# Patient Record
Sex: FEMALE | Race: WHITE | Employment: FULL TIME | URBAN - METROPOLITAN AREA
[De-identification: names, ages, dates, MRNs, and addresses within clinical notes are randomized per-mention and may not be internally consistent; named-entity substitution may affect disease eponyms.]

---

## 2018-03-21 PROBLEM — F32.1 MODERATE SINGLE CURRENT EPISODE OF MAJOR DEPRESSIVE DISORDER (HCC): Status: ACTIVE | Noted: 2018-03-21

## 2019-02-15 PROBLEM — D50.9 IRON DEFICIENCY ANEMIA: Status: ACTIVE | Noted: 2019-02-15

## 2019-03-01 ENCOUNTER — OFFICE VISIT (OUTPATIENT)
Dept: ONCOLOGY | Age: 49
End: 2019-03-01
Payer: COMMERCIAL

## 2019-03-01 VITALS
WEIGHT: 163.3 LBS | SYSTOLIC BLOOD PRESSURE: 116 MMHG | TEMPERATURE: 98.1 F | BODY MASS INDEX: 26.24 KG/M2 | DIASTOLIC BLOOD PRESSURE: 85 MMHG | RESPIRATION RATE: 18 BRPM | HEART RATE: 65 BPM | HEIGHT: 66 IN

## 2019-03-01 DIAGNOSIS — D50.8 IRON DEFICIENCY ANEMIA SECONDARY TO INADEQUATE DIETARY IRON INTAKE: Primary | ICD-10-CM

## 2019-03-01 PROCEDURE — 99244 OFF/OP CNSLTJ NEW/EST MOD 40: CPT | Performed by: INTERNAL MEDICINE

## 2019-03-01 RX ORDER — LANOLIN ALCOHOL/MO/W.PET/CERES
325 CREAM (GRAM) TOPICAL 2 TIMES DAILY
COMMUNITY
End: 2020-12-20 | Stop reason: ALTCHOICE

## 2019-03-01 RX ORDER — M-VIT,TX,IRON,MINS/CALC/FOLIC 27MG-0.4MG
1 TABLET ORAL DAILY
COMMUNITY
End: 2020-12-20 | Stop reason: ALTCHOICE

## 2019-06-05 LAB
ABSOLUTE BASO #: 0.1 K/UL (ref 0–0.1)
ABSOLUTE EOS #: 0.1 K/UL (ref 0.1–0.4)
ABSOLUTE LYMPH #: 1.4 K/UL (ref 0.8–5.2)
ABSOLUTE MONO #: 0.3 K/UL (ref 0.1–0.9)
ABSOLUTE NEUT #: 6.3 K/UL (ref 1.3–9.1)
BASOPHILS RELATIVE PERCENT: 0.7 %
EOSINOPHILS RELATIVE PERCENT: 1.6 %
FERRITIN: 23 NG/ML (ref 11–307)
FOLATE: >25 NG/ML
HCT VFR BLD CALC: 44.5 % (ref 36–48)
HEMOGLOBIN: 15.5 G/DL (ref 12–16)
LYMPHOCYTE %: 16.9 %
MCH RBC QN AUTO: 32.4 PG (ref 27–34)
MCHC RBC AUTO-ENTMCNC: 34.8 G/DL (ref 31–36)
MCV RBC AUTO: 92.9 FL (ref 80–100)
MONOCYTES # BLD: 4.1 %
NEUTROPHILS RELATIVE PERCENT: 76.5 %
PDW BLD-RTO: 13.4 % (ref 10.8–14.8)
PLATELETS: 276 K/UL (ref 150–450)
RBC: 4.79 M/UL (ref 4–5.5)
VITAMIN B-12: 844 PG/ML (ref 180–914)
WBC: 8.3 K/UL (ref 3.7–10.8)

## 2019-06-06 LAB — VITAMIN D 25-HYDROXY: 93 NG/ML

## 2019-06-14 ENCOUNTER — OFFICE VISIT (OUTPATIENT)
Dept: ONCOLOGY | Age: 49
End: 2019-06-14
Payer: COMMERCIAL

## 2019-06-14 VITALS
RESPIRATION RATE: 18 BRPM | HEART RATE: 57 BPM | BODY MASS INDEX: 28.35 KG/M2 | SYSTOLIC BLOOD PRESSURE: 130 MMHG | WEIGHT: 176.4 LBS | DIASTOLIC BLOOD PRESSURE: 74 MMHG | HEIGHT: 66 IN | TEMPERATURE: 97.8 F

## 2019-06-14 DIAGNOSIS — D50.8 IRON DEFICIENCY ANEMIA SECONDARY TO INADEQUATE DIETARY IRON INTAKE: Primary | ICD-10-CM

## 2019-06-14 PROCEDURE — 99214 OFFICE O/P EST MOD 30 MIN: CPT | Performed by: INTERNAL MEDICINE

## 2019-06-14 NOTE — PROGRESS NOTES
Chief Complaint   Patient presents with    Anemia     No concerns. DIAGNOSIS:   1. Iron deficiency anemia  CURRENT THERAPY:  Oral iron  BRIEF CASE HISTORY:   Aleta Siemens is a very pleasant 51 y.o. female who is referred to us for iron deficiency anemia. She has been describing more fatigue. CBC showed mild to moderate anemia further workup showed low ferritin suggesting iron deficiency. She is sent to us for a consultation, she overall feels well. Other than fatigue, she has no complaints. She works long hours. She has been on a course to lose weight. She lost almost 100 pounds over the last year. Her diet is very unbalanced and includes 2 ounces of chicken and some vegetables twice a day, she does not eat anything else. Her periods have been regular. Not excessively heavy. She denies any GI bleeding in her stool occult blood test was negative. INTERIM HISTORY:  The patient is seen for follow-up of anemia. She is doing very well on oral iron. Tolerated fairly well. No side effects. Patient has no weakness or fatigue. No dizziness. No blurred vision. No active bleeding. No melena. No other complaints. PAST MEDICAL HISTORY: has a past medical history of Anemia, unspecified, Anxiety, Edema, Hypertension, Hypothyroidism, Restless legs syndrome (RLS), and Thyroid disease. PAST SURGICAL HISTORY: has a past surgical history that includes Anterior cruciate ligament repair (Left) and Infected skin debridement (Left). CURRENT MEDICATIONS:  has a current medication list which includes the following prescription(s): escitalopram, levothyroxine, triamterene-hydrochlorothiazide, alprazolam, ferrous sulfate, and therapeutic multivitamin-minerals. ALLERGIES:  is allergic to rocephin [ceftriaxone]. FAMILY HISTORY: Negative for any hematological or oncological conditions. SOCIAL HISTORY:  reports that she has never smoked.  She has never used smokeless tobacco. She 06/04/2019     06/04/2019     Lab Results   Component Value Date    FERRITIN 23 06/04/2019       REVIEW OF RADIOLOGICAL RESULTS:     IMPRESSION:   1. Microcytic anemia of iron deficiency  2. thrombocytosis likely secondary to iron deficiency  3. Cause of iron deficiency is likely a combination of her blood loss from her periods. And also nutritional deficiency because of her severe unbalanced diet  PLAN:   Labs were reviewed and discussed with the patient. Patient did very well on oral iron. Tolerating fairly well. No side effects. Repeated lab tests showed full recovery. Hemoglobin is about 15. Iron is normal.    We recommend cutting down on the iron supplements to once a day. We will repeat the labs in about 6 months with iron studies. Sooner if she develops any problems. I offered GI workup but she declined. Patient declined colonoscopy now as her stool occult blood has been negative.   Return visit in 6 months with lab test.                              [address] [facility]                          Cell: [phone]

## 2020-05-20 ENCOUNTER — HOSPITAL ENCOUNTER (OUTPATIENT)
Age: 50
Setting detail: SPECIMEN
Discharge: HOME | End: 2020-05-20
Payer: COMMERCIAL

## 2020-05-20 ENCOUNTER — OFFICE VISIT (OUTPATIENT)
Dept: OBGYN | Age: 50
End: 2020-05-20
Payer: COMMERCIAL

## 2020-05-20 VITALS
HEIGHT: 63 IN | BODY MASS INDEX: 28.7 KG/M2 | SYSTOLIC BLOOD PRESSURE: 130 MMHG | WEIGHT: 162 LBS | DIASTOLIC BLOOD PRESSURE: 82 MMHG

## 2020-05-20 PROCEDURE — A4561 PESSARY RUBBER, ANY TYPE: HCPCS | Performed by: ADVANCED PRACTICE MIDWIFE

## 2020-05-20 PROCEDURE — 87624 HPV HI-RISK TYP POOLED RSLT: CPT

## 2020-05-20 PROCEDURE — 57160 INSERT PESSARY/OTHER DEVICE: CPT | Performed by: ADVANCED PRACTICE MIDWIFE

## 2020-05-20 PROCEDURE — G0145 SCR C/V CYTO,THINLAYER,RESCR: HCPCS

## 2020-05-20 PROCEDURE — 99214 OFFICE O/P EST MOD 30 MIN: CPT | Performed by: ADVANCED PRACTICE MIDWIFE

## 2020-05-20 RX ORDER — ALPRAZOLAM 0.25 MG/1
TABLET ORAL
COMMUNITY
Start: 2020-04-22 | End: 2021-03-19 | Stop reason: SDUPTHER

## 2020-05-20 ASSESSMENT — PATIENT HEALTH QUESTIONNAIRE - PHQ9
SUM OF ALL RESPONSES TO PHQ QUESTIONS 1-9: 2
SUM OF ALL RESPONSES TO PHQ QUESTIONS 1-9: 2
1. LITTLE INTEREST OR PLEASURE IN DOING THINGS: 1
2. FEELING DOWN, DEPRESSED OR HOPELESS: 1
SUM OF ALL RESPONSES TO PHQ9 QUESTIONS 1 & 2: 2

## 2020-05-20 NOTE — PROGRESS NOTES
multivitamin-minerals, escitalopram, buPROPion, traZODone, levothyroxine, triamterene-hydroCHLOROthiazide, furosemide, and ALPRAZolam.    Return in about 2 weeks (around 6/3/2020) for gyn US for uterine prolapse. There are no Patient Instructions on file for this visit. Over 50% of time spent on counseling and care coordination on: see assessment and plan,  She was also counseled on her preventative health maintenance recommendations and follow-up.         FF time: 30 min      Leila Garcia,5/20/2020 8:23 PM

## 2020-05-22 LAB
HPV SAMPLE: NORMAL
HPV, GENOTYPE 16: NOT DETECTED
HPV, GENOTYPE 18: NOT DETECTED
HPV, HIGH RISK OTHER: NOT DETECTED
HPV, INTERPRETATION: NORMAL
SPECIMEN DESCRIPTION: NORMAL

## 2020-05-28 ENCOUNTER — OFFICE VISIT (OUTPATIENT)
Dept: OBGYN | Age: 50
End: 2020-05-28
Payer: COMMERCIAL

## 2020-05-28 VITALS
WEIGHT: 170.2 LBS | DIASTOLIC BLOOD PRESSURE: 85 MMHG | SYSTOLIC BLOOD PRESSURE: 137 MMHG | HEIGHT: 65 IN | BODY MASS INDEX: 28.36 KG/M2

## 2020-05-28 PROCEDURE — 99213 OFFICE O/P EST LOW 20 MIN: CPT | Performed by: OBSTETRICS & GYNECOLOGY

## 2020-05-28 NOTE — PROGRESS NOTES
Yearly:  4-    Last pap: 4-    Last HPV: never    Chief Complaint   Patient presents with    Uterine Prolapse     surgical discussion          NURSE: NISH    PE:  Vital Signs  Blood pressure 137/85, height 5' 5\" (1.651 m), weight 170 lb 3.2 oz (77.2 kg), last menstrual period 05/14/2020, not currently breastfeeding. Labs:    No results found for this visit on 05/28/20. HPI: The patient is here for an opinion regarding her pelvic prolapse. This has been a chronic problem that has been very gradually worsening since the delivery of her child. The patient now is having worsening urinary incontinence as well. She had seen Hiren Sotelo nurse midwife whom had done a Pap smear ordered an ultrasound and placed a Gellhorn pessary #6. The patient could not tolerate the pessary and wishes to have's surgical treatment. The Pap smear is still pending at this time and ultrasound did show two small uterine fibroids about 1 cm each endometrial thickness is 8 mm in the normal range for a premenopausal woman. No PT denies fever, chills, nausea and vomiting       Objective: Abdomen: There appears to be a left inguinal hernia in the lower abdomen no masses or organomegaly appreciated otherwise. Pelvic Exam: GENITAL/URINARY:  External Genitalia:  General appearance; normal, Hair distribution; normal, Lesions absent  Vagina and uterus: The patient has complete procidentia of the uterus along with complete vaginal eversion. There is chronic changes to the cervix from this problem but there is no evidence of malignancy. Assessment and Plan: The patient is employed as a nurse's aide. She will have to continue lifting as part of her job. Due to patient's young age, her vocation, and the severe degree of complete procidentia will make a referral for evaluation and pelvic floor surgery. Diagnosis Orders   1. Procidentia of uterus               I am having Misael Castaneda.  Mely

## 2020-05-29 LAB — CYTOLOGY REPORT: NORMAL

## 2020-07-09 ENCOUNTER — HOSPITAL ENCOUNTER (OUTPATIENT)
Age: 50
Setting detail: SPECIMEN
Discharge: HOME | End: 2020-07-09
Payer: COMMERCIAL

## 2020-07-09 PROCEDURE — U0003 INFECTIOUS AGENT DETECTION BY NUCLEIC ACID (DNA OR RNA); SEVERE ACUTE RESPIRATORY SYNDROME CORONAVIRUS 2 (SARS-COV-2) (CORONAVIRUS DISEASE [COVID-19]), AMPLIFIED PROBE TECHNIQUE, MAKING USE OF HIGH THROUGHPUT TECHNOLOGIES AS DESCRIBED BY CMS-2020-01-R: HCPCS

## 2020-07-15 LAB — SARS-COV-2, NAA: NOT DETECTED

## 2020-07-16 ENCOUNTER — HOSPITAL ENCOUNTER (OUTPATIENT)
Dept: LAB | Age: 50
Discharge: HOME | End: 2020-07-16
Payer: COMMERCIAL

## 2020-07-16 ENCOUNTER — HOSPITAL ENCOUNTER (OUTPATIENT)
Dept: NON INVASIVE DIAGNOSTICS | Age: 50
Discharge: HOME | End: 2020-07-16
Payer: COMMERCIAL

## 2020-07-16 LAB
ANION GAP SERPL CALCULATED.3IONS-SCNC: 8 MMOL/L (ref 9–17)
BUN BLDV-MCNC: 22 MG/DL (ref 6–20)
BUN/CREAT BLD: 27 (ref 9–20)
CALCIUM SERPL-MCNC: 9.2 MG/DL (ref 8.6–10.4)
CHLORIDE BLD-SCNC: 101 MMOL/L (ref 98–107)
CO2: 30 MMOL/L (ref 20–31)
CREAT SERPL-MCNC: 0.81 MG/DL (ref 0.5–0.9)
EKG ATRIAL RATE: 66 BPM
EKG P AXIS: 56 DEGREES
EKG P-R INTERVAL: 152 MS
EKG Q-T INTERVAL: 412 MS
EKG QRS DURATION: 94 MS
EKG QTC CALCULATION (BAZETT): 431 MS
EKG R AXIS: 54 DEGREES
EKG T AXIS: 43 DEGREES
EKG VENTRICULAR RATE: 66 BPM
GFR AFRICAN AMERICAN: >60 ML/MIN
GFR NON-AFRICAN AMERICAN: >60 ML/MIN
GFR SERPL CREATININE-BSD FRML MDRD: ABNORMAL ML/MIN/{1.73_M2}
GFR SERPL CREATININE-BSD FRML MDRD: ABNORMAL ML/MIN/{1.73_M2}
GLUCOSE BLD-MCNC: 104 MG/DL (ref 70–99)
HCT VFR BLD CALC: 45.1 % (ref 36.3–47.1)
HEMOGLOBIN: 14.5 G/DL (ref 11.9–15.1)
MCH RBC QN AUTO: 33.2 PG (ref 25.2–33.5)
MCHC RBC AUTO-ENTMCNC: 32.2 G/DL (ref 28.4–34.8)
MCV RBC AUTO: 103.2 FL (ref 82.6–102.9)
NRBC AUTOMATED: 0 PER 100 WBC
PDW BLD-RTO: 12.5 % (ref 11.8–14.4)
PLATELET # BLD: 296 K/UL (ref 138–453)
PMV BLD AUTO: 9.2 FL (ref 8.1–13.5)
POTASSIUM SERPL-SCNC: 4.9 MMOL/L (ref 3.7–5.3)
RBC # BLD: 4.37 M/UL (ref 3.95–5.11)
SODIUM BLD-SCNC: 139 MMOL/L (ref 135–144)
WBC # BLD: 5.2 K/UL (ref 3.5–11.3)

## 2020-07-16 PROCEDURE — 85027 COMPLETE CBC AUTOMATED: CPT

## 2020-07-16 PROCEDURE — 80048 BASIC METABOLIC PNL TOTAL CA: CPT

## 2020-07-16 PROCEDURE — 36415 COLL VENOUS BLD VENIPUNCTURE: CPT

## 2020-07-16 PROCEDURE — 93005 ELECTROCARDIOGRAM TRACING: CPT | Performed by: OBSTETRICS & GYNECOLOGY

## 2020-07-16 PROCEDURE — 93010 ELECTROCARDIOGRAM REPORT: CPT | Performed by: INTERNAL MEDICINE

## 2020-07-17 ENCOUNTER — HOSPITAL ENCOUNTER (OUTPATIENT)
Age: 50
Discharge: HOME | End: 2020-07-17
Payer: COMMERCIAL

## 2020-07-17 PROCEDURE — 87641 MR-STAPH DNA AMP PROBE: CPT

## 2020-07-17 PROCEDURE — 87081 CULTURE SCREEN ONLY: CPT

## 2020-07-20 LAB
CULTURE: NORMAL
Lab: NORMAL
SPECIMEN DESCRIPTION: NORMAL

## 2020-07-23 ENCOUNTER — HOSPITAL ENCOUNTER (OUTPATIENT)
Dept: LAB | Age: 50
Setting detail: SPECIMEN
Discharge: HOME | End: 2020-07-23
Payer: COMMERCIAL

## 2020-07-23 PROCEDURE — U0003 INFECTIOUS AGENT DETECTION BY NUCLEIC ACID (DNA OR RNA); SEVERE ACUTE RESPIRATORY SYNDROME CORONAVIRUS 2 (SARS-COV-2) (CORONAVIRUS DISEASE [COVID-19]), AMPLIFIED PROBE TECHNIQUE, MAKING USE OF HIGH THROUGHPUT TECHNOLOGIES AS DESCRIBED BY CMS-2020-01-R: HCPCS

## 2020-07-28 LAB — SARS-COV-2, NAA: NOT DETECTED

## 2020-07-29 ENCOUNTER — TELEPHONE (OUTPATIENT)
Dept: FAMILY MEDICINE CLINIC | Age: 50
End: 2020-07-29

## 2020-08-28 ENCOUNTER — HOSPITAL ENCOUNTER (OUTPATIENT)
Dept: VASCULAR LAB | Age: 50
Discharge: HOME | End: 2020-08-30
Payer: COMMERCIAL

## 2020-08-28 ENCOUNTER — HOSPITAL ENCOUNTER (OUTPATIENT)
Age: 50
Discharge: HOME | End: 2020-08-28
Payer: COMMERCIAL

## 2020-08-28 LAB
ABSOLUTE EOS #: 0.1 K/UL (ref 0–0.44)
ABSOLUTE IMMATURE GRANULOCYTE: <0.03 K/UL (ref 0–0.3)
ABSOLUTE LYMPH #: 1.32 K/UL (ref 1.1–3.7)
ABSOLUTE MONO #: 0.34 K/UL (ref 0.1–1.2)
BASOPHILS # BLD: 2 % (ref 0–2)
BASOPHILS ABSOLUTE: 0.09 K/UL (ref 0–0.2)
DIFFERENTIAL TYPE: NORMAL
EOSINOPHILS RELATIVE PERCENT: 2 % (ref 1–4)
HCT VFR BLD CALC: 43.2 % (ref 36.3–47.1)
HEMOGLOBIN: 14.1 G/DL (ref 11.9–15.1)
IMMATURE GRANULOCYTES: 0 %
LYMPHOCYTES # BLD: 29 % (ref 24–43)
MCH RBC QN AUTO: 32.3 PG (ref 25.2–33.5)
MCHC RBC AUTO-ENTMCNC: 32.6 G/DL (ref 28.4–34.8)
MCV RBC AUTO: 99.1 FL (ref 82.6–102.9)
MONOCYTES # BLD: 8 % (ref 3–12)
NRBC AUTOMATED: 0 PER 100 WBC
PDW BLD-RTO: 12.5 % (ref 11.8–14.4)
PLATELET # BLD: 308 K/UL (ref 138–453)
PLATELET ESTIMATE: NORMAL
PMV BLD AUTO: 9.1 FL (ref 8.1–13.5)
RBC # BLD: 4.36 M/UL (ref 3.95–5.11)
RBC # BLD: NORMAL 10*6/UL
SEG NEUTROPHILS: 59 % (ref 36–65)
SEGMENTED NEUTROPHILS ABSOLUTE COUNT: 2.68 K/UL (ref 1.5–8.1)
URIC ACID: 6.6 MG/DL (ref 2.4–5.7)
WBC # BLD: 4.5 K/UL (ref 3.5–11.3)
WBC # BLD: NORMAL 10*3/UL

## 2020-08-28 PROCEDURE — 84550 ASSAY OF BLOOD/URIC ACID: CPT

## 2020-08-28 PROCEDURE — 85025 COMPLETE CBC W/AUTO DIFF WBC: CPT

## 2020-08-28 PROCEDURE — 36415 COLL VENOUS BLD VENIPUNCTURE: CPT

## 2020-08-28 PROCEDURE — 93971 EXTREMITY STUDY: CPT

## 2020-11-04 ENCOUNTER — OFFICE VISIT (OUTPATIENT)
Dept: OBGYN | Age: 50
End: 2020-11-04
Payer: COMMERCIAL

## 2020-11-04 VITALS
DIASTOLIC BLOOD PRESSURE: 80 MMHG | BODY MASS INDEX: 33.79 KG/M2 | HEIGHT: 65 IN | WEIGHT: 202.8 LBS | SYSTOLIC BLOOD PRESSURE: 122 MMHG

## 2020-11-04 PROCEDURE — 99213 OFFICE O/P EST LOW 20 MIN: CPT | Performed by: ADVANCED PRACTICE MIDWIFE

## 2020-11-04 RX ORDER — PHENTERMINE HYDROCHLORIDE 37.5 MG/1
37.5 TABLET ORAL
Qty: 30 TABLET | Refills: 0 | Status: SHIPPED | OUTPATIENT
Start: 2020-11-04 | End: 2020-12-04

## 2020-11-04 ASSESSMENT — PATIENT HEALTH QUESTIONNAIRE - PHQ9
2. FEELING DOWN, DEPRESSED OR HOPELESS: 0
SUM OF ALL RESPONSES TO PHQ QUESTIONS 1-9: 0
SUM OF ALL RESPONSES TO PHQ QUESTIONS 1-9: 0
1. LITTLE INTEREST OR PLEASURE IN DOING THINGS: 0
SUM OF ALL RESPONSES TO PHQ9 QUESTIONS 1 & 2: 0
SUM OF ALL RESPONSES TO PHQ QUESTIONS 1-9: 0

## 2020-11-04 NOTE — PROGRESS NOTES
PROBLEM VISIT     Date of service: 2020    Marlon Hernandez  Is a 53 y.o. single female    PT's PCP is: Justyna Marti MD     : 1970                                             Subjective:       Patient's last menstrual period was 07/15/2020 (approximate). OB History    Para Term  AB Living   1 1       1   SAB TAB Ectopic Molar Multiple Live Births             1      # Outcome Date GA Lbr Navneet/2nd Weight Sex Delivery Anes PTL Lv   1 Para 98 40w0d  8 lb 4 oz (3.742 kg) M Vag-Spont   SHAWNA        Social History     Tobacco Use   Smoking Status Never Smoker   Smokeless Tobacco Never Used        Social History     Substance and Sexual Activity   Alcohol Use No    Alcohol/week: 0.0 standard drinks    Comment: rarely       Social History     Substance and Sexual Activity   Sexual Activity Not Currently       Allergies: Rocephin [ceftriaxone]    Chief Complaint   Patient presents with    Obesity     Concerns with weight , discuss otions , Adipex. Last Yearly:  2020    Last pap: 2020    Last HPV: 2020    PE:  Vital Signs  Blood pressure 122/80, height 5' 5\" (1.651 m), weight 202 lb 12.8 oz (92 kg), last menstrual period 07/15/2020, not currently breastfeeding. Estimated body mass index is 33.75 kg/m² as calculated from the following:    Height as of this encounter: 5' 5\" (1.651 m). Weight as of this encounter: 202 lb 12.8 oz (92 kg). NURSE: Cooper MOHR    HPI: patient has had a difficult time losing weight since recovering from pelvic floor surgery, (robotic assisted supracervical hysterectomy and BS, sacral perineopexy, A&P repair, cystourethroscopy, lynx retropubic sling. Patient still has both ovaries.)  Has attempted diet and walking without success.         PT denies fever, chills, nausea and vomiting       Objective: No acute distress  Excellent communications  Well-nourished    Results reviewed today:    No results found for this visit on 11/04/20. Assessment and Plan          Diagnosis Orders   1. Obesity (BMI 30-39.9)  phentermine (ADIPEX-P) 37.5 MG tablet       patient is currently stable on lexapro and sporadic prn use of xanax as well as trazadone to sleep. Uses dyazide \"for fluid retention\". Currently bp is stable, reviewed case with Dr. Dylan Phelps and will add adipex medical therapy. Patient is to continue her diet and exercise program.  Reviewed danger signs with patient of serotonin syndrome. I am having Ernesto Molina. Sostaric start on phentermine. I am also having her maintain her ferrous sulfate, therapeutic multivitamin-minerals, escitalopram, traZODone, furosemide, ALPRAZolam, triamterene-hydroCHLOROthiazide, levothyroxine, and allopurinol. Return in about 1 month (around 12/4/2020) for adipex/ wt check. There are no Patient Instructions on file for this visit. Over 50% of time spent on counseling and care coordination on: see assessment and plan,  She was also counseled on her preventative health maintenance recommendations and follow-up.         FF time: 10 min      Buck Garcia,11/5/2020 8:17 PM

## 2020-12-02 ENCOUNTER — OFFICE VISIT (OUTPATIENT)
Dept: OBGYN | Age: 50
End: 2020-12-02
Payer: COMMERCIAL

## 2020-12-02 VITALS
SYSTOLIC BLOOD PRESSURE: 120 MMHG | HEIGHT: 65 IN | BODY MASS INDEX: 31.16 KG/M2 | DIASTOLIC BLOOD PRESSURE: 80 MMHG | WEIGHT: 187 LBS

## 2020-12-02 PROCEDURE — 99212 OFFICE O/P EST SF 10 MIN: CPT | Performed by: ADVANCED PRACTICE MIDWIFE

## 2020-12-02 RX ORDER — PHENTERMINE HYDROCHLORIDE 37.5 MG/1
37.5 TABLET ORAL
Qty: 30 TABLET | Refills: 0 | Status: SHIPPED | OUTPATIENT
Start: 2020-12-02 | End: 2021-01-01

## 2020-12-02 NOTE — PROGRESS NOTES
PROBLEM VISIT     Date of service: 2020    Brittnee Friend  Is a 53 y.o. single female    PT's PCP is: Phyllis Garcia MD     : 1970                                             Subjective:       Patient's last menstrual period was 07/15/2020 (approximate). OB History    Para Term  AB Living   1 1       1   SAB TAB Ectopic Molar Multiple Live Births             1      # Outcome Date GA Lbr Navneet/2nd Weight Sex Delivery Anes PTL Lv   1 Para 98 40w0d  8 lb 4 oz (3.742 kg) M Vag-Spont   SHAWNA        Social History     Tobacco Use   Smoking Status Never Smoker   Smokeless Tobacco Never Used        Social History     Substance and Sexual Activity   Alcohol Use No    Alcohol/week: 0.0 standard drinks    Comment: rarely       Social History     Substance and Sexual Activity   Sexual Activity Not Currently       Allergies: Rocephin [ceftriaxone]    Chief Complaint   Patient presents with    Obesity     Medication follow up, patient has almost completed first month of Adipex. Previous weight 202 lb on 2020. Last Yearly:  2020    Last pap: 2020    Last HPV: 2020    PE:  Vital Signs  Blood pressure 120/80, height 5' 5\" (1.651 m), weight 187 lb (84.8 kg), last menstrual period 07/15/2020, not currently breastfeeding. Estimated body mass index is 31.12 kg/m² as calculated from the following:    Height as of this encounter: 5' 5\" (1.651 m). Weight as of this encounter: 187 lb (84.8 kg). NURSE: Cooper MOHR    HPI: here for weight management, has been calorie counting and running, down 15 lbs      PT denies fever, chills, nausea and vomiting     Denies chest pain, dyspnea, anxiety or insomnia  Objective: No acute distress  Excellent communications  Well-nourished    Results reviewed today:    No results found for this visit on 20. Assessment and Plan          Diagnosis Orders   1.  Obesity (BMI 30-39.9)  phentermine (ADIPEX-P) 37.5 MG tablet       continue diet and exercise, will continue adipex therapy with consult Dr. Rocío Loaiza      I am having Mercedes Boone start on phentermine. I am also having her maintain her ferrous sulfate, therapeutic multivitamin-minerals, escitalopram, traZODone, furosemide, ALPRAZolam, allopurinol, phentermine, levothyroxine, and triamterene-hydroCHLOROthiazide. Return in about 1 month (around 1/2/2021) for adipex/ wt check. There are no Patient Instructions on file for this visit. Over 50% of time spent on counseling and care coordination on: see assessment and plan,  She was also counseled on her preventative health maintenance recommendations and follow-up.         FF time: 10 min      Aisha Garcia,12/2/2020 10:59 AM

## 2020-12-20 ENCOUNTER — HOSPITAL ENCOUNTER (EMERGENCY)
Age: 50
Discharge: HOME | End: 2020-12-20
Attending: EMERGENCY MEDICINE
Payer: COMMERCIAL

## 2020-12-20 ENCOUNTER — APPOINTMENT (OUTPATIENT)
Dept: GENERAL RADIOLOGY | Age: 50
End: 2020-12-20
Payer: COMMERCIAL

## 2020-12-20 VITALS
OXYGEN SATURATION: 99 % | DIASTOLIC BLOOD PRESSURE: 98 MMHG | TEMPERATURE: 97.6 F | SYSTOLIC BLOOD PRESSURE: 137 MMHG | RESPIRATION RATE: 19 BRPM | HEART RATE: 96 BPM

## 2020-12-20 PROBLEM — M79.10 MYALGIA: Status: ACTIVE | Noted: 2020-12-20

## 2020-12-20 PROBLEM — R06.02 SHORTNESS OF BREATH: Status: ACTIVE | Noted: 2020-12-20

## 2020-12-20 PROBLEM — U07.1 LAB TEST POSITIVE FOR DETECTION OF COVID-19 VIRUS: Status: ACTIVE | Noted: 2020-12-20

## 2020-12-20 LAB
ABSOLUTE EOS #: 0.04 K/UL (ref 0–0.44)
ABSOLUTE IMMATURE GRANULOCYTE: 0.04 K/UL (ref 0–0.3)
ABSOLUTE LYMPH #: 0.94 K/UL (ref 1.1–3.7)
ABSOLUTE MONO #: 0.32 K/UL (ref 0.1–1.2)
ANION GAP SERPL CALCULATED.3IONS-SCNC: 16 MMOL/L (ref 9–17)
BASOPHILS # BLD: 1 % (ref 0–2)
BASOPHILS ABSOLUTE: 0.04 K/UL (ref 0–0.2)
BUN BLDV-MCNC: 17 MG/DL (ref 6–20)
BUN/CREAT BLD: 19 (ref 9–20)
CALCIUM SERPL-MCNC: 9.7 MG/DL (ref 8.6–10.4)
CHLORIDE BLD-SCNC: 95 MMOL/L (ref 98–107)
CO2: 27 MMOL/L (ref 20–31)
CREAT SERPL-MCNC: 0.88 MG/DL (ref 0.5–0.9)
DIFFERENTIAL TYPE: ABNORMAL
EOSINOPHILS RELATIVE PERCENT: 1 % (ref 1–4)
GFR AFRICAN AMERICAN: >60 ML/MIN
GFR NON-AFRICAN AMERICAN: >60 ML/MIN
GFR SERPL CREATININE-BSD FRML MDRD: ABNORMAL ML/MIN/{1.73_M2}
GFR SERPL CREATININE-BSD FRML MDRD: ABNORMAL ML/MIN/{1.73_M2}
GLUCOSE BLD-MCNC: 79 MG/DL (ref 70–99)
HCT VFR BLD CALC: 50.1 % (ref 36.3–47.1)
HEMOGLOBIN: 16.7 G/DL (ref 11.9–15.1)
IMMATURE GRANULOCYTES: 1 %
LYMPHOCYTES # BLD: 26 % (ref 24–43)
MCH RBC QN AUTO: 31.3 PG (ref 25.2–33.5)
MCHC RBC AUTO-ENTMCNC: 33.3 G/DL (ref 28.4–34.8)
MCV RBC AUTO: 94 FL (ref 82.6–102.9)
MONOCYTES # BLD: 9 % (ref 3–12)
MORPHOLOGY: ABNORMAL
NRBC AUTOMATED: 0 PER 100 WBC
PDW BLD-RTO: 12.4 % (ref 11.8–14.4)
PLATELET # BLD: 200 K/UL (ref 138–453)
PLATELET ESTIMATE: ABNORMAL
PMV BLD AUTO: 9.8 FL (ref 8.1–13.5)
POTASSIUM SERPL-SCNC: 3.3 MMOL/L (ref 3.7–5.3)
RBC # BLD: 5.33 M/UL (ref 3.95–5.11)
RBC # BLD: ABNORMAL 10*6/UL
SEG NEUTROPHILS: 62 % (ref 36–65)
SEGMENTED NEUTROPHILS ABSOLUTE COUNT: 2.22 K/UL (ref 1.5–8.1)
SODIUM BLD-SCNC: 138 MMOL/L (ref 135–144)
WBC # BLD: 3.6 K/UL (ref 3.5–11.3)
WBC # BLD: ABNORMAL 10*3/UL

## 2020-12-20 PROCEDURE — 96372 THER/PROPH/DIAG INJ SC/IM: CPT

## 2020-12-20 PROCEDURE — 96374 THER/PROPH/DIAG INJ IV PUSH: CPT

## 2020-12-20 PROCEDURE — 6370000000 HC RX 637 (ALT 250 FOR IP): Performed by: EMERGENCY MEDICINE

## 2020-12-20 PROCEDURE — 71045 X-RAY EXAM CHEST 1 VIEW: CPT

## 2020-12-20 PROCEDURE — 36415 COLL VENOUS BLD VENIPUNCTURE: CPT

## 2020-12-20 PROCEDURE — 2580000003 HC RX 258: Performed by: EMERGENCY MEDICINE

## 2020-12-20 PROCEDURE — 80048 BASIC METABOLIC PNL TOTAL CA: CPT

## 2020-12-20 PROCEDURE — 85025 COMPLETE CBC W/AUTO DIFF WBC: CPT

## 2020-12-20 PROCEDURE — 99283 EMERGENCY DEPT VISIT LOW MDM: CPT

## 2020-12-20 PROCEDURE — 6360000002 HC RX W HCPCS: Performed by: EMERGENCY MEDICINE

## 2020-12-20 RX ORDER — LORAZEPAM 2 MG/ML
0.5 INJECTION INTRAMUSCULAR ONCE
Status: COMPLETED | OUTPATIENT
Start: 2020-12-20 | End: 2020-12-20

## 2020-12-20 RX ORDER — TERBUTALINE SULFATE 1 MG/ML
0.25 INJECTION, SOLUTION SUBCUTANEOUS ONCE
Status: COMPLETED | OUTPATIENT
Start: 2020-12-20 | End: 2020-12-20

## 2020-12-20 RX ORDER — LORAZEPAM 1 MG/1
1 TABLET ORAL EVERY 8 HOURS PRN
Qty: 20 TABLET | Refills: 0 | Status: SHIPPED | OUTPATIENT
Start: 2020-12-20 | End: 2021-01-19

## 2020-12-20 RX ORDER — HYDROCODONE BITARTRATE AND ACETAMINOPHEN 5; 325 MG/1; MG/1
1 TABLET ORAL ONCE
Status: COMPLETED | OUTPATIENT
Start: 2020-12-20 | End: 2020-12-20

## 2020-12-20 RX ORDER — ALBUTEROL SULFATE 4 MG/1
4 TABLET ORAL 3 TIMES DAILY
Qty: 30 TABLET | Refills: 0 | Status: SHIPPED | OUTPATIENT
Start: 2020-12-20 | End: 2021-02-22

## 2020-12-20 RX ADMIN — TERBUTALINE SULFATE 0.25 MG: 1 INJECTION, SOLUTION SUBCUTANEOUS at 13:44

## 2020-12-20 RX ADMIN — HYDROCODONE BITARTRATE AND ACETAMINOPHEN 1 TABLET: 5; 325 TABLET ORAL at 13:47

## 2020-12-20 RX ADMIN — SODIUM CHLORIDE 999 ML: 9 INJECTION, SOLUTION INTRAVENOUS at 13:55

## 2020-12-20 RX ADMIN — LORAZEPAM 0.5 MG: 2 INJECTION INTRAMUSCULAR; INTRAVENOUS at 13:44

## 2020-12-20 ASSESSMENT — PAIN SCALES - GENERAL: PAINLEVEL_OUTOF10: 9

## 2020-12-20 NOTE — ED PROVIDER NOTES
[facility] ED  EMERGENCY DEPARTMENT ENCOUNTER      Pt Dean Almaraz  MRN: [id number]  Birthdate 1970  Date of evaluation: 12/20/2020  Provider: Kamlesh Perkins MD    [address] [place] [address]     Chief Complaint   Patient presents with    Shortness of Breath     covid +         HISTORY OF PRESENT ILLNESS   (Location/Symptom, Timing/Onset, Context/Setting, Quality, Duration, Modifying Factors, Severity)  Note limiting factors. HPI the patient feels considerably better by the time she was being discharged. She will be discharged on Ativan and terbutaline. She understands she can always come back to the emergency department if she develops other problems or becomes short of breath again    Nursing Notes were reviewed. REVIEW OF SYSTEMS    (2-9 systems for level 4, 10 or more for level 5)     Review of Systems   Constitutional: Positive for activity change and fatigue. Negative for fever. HENT: Positive for congestion and sore throat. Negative for trouble swallowing. Respiratory: Positive for cough and shortness of breath. Cardiovascular: Negative for chest pain, palpitations and leg swelling. Gastrointestinal: Negative for abdominal distention, abdominal pain, constipation, diarrhea, nausea and vomiting. Genitourinary: Negative. Musculoskeletal: Negative for back pain and neck pain. Skin: Negative. Neurological: Negative for dizziness, light-headedness and headaches. Psychiatric/Behavioral: The patient is nervous/anxious.                MEDICAL HISTORY     Past Medical History:   Diagnosis Date    Anemia, unspecified     Anxiety     Edema     Hypertension     Hypothyroidism     Restless legs syndrome (RLS)     Thyroid disease          SURGICAL HISTORY       Past Surgical History:   Procedure Laterality Date    ANTERIOR CRUCIATE LIGAMENT REPAIR Left     HYSTERECTOMY, VAGINAL      07/2020 robotic, lap supracervical hyst and BS, perineopexy, A&P repair, cysto, Lynx retropubic slin    INFECTED SKIN DEBRIDEMENT Left     Left third finger         CURRENT MEDICATIONS       Previous Medications    ALBUTEROL SULFATE HFA (VENTOLIN HFA) 108 (90 BASE) MCG/ACT INHALER    Inhale 2 puffs into the lungs 4 times daily as needed for Wheezing    ALPRAZOLAM (XANAX) 0.25 MG TABLET    TAKE 1 TABLET BY MOUTH TWICE DAILY AS NEEDED FOR ANXIETY FOR 30 DAYS    BENZONATATE (TESSALON) 200 MG CAPSULE    Take 1 capsule by mouth 3 times daily as needed for Cough    ESCITALOPRAM (LEXAPRO) 10 MG TABLET    TAKE 1 & 1/2 (ONE & ONE-HALF) TABLETS BY MOUTH ONCE DAILY    FUROSEMIDE (LASIX) 20 MG TABLET    Take 1 tablet by mouth daily as needed (weight gain more than a lb)    LEVOTHYROXINE (EUTHYROX) 88 MCG TABLET    Take 1 tablet by mouth once daily    PHENTERMINE (ADIPEX-P) 37.5 MG TABLET    Take 1 tablet by mouth every morning (before breakfast) for 30 days.     TRAZODONE (DESYREL) 50 MG TABLET    100 mg     TRIAMTERENE-HYDROCHLOROTHIAZIDE (DYAZIDE) 37.5-25 MG PER CAPSULE    Take 1 capsule by mouth once daily       ALLERGIES     Rocephin [ceftriaxone]    FAMILY HISTORY       Family History   Adopted: Yes          SOCIAL HISTORY       Social History     Socioeconomic History    Marital status: Single     Spouse name: None    Number of children: None    Years of education: None    Highest education level: None   Occupational History    Occupation: nurse aid   Social Needs    Financial resource strain: None    Food insecurity     Worry: None     Inability: None    Transportation needs     Medical: None     Non-medical: None   Tobacco Use    Smoking status: Never Smoker    Smokeless tobacco: Never Used   Substance and Sexual Activity    Alcohol use: No     Alcohol/week: 0.0 standard drinks     Comment: rarely    Drug use: No    Sexual activity: Not Currently   Lifestyle    Physical activity     Days per week: None     Minutes per session: None    Stress: None   Relationships    Social signs or Co-signs this chart in the absence of a cardiologist.      RADIOLOGY:   Non-plain film images such as CT, Ultrasound and MRI are read by the radiologist. Plain radiographic images are visualized and preliminarily interpreted by the emergency physician     Interpretation per the Radiologist below, if available at the time of this note:    XR CHEST 1 VIEW   Final Result   No acute cardiopulmonary pathology. ED BEDSIDE ULTRASOUND:   Performed by ED Physician - none    LABS:  Labs Reviewed   CBC WITH AUTO DIFFERENTIAL - Abnormal; Notable for the following components:       Result Value    RBC 5.33 (*)     Hemoglobin 16.7 (*)     Hematocrit 50.1 (*)     Immature Granulocytes 1 (*)     Absolute Lymph # 0.94 (*)     All other components within normal limits   BASIC METABOLIC PANEL - Abnormal; Notable for the following components:    Potassium 3.3 (*)     Chloride 95 (*)     All other components within normal limits   URINALYSIS WITH MICROSCOPIC       EMERGENCY DEPARTMENT COURSE and DIFFERENTIAL DIAGNOSIS/MDM:   Vitals:    Vitals:    12/20/20 1157   BP: (!) 137/98   Pulse: 96   Resp: 19   Temp: 97.6 °F (36.4 °C)   SpO2: 99%           MDM  Number of Diagnoses or Management Options   the patient will be discharged on Ativan and albuterol. Patient understands she can return to the emergency department at any time. CONSULTS:  None    PROCEDURES:  Unless otherwise noted below, none     Procedures    FINAL IMPRESSION      1. Lab test positive for detection of COVID-19 virus    2. Shortness of breath    3. Anxiety state    4. Myalgia          DISPOSITION/PLAN   DISPOSITION Decision To Discharge 12/20/2020 02:43:11 PM      PATIENT REFERRED TO:  Lio Love MD  [address]  [phone]      Recheck in 5 to 7 days.       DISCHARGE MEDICATIONS:  New Prescriptions    ALBUTEROL (PROVENTIL) 4 MG TABLET    Take 1 tablet by mouth 3 times daily    LORAZEPAM (ATIVAN) 1 MG TABLET    Take 1 tablet by mouth every 8 hours as needed for Anxiety for up to 30 days.               (Please note that portions ofthis note were completed with a voice recognition program.  Efforts were made to edit the dictations but occasionally words are mis-transcribed.)      [place] MD (electronically signed)  Attending Emergency Physician          Donna Burkett MD  12/20/20 [id number]       Donna Burkett MD  01/14/21 [id number]

## 2020-12-20 NOTE — ED NOTES
Pt refused to put gown on, stated that she just wanted to leave her layers of shirts/sweatshirts on.  This writer agreeable to leave short sleeve shirt on due to IV placement     Latasha Jansen RN  12/20/20 2311

## 2020-12-23 ENCOUNTER — APPOINTMENT (OUTPATIENT)
Dept: CT IMAGING | Age: 50
End: 2020-12-23
Payer: COMMERCIAL

## 2020-12-23 ENCOUNTER — HOSPITAL ENCOUNTER (EMERGENCY)
Age: 50
Discharge: HOME | End: 2020-12-23
Payer: COMMERCIAL

## 2020-12-23 VITALS
RESPIRATION RATE: 13 BRPM | SYSTOLIC BLOOD PRESSURE: 129 MMHG | DIASTOLIC BLOOD PRESSURE: 66 MMHG | OXYGEN SATURATION: 99 % | TEMPERATURE: 98.3 F | HEART RATE: 74 BPM

## 2020-12-23 LAB
ALBUMIN SERPL-MCNC: 4.3 G/DL (ref 3.5–5.2)
ALBUMIN/GLOBULIN RATIO: 1.3 (ref 1–2.5)
ALP BLD-CCNC: 41 U/L (ref 35–104)
ALT SERPL-CCNC: 37 U/L (ref 5–33)
ANION GAP SERPL CALCULATED.3IONS-SCNC: 11 MMOL/L (ref 9–17)
AST SERPL-CCNC: 35 U/L
BILIRUB SERPL-MCNC: 0.43 MG/DL (ref 0.3–1.2)
BNP INTERPRETATION: NORMAL
BUN BLDV-MCNC: 11 MG/DL (ref 6–20)
BUN/CREAT BLD: 14 (ref 9–20)
CALCIUM SERPL-MCNC: 9.6 MG/DL (ref 8.6–10.4)
CHLORIDE BLD-SCNC: 99 MMOL/L (ref 98–107)
CO2: 27 MMOL/L (ref 20–31)
CREAT SERPL-MCNC: 0.81 MG/DL (ref 0.5–0.9)
EKG ATRIAL RATE: 88 BPM
EKG P AXIS: 64 DEGREES
EKG P-R INTERVAL: 144 MS
EKG Q-T INTERVAL: 362 MS
EKG QRS DURATION: 88 MS
EKG QTC CALCULATION (BAZETT): 438 MS
EKG R AXIS: 56 DEGREES
EKG T AXIS: 39 DEGREES
EKG VENTRICULAR RATE: 88 BPM
GFR AFRICAN AMERICAN: >60 ML/MIN
GFR NON-AFRICAN AMERICAN: >60 ML/MIN
GFR SERPL CREATININE-BSD FRML MDRD: ABNORMAL ML/MIN/{1.73_M2}
GFR SERPL CREATININE-BSD FRML MDRD: ABNORMAL ML/MIN/{1.73_M2}
GLUCOSE BLD-MCNC: 120 MG/DL (ref 70–99)
HCT VFR BLD CALC: 50.3 % (ref 36.3–47.1)
HEMOGLOBIN: 16.7 G/DL (ref 11.9–15.1)
MCH RBC QN AUTO: 31.4 PG (ref 25.2–33.5)
MCHC RBC AUTO-ENTMCNC: 33.2 G/DL (ref 28.4–34.8)
MCV RBC AUTO: 94.5 FL (ref 82.6–102.9)
NRBC AUTOMATED: 0 PER 100 WBC
PDW BLD-RTO: 12.8 % (ref 11.8–14.4)
PLATELET # BLD: 190 K/UL (ref 138–453)
PMV BLD AUTO: 9.8 FL (ref 8.1–13.5)
POTASSIUM SERPL-SCNC: 3.3 MMOL/L (ref 3.7–5.3)
PRO-BNP: 40 PG/ML
RBC # BLD: 5.32 M/UL (ref 3.95–5.11)
SODIUM BLD-SCNC: 137 MMOL/L (ref 135–144)
TOTAL PROTEIN: 7.5 G/DL (ref 6.4–8.3)
TROPONIN INTERP: NORMAL
TROPONIN T: NORMAL NG/ML
TROPONIN, HIGH SENSITIVITY: <6 NG/L (ref 0–14)
WBC # BLD: 3.5 K/UL (ref 3.5–11.3)

## 2020-12-23 PROCEDURE — 6360000002 HC RX W HCPCS: Performed by: PHYSICIAN ASSISTANT

## 2020-12-23 PROCEDURE — 99284 EMERGENCY DEPT VISIT MOD MDM: CPT

## 2020-12-23 PROCEDURE — 85027 COMPLETE CBC AUTOMATED: CPT

## 2020-12-23 PROCEDURE — 96374 THER/PROPH/DIAG INJ IV PUSH: CPT

## 2020-12-23 PROCEDURE — 36415 COLL VENOUS BLD VENIPUNCTURE: CPT

## 2020-12-23 PROCEDURE — 93010 ELECTROCARDIOGRAM REPORT: CPT | Performed by: INTERNAL MEDICINE

## 2020-12-23 PROCEDURE — 93005 ELECTROCARDIOGRAM TRACING: CPT | Performed by: PHYSICIAN ASSISTANT

## 2020-12-23 PROCEDURE — 80053 COMPREHEN METABOLIC PANEL: CPT

## 2020-12-23 PROCEDURE — 84484 ASSAY OF TROPONIN QUANT: CPT

## 2020-12-23 PROCEDURE — 2580000003 HC RX 258: Performed by: PHYSICIAN ASSISTANT

## 2020-12-23 PROCEDURE — 6360000004 HC RX CONTRAST MEDICATION: Performed by: PHYSICIAN ASSISTANT

## 2020-12-23 PROCEDURE — 83880 ASSAY OF NATRIURETIC PEPTIDE: CPT

## 2020-12-23 PROCEDURE — 71260 CT THORAX DX C+: CPT

## 2020-12-23 RX ORDER — 0.9 % SODIUM CHLORIDE 0.9 %
1000 INTRAVENOUS SOLUTION INTRAVENOUS ONCE
Status: COMPLETED | OUTPATIENT
Start: 2020-12-23 | End: 2020-12-23

## 2020-12-23 RX ORDER — DEXAMETHASONE SODIUM PHOSPHATE 10 MG/ML
6 INJECTION INTRAMUSCULAR; INTRAVENOUS ONCE
Status: COMPLETED | OUTPATIENT
Start: 2020-12-23 | End: 2020-12-23

## 2020-12-23 RX ADMIN — SODIUM CHLORIDE 1000 ML: 9 INJECTION, SOLUTION INTRAVENOUS at 19:31

## 2020-12-23 RX ADMIN — IOPAMIDOL 70 ML: 755 INJECTION, SOLUTION INTRAVENOUS at 18:52

## 2020-12-23 RX ADMIN — DEXAMETHASONE SODIUM PHOSPHATE 6 MG: 10 INJECTION INTRAMUSCULAR; INTRAVENOUS at 19:32

## 2020-12-23 ASSESSMENT — ENCOUNTER SYMPTOMS
RHINORRHEA: 0
COUGH: 0
BACK PAIN: 0
NAUSEA: 0
WHEEZING: 0
SHORTNESS OF BREATH: 1
ABDOMINAL PAIN: 0
CONSTIPATION: 0
CHEST TIGHTNESS: 0
BLOOD IN STOOL: 0
EYE REDNESS: 0
SORE THROAT: 0
EYE DISCHARGE: 0
DIARRHEA: 0
VOMITING: 0

## 2020-12-23 NOTE — ED PROVIDER NOTES
[facility] ED  eMERGENCY dEPARTMENT eNCOUnter      Pt Name: Sara Seth  MRN: [id number]  [place] 1970  Date of evaluation: 12/23/2020  Provider: Vic Wooten Dr     Chief Complaint   Patient presents with    Shortness of Breath     covid + tested last friday         HISTORY OF PRESENT ILLNESS   (Location/Symptom, Timing/Onset, Context/Setting,Quality, Duration, Modifying Factors, Severity)  Note limiting factors. Sara Seth is a52 y.o. female who presents to the emergency department via private vehicle secondary to shortness of breath that is been ongoing but worsening over the past day. She states she was diagnosed with Covid earlier this week. She states that she just feels like she continues have shortness of breath so she came to the ER for further evaluation. She denies any history of lung disease or heart disease. She denies any active chest pain at this time. She is not taking anything new for the symptoms. She otherwise has no other complaints at this time. HPI    Nursing Notes werereviewed. REVIEW OF SYSTEMS    (2-9 systems for level 4, 10 or more for level 5)     Review of Systems   Constitutional: Positive for fatigue. Negative for chills, diaphoresis and fever. HENT: Negative for congestion, ear pain, rhinorrhea and sore throat. Eyes: Negative for discharge, redness and visual disturbance. Respiratory: Positive for shortness of breath. Negative for cough, chest tightness and wheezing. Cardiovascular: Negative for chest pain and palpitations. Gastrointestinal: Negative for abdominal pain, blood in stool, constipation, diarrhea, nausea and vomiting. Endocrine: Negative for polydipsia, polyphagia and polyuria. Genitourinary: Negative for decreased urine volume, difficulty urinating, dysuria, frequency and hematuria. Musculoskeletal: Negative for arthralgias, back pain and myalgias. Skin: Negative for pallor and rash. Allergic/Immunologic: Negative for food allergies and immunocompromised state. Neurological: Negative for dizziness, syncope, weakness and light-headedness. Hematological: Negative for adenopathy. Does not bruise/bleed easily. Psychiatric/Behavioral: Negative for behavioral problems and suicidal ideas. The patient is not nervous/anxious. Except as noted above the remainder of the review of systems was reviewed and negative. PAST MEDICAL HISTORY     Past Medical History:   Diagnosis Date    Anemia, unspecified     Anxiety     Edema     Hypertension     Hypothyroidism     Restless legs syndrome (RLS)     Thyroid disease          SURGICALHISTORY       Past Surgical History:   Procedure Laterality Date    ANTERIOR CRUCIATE LIGAMENT REPAIR Left     HYSTERECTOMY, VAGINAL      07/2020 robotic, lap supracervical hyst and BS, perineopexy, A&P repair, cysto, Lynx retropubic slin    INFECTED SKIN DEBRIDEMENT Left     Left third finger         CURRENT MEDICATIONS       Previous Medications    ALBUTEROL (PROVENTIL) 4 MG TABLET    Take 1 tablet by mouth 3 times daily    ALBUTEROL SULFATE HFA (VENTOLIN HFA) 108 (90 BASE) MCG/ACT INHALER    Inhale 2 puffs into the lungs 4 times daily as needed for Wheezing    ALPRAZOLAM (XANAX) 0.25 MG TABLET    TAKE 1 TABLET BY MOUTH TWICE DAILY AS NEEDED FOR ANXIETY FOR 30 DAYS    BENZONATATE (TESSALON) 200 MG CAPSULE    Take 1 capsule by mouth 3 times daily as needed for Cough    ESCITALOPRAM (LEXAPRO) 10 MG TABLET    TAKE 1 & 1/2 (ONE & ONE-HALF) TABLETS BY MOUTH ONCE DAILY    FUROSEMIDE (LASIX) 20 MG TABLET    Take 1 tablet by mouth daily as needed (weight gain more than a lb)    LEVOTHYROXINE (EUTHYROX) 88 MCG TABLET    Take 1 tablet by mouth once daily    LORAZEPAM (ATIVAN) 1 MG TABLET    Take 1 tablet by mouth every 8 hours as needed for Anxiety for up to 30 days.     PHENTERMINE (ADIPEX-P) 37.5 MG TABLET    Take 1 tablet by mouth every morning (before breakfast) for 30 days. TRAZODONE (DESYREL) 50 MG TABLET    100 mg     TRIAMTERENE-HYDROCHLOROTHIAZIDE (DYAZIDE) 37.5-25 MG PER CAPSULE    Take 1 capsule by mouth once daily       ALLERGIES     Rocephin [ceftriaxone]    FAMILY HISTORY       Family History   Adopted: Yes          SOCIAL HISTORY       Social History     Socioeconomic History    Marital status: Single     Spouse name: None    Number of children: None    Years of education: None    Highest education level: None   Occupational History    Occupation: nurse aid   Social Needs    Financial resource strain: None    Food insecurity     Worry: None     Inability: None    Transportation needs     Medical: None     Non-medical: None   Tobacco Use    Smoking status: Never Smoker    Smokeless tobacco: Never Used   Substance and Sexual Activity    Alcohol use: No     Alcohol/week: 0.0 standard drinks     Comment: rarely    Drug use: No    Sexual activity: Not Currently   Lifestyle    Physical activity     Days per week: None     Minutes per session: None    Stress: None   Relationships    Social connections     Talks on phone: None     Gets together: None     Attends Yazdanism service: None     Active member of club or organization: None     Attends meetings of clubs or organizations: None     Relationship status: None    Intimate partner violence     Fear of current or ex partner: None     Emotionally abused: None     Physically abused: None     Forced sexual activity: None   Other Topics Concern    None   Social History Narrative    None       SCREENINGS      @FLOW([id number])@      PHYSICAL EXAM    (up to 7 for level 4, 8 or more for level 5)     ED Triage Vitals   BP Temp Temp src Pulse Resp SpO2 Height Weight   12/23/20 1748 12/23/20 1747 -- 12/23/20 1747 12/23/20 1748 12/23/20 1748 -- --   (!) 147/62 98.3 °F (36.8 °C)  110 18 99 %         Physical Exam  Vitals signs and nursing note reviewed.    Constitutional:       General: She is not in in the absence of a cardiologist.      RADIOLOGY:   Non-plainfilm images such as CT, Ultrasound and MRI are read by the radiologist. Plain radiographic images are visualized and preliminarily interpreted by the emergency physician with the below findings:      Interpretationper the Radiologist below, if available at the time of this note:    CT CHEST PULMONARY EMBOLISM W CONTRAST   Final Result   No evidence of pulmonary embolism or acute pulmonary abnormality. ED BEDSIDE ULTRASOUND:   Performed by ED Physician - none    LABS:  Labs Reviewed   CBC - Abnormal; Notable for the following components:       Result Value    RBC 5.32 (*)     Hemoglobin 16.7 (*)     Hematocrit 50.3 (*)     All other components within normal limits   COMPREHENSIVE METABOLIC PANEL - Abnormal; Notable for the following components:    Glucose 120 (*)     Potassium 3.3 (*)     ALT 37 (*)     AST 35 (*)     All other components within normal limits   TROPONIN   BRAIN NATRIURETIC PEPTIDE       All other labs were within normal range or not returned as of this dictation. EMERGENCY DEPARTMENT COURSE and DIFFERENTIAL DIAGNOSIS/MDM:   Vitals:    Vitals:    12/23/20 1747 12/23/20 1748   BP:  (!) 147/62   Pulse: 110    Resp:  18   Temp: 98.3 °F (36.8 °C)    SpO2:  99%         MDM  81-year-old female Covid positive earlier this week who presents with worsening shortness of breath. Chart review from earlier this week shows a chest x-ray that shows no acute cardiopulmonary disease. But given her worsening shortness of breath and the presence of diffuse rhonchi I am going get a CT PE protocol for further evaluation. We will also check a troponin BNP rule out other infection dehydration electrolyte balance PE, arrhythmia, AMI. At this point, the evidence for any other entities in the differential is insufficient to justify any further testing. This was extended the patient.   The patient was advised that persistent or worsening symptoms would require further evaluation. Patient's pulse ox has not dropped below the upper 90s. Tachycardia has resolved. At this point I will discharge her home with close follow-up. I told her to call and schedule a virtual visit with her primary care tomorrow. Strict and specific return 7 given. She verbalized agreement with plan. Questions have been answered at length. She will otherwise return immediately to the ER with any new or worse complaints. Procedures    FINAL IMPRESSION      1. COVID-19    2. Dyspnea, unspecified type        DISPOSITION/PLAN   DISPOSITION Discharge - Pending Orders Complete 12/23/2020 07:27:46 PM      PATIENT REFERRED TO:  [address]  [phone]    If symptoms worsen, As needed    [address], [place]  [address]  [phone]    Schedule an appointment as soon as possible for a visit in 1 day        DISCHARGE MEDICATIONS:  New Prescriptions    No medications on file              Summation      Patient Course:      ED Medications administered this visit:    Medications   0.9 % sodium chloride bolus (has no administration in time range)   dexamethasone (DECADRON) injection 6 mg (has no administration in time range)   iopamidol (ISOVUE-370) 76 % injection 70 mL (70 mLs Intravenous Given 12/23/20 1852)       New Prescriptions from this visit:    New Prescriptions    No medications on file       Follow-up:  [address]  [phone]    If symptoms worsen, As needed    [address], [place]  [address]  [phone]    Schedule an appointment as soon as possible for a visit in 1 day          Final Impression:   1. COVID-19    2.  Dyspnea, unspecified type               (Please note that portions of this note were completed with a voice recognition program.  Efforts were made to edit the dictations but occasionally words are mis-transcribed.)           Esequiel Patel PA-C  12/23/20 1930

## 2020-12-31 ENCOUNTER — OFFICE VISIT (OUTPATIENT)
Dept: OBGYN | Age: 50
End: 2020-12-31
Payer: COMMERCIAL

## 2020-12-31 VITALS
DIASTOLIC BLOOD PRESSURE: 72 MMHG | BODY MASS INDEX: 29.16 KG/M2 | SYSTOLIC BLOOD PRESSURE: 112 MMHG | HEIGHT: 65 IN | WEIGHT: 175 LBS

## 2020-12-31 DIAGNOSIS — I10 HYPERTENSION, UNSPECIFIED TYPE: ICD-10-CM

## 2020-12-31 DIAGNOSIS — E66.3 OVERWEIGHT (BMI 25.0-29.9): Primary | ICD-10-CM

## 2020-12-31 DIAGNOSIS — E03.8 OTHER SPECIFIED HYPOTHYROIDISM: ICD-10-CM

## 2020-12-31 PROCEDURE — 99212 OFFICE O/P EST SF 10 MIN: CPT | Performed by: ADVANCED PRACTICE MIDWIFE

## 2020-12-31 RX ORDER — PHENTERMINE HYDROCHLORIDE 37.5 MG/1
37.5 TABLET ORAL
Qty: 30 TABLET | Refills: 0 | Status: SHIPPED | OUTPATIENT
Start: 2020-12-31 | End: 2021-01-22 | Stop reason: ALTCHOICE

## 2020-12-31 NOTE — PROGRESS NOTES
PROBLEM VISIT     Date of service: 2020    Edwin Caro  Is a 53 y.o. single female    PT's PCP is: Moe Escobar MD     : 1970                                             Subjective:       Patient's last menstrual period was 07/15/2020 (approximate). OB History    Para Term  AB Living   1 1       1   SAB TAB Ectopic Molar Multiple Live Births             1      # Outcome Date GA Lbr Navneet/2nd Weight Sex Delivery Anes PTL Lv   1 Para 98 40w0d  8 lb 4 oz (3.742 kg) M Vag-Spont   SHAWNA        Social History     Tobacco Use   Smoking Status Never Smoker   Smokeless Tobacco Never Used        Social History     Substance and Sexual Activity   Alcohol Use No    Alcohol/week: 0.0 standard drinks    Comment: rarely       Social History     Substance and Sexual Activity   Sexual Activity Not Currently       Allergies: Rocephin [ceftriaxone]    Chief Complaint   Patient presents with    Weight Loss     pt presents here today for Adipex #3. pt states no issues or concerns. last weight was 187lb. Last Yearly:      Last pap: 2020    Last HPV: 2020    PE:  Vital Signs  Blood pressure 112/72, height 5' 5\" (1.651 m), weight 175 lb (79.4 kg), last menstrual period 07/15/2020, not currently breastfeeding. Estimated body mass index is 29.12 kg/m² as calculated from the following:    Height as of this encounter: 5' 5\" (1.651 m). Weight as of this encounter: 175 lb (79.4 kg). NURSE: JANELLE    HPI: patient has done very well on adipex tx addition to her diet and exercise. Her blood pressure is well within normal range      PT denies fever, chills, nausea and vomiting     Denies chest pain, dyspnea, insomnia or anxiety.   Had cough this month as she tested positive for covid but is ten days out and \"better\" is not febrile    Objective: No acute distress  Excellent communications  Well-nourished    Results reviewed today: No results found for this visit on 12/31/20. Assessment and Plan          Diagnosis Orders   1. Overweight (BMI 25.0-29.9)  phentermine (ADIPEX-P) 37.5 MG tablet   2. Other specified hypothyroidism  phentermine (ADIPEX-P) 37.5 MG tablet   3. Hypertension, unspecified type  phentermine (ADIPEX-P) 37.5 MG tablet       discussed use when < 30 BMI with comorbid conditions, is hypothyroid and hypertensive / well controlled. With consult Dr. Mahesh Sharma continue adipex as well as diet and exercise. I am having Sofia Cisneros. Sostanatalya start on phentermine. I am also having her maintain her escitalopram, traZODone, furosemide, ALPRAZolam, levothyroxine, triamterene-hydroCHLOROthiazide, phentermine, albuterol sulfate HFA, LORazepam, albuterol, and potassium chloride. Return in about 1 month (around 1/31/2021), or weight check. There are no Patient Instructions on file for this visit. Over 50% of time spent on counseling and care coordination on: see assessment and plan,  She was also counseled on her preventative health maintenance recommendations and follow-up.         FF time: 15 min      Kaylene Garcia,1/1/2021 4:59 PM

## 2021-01-06 ENCOUNTER — HOSPITAL ENCOUNTER (OUTPATIENT)
Dept: WOMENS IMAGING | Age: 51
Discharge: HOME | End: 2021-01-08
Payer: COMMERCIAL

## 2021-01-06 DIAGNOSIS — Z12.31 SCREENING MAMMOGRAM, ENCOUNTER FOR: ICD-10-CM

## 2021-01-06 DIAGNOSIS — R92.8 ABNORMAL MAMMOGRAM: Primary | ICD-10-CM

## 2021-01-06 PROCEDURE — 77067 SCR MAMMO BI INCL CAD: CPT

## 2021-01-13 ENCOUNTER — HOSPITAL ENCOUNTER (OUTPATIENT)
Dept: ULTRASOUND IMAGING | Age: 51
Discharge: HOME | End: 2021-01-15
Payer: COMMERCIAL

## 2021-01-13 ENCOUNTER — TELEPHONE (OUTPATIENT)
Dept: OBGYN | Age: 51
End: 2021-01-13

## 2021-01-13 ENCOUNTER — HOSPITAL ENCOUNTER (OUTPATIENT)
Dept: WOMENS IMAGING | Age: 51
Discharge: HOME | End: 2021-01-15
Payer: COMMERCIAL

## 2021-01-13 DIAGNOSIS — R92.8 ABNORMAL MAMMOGRAM: ICD-10-CM

## 2021-01-13 PROCEDURE — 77063 BREAST TOMOSYNTHESIS BI: CPT

## 2021-01-13 PROCEDURE — 77065 DX MAMMO INCL CAD UNI: CPT

## 2021-01-13 NOTE — TELEPHONE ENCOUNTER
Patient had diagnostic mammogram today. Patient was supposed to have an ultrasound however they advised she did not need it Patient however has lump to right of right breast , axilla? Slightly tender. Advice?

## 2021-01-14 ASSESSMENT — ENCOUNTER SYMPTOMS
COUGH: 1
ABDOMINAL PAIN: 0
ABDOMINAL DISTENTION: 0
VOMITING: 0
DIARRHEA: 0
CONSTIPATION: 0
NAUSEA: 0
SHORTNESS OF BREATH: 1
SORE THROAT: 1
BACK PAIN: 0
TROUBLE SWALLOWING: 0

## 2021-01-25 ENCOUNTER — HOSPITAL ENCOUNTER (OUTPATIENT)
Age: 51
Discharge: HOME | End: 2021-01-25
Payer: COMMERCIAL

## 2021-01-25 ENCOUNTER — APPOINTMENT (OUTPATIENT)
Dept: CT IMAGING | Age: 51
End: 2021-01-25
Attending: FAMILY MEDICINE
Payer: COMMERCIAL

## 2021-01-25 ENCOUNTER — HOSPITAL ENCOUNTER (OUTPATIENT)
Age: 51
Setting detail: OBSERVATION
Discharge: HOME | End: 2021-01-27
Attending: FAMILY MEDICINE | Admitting: FAMILY MEDICINE
Payer: COMMERCIAL

## 2021-01-25 DIAGNOSIS — U07.1 COVID-19 VIRUS INFECTION: ICD-10-CM

## 2021-01-25 DIAGNOSIS — R42 DIZZINESS AND GIDDINESS: ICD-10-CM

## 2021-01-25 DIAGNOSIS — D64.9 ANEMIA, UNSPECIFIED TYPE: ICD-10-CM

## 2021-01-25 DIAGNOSIS — E03.9 HYPOTHYROIDISM, UNSPECIFIED TYPE: ICD-10-CM

## 2021-01-25 PROBLEM — R11.10 UNCONTROLLABLE VOMITING: Status: ACTIVE | Noted: 2021-01-25

## 2021-01-25 PROBLEM — R11.2 INTRACTABLE NAUSEA AND VOMITING: Status: ACTIVE | Noted: 2021-01-25

## 2021-01-25 PROBLEM — R11.15 PERSISTENT VOMITING: Status: ACTIVE | Noted: 2021-01-25

## 2021-01-25 LAB
ABSOLUTE EOS #: 0.04 K/UL (ref 0–0.44)
ABSOLUTE IMMATURE GRANULOCYTE: <0.03 K/UL (ref 0–0.3)
ABSOLUTE LYMPH #: 1.21 K/UL (ref 1.1–3.7)
ABSOLUTE MONO #: 0.35 K/UL (ref 0.1–1.2)
ALBUMIN SERPL-MCNC: 4.2 G/DL (ref 3.5–5.2)
ALBUMIN/GLOBULIN RATIO: 1.4 (ref 1–2.5)
ALP BLD-CCNC: 34 U/L (ref 35–104)
ALT SERPL-CCNC: 12 U/L (ref 5–33)
AMYLASE: 41 U/L (ref 28–100)
ANION GAP SERPL CALCULATED.3IONS-SCNC: 6 MMOL/L (ref 9–17)
AST SERPL-CCNC: 25 U/L
BASOPHILS # BLD: 2 % (ref 0–2)
BASOPHILS ABSOLUTE: 0.06 K/UL (ref 0–0.2)
BILIRUB SERPL-MCNC: 1.11 MG/DL (ref 0.3–1.2)
BUN BLDV-MCNC: 13 MG/DL (ref 6–20)
BUN/CREAT BLD: 19 (ref 9–20)
CALCIUM SERPL-MCNC: 9.6 MG/DL (ref 8.6–10.4)
CHLORIDE BLD-SCNC: 96 MMOL/L (ref 98–107)
CO2: 34 MMOL/L (ref 20–31)
CREAT SERPL-MCNC: 0.69 MG/DL (ref 0.5–0.9)
DIFFERENTIAL TYPE: NORMAL
EOSINOPHILS RELATIVE PERCENT: 1 % (ref 1–4)
GFR AFRICAN AMERICAN: >60 ML/MIN
GFR NON-AFRICAN AMERICAN: >60 ML/MIN
GFR SERPL CREATININE-BSD FRML MDRD: ABNORMAL ML/MIN/{1.73_M2}
GFR SERPL CREATININE-BSD FRML MDRD: ABNORMAL ML/MIN/{1.73_M2}
GLUCOSE BLD-MCNC: 103 MG/DL (ref 70–99)
HCT VFR BLD CALC: 44.2 % (ref 36.3–47.1)
HEMOGLOBIN: 14.7 G/DL (ref 11.9–15.1)
IMMATURE GRANULOCYTES: 0 %
LIPASE: 26 U/L (ref 13–60)
LYMPHOCYTES # BLD: 30 % (ref 24–43)
MAGNESIUM: 2 MG/DL (ref 1.6–2.6)
MCH RBC QN AUTO: 32.4 PG (ref 25.2–33.5)
MCHC RBC AUTO-ENTMCNC: 33.3 G/DL (ref 28.4–34.8)
MCV RBC AUTO: 97.4 FL (ref 82.6–102.9)
MONOCYTES # BLD: 9 % (ref 3–12)
NRBC AUTOMATED: 0 PER 100 WBC
PDW BLD-RTO: 13.2 % (ref 11.8–14.4)
PLATELET # BLD: 280 K/UL (ref 138–453)
PLATELET ESTIMATE: NORMAL
PMV BLD AUTO: 10 FL (ref 8.1–13.5)
POTASSIUM SERPL-SCNC: 3.7 MMOL/L (ref 3.7–5.3)
RBC # BLD: 4.54 M/UL (ref 3.95–5.11)
RBC # BLD: NORMAL 10*6/UL
SEG NEUTROPHILS: 58 % (ref 36–65)
SEGMENTED NEUTROPHILS ABSOLUTE COUNT: 2.35 K/UL (ref 1.5–8.1)
SODIUM BLD-SCNC: 136 MMOL/L (ref 135–144)
TOTAL PROTEIN: 7.2 G/DL (ref 6.4–8.3)
TROPONIN INTERP: NORMAL
TROPONIN T: NORMAL NG/ML
TROPONIN, HIGH SENSITIVITY: 6 NG/L (ref 0–14)
TSH SERPL DL<=0.05 MIU/L-ACNC: 1.07 MIU/L (ref 0.3–5)
WBC # BLD: 4 K/UL (ref 3.5–11.3)
WBC # BLD: NORMAL 10*3/UL

## 2021-01-25 PROCEDURE — 97165 OT EVAL LOW COMPLEX 30 MIN: CPT

## 2021-01-25 PROCEDURE — G0379 DIRECT REFER HOSPITAL OBSERV: HCPCS

## 2021-01-25 PROCEDURE — G0378 HOSPITAL OBSERVATION PER HR: HCPCS

## 2021-01-25 PROCEDURE — 96361 HYDRATE IV INFUSION ADD-ON: CPT

## 2021-01-25 PROCEDURE — 80053 COMPREHEN METABOLIC PANEL: CPT

## 2021-01-25 PROCEDURE — 96376 TX/PRO/DX INJ SAME DRUG ADON: CPT

## 2021-01-25 PROCEDURE — 36415 COLL VENOUS BLD VENIPUNCTURE: CPT

## 2021-01-25 PROCEDURE — 94761 N-INVAS EAR/PLS OXIMETRY MLT: CPT

## 2021-01-25 PROCEDURE — 81001 URINALYSIS AUTO W/SCOPE: CPT

## 2021-01-25 PROCEDURE — 82150 ASSAY OF AMYLASE: CPT

## 2021-01-25 PROCEDURE — 6360000004 HC RX CONTRAST MEDICATION: Performed by: NURSE PRACTITIONER

## 2021-01-25 PROCEDURE — 85025 COMPLETE CBC W/AUTO DIFF WBC: CPT

## 2021-01-25 PROCEDURE — 93005 ELECTROCARDIOGRAM TRACING: CPT | Performed by: NURSE PRACTITIONER

## 2021-01-25 PROCEDURE — 96375 TX/PRO/DX INJ NEW DRUG ADDON: CPT

## 2021-01-25 PROCEDURE — 83735 ASSAY OF MAGNESIUM: CPT

## 2021-01-25 PROCEDURE — 6370000000 HC RX 637 (ALT 250 FOR IP): Performed by: FAMILY MEDICINE

## 2021-01-25 PROCEDURE — 2580000003 HC RX 258: Performed by: NURSE PRACTITIONER

## 2021-01-25 PROCEDURE — 84484 ASSAY OF TROPONIN QUANT: CPT

## 2021-01-25 PROCEDURE — 84443 ASSAY THYROID STIM HORMONE: CPT

## 2021-01-25 PROCEDURE — 83690 ASSAY OF LIPASE: CPT

## 2021-01-25 PROCEDURE — 6360000002 HC RX W HCPCS: Performed by: NURSE PRACTITIONER

## 2021-01-25 PROCEDURE — 96374 THER/PROPH/DIAG INJ IV PUSH: CPT

## 2021-01-25 PROCEDURE — 74177 CT ABD & PELVIS W/CONTRAST: CPT

## 2021-01-25 PROCEDURE — 6360000002 HC RX W HCPCS: Performed by: FAMILY MEDICINE

## 2021-01-25 PROCEDURE — 6370000000 HC RX 637 (ALT 250 FOR IP): Performed by: NURSE PRACTITIONER

## 2021-01-25 RX ORDER — SODIUM CHLORIDE 0.9 % (FLUSH) 0.9 %
10 SYRINGE (ML) INJECTION PRN
Status: DISCONTINUED | OUTPATIENT
Start: 2021-01-25 | End: 2021-01-27 | Stop reason: HOSPADM

## 2021-01-25 RX ORDER — METHYLPREDNISOLONE SODIUM SUCCINATE 40 MG/ML
40 INJECTION, POWDER, LYOPHILIZED, FOR SOLUTION INTRAMUSCULAR; INTRAVENOUS ONCE
Status: COMPLETED | OUTPATIENT
Start: 2021-01-25 | End: 2021-01-25

## 2021-01-25 RX ORDER — FAMOTIDINE 20 MG/1
20 TABLET, FILM COATED ORAL 2 TIMES DAILY
Status: DISCONTINUED | OUTPATIENT
Start: 2021-01-25 | End: 2021-01-27 | Stop reason: HOSPADM

## 2021-01-25 RX ORDER — 0.9 % SODIUM CHLORIDE 0.9 %
1000 INTRAVENOUS SOLUTION INTRAVENOUS ONCE
Status: COMPLETED | OUTPATIENT
Start: 2021-01-25 | End: 2021-01-25

## 2021-01-25 RX ORDER — SODIUM CHLORIDE 0.9 % (FLUSH) 0.9 %
10 SYRINGE (ML) INJECTION EVERY 12 HOURS SCHEDULED
Status: DISCONTINUED | OUTPATIENT
Start: 2021-01-25 | End: 2021-01-27 | Stop reason: HOSPADM

## 2021-01-25 RX ORDER — SODIUM CHLORIDE 9 MG/ML
INJECTION, SOLUTION INTRAVENOUS CONTINUOUS
Status: DISCONTINUED | OUTPATIENT
Start: 2021-01-25 | End: 2021-01-27 | Stop reason: HOSPADM

## 2021-01-25 RX ORDER — ACETAMINOPHEN 325 MG/1
650 TABLET ORAL EVERY 8 HOURS PRN
Status: DISCONTINUED | OUTPATIENT
Start: 2021-01-25 | End: 2021-01-27 | Stop reason: HOSPADM

## 2021-01-25 RX ORDER — METOCLOPRAMIDE HYDROCHLORIDE 5 MG/ML
10 INJECTION INTRAMUSCULAR; INTRAVENOUS ONCE
Status: COMPLETED | OUTPATIENT
Start: 2021-01-25 | End: 2021-01-25

## 2021-01-25 RX ORDER — ONDANSETRON 2 MG/ML
4 INJECTION INTRAMUSCULAR; INTRAVENOUS EVERY 6 HOURS PRN
Status: DISCONTINUED | OUTPATIENT
Start: 2021-01-25 | End: 2021-01-27 | Stop reason: HOSPADM

## 2021-01-25 RX ADMIN — SODIUM CHLORIDE 1000 ML: 9 INJECTION, SOLUTION INTRAVENOUS at 13:41

## 2021-01-25 RX ADMIN — ONDANSETRON 4 MG: 2 INJECTION INTRAMUSCULAR; INTRAVENOUS at 13:44

## 2021-01-25 RX ADMIN — METOCLOPRAMIDE 10 MG: 5 INJECTION, SOLUTION INTRAMUSCULAR; INTRAVENOUS at 13:44

## 2021-01-25 RX ADMIN — IOPAMIDOL 18 ML: 755 INJECTION, SOLUTION INTRAVENOUS at 12:55

## 2021-01-25 RX ADMIN — ONDANSETRON 4 MG: 2 INJECTION INTRAMUSCULAR; INTRAVENOUS at 19:46

## 2021-01-25 RX ADMIN — IOPAMIDOL 75 ML: 755 INJECTION, SOLUTION INTRAVENOUS at 12:55

## 2021-01-25 RX ADMIN — ACETAMINOPHEN 650 MG: 325 TABLET, FILM COATED ORAL at 19:44

## 2021-01-25 RX ADMIN — SODIUM CHLORIDE: 9 INJECTION, SOLUTION INTRAVENOUS at 16:10

## 2021-01-25 RX ADMIN — METHYLPREDNISOLONE SODIUM SUCCINATE 40 MG: 40 INJECTION, POWDER, FOR SOLUTION INTRAMUSCULAR; INTRAVENOUS at 21:45

## 2021-01-25 RX ADMIN — FAMOTIDINE 20 MG: 20 TABLET, FILM COATED ORAL at 19:44

## 2021-01-25 ASSESSMENT — PAIN SCALES - GENERAL: PAINLEVEL_OUTOF10: 8

## 2021-01-25 NOTE — H&P
History and Physical    Patient:  Ritu Johnson  MRN: [id number]    Chief Complaint: Dizziness with falls    History Obtained From:  patient    PCP: Jamel Green MD    History of Present Illness: The patient is a 53 y.o. female who presents with with dizziness and falls. She explained that she had Covid a couple weeks ago and has been doing better. However in the last few days she has developed dizziness and has fallen twice in the last couple days. She admitted to nausea and vomiting with one emesis in the last 24 hours. She denies diarrhea. Denies constipation although is unable to tell me when her last bowel movement was and states probably in the last couple days. States that she does go most every day. She has been afebrile. Denies chest pain or palpitations. Denies fever chills. Past Medical History:        Diagnosis Date    Anemia, unspecified     Anxiety     Edema     Hypertension     Hypothyroidism     Restless legs syndrome (RLS)     Thyroid disease        Past Surgical History:        Procedure Laterality Date    ANTERIOR CRUCIATE LIGAMENT REPAIR Left     HYSTERECTOMY, VAGINAL      07/2020 robotic, lap supracervical hyst and BS, perineopexy, A&P repair, cysto, Lynx retropubic slin    INFECTED SKIN DEBRIDEMENT Left     Left third finger       Family History:       Adopted: Yes       Social History:   TOBACCO:   reports that she has never smoked. She has never used smokeless tobacco.  ETOH:   reports no history of alcohol use. ELICIT DRUG USE:    Social History     Substance and Sexual Activity   Drug Use No     OCCUPATION: Unknown      Allergies:  Rocephin [ceftriaxone]    Medications Prior to Admission:    Prior to Admission medications    Medication Sig Start Date End Date Taking?  Authorizing Provider   buPROPion (WELLBUTRIN XL) 150 MG extended release tablet  1/21/21   Historical Provider, MD   promethazine (PHENERGAN) 25 MG tablet Take 1 tablet by mouth 3 times daily as needed for Nausea 1/22/21 1/29/21  Nicole Quintero MD   potassium chloride (KLOR-CON M) 20 MEQ extended release tablet Take 1 tablet by mouth 2 times daily 1/21/21   Nicole Quintero MD   furosemide (LASIX) 20 MG tablet TAKE 1 TABLET BY MOUTH ONCE DAILY AS NEEDED (WEIGHT  GAIN  MORE  THAN  A  POUND) 1/4/21   Nicole Quintero MD   albuterol (PROVENTIL) 4 MG tablet Take 1 tablet by mouth 3 times daily 12/20/20   Saul Drake MD   albuterol sulfate HFA (VENTOLIN HFA) 108 (90 Base) MCG/ACT inhaler Inhale 2 puffs into the lungs 4 times daily as needed for Wheezing 12/19/20   Patricia Thorpe MD   levothyroxine (EUTHYROX) 88 MCG tablet Take 1 tablet by mouth once daily 11/6/20   Nicole Quintero MD   triamterene-hydroCHLOROthiazide (DYAZIDE) 37.5-25 MG per capsule Take 1 capsule by mouth once daily 11/6/20   Nicole Quintero MD   ALPRAZolam (XANAX) 0.25 MG tablet TAKE 1 TABLET BY MOUTH TWICE DAILY AS NEEDED FOR ANXIETY FOR 30 DAYS 4/22/20   Historical Provider, MD   traZODone (DESYREL) 50 MG tablet 100 mg  1/10/20   Historical Provider, MD   escitalopram (LEXAPRO) 10 MG tablet TAKE 1 & 1/2 (ONE & ONE-HALF) TABLETS BY MOUTH ONCE DAILY  Patient taking differently: 20 mg  9/23/19   Nicole Quintero MD       Review of Systems:  Constitutional:negative  for fevers, and negative for chills.   Positive for dizziness positive for falls  Eyes: negative for visual disturbance   ENT: negative for sore throat, negative nasal congestion, and negative for earache  Respiratory: negative for shortness of breath, negative for cough, and negative for wheezing  Cardiovascular: negative for chest pain, negative for palpitations, and negative for syncope  Gastrointestinal: negative for abdominal pain, positive for nausea,positive for vomiting, negative for diarrhea, negative for constipation, and negative for hematochezia or melena  Genitourinary: negative for dysuria, negative for urinary urgency, negative for urinary frequency, and negative for hematuria  Skin: negative for skin rash, and negative for skin lesions  Neurological: negative for unilateral weakness, numbness or tingling. Physical Exam:    Vitals:   Temp: 98.1 °F (36.7 °C)  BP: 124/65  Resp: 18  Pulse: 66  SpO2: 97 %  24HR INTAKE/OUTPUT:  No intake or output data in the 24 hours ending 01/25/21 1341    Exam:  GEN:  alert and oriented to person, place and time, well-developed and well nourished, in no acute distress, alert and with anxious affect  EYES: No gross abnormalities. , PERRL and EOMI  NECK: normal, supple, no lymphadenopathy,  no carotid bruits  PULM: clear to auscultation bilaterally- no wheezes, rales or rhonchi, normal air movement, no respiratory distress  COR: regular rate & rhythm, no murmurs, no gallops, S1 normal and S2 normal  ABD:  soft, non-tender, non-distended, normal bowel sounds, no masses or organomegaly  EXT:   no cyanosis, clubbing or edema present    NEURO: follows commands, WILSON, no deficits  SKIN:  no rashes or significant lesions  -----------------------------------------------------------------  Diagnostic Data:   Lab Results   Component Value Date    WBC 4.0 01/25/2021    HGB 14.7 01/25/2021     01/25/2021       Lab Results   Component Value Date    BUN 13 01/25/2021    CREATININE 0.69 01/25/2021     01/25/2021    K 3.7 01/25/2021    CALCIUM 9.6 01/25/2021    CL 96 (L) 01/25/2021    CO2 34 (H) 01/25/2021    LABGLOM >60 01/25/2021       No results found for: Mari Marine, EPITHUA, LEUKOCYTESUR, SPECGRAV, GLUCOSEU, KETUA, PROTEINU, HGBUR, CASTUA, CRYSTUA, BACTERIA, YEAST    Lab Results   Component Value Date    TROPONINT NOT REPORTED 01/25/2021    PROBNP 40 12/23/2020       Ct Abdomen Pelvis W Iv Contrast Additional Contrast? Radiologist Recommendation    Result Date: 1/25/2021  EXAMINATION: CT OF THE ABDOMEN AND PELVIS WITH CONTRAST 1/25/2021 12:54 pm TECHNIQUE: CT of the abdomen and pelvis was performed with the administration of intravenous contrast. Multiplanar reformatted images are provided for review. Dose modulation, iterative reconstruction, and/or weight based adjustment of the mA/kV was utilized to reduce the radiation dose to as low as reasonably achievable. COMPARISON: None. HISTORY: ORDERING SYSTEM PROVIDED HISTORY: intractible n/v TECHNOLOGIST PROVIDED HISTORY: intractible n/v FINDINGS: Lower Chest: Lung bases are clear. Organs: No liver lesion. Contracted gallbladder. No gallstones. No pancreatic lesion. No splenomegaly. No adrenal lesion. No hydronephrosis. No renal lesion. GI/Bowel: No bowel obstruction. No acute inflammatory process. No appendicitis. Moderate-large stool volume. Pelvis: No free fluid. No bladder calculus. Right ovarian cyst appears simple measuring 2.8 cm. Peritoneum/Retroperitoneum: No aortic aneurysm. No adenopathy. Bones/Soft Tissues: Nonspecific superficial subcutaneous lesion along the left lower chest measuring 13 mm. No acute soft tissue abnormality. Lumbar spine degenerative changes. Moderate-large stool volume. No bowel obstruction. No acute inflammatory process. Right ovarian cyst requires no specific follow-up in the absence of right lower quadrant symptoms. Nonspecific subcutaneous lesion along the left lower chest.       CT ABDOMEN PELVIS W IV CONTRAST Additional Contrast? Radiologist Recommendation   Preliminary Result   Moderate-large stool volume. No bowel obstruction. No acute inflammatory   process. Right ovarian cyst requires no specific follow-up in the absence of right   lower quadrant symptoms. Nonspecific subcutaneous lesion along the left lower chest.             Assessment:    Principal Problem:    Uncontrollable vomiting  Active Problems:    Intractable nausea and vomiting  Resolved Problems:    * No resolved hospital problems.  *      Patient Active Problem List    Diagnosis Date Noted    Persistent vomiting 01/25/2021   

## 2021-01-25 NOTE — PROGRESS NOTES
Occupational Therapy   Occupational Therapy Initial Assessment  Date: 2021   Patient Name: Donavon Lipscomb  MRN: [id number]     : 1970    Date of Service: 2021    Discharge Recommendations:  Home independently       Assessment   Assessment: Patient is a 54 y/o female admitted due to dizziness/falls. She completed functional mobility with IV pole-mod I in room. Completes LB ADLs and bed mobility with mod I- slight complaints of dizziness when initially standing, however reports that it went away within 30 seconds. Patient is at baseline function for ADLs-OT not warranted at this time. Prognosis: Good  Decision Making: Low Complexity  OT Education: OT Role  Barriers to Learning: none  No Skilled OT: At baseline function  REQUIRES OT FOLLOW UP: No  Safety Devices  Safety Devices in place: Yes  Type of devices: Nurse notified; Left in bed;Call light within reach           Patient Diagnosis(es): There were no encounter diagnoses. has a past medical history of Anemia, unspecified, Anxiety, Edema, Hypertension, Hypothyroidism, Restless legs syndrome (RLS), and Thyroid disease. has a past surgical history that includes Anterior cruciate ligament repair (Left); Infected skin debridement (Left); and Hysterectomy, vaginal.           Restrictions  Restrictions/Precautions  Restrictions/Precautions: General Precautions    Subjective   General  Chart Reviewed: Yes  Patient assessed for rehabilitation services?: Yes  Family / Caregiver Present: No  Referring Practitioner: Maite Reese CNP  Diagnosis: Uncontrollable Vomitting  Subjective  Subjective: Patient reports 0/10 pain, agreeable to OT evaluation. Pleasant and cooperative throughout.   Vital Signs  Orthostatic B/P and Pulse?: Yes  Blood Pressure Lyin/53  Pulse Lyin PER MINUTE  Blood Pressure Sittin/59  Pulse Sittin PER MINUTE  Blood Pressure Standin/67  Pulse Standin PER MINUTE  Orthostatic B/P and Pulse?: Yes  Social/Functional History  Social/Functional History  Lives With: Alone  Type of Home: House  Home Layout: One level  Home Access: Stairs to enter with rails  Entrance Stairs - Number of Steps: 2  Entrance Stairs - Rails: None  Bathroom Shower/Tub: Walk-in shower  Bathroom Toilet: Standard  ADL Assistance: Independent  Homemaking Assistance: Independent  Ambulation Assistance: Independent  Transfer Assistance: Independent  Active : Yes  Occupation: Full time employment  Additional Comments: Patient reports independence with ADLs prior to admission. Objective   Vision: Within Functional Limits  Hearing: Within functional limits    Orientation  Overall Orientation Status: Within Functional Limits     Balance  Sitting Balance: Modified independent   Standing Balance: Modified independent   Functional Mobility  Functional - Mobility Device: No device(walked with IV Pole)  Activity: Retrieve items  Assist Level: Modified independent   Functional Mobility Comments:  Mod I when completing functional mobility in room- reported a little dizziness when first standing, however went away           Bed mobility  Supine to Sit: Modified independent  Sit to Supine: Modified independent  Scooting: Modified independent                          LUE AROM (degrees)  LUE AROM : WFL  Left Hand AROM (degrees)  Left Hand AROM: WFL  RUE AROM (degrees)  RUE AROM : WFL  Right Hand AROM (degrees)  Right Hand AROM: WFL  LUE Strength  Gross LUE Strength: WFL  L Hand General: 4+/5  RUE Strength  Gross RUE Strength: WFL  R Hand General: 4+/5               AM-PAC Score        AM-PAC Inpatient Daily Activity Raw Score: 24 (01/25/21 1424)  AM-PAC Inpatient ADL T-Scale Score : 57.54 (01/25/21 1424)  ADL Inpatient CMS 0-100% Score: 0 (01/25/21 1424)  ADL Inpatient CMS G-Code Modifier : CH (01/25/21 1424)      Therapy Time   Individual Concurrent Group Co-treatment   Time In 1406         Time Out 1416         Minutes 10 Inder Rayo, OTR/L

## 2021-01-25 NOTE — PROGRESS NOTES
Patient arrived to the floor via wheel chair from Dr. Cordelia Zambrano office. No complaints at this time, but has fallen 3 times recently. Patient instructed on fall precautions.

## 2021-01-26 ENCOUNTER — APPOINTMENT (OUTPATIENT)
Dept: CT IMAGING | Age: 51
End: 2021-01-26
Attending: FAMILY MEDICINE
Payer: COMMERCIAL

## 2021-01-26 ENCOUNTER — APPOINTMENT (OUTPATIENT)
Dept: NON INVASIVE DIAGNOSTICS | Age: 51
End: 2021-01-26
Attending: FAMILY MEDICINE
Payer: COMMERCIAL

## 2021-01-26 PROBLEM — R42 DIZZINESS: Status: ACTIVE | Noted: 2021-01-26

## 2021-01-26 LAB
-: ABNORMAL
ABSOLUTE EOS #: 0 K/UL (ref 0–0.44)
ABSOLUTE IMMATURE GRANULOCYTE: 0 K/UL (ref 0–0.3)
ABSOLUTE LYMPH #: 0.32 K/UL (ref 1.1–3.7)
ABSOLUTE MONO #: 0 K/UL (ref 0.1–1.2)
AMORPHOUS: ABNORMAL
ANION GAP SERPL CALCULATED.3IONS-SCNC: 10 MMOL/L (ref 9–17)
BACTERIA: ABNORMAL
BASOPHILS # BLD: 0 % (ref 0–2)
BASOPHILS ABSOLUTE: 0 K/UL (ref 0–0.2)
BILIRUBIN URINE: ABNORMAL
BUN BLDV-MCNC: 10 MG/DL (ref 6–20)
BUN/CREAT BLD: 17 (ref 9–20)
CALCIUM SERPL-MCNC: 8.3 MG/DL (ref 8.6–10.4)
CASTS UA: ABNORMAL /LPF
CHLORIDE BLD-SCNC: 103 MMOL/L (ref 98–107)
CO2: 25 MMOL/L (ref 20–31)
COLOR: YELLOW
COMMENT UA: ABNORMAL
CREAT SERPL-MCNC: 0.6 MG/DL (ref 0.5–0.9)
CRYSTALS, UA: ABNORMAL /HPF
DIFFERENTIAL TYPE: ABNORMAL
EKG ATRIAL RATE: 64 BPM
EKG P AXIS: 51 DEGREES
EKG P-R INTERVAL: 156 MS
EKG Q-T INTERVAL: 472 MS
EKG QRS DURATION: 102 MS
EKG QTC CALCULATION (BAZETT): 486 MS
EKG R AXIS: 57 DEGREES
EKG T AXIS: 47 DEGREES
EKG VENTRICULAR RATE: 64 BPM
EOSINOPHILS RELATIVE PERCENT: 0 % (ref 1–4)
EPITHELIAL CELLS UA: ABNORMAL /HPF (ref 0–25)
GFR AFRICAN AMERICAN: >60 ML/MIN
GFR NON-AFRICAN AMERICAN: >60 ML/MIN
GFR SERPL CREATININE-BSD FRML MDRD: ABNORMAL ML/MIN/{1.73_M2}
GFR SERPL CREATININE-BSD FRML MDRD: ABNORMAL ML/MIN/{1.73_M2}
GLUCOSE BLD-MCNC: 119 MG/DL (ref 70–99)
GLUCOSE URINE: NEGATIVE
HCT VFR BLD CALC: 42.8 % (ref 36.3–47.1)
HEMOGLOBIN: 14.1 G/DL (ref 11.9–15.1)
IMMATURE GRANULOCYTES: 0 %
KETONES, URINE: ABNORMAL
LEUKOCYTE ESTERASE, URINE: NEGATIVE
LV EF: 60 %
LVEF MODALITY: NORMAL
LYMPHOCYTES # BLD: 6 % (ref 24–43)
MAGNESIUM: 2.1 MG/DL (ref 1.6–2.6)
MCH RBC QN AUTO: 32.3 PG (ref 25.2–33.5)
MCHC RBC AUTO-ENTMCNC: 32.9 G/DL (ref 28.4–34.8)
MCV RBC AUTO: 98.2 FL (ref 82.6–102.9)
MONOCYTES # BLD: 0 % (ref 3–12)
MORPHOLOGY: NORMAL
MUCUS: ABNORMAL
NITRITE, URINE: NEGATIVE
NRBC AUTOMATED: 0 PER 100 WBC
OTHER OBSERVATIONS UA: ABNORMAL
PDW BLD-RTO: 13.2 % (ref 11.8–14.4)
PH UA: 6 (ref 5–9)
PLATELET # BLD: 269 K/UL (ref 138–453)
PLATELET ESTIMATE: ABNORMAL
PMV BLD AUTO: 9.8 FL (ref 8.1–13.5)
POTASSIUM SERPL-SCNC: 3.5 MMOL/L (ref 3.7–5.3)
PROTEIN UA: NEGATIVE
RBC # BLD: 4.36 M/UL (ref 3.95–5.11)
RBC # BLD: ABNORMAL 10*6/UL
RBC UA: ABNORMAL /HPF (ref 0–2)
RENAL EPITHELIAL, UA: ABNORMAL /HPF
SEG NEUTROPHILS: 94 % (ref 36–65)
SEGMENTED NEUTROPHILS ABSOLUTE COUNT: 4.98 K/UL (ref 1.5–8.1)
SODIUM BLD-SCNC: 138 MMOL/L (ref 135–144)
SPECIFIC GRAVITY UA: 1.02 (ref 1.01–1.02)
TRICHOMONAS: ABNORMAL
TURBIDITY: CLEAR
URINE HGB: NEGATIVE
UROBILINOGEN, URINE: NORMAL
WBC # BLD: 5.3 K/UL (ref 3.5–11.3)
WBC # BLD: ABNORMAL 10*3/UL
WBC UA: ABNORMAL /HPF (ref 0–5)
YEAST: ABNORMAL

## 2021-01-26 PROCEDURE — 6370000000 HC RX 637 (ALT 250 FOR IP): Performed by: NURSE PRACTITIONER

## 2021-01-26 PROCEDURE — 36415 COLL VENOUS BLD VENIPUNCTURE: CPT

## 2021-01-26 PROCEDURE — 6360000004 HC RX CONTRAST MEDICATION: Performed by: INTERNAL MEDICINE

## 2021-01-26 PROCEDURE — 70470 CT HEAD/BRAIN W/O & W/DYE: CPT

## 2021-01-26 PROCEDURE — 93010 ELECTROCARDIOGRAM REPORT: CPT | Performed by: INTERNAL MEDICINE

## 2021-01-26 PROCEDURE — 6370000000 HC RX 637 (ALT 250 FOR IP): Performed by: INTERNAL MEDICINE

## 2021-01-26 PROCEDURE — 6370000000 HC RX 637 (ALT 250 FOR IP): Performed by: FAMILY MEDICINE

## 2021-01-26 PROCEDURE — 96375 TX/PRO/DX INJ NEW DRUG ADDON: CPT

## 2021-01-26 PROCEDURE — 80048 BASIC METABOLIC PNL TOTAL CA: CPT

## 2021-01-26 PROCEDURE — 93306 TTE W/DOPPLER COMPLETE: CPT

## 2021-01-26 PROCEDURE — G0378 HOSPITAL OBSERVATION PER HR: HCPCS

## 2021-01-26 PROCEDURE — 2580000003 HC RX 258: Performed by: NURSE PRACTITIONER

## 2021-01-26 PROCEDURE — 96372 THER/PROPH/DIAG INJ SC/IM: CPT

## 2021-01-26 PROCEDURE — 85025 COMPLETE CBC W/AUTO DIFF WBC: CPT

## 2021-01-26 PROCEDURE — 99244 OFF/OP CNSLTJ NEW/EST MOD 40: CPT | Performed by: FAMILY MEDICINE

## 2021-01-26 PROCEDURE — 94761 N-INVAS EAR/PLS OXIMETRY MLT: CPT

## 2021-01-26 PROCEDURE — 6360000002 HC RX W HCPCS: Performed by: INTERNAL MEDICINE

## 2021-01-26 PROCEDURE — 96376 TX/PRO/DX INJ SAME DRUG ADON: CPT

## 2021-01-26 PROCEDURE — 6360000002 HC RX W HCPCS: Performed by: NURSE PRACTITIONER

## 2021-01-26 PROCEDURE — 83735 ASSAY OF MAGNESIUM: CPT

## 2021-01-26 RX ORDER — KETOROLAC TROMETHAMINE 15 MG/ML
15 INJECTION, SOLUTION INTRAMUSCULAR; INTRAVENOUS EVERY 6 HOURS PRN
Status: DISCONTINUED | OUTPATIENT
Start: 2021-01-26 | End: 2021-01-27 | Stop reason: HOSPADM

## 2021-01-26 RX ORDER — KETOROLAC TROMETHAMINE 15 MG/ML
15 INJECTION, SOLUTION INTRAMUSCULAR; INTRAVENOUS ONCE
Status: COMPLETED | OUTPATIENT
Start: 2021-01-26 | End: 2021-01-26

## 2021-01-26 RX ORDER — SUMATRIPTAN 50 MG/1
100 TABLET, FILM COATED ORAL ONCE
Status: COMPLETED | OUTPATIENT
Start: 2021-01-26 | End: 2021-01-26

## 2021-01-26 RX ADMIN — IOPAMIDOL 75 ML: 755 INJECTION, SOLUTION INTRAVENOUS at 19:27

## 2021-01-26 RX ADMIN — SUMATRIPTAN SUCCINATE 100 MG: 50 TABLET ORAL at 21:15

## 2021-01-26 RX ADMIN — KETOROLAC TROMETHAMINE 15 MG: 15 INJECTION, SOLUTION INTRAMUSCULAR; INTRAVENOUS at 07:49

## 2021-01-26 RX ADMIN — SODIUM CHLORIDE: 9 INJECTION, SOLUTION INTRAVENOUS at 17:53

## 2021-01-26 RX ADMIN — ACETAMINOPHEN 650 MG: 325 TABLET, FILM COATED ORAL at 03:52

## 2021-01-26 RX ADMIN — SODIUM CHLORIDE: 9 INJECTION, SOLUTION INTRAVENOUS at 00:03

## 2021-01-26 RX ADMIN — FAMOTIDINE 20 MG: 20 TABLET, FILM COATED ORAL at 20:42

## 2021-01-26 RX ADMIN — FAMOTIDINE 20 MG: 20 TABLET, FILM COATED ORAL at 07:50

## 2021-01-26 RX ADMIN — KETOROLAC TROMETHAMINE 15 MG: 15 INJECTION, SOLUTION INTRAMUSCULAR; INTRAVENOUS at 21:16

## 2021-01-26 RX ADMIN — ACETAMINOPHEN 650 MG: 325 TABLET, FILM COATED ORAL at 14:37

## 2021-01-26 RX ADMIN — ONDANSETRON 4 MG: 2 INJECTION INTRAMUSCULAR; INTRAVENOUS at 07:49

## 2021-01-26 RX ADMIN — ENOXAPARIN SODIUM 40 MG: 40 INJECTION SUBCUTANEOUS at 07:49

## 2021-01-26 RX ADMIN — ONDANSETRON 4 MG: 2 INJECTION INTRAMUSCULAR; INTRAVENOUS at 16:39

## 2021-01-26 RX ADMIN — SODIUM CHLORIDE: 9 INJECTION, SOLUTION INTRAVENOUS at 07:57

## 2021-01-26 ASSESSMENT — PAIN DESCRIPTION - LOCATION
LOCATION: HEAD

## 2021-01-26 ASSESSMENT — PAIN SCALES - GENERAL
PAINLEVEL_OUTOF10: 8
PAINLEVEL_OUTOF10: 8

## 2021-01-26 ASSESSMENT — PAIN DESCRIPTION - FREQUENCY: FREQUENCY: CONTINUOUS

## 2021-01-26 ASSESSMENT — PAIN DESCRIPTION - DESCRIPTORS: DESCRIPTORS: HEADACHE

## 2021-01-26 ASSESSMENT — PAIN DESCRIPTION - PAIN TYPE: TYPE: ACUTE PAIN

## 2021-01-26 NOTE — PROGRESS NOTES
Patient up in chair in room, lights off and blanket over face. Reports migraine, dizziness and nausea. One time toradol dose ordered per Dr. Osiris Palomo. Will admin toradol and zofran for nausea. Denies further needs at this time. Call light in reach.

## 2021-01-26 NOTE — PROGRESS NOTES
Progress Note    SUBJECTIVE: Follow-up on nausea vomiting    OBJECTIVE: Sitting up in chair in the dark with blanket over her face. Complains of headache. States she does have migraines in the past and just takes Tylenol. Nursing did provide that to her as well. She has not had any further emesis. She does still complain of feeling dizzy. Vitals:   Vitals:    01/26/21 0724   BP: (!) 120/53   Pulse: 77   Resp: 16   Temp: 98.7 °F (37.1 °C)   SpO2: 97%     Weight: 174 lb (78.9 kg)(bed rezeroed)   Height: 5' 6\" (167.6 cm)   -----------------------------------------------------------------  Exam:    GEN:  alert and oriented to person, place and time, well-developed and well nourished, in no acute distress, alert and with anxious affect  EYES:  No gross abnormalities. , PERRL and EOMI  NECK: normal, supple, no lymphadenopathy,  no carotid bruits  PULM: clear to auscultation bilaterally- no wheezes, rales or rhonchi, normal air movement, no respiratory distress  COR:   regular rate & rhythm, no murmurs, no gallops, S1 normal and S2 normal  ABD:    soft, non-tender, non-distended, normal bowel sounds, no masses or organomegaly  EXT:    no cyanosis, clubbing or edema present    NEURO: follows commands, WILSON, no deficits  SKIN:   no rashes or significant lesions  -----------------------------------------------------------------  Diagnostic Data: Reviewed    ASSESSMENT:   Principal Problem:    Uncontrollable vomiting  Active Problems:    Intractable nausea and vomiting    Dizziness  Resolved Problems:    * No resolved hospital problems.  *        PLAN:  · Dizziness  · Appreciate cardiology  · Echocardiogram  · Orthostatic blood pressuresnegative  · Intractable nausea vomiting  · Zofran as needed  · Continue normal saline at 125 mL/h  · Daily CBC and BMPnormal  · Increase to full liquid diet today  · High risk medications:  None  · Discharge plan: Home when stable      BRADLEY Ty, NP-C

## 2021-01-26 NOTE — PROGRESS NOTES
Patients IV to left AC infiltrated at this time, writer attempted to start new IV, unsuccessful after one attempt. Tiera RN supervisor called and will attempt IV start shortly. [facility] also [facility] at this time.

## 2021-01-26 NOTE — PROGRESS NOTES
Patient state she is still having migraine and requested more tylenol. Tylenol given at this time. Patient states she has a hx of migraines and is prone to them. She states she alternates tylenol and ibuprofen at home to tx migraine.

## 2021-01-26 NOTE — PROGRESS NOTES
One time order of Methylprednisolone IV 40mg  given at this time per new order placed by Dr. Royal Quintero.

## 2021-01-26 NOTE — PROGRESS NOTES
Patient reports improvement in pain and nausea at this time. Denies needs. Resting in bed, call light in [facility].

## 2021-01-26 NOTE — PROGRESS NOTES
Patient stated she is currently having nausea. Zofran IV given at this time. Patient stated she also has a migraine of 8/10, tylenol given. Lights turned out and door shut to minimize noise. Patient denies any other needs at this time. Will continue to monitor.

## 2021-01-26 NOTE — PROGRESS NOTES
Comprehensive Nutrition Assessment    Type and Reason for Visit:  Initial    Nutrition Recommendations/Plan:  Encourage oral intakes    Nutrition Assessment:  Inadequate nutrient intakes r/t alteration in GI status, AEB vomiting pta. Reports of covid in December, with weight stability since. Low K+ likely r/t losses from emesis (none noted since admission in I/O). States tolerance of broth last evening, but slow to work on breakfast r/t migraine this AM.    Malnutrition Assessment:  Malnutrition Status: At risk for malnutrition (Comment)    Context:  Acute Illness     Findings of the 6 clinical characteristics of malnutrition:  Energy Intake:  Mild decrease in energy intake (Comment)(vague responses from patient)  Weight Loss:  No significant weight loss     Body Fat Loss:  Unable to assess     Muscle Mass Loss:  Unable to assess    Fluid Accumulation:  No significant fluid accumulation     Strength:  Not Performed    Estimated Daily Nutrient Needs:  Energy (kcal):  4544-3819(92-58); Weight Used for Energy Requirements:  Current     Protein (g):  77-89(1.3-1.5); Weight Used for Protein Requirements:  Ideal        Fluid (ml/day):  1800; Method Used for Fluid Requirements:  1 ml/kcal      Nutrition Related Findings:  covered with blanket r/t migraine      Wounds:  None       Current Nutrition Therapies:    DIET FULL LIQUID; Anthropometric Measures:  · Height: 5' 6\" (167.6 cm)  · Current Body Weight: 174 lb (78.9 kg)   · Admission Body Weight: 171 lb 8 oz (77.8 kg)    · Usual Body Weight: 175 lb (79.4 kg)(post covid)     · Ideal Body Weight: 130 lbs; % Ideal Body Weight 133.8 %   · BMI: 28.1  · Adjusted Body Weight:  ; No Adjustment   · BMI Categories: Overweight (BMI 25.0-29. 9)       Nutrition Diagnosis:   · Inadequate energy intake related to altered GI function as evidenced by vomiting    Lab Results   Component Value Date     01/26/2021    K 3.5 (L) 01/26/2021     01/26/2021    CO2 25 01/26/2021    BUN 10 01/26/2021    CREATININE 0.60 01/26/2021    GLUCOSE 119 (H) 01/26/2021    CALCIUM 8.3 (L) 01/26/2021    PROT 7.2 01/25/2021    LABALBU 4.2 01/25/2021    BILITOT 1.11 01/25/2021    ALKPHOS 34 (L) 01/25/2021    AST 25 01/25/2021    ALT 12 01/25/2021    LABGLOM >60 01/26/2021    GFRAA >60 01/26/2021     No results found for: LABA1C  No results found for: EAG    Nutrition Interventions:   Food and/or Nutrient Delivery:  Modify Current Diet(advance diet as GI feasible)  Nutrition Education/Counseling:  No recommendation at this time   Coordination of Nutrition Care:  Continue to monitor while inpatient    Goals:  PO >75% meals on advanced diet       Nutrition Monitoring and Evaluation:   Behavioral-Environmental Outcomes:  None Identified   Food/Nutrient Intake Outcomes:  Diet Advancement/Tolerance  Physical Signs/Symptoms Outcomes:  Biochemical Data, Weight, Nausea or Vomiting, Constipation     Discharge Planning: Too soon to determine     Electronically signed by Juan Coleman RD, LD on 1/26/21 at 8:19 AM EST    Contact: [phone]        .

## 2021-01-26 NOTE — PROGRESS NOTES
Patient assisted to bathroom, stand by assist. Patient requested to sit up in chair at this time. Call light, bedside table and personal belonging within reach. Water pitcher refilled. Patient denies any other needs.

## 2021-01-26 NOTE — CONSULTS
Social History:  Social History     Tobacco Use    Smoking status: Never Smoker    Smokeless tobacco: Never Used   Substance Use Topics    Alcohol use: No     Alcohol/week: 0.0 standard drinks     Comment: rarely    Drug use: No        MEDICATIONS:      0.9 % sodium chloride infusion, Continuous      sodium chloride flush 0.9 % injection 10 mL, 2 times per day      sodium chloride flush 0.9 % injection 10 mL, PRN      enoxaparin (LOVENOX) injection 40 mg, Daily      famotidine (PEPCID) tablet 20 mg, BID      ondansetron (ZOFRAN) injection 4 mg, Q6H PRN      acetaminophen (TYLENOL) tablet 650 mg, Q8H PRN        CURRENT INPATIENT MEDICATIONS:  Prior to Admission medications    Medication Sig Start Date End Date Taking?  Authorizing Provider   buPROPion (WELLBUTRIN XL) 150 MG extended release tablet  1/21/21   Historical Provider, MD   promethazine (PHENERGAN) 25 MG tablet Take 1 tablet by mouth 3 times daily as needed for Nausea 1/22/21 1/29/21  Jame Judd MD   potassium chloride (KLOR-CON M) 20 MEQ extended release tablet Take 1 tablet by mouth 2 times daily 1/21/21   Jame Judd MD   furosemide (LASIX) 20 MG tablet TAKE 1 TABLET BY MOUTH ONCE DAILY AS NEEDED (WEIGHT  GAIN  MORE  THAN  A  POUND) 1/4/21   Jame Judd MD   albuterol (PROVENTIL) 4 MG tablet Take 1 tablet by mouth 3 times daily 12/20/20   Darrel Oconnell MD   albuterol sulfate HFA (VENTOLIN HFA) 108 (90 Base) MCG/ACT inhaler Inhale 2 puffs into the lungs 4 times daily as needed for Wheezing 12/19/20   Carolyn Natarajan MD   levothyroxine (EUTHYROX) 88 MCG tablet Take 1 tablet by mouth once daily 11/6/20   Jame Judd MD   triamterene-hydroCHLOROthiazide (DYAZIDE) 37.5-25 MG per capsule Take 1 capsule by mouth once daily 11/6/20   Jame Judd MD   ALPRAZolam (XANAX) 0.25 MG tablet TAKE 1 TABLET BY MOUTH TWICE DAILY AS NEEDED FOR ANXIETY FOR 30 DAYS 4/22/20   Historical Provider, MD AST 25 01/25/2021     01/26/2021    K 3.5 (L) 01/26/2021     01/26/2021    CREATININE 0.60 01/26/2021    BUN 10 01/26/2021    CO2 25 01/26/2021    TSH 1.07 01/25/2021       ASSESSMENT:  Patient Active Problem List    Diagnosis Date Noted    Dizziness 01/26/2021    Persistent vomiting 01/25/2021    Uncontrollable vomiting 01/25/2021    Intractable nausea and vomiting 01/25/2021    Lab test positive for detection of COVID-19 virus 12/20/2020    Shortness of breath 12/20/2020    Myalgia 12/20/2020    Iron deficiency anemia 02/15/2019    Moderate single current episode of major depressive disorder ([facility] [address] 03/21/2018    Idiopathic gout 04/20/2016    Anxiety state 04/20/2016    Essential hypertension 05/06/2015    Hypothyroidism 05/06/2015        PLAN:  Lightheadedness/Dizziness: Although unclear etiology, given her reported 2 weeks of nausea and vomiting and diarrhea issues combined with her oral dieretics I think that hypovolemia was likely a significant contributing cause. Agree with discontinuation of dieretics last Friday and IVF now as well as supportive care including treatment of her nausea. Would add treatment for her diarrhea and am hopeful that if we can control her nausea and vomiting and diarrhea issues I think this will improve her symptoms. She did mention some heart palpations however I did let her know this can be common due to her not feeling well. · Nonpharmacologic counseling: Because of her condition, I reminded her to try and keep herself well-hydrated and to take extra time when moving from laying to sitting, sitting to standing and standing to walking. I also explained to her to help improve her symptoms she should include 3 g sodium diet, 1 or 2 L of sports drinks daily, Knee-high compressions stockings and regular exercise. I also told her to practice, learn, and regularly implement vasovagal abortive counter pressure counter measure maneuvers which are simply isometric exercises of the upper and lower extremities, called leg crossing and arm tensing, which are to be performed on a regular basis and practice and applied during the her syncopal episodes. · Agree with IV fluids at this time. · Additional Testing: I ordered/agree with ordering an Echocardiogram to assess Ms. Boone's ejection fraction and to look for significant valvular heart disease as a source of Ms. Boone symptoms. · Could consider tilt table testing but would treat as above 1st and only pursue this if symptoms persist.  · If her symptoms do not improve with in a day or two then we may consider doing a Tilt Table Test.         Once again, thank you for allowing me to participate in this patients care. Please do not hesitate to contact me if I could be of any further assistance. Sincerely,  Zeeshan Little MD, MS, F.A.C.C. [facility] Cardiology Specialist    [address] [place] [address]  Phone: [phone], Fax: [phone]     I believe that the risk of significant morbidity and mortality related to the patient's current medical conditions are: Intermediate. The documentation recorded by the scribe, accurately and completely reflects the services I personally performed and the decisions made by me. Mark Vázquez.  Deb Patel MD, MS, F.A.C.C. 1/26/2021

## 2021-01-27 VITALS
HEART RATE: 60 BPM | TEMPERATURE: 97.5 F | RESPIRATION RATE: 16 BRPM | HEIGHT: 66 IN | SYSTOLIC BLOOD PRESSURE: 123 MMHG | WEIGHT: 176.5 LBS | OXYGEN SATURATION: 99 % | BODY MASS INDEX: 28.37 KG/M2 | DIASTOLIC BLOOD PRESSURE: 61 MMHG

## 2021-01-27 LAB
ABSOLUTE EOS #: 0.03 K/UL (ref 0–0.44)
ABSOLUTE IMMATURE GRANULOCYTE: <0.03 K/UL (ref 0–0.3)
ABSOLUTE LYMPH #: 2.03 K/UL (ref 1.1–3.7)
ABSOLUTE MONO #: 0.23 K/UL (ref 0.1–1.2)
ANION GAP SERPL CALCULATED.3IONS-SCNC: 7 MMOL/L (ref 9–17)
BASOPHILS # BLD: 1 % (ref 0–2)
BASOPHILS ABSOLUTE: 0.06 K/UL (ref 0–0.2)
BUN BLDV-MCNC: 7 MG/DL (ref 6–20)
BUN/CREAT BLD: 11 (ref 9–20)
CALCIUM SERPL-MCNC: 8.2 MG/DL (ref 8.6–10.4)
CHLORIDE BLD-SCNC: 109 MMOL/L (ref 98–107)
CO2: 28 MMOL/L (ref 20–31)
CREAT SERPL-MCNC: 0.61 MG/DL (ref 0.5–0.9)
DIFFERENTIAL TYPE: ABNORMAL
EOSINOPHILS RELATIVE PERCENT: 1 % (ref 1–4)
GFR AFRICAN AMERICAN: >60 ML/MIN
GFR NON-AFRICAN AMERICAN: >60 ML/MIN
GFR SERPL CREATININE-BSD FRML MDRD: ABNORMAL ML/MIN/{1.73_M2}
GFR SERPL CREATININE-BSD FRML MDRD: ABNORMAL ML/MIN/{1.73_M2}
GLUCOSE BLD-MCNC: 93 MG/DL (ref 70–99)
HCT VFR BLD CALC: 41.3 % (ref 36.3–47.1)
HEMOGLOBIN: 13.3 G/DL (ref 11.9–15.1)
IMMATURE GRANULOCYTES: 0 %
LYMPHOCYTES # BLD: 45 % (ref 24–43)
MCH RBC QN AUTO: 31.9 PG (ref 25.2–33.5)
MCHC RBC AUTO-ENTMCNC: 32.2 G/DL (ref 28.4–34.8)
MCV RBC AUTO: 99 FL (ref 82.6–102.9)
MONOCYTES # BLD: 5 % (ref 3–12)
NRBC AUTOMATED: 0 PER 100 WBC
PDW BLD-RTO: 13.5 % (ref 11.8–14.4)
PLATELET # BLD: 249 K/UL (ref 138–453)
PLATELET ESTIMATE: ABNORMAL
PMV BLD AUTO: 9.6 FL (ref 8.1–13.5)
POTASSIUM SERPL-SCNC: 3.8 MMOL/L (ref 3.7–5.3)
RBC # BLD: 4.17 M/UL (ref 3.95–5.11)
RBC # BLD: ABNORMAL 10*6/UL
SEG NEUTROPHILS: 49 % (ref 36–65)
SEGMENTED NEUTROPHILS ABSOLUTE COUNT: 2.21 K/UL (ref 1.5–8.1)
SODIUM BLD-SCNC: 144 MMOL/L (ref 135–144)
WBC # BLD: 4.6 K/UL (ref 3.5–11.3)
WBC # BLD: ABNORMAL 10*3/UL

## 2021-01-27 PROCEDURE — 6370000000 HC RX 637 (ALT 250 FOR IP): Performed by: NURSE PRACTITIONER

## 2021-01-27 PROCEDURE — 6360000002 HC RX W HCPCS: Performed by: INTERNAL MEDICINE

## 2021-01-27 PROCEDURE — 85025 COMPLETE CBC W/AUTO DIFF WBC: CPT

## 2021-01-27 PROCEDURE — 94761 N-INVAS EAR/PLS OXIMETRY MLT: CPT

## 2021-01-27 PROCEDURE — G0378 HOSPITAL OBSERVATION PER HR: HCPCS

## 2021-01-27 PROCEDURE — 96376 TX/PRO/DX INJ SAME DRUG ADON: CPT

## 2021-01-27 PROCEDURE — 2580000003 HC RX 258: Performed by: NURSE PRACTITIONER

## 2021-01-27 PROCEDURE — 36415 COLL VENOUS BLD VENIPUNCTURE: CPT

## 2021-01-27 PROCEDURE — 6360000002 HC RX W HCPCS: Performed by: NURSE PRACTITIONER

## 2021-01-27 PROCEDURE — 80048 BASIC METABOLIC PNL TOTAL CA: CPT

## 2021-01-27 PROCEDURE — 96372 THER/PROPH/DIAG INJ SC/IM: CPT

## 2021-01-27 RX ORDER — SUMATRIPTAN 25 MG/1
25 TABLET, FILM COATED ORAL DAILY PRN
Qty: 10 TABLET | Refills: 0 | Status: SHIPPED | OUTPATIENT
Start: 2021-01-27 | End: 2021-02-23 | Stop reason: ALTCHOICE

## 2021-01-27 RX ADMIN — ONDANSETRON 4 MG: 2 INJECTION INTRAMUSCULAR; INTRAVENOUS at 08:46

## 2021-01-27 RX ADMIN — ENOXAPARIN SODIUM 40 MG: 40 INJECTION SUBCUTANEOUS at 08:39

## 2021-01-27 RX ADMIN — FAMOTIDINE 20 MG: 20 TABLET, FILM COATED ORAL at 08:39

## 2021-01-27 RX ADMIN — KETOROLAC TROMETHAMINE 15 MG: 15 INJECTION, SOLUTION INTRAMUSCULAR; INTRAVENOUS at 07:58

## 2021-01-27 RX ADMIN — SODIUM CHLORIDE: 9 INJECTION, SOLUTION INTRAVENOUS at 02:24

## 2021-01-27 ASSESSMENT — PAIN SCALES - GENERAL: PAINLEVEL_OUTOF10: 6

## 2021-01-27 ASSESSMENT — PAIN DESCRIPTION - LOCATION: LOCATION: HEAD

## 2021-01-27 NOTE — PROGRESS NOTES
Patient alert, sitting up in chair. Admits to dizziness with standing, denies at rest. Continues to c/o headache, voices effectiveness of imitrex given during the night. Admits to some nausea, but states it has improved some. Will give tylenol for headache at this time. Call light in reach.

## 2021-01-27 NOTE — PROGRESS NOTES
Discharge instructions given to patient, voices understanding of all. Denies questions or concerns at this time. Discharged to home at this time, transports via w/c.

## 2021-01-27 NOTE — DISCHARGE SUMMARY
Discharge Summary    Conor Ceballos  :  1970  MRN:  [id number]    Admit date:  2021      Discharge date: 2021     Admitting Physician:  Celena Campos MD    Discharge Diagnoses:    Principal Problem:    Uncontrollable vomiting  Active Problems:    Orthostatic hypotension    Diarrhea    Recurrent falls while walking    Dehydration    Intractable nausea and vomiting    Dizziness  Resolved Problems:    * No resolved hospital problems. *      Hospital Course:   Conor Ceballos is a 53 y.o. female admitted with intractable nausea and vomiting. She presented with with dizziness and falls. She explained that she had Covid a couple weeks ago and has been doing better. However in the last few days she has developed dizziness and has fallen twice in the last couple days. She admitted to nausea and vomiting with one emesis in the last 24 hours. She denied diarrhea. Denied constipation although was unable to tell me when her last bowel movement was and dated probably in the last couple days. Stated that she does go most every day. She had been afebrile. Denied chest pain or palpitations. Denied fever chills. She was admitted for observation. She was given a bolus of normal saline of 1 L. Following the bolus she was placed on maintenance IV fluids. CT of the abdomen and pelvis just showed stool in the colon. Cardiology was consulted echocardiogram completed and was within normal limits. She continues to complain of headache however the dizziness is better. She does have history of migraines in the past.  I will discharge home today with Imitrex. Consultants:  Dr. Reagan Miguel, cardiology    Procedures: none    Complications: none    Discharge Condition: fair    Exam:    GEN:  alert and oriented to person, place and time, well-developed and well nourished, in no acute distress  EYES:  No gross abnormalities. , PERRL and EOMI  NECK: normal, supple, no lymphadenopathy,  no carotid Accession #  [id number]  Interpreting Physician      [address]   Fellow                   Referring Nurse                           Practitioner   Interpreting             Referring Physician         [address] [place] [address]  Fellow  Type of Study   TTE procedure:2D Echocardiogram, M-Mode, Doppler, Color Doppler. Procedure Date Date: 01/26/2021 Start: 01:29 PM Study Location: [facility] Indications:Dizziness. Comments:Student performed History / Tech. Comments: Dizziness PMHX; HTN Patient Status: Inpatient Height: 66 inches Weight: 174 pounds BSA: 1.89 m^2 BMI: 28.08 kg/m^2 BP: 120/53 mmHg CONCLUSIONS Summary Global left ventricular systolic function appears preserved with an estimated ejection fraction of >60%. The left ventricular cavity size is within normal limits and the left ventricular wall thickness is mildly increased. No definite specific wall motion abnormalities were identified. Normal tricuspid valve structure with mild tricuspid regurgitation. Normal mitral valve structure with mild mitral regurgitation. No clear evidence of diastolic dysfunction was seen. No previous studies were available for comparison. No significant cardiac cause of dizziness was identified from this study. Signature ----------------------------------------------------------------------------  Electronically signed by Dorothy Mas(Sonographer) on 01/26/2021 03:30  PM ---------------------------------------------------------------------------- ----------------------------------------------------------------------------  Electronically signed by Reji Little(Interpreting physician) on  01/26/2021 05:25 PM ---------------------------------------------------------------------------- FINDINGS Left Atrium Left atrium is normal in size. Left Ventricle Global left ventricular systolic function appears preserved with an estimated ejection fraction of >60%.  The left ventricular cavity size is within normal limits and the left ventricular wall thickness is mildly increased. No definite specific wall motion abnormalities were identified. Right Atrium Right atrium is normal in size. Right Ventricle Normal right ventricular size and function. Mitral Valve Normal mitral valve structure with mild mitral regurgitation. Aortic Valve Normal aortic valve structure and function without stenosis or regurgitation. Tricuspid Valve Normal tricuspid valve structure with mild tricuspid regurgitation. Pulmonic Valve The pulmonic valve is normal in structure. Pericardial Effusion No significant pericardial effusion is seen. Miscellaneous Normal aortic root diameter. No clear evidence of diastolic dysfunction was seen.  M-mode / 2D Measurements & Calculations:   LVIDd:4.23 cm(3.7 - 5.6 cm)     Diastolic SKAQKH:80.04 ml  LVIDs:2.94 cm(2.2 - 4.0 cm)     Systolic RDKNAX:71.69 ml  IVSd:1.22 cm(0.6 - 1.1 cm)      Aortic Root:3.37 cm(2.0 - 3.7 cm)  LVPWd:1.16 cm(0.6 - 1.1 cm)     LA Dimension: 4.07 cm(1.9 - 4.0 cm)  Fractional Shortenin.5 %    LA volume/Index: 52 ml /28m^2  Calculated LVEF (%): 66.27 %    AV Cusp Separation: 2.27 cm   Mitral:                                 Aortic   Valve Area (P1/2-Time): 3.81 cm^2       Peak Velocity: 1.39 m/s  Peak E-Wave: 0.85 m/s                   Mean Velocity: 1.00 m/s  Peak A-Wave: 0.75 m/s                   Peak Gradient: 7.77 mmHg  E/A Ratio: 1.13                         Mean Gradient: 4.48 mmHg  Peak Gradient: 2.89 mmHg                                          Acceleration Time: 44.2 msec  P1/2t: 57.68 msec                                          AV VTI: 27.11 cm   Tricuspid:                              Pulmonic:   Estimated RVSP: 19.86 mmHg  Peak TR Velocity: 2.05 m/s  Peak TR Gradient: 59.57057 mmHg  Estimated RA Pressure: 3 mmHg                                          Estimated PASP: 19.86 mmHg  Diastology / Tissue Doppler Septal Wall E' velocity:0.14 m/s Lateral Wall E' velocity:0.19 m/s Lateral Wall E/E':6.04    Ct Head W Wo Contrast    Result Date: 1/26/2021  EXAMINATION: CT OF THE HEAD WITH AND WITHOUT CONTRAST  1/26/2021 7:28 pm TECHNIQUE: CT of the head/brain was performed without and with the administration of intravenous contrast. Multiplanar reformatted images are provided for review. Dose modulation, iterative reconstruction, and/or weight based adjustment of the mA/kV was utilized to reduce the radiation dose to as low as reasonably achievable. COMPARISON: None. HISTORY: ORDERING SYSTEM PROVIDED HISTORY: headache TECHNOLOGIST PROVIDED HISTORY: headache FINDINGS: BRAIN/VENTRICLES: There is no acute intracranial hemorrhage, mass effect or midline shift. No abnormal extra-axial fluid collection. The gray-white differentiation is maintained without evidence of an acute infarct. There is no evidence of hydrocephalus. ORBITS: The visualized portion of the orbits demonstrate no acute abnormality. SINUSES: The visualized paranasal sinuses and mastoid air cells demonstrate no acute abnormality. SOFT TISSUES/SKULL:  No acute abnormality of the visualized skull or soft tissues. No acute intracranial abnormality. Ct Abdomen Pelvis W Iv Contrast Additional Contrast? Radiologist Recommendation    Result Date: 1/25/2021  EXAMINATION: CT OF THE ABDOMEN AND PELVIS WITH CONTRAST 1/25/2021 12:54 pm TECHNIQUE: CT of the abdomen and pelvis was performed with the administration of intravenous contrast. Multiplanar reformatted images are provided for review. Dose modulation, iterative reconstruction, and/or weight based adjustment of the mA/kV was utilized to reduce the radiation dose to as low as reasonably achievable. COMPARISON: None. HISTORY: ORDERING SYSTEM PROVIDED HISTORY: intractible n/v TECHNOLOGIST PROVIDED HISTORY: intractible n/v FINDINGS: Lower Chest: Lung bases are clear. Organs: No liver lesion. Contracted gallbladder. No gallstones. No pancreatic lesion. No splenomegaly. No adrenal lesion.   No hydronephrosis. No renal lesion. GI/Bowel: No bowel obstruction. No acute inflammatory process. No appendicitis. Moderate-large stool volume. Pelvis: No free fluid. No bladder calculus. Right ovarian cyst appears simple measuring 2.8 cm. Peritoneum/Retroperitoneum: No aortic aneurysm. No adenopathy. Bones/Soft Tissues: Nonspecific superficial subcutaneous lesion along the left lower chest measuring 13 mm. No acute soft tissue abnormality. Lumbar spine degenerative changes. Moderate-large stool volume. No bowel obstruction. No acute inflammatory process. Right ovarian cyst requires no specific follow-up in the absence of right lower quadrant symptoms.  Nonspecific subcutaneous lesion along the left lower chest.       Assessment and Plan:  Patient Active Problem List    Diagnosis Date Noted    Orthostatic hypotension      Priority: High    Diarrhea      Priority: High    Recurrent falls while walking      Priority: High    Dehydration      Priority: High    Dizziness 01/26/2021    Persistent vomiting 01/25/2021    Uncontrollable vomiting 01/25/2021    Intractable nausea and vomiting 01/25/2021    Lab test positive for detection of COVID-19 virus 12/20/2020    Shortness of breath 12/20/2020    Myalgia 12/20/2020    Iron deficiency anemia 02/15/2019    Moderate single current episode of major depressive disorder ([address] 03/21/2018    Idiopathic gout 04/20/2016    Anxiety state 04/20/2016    Essential hypertension 05/06/2015    Hypothyroidism 05/06/2015        Discharge Medications:       Aman Pollen   Home Medication Instructions [id number]    Printed on:01/27/21 1026   Medication Information                      albuterol (PROVENTIL) 4 MG tablet  Take 1 tablet by mouth 3 times daily             albuterol sulfate HFA (VENTOLIN HFA) 108 (90 Base) MCG/ACT inhaler  Inhale 2 puffs into the lungs 4 times daily as needed for Wheezing             ALPRAZolam (XANAX) 0.25 MG tablet  TAKE 1 TABLET BY MOUTH TWICE DAILY AS NEEDED FOR ANXIETY FOR 30 DAYS             buPROPion (WELLBUTRIN XL) 150 MG extended release tablet               escitalopram (LEXAPRO) 10 MG tablet  TAKE 1 & 1/2 (ONE & ONE-HALF) TABLETS BY MOUTH ONCE DAILY             furosemide (LASIX) 20 MG tablet  TAKE 1 TABLET BY MOUTH ONCE DAILY AS NEEDED (WEIGHT  GAIN  MORE  THAN  A  POUND)             levothyroxine (EUTHYROX) 88 MCG tablet  Take 1 tablet by mouth once daily             potassium chloride (KLOR-CON M) 20 MEQ extended release tablet  Take 1 tablet by mouth 2 times daily             promethazine (PHENERGAN) 25 MG tablet  Take 1 tablet by mouth 3 times daily as needed for Nausea             SUMAtriptan (IMITREX) 25 MG tablet  Take 1 tablet by mouth daily as needed for Migraine             traZODone (DESYREL) 50 MG tablet  100 mg              triamterene-hydroCHLOROthiazide (DYAZIDE) 37.5-25 MG per capsule  Take 1 capsule by mouth once daily                 Patient Instructions:    Activity: activity as tolerated  Diet: encourage fluids  Wound Care: none needed  Other: None    Disposition:   Discharge to Home    Follow up:  Patient will be followed by Sadaf Churchill MD in 1-2 weeks    CORE MEASURES on Discharge (if applicable)  ACE/ARB in CHF: NA  Statin in MI: NA  ASA in MI: NA  Statin in CVA: NA  Antiplatelet in CVA: NA    Total time spent on discharge services: 40 minutes    Including the following activities:  Evaluation and Management of patient  Discussion with patient and/or surrogate about current care plan  Coordination with Case Management and/or   Coordination of care with Consultants (if applicable)   Coordination of care with Receiving Facility Physician (if applicable)  Completion of DME forms (if applicable)  Preparation of Discharge Summary  Preparation of Medication Reconciliation  Preparation of Discharge Prescriptions    Signed:  BRADLEY Medrano, NP-C  1/27/2021, 10:26 AM

## 2021-02-22 ENCOUNTER — HOSPITAL ENCOUNTER (OUTPATIENT)
Dept: NON INVASIVE DIAGNOSTICS | Age: 51
Discharge: HOME | End: 2021-02-22
Payer: COMMERCIAL

## 2021-02-22 ENCOUNTER — OFFICE VISIT (OUTPATIENT)
Dept: CARDIOLOGY | Age: 51
End: 2021-02-22
Payer: COMMERCIAL

## 2021-02-22 VITALS
BODY MASS INDEX: 29.83 KG/M2 | HEIGHT: 66 IN | SYSTOLIC BLOOD PRESSURE: 116 MMHG | RESPIRATION RATE: 18 BRPM | HEART RATE: 81 BPM | DIASTOLIC BLOOD PRESSURE: 80 MMHG | OXYGEN SATURATION: 98 % | WEIGHT: 185.6 LBS

## 2021-02-22 DIAGNOSIS — R94.31 ABNORMAL EKG: ICD-10-CM

## 2021-02-22 DIAGNOSIS — R00.2 PALPITATIONS: ICD-10-CM

## 2021-02-22 DIAGNOSIS — I95.1 ORTHOSTATIC HYPOTENSION: ICD-10-CM

## 2021-02-22 DIAGNOSIS — I95.1 ORTHOSTATIC HYPOTENSION: Primary | ICD-10-CM

## 2021-02-22 DIAGNOSIS — R06.02 SOB (SHORTNESS OF BREATH): ICD-10-CM

## 2021-02-22 DIAGNOSIS — R07.89 ATYPICAL CHEST PAIN: ICD-10-CM

## 2021-02-22 PROCEDURE — G8417 CALC BMI ABV UP PARAM F/U: HCPCS | Performed by: FAMILY MEDICINE

## 2021-02-22 PROCEDURE — 93242 EXT ECG>48HR<7D RECORDING: CPT

## 2021-02-22 PROCEDURE — G8427 DOCREV CUR MEDS BY ELIG CLIN: HCPCS | Performed by: FAMILY MEDICINE

## 2021-02-22 PROCEDURE — 99214 OFFICE O/P EST MOD 30 MIN: CPT | Performed by: FAMILY MEDICINE

## 2021-02-22 PROCEDURE — 3017F COLORECTAL CA SCREEN DOC REV: CPT | Performed by: FAMILY MEDICINE

## 2021-02-22 PROCEDURE — G8484 FLU IMMUNIZE NO ADMIN: HCPCS | Performed by: FAMILY MEDICINE

## 2021-02-22 PROCEDURE — 1036F TOBACCO NON-USER: CPT | Performed by: FAMILY MEDICINE

## 2021-02-22 PROCEDURE — 93243 EXT ECG>48HR<7D SCAN A/R: CPT

## 2021-02-22 NOTE — PROGRESS NOTES
Urvashi Ch am scribing for and in the presence of Hima Little MD, MS, F.A.C.C. Patient: Ross Rosado  : [id number] Care Physician: Loly Hughes  Today's Date: 2021    REASON FOR VISIT: Follow-up (hospital follow up for hypotension and falls. She is having heart palpitations that wake her up at night. No more falls since being in the hospital. she has severe lightheadedness when she is standing. She does have CP that feels sharp lasting seconds, sometimes radiates down her left arm. goes away on its own. resting makes it better. it can happen a couple times a day. SOB all the time. this has all been going on since mid january when she had covid)    Dear Loly Hughes MD,    HPI: Ms. Marga Dean is a 49 y.o. female who was admitted to the hospital with lightheaded and dizziness. She also had been feeling nauseated and having migraine that started last night. She denied any previous heart related history. She is adopted so she was unaware of any family history. She does drink 1-2 cups off coffee daily. Her echocardiogram on 2021 was fairly unremarkable and showed an ejection fraction of >60%. Ms. Marga Dean is here today for a follow up from the hospital. She did have Covid in January and all her symptoms started post Covid. She is reporting continued intermittent palpitations that wake her up at night. They can be moderate in intensity and she has intermittent dizziness with them as well as other times. She is having moderate sharp chest pain that last seconds, happening randomly a couple times a week. The chest pain takes her breath away for a second but denies association with exertion. She also continued to report moderate shortness of breath with even mild to moderate exertion which she says is not normal for her. She does stay very well hydrated. She is the most concerned about her palpitations and shortness of breath.  She is also reporting migraines which have seemed to worsen as well since all this started. She denied any abdominal pain, bleeding problems, problems with her medications or any other concerns at this time. She also denied any fever or chills. Past Medical History:   Diagnosis Date    Anemia, unspecified     Anxiety     Edema     Hypertension     Hypothyroidism     Restless legs syndrome (RLS)     Thyroid disease        CURRENT ALLERGIES: Rocephin [ceftriaxone] REVIEW OF SYSTEMS: 14 systems were reviewed. Pertinent positives and negatives as above, all else negative. Past Surgical History:   Procedure Laterality Date    ANTERIOR CRUCIATE LIGAMENT REPAIR Left     HYSTERECTOMY, VAGINAL      07/2020 robotic, lap supracervical hyst and BS, perineopexy, A&P repair, cysto, Lynx retropubic slin    INFECTED SKIN DEBRIDEMENT Left     Left third finger    Social History:  Social History     Tobacco Use    Smoking status: Never Smoker    Smokeless tobacco: Never Used   Substance Use Topics    Alcohol use: No     Alcohol/week: 0.0 standard drinks     Comment: rarely    Drug use: No        MEDICATIONS:  No current facility-administered medications for this visit. CURRENT INPATIENT MEDICATIONS:  Prior to Admission medications    Medication Sig Start Date End Date Taking?  Authorizing Provider   levothyroxine (EUTHYROX) 88 MCG tablet Take 1 tablet by mouth once daily 2/18/21  Yes Sree Kay MD   omeprazole (PRILOSEC) 40 MG delayed release capsule Take 1 capsule by mouth daily 2/2/21  Yes Sree Kay MD   SUMAtriptan (IMITREX) 25 MG tablet Take 1 tablet by mouth daily as needed for Migraine 1/27/21  Yes BRADLEY Paul - CNP   buPROPion (WELLBUTRIN XL) 150 MG extended release tablet  1/21/21  Yes Historical Provider, MD   albuterol sulfate HFA (VENTOLIN HFA) 108 (90 Base) MCG/ACT inhaler Inhale 2 puffs into the lungs 4 times daily as needed for Wheezing 12/19/20  Yes Lonny Shay MD   ALPRAZolam Keerthi Campuzano) 0.25 MG tablet TAKE 1 TABLET BY MOUTH TWICE DAILY AS NEEDED FOR ANXIETY FOR 30 DAYS 4/22/20  Yes Historical Provider, MD   traZODone (DESYREL) 50 MG tablet 100 mg  1/10/20  Yes Historical Provider, MD   escitalopram (LEXAPRO) 10 MG tablet TAKE 1 & 1/2 (ONE & ONE-HALF) TABLETS BY MOUTH ONCE DAILY  Patient taking differently: 20 mg  9/23/19  Yes Tim Flores MD   SUMAtriptan (IMITREX) 25 MG tablet Take 1 tablet by mouth as needed for Migraine 2/8/21   Tim Flores MD       FAMILY HISTORY: family history is not on file. She was adopted. PHYSICAL EXAM:   /80 (Site: Right Upper Arm, Position: Standing, Cuff Size: Medium Adult)   Pulse 81   Resp 18   Ht 5' 5.98\" (1.676 m)   Wt 185 lb 9.6 oz (84.2 kg)   LMP 07/15/2020 (Approximate)   SpO2 98%   BMI 29.97 kg/m²  Body mass index is 29.97 kg/m². Constitutional: She is oriented to person, place, and time. She appears well-developed and well-nourished. In no acute distress. HEENT: Normocephalic and atraumatic. No JVD present. Carotid bruit is not present. No mass and no thyromegaly present. No lymphadenopathy present. Cardiovascular: Normal rate, regular rhythm, normal heart sounds. Exam reveals no gallop and no friction rubs. 1/6 systolic murmur, 2nd intercostal space on the RIGHT just lateral to the sternum. Pulmonary/Chest: Effort normal and breath sounds normal. No respiratory distress. She has no wheezes, rhonchi or rales. Abdominal: Soft, non-tender. Bowel sounds and aorta are normal. She exhibits no organomegaly, mass or bruit. Extremities: No edema. No cyanosis and no clubbing. Pulses are 2+ radial and carotid pulses. 2+ dorsalis pedis and posterior tibial pulses bilaterally. Neurological: She is alert and oriented to person, place, and time. No evidence of gross cranial nerve deficit. Coordination appeared normal.   Skin: Skin is warm and dry. There is no rash or diaphoresis. Psychiatric: She has a normal mood and affect.  Her speech is normal and behavior is normal.      MOST RECENT LABS ON RECORD:   Lab Results   Component Value Date    WBC 4.6 01/27/2021    HGB 13.3 01/27/2021    HCT 41.3 01/27/2021     01/27/2021    CHOL 185 04/22/2020    TRIG 79 04/22/2020    HDL 48 04/22/2020    ALT 12 01/25/2021    AST 25 01/25/2021     02/17/2021    K 3.5 02/17/2021    CL 96 (L) 02/17/2021    CREATININE 0.94 02/17/2021    BUN 19 02/17/2021    CO2 34 (H) 02/17/2021    TSH 1.07 01/25/2021       ASSESSMENT:  Patient Active Problem List    Diagnosis Date Noted    Orthostatic hypotension      Priority: High    Diarrhea      Priority: High    Recurrent falls while walking      Priority: High    Dehydration      Priority: High    Dizziness 01/26/2021    Persistent vomiting 01/25/2021    Uncontrollable vomiting 01/25/2021    Intractable nausea and vomiting 01/25/2021    Lab test positive for detection of COVID-19 virus 12/20/2020    Shortness of breath 12/20/2020    Myalgia 12/20/2020    Iron deficiency anemia 02/15/2019    Moderate single current episode of major depressive disorder ([facility] [address] 03/21/2018    Idiopathic gout 04/20/2016    Anxiety state 04/20/2016    Essential hypertension 05/06/2015    Hypothyroidism 05/06/2015        PLAN:  Orthostatic Hypotension: symptomatic with dizziness/lightheadedness  · Nonpharmacologic counseling: Because of her condition, I reminded her to try and keep herself well-hydrated and to take extra time when moving from laying to sitting, sitting to standing and standing to walking. I also explained to her to help improve her symptoms she should include 3 g sodium diet, 1 or 2 L of sports drinks daily, Knee-high compressions stockings and regular exercise.  I also told her to practice, learn, and regularly implement vasovagal abortive counter pressure counter measure maneuvers which are simply isometric exercises of the upper and lower extremities, called leg crossing and arm tensing, which are to be

## 2021-03-08 ENCOUNTER — HOSPITAL ENCOUNTER (OUTPATIENT)
Dept: NON INVASIVE DIAGNOSTICS | Age: 51
Discharge: HOME | End: 2021-03-08
Payer: COMMERCIAL

## 2021-03-08 DIAGNOSIS — R07.89 ATYPICAL CHEST PAIN: ICD-10-CM

## 2021-03-08 DIAGNOSIS — R06.02 SOB (SHORTNESS OF BREATH): ICD-10-CM

## 2021-03-08 DIAGNOSIS — R94.31 ABNORMAL EKG: ICD-10-CM

## 2021-03-08 DIAGNOSIS — R00.2 PALPITATIONS: ICD-10-CM

## 2021-03-08 DIAGNOSIS — I95.1 ORTHOSTATIC HYPOTENSION: ICD-10-CM

## 2021-03-08 PROCEDURE — 93017 CV STRESS TEST TRACING ONLY: CPT

## 2021-03-09 ENCOUNTER — TELEPHONE (OUTPATIENT)
Dept: CARDIOLOGY | Age: 51
End: 2021-03-09

## 2021-03-09 NOTE — PROCEDURES
normal  sinus rhythm without definitive ST-segment abnormalities suggestive of  myocardial ischemia. At peak exercise and during recovery, the patient developed:    Horizontal ST segment changes in leads II, III, aVF, V4, V5 and V6,  which did meet diagnostic criteria for myocardial ischemia with  occasional premature atrial contractions (PACs) and occasional premature  ventricular contractions (PVCs). IMPRESSION:  Significant electrocardiographic evidence of myocardial  ischemia during EKG monitoring without significant associated  arrhythmias. The patient's Duke Treadmill score is 0, which correlates with an  intermediate risk significant coronary artery disease. Overall these results are most consistent with an intermediate risk for  significant coronary artery disease. Based on the patient's abnormal exercise stress test results, a repeat  study with the addition of cardiac imaging is recommended.         Elly Trejo MD    D: 03/09/2021 9:01:17       T: 03/09/2021 9:02:04     [email or web address]  Job#: [id number]     Doc#: Unknown    CC:  Delmi Acuña

## 2021-03-09 NOTE — PROCEDURES
[address], [place] [address]                                 EVENT MONITOR    PATIENT NAME: Yair Corbett                  :        1970  MED REC NO:   [id number]                              ROOM:  ACCOUNT NO:   [de-identified]                           ADMIT DATE: 2021  PROVIDER:     Rosaline Aaron MD    CARDIOVASCULAR DIAGNOSTIC DEPARTMENT    DATE OF STUDY:  2021    ORDERING PROVIDER:  Rosaline Aaron MD    PRIMARY CARE PROVIDER:  Rafa Sanchez MD    INTERPRETING PHYSICIAN: Rosaline Aaron MD    DIAGNOSIS:  Palpitations. PHYSICIAN INTERPRETATION:  Recording Length: 6 days, 19 hours. 1. Predominant rhythm: Normal sinus rhythm. 2. Atrial tachycardia (AT) 2 episodes: Longest/Fastest 9 beats at  average 127 bpm up to 153 bpm.  3. Premature atrial contractions 0.08%. 4. Premature ventricular contractions 0.07%. Multiple symptoms reported and mostly associated with normal sinus  rhythm and sinus tachycardia in the 100's. One episode of atrial  tachycardia was also associated with symptoms. Clinical correlation  required.           Rebel Reyes MD    D: 2021 7:55:50       T: 2021 7:57:01     [email or web address]  Job#: [id number]     Doc#: Unknown    CC:  Rhetta Scale

## 2021-03-10 ENCOUNTER — TELEPHONE (OUTPATIENT)
Dept: CARDIOLOGY | Age: 51
End: 2021-03-10

## 2021-03-10 NOTE — TELEPHONE ENCOUNTER
----- Message from Dayna Chase MD sent at 3/10/2021 12:12 AM EST -----  Please let Ms. Boone know that her test was abnormal and please make them an appointment in 1-2 weeks if not already done. ThanksSea Little

## 2021-03-10 NOTE — RESULT ENCOUNTER NOTE
Please let Ms. Boone know that her test was abnormal and please make them an appointment in 1-2 weeks if not already done. Thanks.     Sidney

## 2021-03-19 ENCOUNTER — OFFICE VISIT (OUTPATIENT)
Dept: CARDIOLOGY | Age: 51
End: 2021-03-19
Payer: COMMERCIAL

## 2021-03-19 VITALS
WEIGHT: 197.2 LBS | RESPIRATION RATE: 18 BRPM | DIASTOLIC BLOOD PRESSURE: 84 MMHG | TEMPERATURE: 98 F | HEART RATE: 83 BPM | SYSTOLIC BLOOD PRESSURE: 138 MMHG | HEIGHT: 66 IN | BODY MASS INDEX: 31.69 KG/M2

## 2021-03-19 DIAGNOSIS — R94.31 ABNORMAL EKG: ICD-10-CM

## 2021-03-19 DIAGNOSIS — I95.1 ORTHOSTATIC HYPOTENSION: Primary | ICD-10-CM

## 2021-03-19 DIAGNOSIS — R07.89 ATYPICAL CHEST PAIN: ICD-10-CM

## 2021-03-19 DIAGNOSIS — R00.2 PALPITATIONS: ICD-10-CM

## 2021-03-19 DIAGNOSIS — R06.02 SOB (SHORTNESS OF BREATH): ICD-10-CM

## 2021-03-19 PROCEDURE — G8484 FLU IMMUNIZE NO ADMIN: HCPCS | Performed by: FAMILY MEDICINE

## 2021-03-19 PROCEDURE — G8417 CALC BMI ABV UP PARAM F/U: HCPCS | Performed by: FAMILY MEDICINE

## 2021-03-19 PROCEDURE — 3017F COLORECTAL CA SCREEN DOC REV: CPT | Performed by: FAMILY MEDICINE

## 2021-03-19 PROCEDURE — G8427 DOCREV CUR MEDS BY ELIG CLIN: HCPCS | Performed by: FAMILY MEDICINE

## 2021-03-19 PROCEDURE — 1036F TOBACCO NON-USER: CPT | Performed by: FAMILY MEDICINE

## 2021-03-19 PROCEDURE — 99214 OFFICE O/P EST MOD 30 MIN: CPT | Performed by: FAMILY MEDICINE

## 2021-03-19 RX ORDER — UBROGEPANT 100 MG/1
100 TABLET ORAL PRN
COMMUNITY
Start: 2021-03-16

## 2021-03-19 RX ORDER — FERROUS SULFATE 325(65) MG
1 TABLET ORAL DAILY
COMMUNITY
End: 2022-06-17 | Stop reason: ALTCHOICE

## 2021-03-19 RX ORDER — FLUDROCORTISONE ACETATE 0.1 MG/1
0.1 TABLET ORAL DAILY
Qty: 90 TABLET | Refills: 3 | Status: CANCELLED | OUTPATIENT
Start: 2021-03-19 | End: 2021-04-18

## 2021-03-19 RX ORDER — PROCHLORPERAZINE MALEATE 5 MG/1
5 TABLET ORAL EVERY 6 HOURS PRN
COMMUNITY
Start: 2021-03-15 | End: 2022-06-08

## 2021-03-19 RX ORDER — METOPROLOL SUCCINATE 25 MG/1
25 TABLET, EXTENDED RELEASE ORAL DAILY
Qty: 90 TABLET | Refills: 3 | Status: SHIPPED | OUTPATIENT
Start: 2021-03-19 | End: 2021-03-30

## 2021-03-19 NOTE — PROGRESS NOTES
Mani Pagan am scribing for and in the presence of Rainer Guzmán. Sidney ARAGON, MS, F.A.C.C. Patient: Gina Hernandez  : /[id number] Care Physician: Rafia Macdonald  Today's Date: 3/19/2021    REASON FOR VISIT: 1 Month Follow-Up (hx:ORTHO HYPOTENSION, sob,ABN ekg, cp,palpitaitons Pt is here for 4 week follow up she states she is no better. She also mentions she was sick after stress test and has pain in back of rt leg She continues to have CP,SOB,lightheaded/dizziness,palpitaitons. CP last seconds, sharp, travels across shoulder and arm then arms are weak. When she is in shower she still gets very lightheaded. also having night sweats)    Dear Rafia Macdonald MD,    HPI: Ms. Srinath Espino is a 49 y.o. female who was admitted to the hospital with lightheaded and dizziness. She also had been feeling nauseated and having migraine that started last night. She denied any previous heart related history. She is adopted so she was unaware of any family history. She does drink 1-2 cups off coffee daily. Her echocardiogram on 2021 was fairly unremarkable and showed an ejection fraction of >60%. Her CAM monitor was done on 2021 she wore 6 days, 19 hours showed one epos ode of atrial tachycardia with symptoms. She had treadmill stress test done on 3/8/2021 her  Duke treadmill score is 0 which is intermediate risk. Ms. Srinath Espino is here today for a four week follow up. She is reporting continued intermittent palpitations. They can be moderate in intensity and she has intermittent dizziness with them as well as other times when standing. She did have one fall after walking and lost her balance and fell against wall. She is having moderate sharp chest pain that last seconds, happening randomly a couple times a day at times. The chest pain takes her breath away for a second but denies association with exertion, travels across shoulders and to arms.  She also continued to report moderate shortness of breath with even mild to moderate exertion which she says is not normal for her. She does stay very well hydrated. Her biggest complaint today is that her symptoms are scary but the chest pain is the most concerning for her. She also states her lightheaded/dizziness is worsened in the shower. She denied any abdominal pain, bleeding problems, problems with her medications or any other concerns at this time. She also denied any fever or chills. Past Medical History:   Diagnosis Date    Anemia, unspecified     Anxiety     Edema     H/O cardiovascular stress test 03/08/2021    [place] treadmill score is 0    H/O echocardiogram 01/26/2021    EF >60% Normal study    History of normal Holter exam 02/22/2021    CAM 6 days 19 hrs Normal sinus. AT 2 episodes Longest/fastest 9 beats at ave 127 bpm p to 153 bpm PAC 0.08% PVC 0.07%    Hypertension     Hypothyroidism     Restless legs syndrome (RLS)     Thyroid disease        CURRENT ALLERGIES: Rocephin [ceftriaxone] REVIEW OF SYSTEMS: 14 systems were reviewed. Pertinent positives and negatives as above, all else negative. Past Surgical History:   Procedure Laterality Date    ANTERIOR CRUCIATE LIGAMENT REPAIR Left     HYSTERECTOMY, VAGINAL      07/2020 robotic, lap supracervical hyst and BS, perineopexy, A&P repair, cysto, Lynx retropubic slin    INFECTED SKIN DEBRIDEMENT Left     Left third finger    Social History:  Social History     Tobacco Use    Smoking status: Never Smoker    Smokeless tobacco: Never Used   Substance Use Topics    Alcohol use: No     Alcohol/week: 0.0 standard drinks     Comment: rarely    Drug use: No        MEDICATIONS:  No current facility-administered medications for this visit. CURRENT INPATIENT MEDICATIONS:  Prior to Admission medications    Medication Sig Start Date End Date Taking?  Authorizing Provider   ALPRAZolam Allen Ghee) 0.25 MG tablet Take one 1 hr before mri,may repeat in 1 hr if needed 3/19/21 4/19/21 Yes Aden Adonit Wendy Mathis MD   prochlorperazine (COMPAZINE) 5 MG tablet Take 5 mg by mouth every 6 hours as needed 3/15/21  Yes Historical Provider, MD   ferrous sulfate (IRON 325) 325 (65 Fe) MG tablet Take 1 tablet by mouth   Yes Historical Provider, MD   Cholecalciferol 50 MCG (2000 UT) CAPS Take 1 capsule by mouth   Yes Historical Provider, MD   Turmeric (QC TUMERIC COMPLEX PO) Take 1 capsule by mouth   Yes Historical Provider, MD   Garlic 10 MG CAPS Take 1 capsule by mouth   Yes Historical Provider, MD   levothyroxine (EUTHYROX) 88 MCG tablet Take 1 tablet by mouth once daily 2/18/21  Yes Tim Flores MD   omeprazole (PRILOSEC) 40 MG delayed release capsule Take 1 capsule by mouth daily 2/2/21  Yes Tim Flores MD   buPROPion (WELLBUTRIN XL) 150 MG extended release tablet  1/21/21  Yes Historical Provider, MD   albuterol sulfate HFA (VENTOLIN HFA) 108 (90 Base) MCG/ACT inhaler Inhale 2 puffs into the lungs 4 times daily as needed for Wheezing 12/19/20  Yes Loc Boogie MD   traZODone (DESYREL) 50 MG tablet 100 mg  1/10/20  Yes Historical Provider, MD   escitalopram (LEXAPRO) 10 MG tablet TAKE 1 & 1/2 (ONE & ONE-HALF) TABLETS BY MOUTH ONCE DAILY  Patient taking differently: 20 mg  9/23/19  Yes Tim Flores MD   Ubrogepant (UBRELVY) 100 MG TABS Take 100 mg by mouth 3/16/21   Historical Provider, MD   SUMAtriptan (IMITREX) 100 MG tablet Take 0.5 tablets by mouth as needed for Migraine  Patient not taking: Reported on 3/19/2021 2/23/21   Tim Flores MD       FAMILY HISTORY: family history is not on file. She was adopted. PHYSICAL EXAM:   /84 (Site: Left Upper Arm, Position: Standing, Cuff Size: Medium Adult)   Pulse 83   Temp 98 °F (36.7 °C)   Resp 18   Ht 5' 6\" (1.676 m)   Wt 197 lb 3.2 oz (89.4 kg)   LMP 07/15/2020 (Approximate)   BMI 31.83 kg/m²  Body mass index is 31.83 kg/m². Constitutional: She is oriented to person, place, and time.  She appears well-developed and well-nourished. In no acute distress. HEENT: Normocephalic and atraumatic. No JVD present. Carotid bruit is not present. No mass and no thyromegaly present. No lymphadenopathy present. Cardiovascular: Normal rate, regular rhythm, normal heart sounds. Exam reveals no gallop and no friction rubs. 2/6 systolic murmur, 2nd intercostal space on the RIGHT just lateral to the sternum. Did not change with bilateral hand grasps. Pulmonary/Chest: Effort normal and breath sounds normal. No respiratory distress. She has no wheezes, rhonchi or rales. Abdominal: Soft, non-tender. Bowel sounds and aorta are normal. She exhibits no organomegaly, mass or bruit. Extremities: No edema. No cyanosis and no clubbing. Pulses are 2+ radial and carotid pulses. 2+ dorsalis pedis and posterior tibial pulses bilaterally. Neurological: She is alert and oriented to person, place, and time. No evidence of gross cranial nerve deficit. Coordination appeared normal.   Skin: Skin is warm and dry. There is no rash or diaphoresis. Psychiatric: She has a normal mood and affect.  Her speech is normal and behavior is normal.      MOST RECENT LABS ON RECORD:   Lab Results   Component Value Date    WBC 4.6 01/27/2021    HGB 13.3 01/27/2021    HCT 41.3 01/27/2021     01/27/2021    CHOL 185 04/22/2020    TRIG 79 04/22/2020    HDL 48 04/22/2020    ALT 12 01/25/2021    AST 25 01/25/2021     02/17/2021    K 3.5 02/17/2021    CL 96 (L) 02/17/2021    CREATININE 0.94 02/17/2021    BUN 19 02/17/2021    CO2 34 (H) 02/17/2021    TSH 1.07 01/25/2021       ASSESSMENT:  Patient Active Problem List    Diagnosis Date Noted    Orthostatic hypotension      Priority: High    Diarrhea      Priority: High    Recurrent falls while walking      Priority: High    Dizziness 01/26/2021    Persistent vomiting 01/25/2021    Uncontrollable vomiting 01/25/2021    Intractable nausea and vomiting 01/25/2021    Lab test positive for detection of COVID-19 virus 12/20/2020    Shortness of breath 12/20/2020    Myalgia 12/20/2020    Iron deficiency anemia 02/15/2019    Moderate single current episode of major depressive disorder ([facility] [address] 03/21/2018    Idiopathic gout 04/20/2016    Anxiety state 04/20/2016    Essential hypertension 05/06/2015    Hypothyroidism 05/06/2015        PLAN:  Orthostatic Hypotension: symptomatic with dizziness/lightheadedness. Possibly postural orthostatic tachycardia syndrome (POTS)  · Nonpharmacologic counseling: Because of her condition, I reminded her to try and keep herself well-hydrated and to take extra time when moving from laying to sitting, sitting to standing and standing to walking. I also explained to her to help improve her symptoms she should include 3 g sodium diet, 1 or 2 L of sports drinks daily, Knee-high compressions stockings and regular exercise. I also told her to practice, learn, and regularly implement vasovagal abortive counter pressure counter measure maneuvers which are simply isometric exercises of the upper and lower extremities, called leg crossing and arm tensing, which are to be performed on a regular basis and practice and applied during the her syncopal episodes. · Additional Testing List: I ordered a head upright TILT TABLE TEST to try and better assess the etiology of the patient's recent symptoms   · Beta Blocker: START Metoprolol succinate (Toprol XL) 25 mg daily. I also discussed the potential side effects of this medication including lightheadedness and dizziness and instructed them to stop the medication of this occurs and call our office if this occurs. · I explained to her that I would like her to wait to start this medication until after Tilt Table test is done.      · Shortness of breath with almost any exertion:   Additional Testing List: Because current signs and symptoms can certainly be caused by significant coronary artery disease, I ordered a Regadenoson (Lexiscan) stress test with SPECT completely reflects the services I personally performed and the decisions made by me. Koffi Nix MD, MS, F.A.C.C.  March 19, 2021

## 2021-03-22 ENCOUNTER — HOSPITAL ENCOUNTER (OUTPATIENT)
Dept: NON INVASIVE DIAGNOSTICS | Age: 51
Discharge: HOME | End: 2021-03-22
Payer: COMMERCIAL

## 2021-03-22 DIAGNOSIS — R94.31 ABNORMAL EKG: ICD-10-CM

## 2021-03-22 DIAGNOSIS — R06.02 SOB (SHORTNESS OF BREATH): ICD-10-CM

## 2021-03-22 DIAGNOSIS — I95.1 ORTHOSTATIC HYPOTENSION: ICD-10-CM

## 2021-03-22 DIAGNOSIS — R07.89 ATYPICAL CHEST PAIN: ICD-10-CM

## 2021-03-22 DIAGNOSIS — R00.2 PALPITATIONS: ICD-10-CM

## 2021-03-22 PROCEDURE — A9500 TC99M SESTAMIBI: HCPCS | Performed by: FAMILY MEDICINE

## 2021-03-22 PROCEDURE — 93660 TILT TABLE EVALUATION: CPT

## 2021-03-22 PROCEDURE — 6370000000 HC RX 637 (ALT 250 FOR IP): Performed by: INTERNAL MEDICINE

## 2021-03-22 PROCEDURE — 3430000000 HC RX DIAGNOSTIC RADIOPHARMACEUTICAL: Performed by: FAMILY MEDICINE

## 2021-03-22 PROCEDURE — 93017 CV STRESS TEST TRACING ONLY: CPT

## 2021-03-22 RX ORDER — NITROGLYCERIN 0.3 MG/1
0.3 TABLET SUBLINGUAL EVERY 5 MIN PRN
Status: DISCONTINUED | OUTPATIENT
Start: 2021-03-22 | End: 2021-03-23 | Stop reason: HOSPADM

## 2021-03-22 RX ADMIN — Medication 30 MILLICURIE: at 11:44

## 2021-03-22 RX ADMIN — NITROGLYCERIN 0.3 MG: 0.3 TABLET SUBLINGUAL at 14:06

## 2021-03-22 NOTE — PROCEDURES
[address], [place] [address]                                TILT TABLE TEST    PATIENT NAME: Trav Echevarria                  :        1970  MED REC NO:   [id number]                              ROOM:  ACCOUNT NO:   [de-identified]                           ADMIT DATE: 2021  PROVIDER:     Nusrat Rai MD    Cardiovascular Diagnostics Department    DATE OF PROCEDURE:  2021    ORDERING PROVIDER:  Chasity العلي MD    PRIMARY CARE PROVIDER:  Rafa Crooks MD    INTERPRETING PHYSICIAN:  Chasity العلي MD     Diagnosis:  Orthostatic hypotension. PROCEDURE SUMMARY:  After explaining the risk, benefits and alternatives  to the procedure, informed written consent was obtained. The patient  was brought to the tilt table laboratory in a fasting and resting state. The patient was placed on the tilt table in the supine position, ECG  patches were applied and an IV was placed. The patient's baseline blood  pressure was 138/74 mmHg with a heart rate of 75/minute. The patient  was then raised to the 70 degree head upright tilt position with and  pulse rate, blood pressure and cardiac rhythm were monitored and  recorded each minute of the study for a maximum of 30 minutes. During the initial 20 minutes of the study, the patient's blood pressure  ranged from a high of 153/78 mmHg to a low of 103/82 mmHg, while their  heart rate ranged from a low of 85/minute to a high of 107/minute. During the last 10 minutes of the study, nitroglycerin 0.3 mg was give  sublingually. During this time, the patient's blood pressure ranged  from a high of 188/108 mmHg to a low of 162/115 mmHg, while their heart  rate ranged from a low of 112/minute to a high of 135/minute. During  this period, the patient reported lightheadedness, chest tightness,  feeling anxious, headache, leg weakness.     At this point, the patient was returned to the supine position and  monitored for an additional ten minutes. Once the patient felt well  enough to be discharged home, they were discharged home with  instructions to follow up with their primary care physician and/or  cardiologist as previously scheduled. STUDY CONCLUSIONS:  Negative head upright tilt table study. Although  the sensitivity of this study certainly is not 694%, the relatively  normal result significantly decrease the likelihood of a  neurocardiogenic source for the patient's symptoms. However, if  clinically suspicion remains high, a repeat study may be indicated.         Isabell Sampson MD    D: 03/22/2021 16:33:06       T: 03/22/2021 16:34:18     [email or web address]  Job#: [id number]     Doc#: Unknown    CC:  Lamar Phillips

## 2021-03-23 ENCOUNTER — TELEPHONE (OUTPATIENT)
Dept: CARDIOLOGY | Age: 51
End: 2021-03-23

## 2021-03-23 ENCOUNTER — HOSPITAL ENCOUNTER (OUTPATIENT)
Dept: NON INVASIVE DIAGNOSTICS | Age: 51
Discharge: HOME | End: 2021-03-23
Payer: COMMERCIAL

## 2021-03-23 ENCOUNTER — HOSPITAL ENCOUNTER (OUTPATIENT)
Dept: MRI IMAGING | Age: 51
Discharge: HOME | End: 2021-03-25
Payer: COMMERCIAL

## 2021-03-23 DIAGNOSIS — G89.29 OTHER CHRONIC PAIN: ICD-10-CM

## 2021-03-23 DIAGNOSIS — R51.9 FACIAL PAIN: ICD-10-CM

## 2021-03-23 PROCEDURE — A9500 TC99M SESTAMIBI: HCPCS | Performed by: FAMILY MEDICINE

## 2021-03-23 PROCEDURE — A9579 GAD-BASE MR CONTRAST NOS,1ML: HCPCS | Performed by: PSYCHIATRY & NEUROLOGY

## 2021-03-23 PROCEDURE — 6360000004 HC RX CONTRAST MEDICATION: Performed by: PSYCHIATRY & NEUROLOGY

## 2021-03-23 PROCEDURE — 78452 HT MUSCLE IMAGE SPECT MULT: CPT

## 2021-03-23 PROCEDURE — 70553 MRI BRAIN STEM W/O & W/DYE: CPT

## 2021-03-23 PROCEDURE — 70546 MR ANGIOGRAPH HEAD W/O&W/DYE: CPT

## 2021-03-23 PROCEDURE — 3430000000 HC RX DIAGNOSTIC RADIOPHARMACEUTICAL: Performed by: FAMILY MEDICINE

## 2021-03-23 RX ADMIN — Medication 30 MILLICURIE: at 11:11

## 2021-03-23 RX ADMIN — GADOTERIDOL 17 ML: 279.3 INJECTION, SOLUTION INTRAVENOUS at 09:50

## 2021-03-23 NOTE — TELEPHONE ENCOUNTER
Pt advised. Pt says she is still have really bad palpitations and chest pain. She is getting her second part of her stress test today and her MRI. Please advise.

## 2021-03-23 NOTE — TELEPHONE ENCOUNTER
Please let patient know that if she is having \"bad chest pain\" then she really should be seen in the ER. However, as far as the palpitations, has she started the Toprol XL yet, if not start the 25 mg daily. Thanks.

## 2021-03-23 NOTE — TELEPHONE ENCOUNTER
----- Message from Tia Jones MD sent at 3/22/2021  6:16 PM EDT -----  Let Ms. Boone know their test result was ok. Thanks.

## 2021-03-23 NOTE — TELEPHONE ENCOUNTER
Pt advised, said she is taking her first dose of Toprol today since she finished her stress test. I advised her if her chest pain is severe she needs to go to the ED.  She verbalized understanding and is awaiting results of her stress test.

## 2021-03-24 NOTE — PROCEDURES
[address], [place] [address]                              CARDIAC STRESS TEST    PATIENT NAME: Susana Gunderson                  :        1970  MED REC NO:   [id number]                              ROOM:  ACCOUNT NO:   [de-identified]                           ADMIT DATE: 2021  PROVIDER:     Annette Weaver    CARDIOVASCULAR DIAGNOSTIC DEPARTMENT    DATE OF STUDY:  2021    ORDERING PROVIDER:  Krystina Alexander MD    PRIMARY CARE PROVIDER:  Rafa Gonzalez MD    INTERPRETING PHYSICIAN:  Annette Weaver MD    EXERCISE MYOCARDIAL PERFUSION STRESS TEST REPORT    Stress/rest single-isotope SPECT imaging with exercise stress and gated  SPECT imaging. INDICATION:  Assessment of recent chest pain and/or discomfort. Assessment of a cardiac cause of:  Abnormal ECG, palpitations. CLINICAL HISTORY:  The patient is a 52-year-old woman with no known  coronary artery disease. Previous cardiac history includes:  Stress test.    Other previous history includes:  Chest pain, palpitations, dyspnea,  lightheadedness, syncope. Symptoms just prior to testing included:  Chest discomfort (5/10). Relevant medications:  Metoprolol. PROCEDURE:  The patient performed treadmill exercise using a Alfredo  protocol, completing 8:57 minutes and completing an estimated workload  of 46.43 metabolic equivalents (METS). The test was terminated due to fatigue, shortness of breath, chest  tightness. The heart rate was 93 beats per minute at baseline and increased to 151  beats per minute at peak exercise, which was 88% of the maximum  predicted heart rate. The rest blood pressure was 158/80 mmHg and  increased to 184/52 mmHg, which is a normal response. During the  procedure, the patient developed fatigue, shortness of breath, leg  fatigue and chest tightness (2/3), but denied any chest discomfort.     Myocardial perfusion imaging: evidence of myocardial ischemia  during EKG monitoring without significant associated arrhythmias. The patient's Duke Treadmill score is -2, which correlates with an  intermediate risk for significant coronary artery disease. Overall, these results are most consistent with a low/intermediate risk  for significant coronary artery disease. Depending on the patient symptoms and level of clinical suspicion,  aggressive medical management vs. additional testing by coronary  angiography may be indicated. The sensitivity for detecting ischemia on this test may have been  reduced due to the patient being on a beta blocker. The results of this test were discussed with Dr. Juve Joy on 03/23/2021 at  [phone].         Art Michael    D: 03/24/2021 9:38:53       T: 03/24/2021 9:39:41     [email or web address]  Job#: [id number]     Doc#: Unknown    CC:  Becca Hagan MD

## 2021-03-25 ENCOUNTER — TELEPHONE (OUTPATIENT)
Dept: CARDIOLOGY | Age: 51
End: 2021-03-25

## 2021-03-25 NOTE — TELEPHONE ENCOUNTER
----- Message from Lubna Pacheco MD sent at 3/24/2021 11:08 PM EDT -----  Please make an appointment for Ms. Boone with me in the next 2-3 weeks. Thanks.     Sidney

## 2021-03-26 ENCOUNTER — HOSPITAL ENCOUNTER (OUTPATIENT)
Age: 51
Setting detail: SPECIMEN
Discharge: HOME | End: 2021-03-26
Payer: COMMERCIAL

## 2021-03-26 DIAGNOSIS — L72.3 INFECTED SEBACEOUS CYST: ICD-10-CM

## 2021-03-26 DIAGNOSIS — L08.9 INFECTED SEBACEOUS CYST: ICD-10-CM

## 2021-03-26 PROCEDURE — 87070 CULTURE OTHR SPECIMN AEROBIC: CPT

## 2021-03-26 PROCEDURE — 87205 SMEAR GRAM STAIN: CPT

## 2021-03-28 LAB
CULTURE: ABNORMAL
DIRECT EXAM: ABNORMAL
DIRECT EXAM: ABNORMAL
Lab: ABNORMAL
SPECIMEN DESCRIPTION: ABNORMAL

## 2021-03-30 ENCOUNTER — HOSPITAL ENCOUNTER (OUTPATIENT)
Age: 51
Discharge: HOME | End: 2021-03-30
Payer: COMMERCIAL

## 2021-03-30 ENCOUNTER — OFFICE VISIT (OUTPATIENT)
Dept: CARDIOLOGY | Age: 51
End: 2021-03-30
Payer: COMMERCIAL

## 2021-03-30 VITALS
RESPIRATION RATE: 18 BRPM | HEART RATE: 72 BPM | WEIGHT: 209.6 LBS | OXYGEN SATURATION: 99 % | HEIGHT: 66 IN | SYSTOLIC BLOOD PRESSURE: 133 MMHG | DIASTOLIC BLOOD PRESSURE: 86 MMHG | BODY MASS INDEX: 33.68 KG/M2

## 2021-03-30 DIAGNOSIS — R00.2 PALPITATIONS: ICD-10-CM

## 2021-03-30 DIAGNOSIS — R94.31 ABNORMAL EKG: ICD-10-CM

## 2021-03-30 DIAGNOSIS — R06.02 SOB (SHORTNESS OF BREATH): ICD-10-CM

## 2021-03-30 DIAGNOSIS — R94.39 ABNORMAL STRESS TEST: ICD-10-CM

## 2021-03-30 DIAGNOSIS — R07.89 ATYPICAL CHEST PAIN: ICD-10-CM

## 2021-03-30 DIAGNOSIS — I95.1 ORTHOSTATIC HYPOTENSION: Primary | ICD-10-CM

## 2021-03-30 DIAGNOSIS — I95.1 ORTHOSTATIC HYPOTENSION: ICD-10-CM

## 2021-03-30 LAB
ANION GAP SERPL CALCULATED.3IONS-SCNC: 8 MMOL/L (ref 9–17)
BUN BLDV-MCNC: 15 MG/DL (ref 6–20)
BUN/CREAT BLD: 19 (ref 9–20)
CALCIUM SERPL-MCNC: 9.5 MG/DL (ref 8.6–10.4)
CHLORIDE BLD-SCNC: 104 MMOL/L (ref 98–107)
CO2: 29 MMOL/L (ref 20–31)
CREAT SERPL-MCNC: 0.77 MG/DL (ref 0.5–0.9)
GFR AFRICAN AMERICAN: >60 ML/MIN
GFR NON-AFRICAN AMERICAN: >60 ML/MIN
GFR SERPL CREATININE-BSD FRML MDRD: ABNORMAL ML/MIN/{1.73_M2}
GFR SERPL CREATININE-BSD FRML MDRD: ABNORMAL ML/MIN/{1.73_M2}
GLUCOSE BLD-MCNC: 104 MG/DL (ref 70–99)
HCT VFR BLD CALC: 43.6 % (ref 36.3–47.1)
HEMOGLOBIN: 14.2 G/DL (ref 11.9–15.1)
MCH RBC QN AUTO: 33.1 PG (ref 25.2–33.5)
MCHC RBC AUTO-ENTMCNC: 32.6 G/DL (ref 28.4–34.8)
MCV RBC AUTO: 101.6 FL (ref 82.6–102.9)
NRBC AUTOMATED: 0 PER 100 WBC
PDW BLD-RTO: 13.2 % (ref 11.8–14.4)
PLATELET # BLD: 291 K/UL (ref 138–453)
PMV BLD AUTO: 9 FL (ref 8.1–13.5)
POTASSIUM SERPL-SCNC: 4.3 MMOL/L (ref 3.7–5.3)
RBC # BLD: 4.29 M/UL (ref 3.95–5.11)
SODIUM BLD-SCNC: 141 MMOL/L (ref 135–144)
WBC # BLD: 5.4 K/UL (ref 3.5–11.3)

## 2021-03-30 PROCEDURE — 80048 BASIC METABOLIC PNL TOTAL CA: CPT

## 2021-03-30 PROCEDURE — 85027 COMPLETE CBC AUTOMATED: CPT

## 2021-03-30 PROCEDURE — 36415 COLL VENOUS BLD VENIPUNCTURE: CPT

## 2021-03-30 PROCEDURE — 3017F COLORECTAL CA SCREEN DOC REV: CPT | Performed by: FAMILY MEDICINE

## 2021-03-30 PROCEDURE — G8417 CALC BMI ABV UP PARAM F/U: HCPCS | Performed by: FAMILY MEDICINE

## 2021-03-30 PROCEDURE — G8484 FLU IMMUNIZE NO ADMIN: HCPCS | Performed by: FAMILY MEDICINE

## 2021-03-30 PROCEDURE — 99215 OFFICE O/P EST HI 40 MIN: CPT | Performed by: FAMILY MEDICINE

## 2021-03-30 PROCEDURE — G8427 DOCREV CUR MEDS BY ELIG CLIN: HCPCS | Performed by: FAMILY MEDICINE

## 2021-03-30 PROCEDURE — 1036F TOBACCO NON-USER: CPT | Performed by: FAMILY MEDICINE

## 2021-03-30 RX ORDER — METOPROLOL SUCCINATE 50 MG/1
50 TABLET, EXTENDED RELEASE ORAL DAILY
Qty: 90 TABLET | Refills: 3 | Status: SHIPPED | OUTPATIENT
Start: 2021-03-30 | End: 2021-11-10 | Stop reason: ALTCHOICE

## 2021-03-30 NOTE — PATIENT INSTRUCTIONS
SURVEY:    You may be receiving a survey from [employer] regarding your visit today. Please complete the survey to enable us to provide the highest quality of care to you and your family. If you cannot score us a very good on any question, please call the office to discuss how we could have made your experience a very good one. Thank you.     Your MA today was Patience Roch

## 2021-03-30 NOTE — PROGRESS NOTES
Du Meek am scribing for and in the presence of Sayda Wade. Sidney ARAGON, MS, F.A.C.C. Patient: Donavon Lipscomb  : [id number] Care Physician: Nicole Quintero  Today's Date: 3/30/2021    REASON FOR VISIT: Follow-up (HX:Orthostatic hypotension, SOB,CP,palpitaitons Pt is here for follow up she had tilt table and stress test doneon 3/22/21 she states she feels palpitaions, lightheaded/dizziness and fell while working in yard on Sat, SOB and CP. )    Dear Nicole Quintero MD,    HPI: Ms. Radha Rush is a 49 y.o. female who was admitted to the hospital with lightheaded and dizziness. She also had been feeling nauseated and having migraine that started last night. She denied any previous heart related history. She is adopted so she was unaware of any family history. She does drink 1-2 cups off coffee daily. Her echocardiogram on 2021 was fairly unremarkable and showed an ejection fraction of >60%. Her CAM monitor was done on 2021 she wore 6 days, 19 hours showed one epos ode of atrial tachycardia with symptoms. She had treadmill stress test done on 3/8/2021 her  Duke treadmill score is 0 which is intermediate risk. Ms. Radha Rush recently had Negative Tilt Table test on 3/22/21 and an Equivocal stress test.     Ms. Radha Rush is here today for a  follow up after having stress test and tilt table testing. She reports she continues to have intermittent palpitations. She has them several times a day. They can be moderate in intensity and she has intermittent dizziness with them as well as other times when standing. She did have one fall in yard on Saturday and lost her balance stepping over little fence. She had chest pain last time was  lasted a few minutes and happened while sitting watching a movie. She described the same pressure quality across her shoulders associated with at least mild shortness of breath. She states she feels a heaviness across chest and then arms feel weak.  She does stay very well hydrated. Her breathing on exertion is worse with moderate exertion and says this is unusual for her. She says her palpitations are actually a bit better though with the Toprol XL. She denied any abdominal pain, bleeding problems, problems with her medications or any other concerns at this time. She also denied any fever or chills. Past Medical History:   Diagnosis Date    Anemia, unspecified     Anxiety     Edema     H/O cardiovascular stress test 03/08/2021    [place] treadmill score is 0    H/O echocardiogram 01/26/2021    EF >60% Normal study    History of cardiovascular stress test 03/22/2021    Equivocal study. Small perfusion defect of mild intensity in the anterior and anteroseptal regions during stress and rest imaging, which is most consistent with artifact EF 71% Duke Treadmill score is -2intermediate risk.  History of normal Holter exam 02/22/2021    CAM 6 days 19 hrs Normal sinus. AT 2 episodes Longest/fastest 9 beats at ave 127 bpm p to 153 bpm PAC 0.08% PVC 0.07%    Hypertension     Hypothyroidism     Restless legs syndrome (RLS)     Thyroid disease     Tilt table evaluation 03/22/2021    Negative study Although sensitivity of study certainly is not 975% relatively normal results signifcantly decrease the likelihood of neurocardiogenic source for pt symptoms. Clinically suspision remains high possible repeat study maybe indicated       CURRENT ALLERGIES: Rocephin [ceftriaxone] REVIEW OF SYSTEMS: 14 systems were reviewed. Pertinent positives and negatives as above, all else negative.      Past Surgical History:   Procedure Laterality Date    ANTERIOR CRUCIATE LIGAMENT REPAIR Left     HYSTERECTOMY, VAGINAL      07/2020 robotic, lap supracervical hyst and BS, perineopexy, A&P repair, cysto, Lynx retropubic slin    INFECTED SKIN DEBRIDEMENT Left     Left third finger    Social History:  Social History     Tobacco Use    Smoking status: Never Smoker    Smokeless tobacco: Never Used   Substance Use Topics    Alcohol use: No     Alcohol/week: 0.0 standard drinks     Comment: rarely    Drug use: No        MEDICATIONS:  No current facility-administered medications for this visit. CURRENT INPATIENT MEDICATIONS:  Prior to Admission medications    Medication Sig Start Date End Date Taking?  Authorizing Provider   buPROPion (WELLBUTRIN XL) 300 MG extended release tablet TAKE 1 TABLET BY MOUTH ONCE DAILY 2/24/21  Yes Historical Provider, MD   topiramate (TOPAMAX) 25 MG tablet TAKE 1 TABLET BY MOUTH ONCE DAILY FOR 7 DAYS THEN 1 TAB TWICE DAILY FOR 7 DAYS THEN 1 TAB IN THE MORNING AND 2 IN THE EVENING FOR 7 DAYS AND 3/11/21  Yes Historical Provider, MD   doxycycline hyclate (VIBRA-TABS) 100 MG tablet Take 1 tablet by mouth 2 times daily for 10 days 3/25/21 4/4/21 Yes Je Rios MD   ALPRAZolam Rosalie Shantell) 0.25 MG tablet Take one 1 hr before mri,may repeat in 1 hr if needed 3/19/21 4/19/21 Yes Je Rios MD   prochlorperazine (COMPAZINE) 5 MG tablet Take 5 mg by mouth every 6 hours as needed 3/15/21  Yes Historical Provider, MD   Ubrogepant (UBRELVY) 100 MG TABS Take 100 mg by mouth 3/16/21  Yes Historical Provider, MD   ferrous sulfate (IRON 325) 325 (65 Fe) MG tablet Take 1 tablet by mouth   Yes Historical Provider, MD   Cholecalciferol 50 MCG (2000 UT) CAPS Take 1 capsule by mouth   Yes Historical Provider, MD   Turmeric (QC TUMERIC COMPLEX PO) Take 1 capsule by mouth   Yes Historical Provider, MD   Garlic 10 MG CAPS Take 1 capsule by mouth   Yes Historical Provider, MD   metoprolol succinate (TOPROL XL) 25 MG extended release tablet Take 1 tablet by mouth daily 3/19/21  Yes Subhash Contreras MD   levothyroxine (EUTHYROX) 88 MCG tablet Take 1 tablet by mouth once daily 2/18/21  Yes Je Rios MD   omeprazole (PRILOSEC) 40 MG delayed release capsule Take 1 capsule by mouth daily 2/2/21  Yes Je Rios MD   buPROPion (WELLBUTRIN XL) 150 MG extended release tablet  1/21/21  Yes Historical Provider, MD   albuterol sulfate HFA (VENTOLIN HFA) 108 (90 Base) MCG/ACT inhaler Inhale 2 puffs into the lungs 4 times daily as needed for Wheezing 12/19/20  Yes Sherri Coleman MD   traZODone (DESYREL) 50 MG tablet 100 mg  1/10/20  Yes Historical Provider, MD   escitalopram (LEXAPRO) 10 MG tablet TAKE 1 & 1/2 (ONE & ONE-HALF) TABLETS BY MOUTH ONCE DAILY  Patient taking differently: 20 mg  9/23/19  Yes Luis A Molina MD   SUMAtriptan (IMITREX) 100 MG tablet Take 0.5 tablets by mouth as needed for Migraine  Patient not taking: Reported on 3/19/2021 2/23/21   Luis A Molina MD       FAMILY HISTORY: family history is not on file. She was adopted. PHYSICAL EXAM:   /86 (Site: Left Upper Arm, Position: Standing, Cuff Size: Large Adult)   Pulse 72   Resp 18   Ht 5' 6\" (1.676 m)   Wt 209 lb 9.6 oz (95.1 kg)   LMP 07/15/2020 (Approximate)   SpO2 99%   BMI 33.83 kg/m²  Body mass index is 33.83 kg/m². Constitutional: She is oriented to person, place, and time. She appears well-developed and well-nourished. In no acute distress. HEENT: Normocephalic and atraumatic. No JVD present. Carotid bruit is not present. No mass and no thyromegaly present. No lymphadenopathy present. Cardiovascular: Normal rate, regular rhythm, normal heart sounds. Exam reveals no gallop and no friction rubs. 1/6 systolic murmur, 5th intercostal space on the LEFT in the mid-clavicular line (cardiac apex). Pulmonary/Chest: Effort normal and breath sounds normal. No respiratory distress. She has no wheezes, rhonchi or rales. Abdominal: Soft, non-tender. Bowel sounds and aorta are normal. She exhibits no organomegaly, mass or bruit. Extremities: No edema. No cyanosis and no clubbing. Pulses are 2+ radial and carotid pulses. 2+ dorsalis pedis and posterior tibial pulses bilaterally. Neurological: She is alert and oriented to person, place, and time.  No evidence of gross cranial nerve deficit. Coordination appeared normal.   Skin: Skin is warm and dry. There is no rash or diaphoresis. Psychiatric: She has a normal mood and affect. Her speech is normal and behavior is normal.      MOST RECENT LABS ON RECORD:   Lab Results   Component Value Date    WBC 4.6 01/27/2021    HGB 13.3 01/27/2021    HCT 41.3 01/27/2021     01/27/2021    CHOL 185 04/22/2020    TRIG 79 04/22/2020    HDL 48 04/22/2020    ALT 12 01/25/2021    AST 25 01/25/2021     02/17/2021    K 3.5 02/17/2021    CL 96 (L) 02/17/2021    CREATININE 0.94 02/17/2021    BUN 19 02/17/2021    CO2 34 (H) 02/17/2021    TSH 1.07 01/25/2021       ASSESSMENT:  Patient Active Problem List    Diagnosis Date Noted    Orthostatic hypotension      Priority: High    Diarrhea      Priority: High    Recurrent falls while walking      Priority: High    Dizziness 01/26/2021    Persistent vomiting 01/25/2021    Uncontrollable vomiting 01/25/2021    Intractable nausea and vomiting 01/25/2021    Lab test positive for detection of COVID-19 virus 12/20/2020    Shortness of breath 12/20/2020    Myalgia 12/20/2020    Iron deficiency anemia 02/15/2019    Moderate single current episode of major depressive disorder ([facility] [address] 03/21/2018    Idiopathic gout 04/20/2016    Anxiety state 04/20/2016    Essential hypertension 05/06/2015    Hypothyroidism 05/06/2015       Diagnosis Orders   1. Orthostatic hypotension  metoprolol succinate (TOPROL XL) 50 MG extended release tablet    Referral to Cardiac Cath    CBC    Basic Metabolic Panel   2. SOB (shortness of breath)  metoprolol succinate (TOPROL XL) 50 MG extended release tablet    Referral to Cardiac Cath    CBC    Basic Metabolic Panel   3. Abnormal EKG  metoprolol succinate (TOPROL XL) 50 MG extended release tablet    Referral to Cardiac Cath    CBC    Basic Metabolic Panel   4.  Atypical chest pain  metoprolol succinate (TOPROL XL) 50 MG extended release tablet    Referral to Cardiac Cath CBC    Basic Metabolic Panel   5. Palpitations  metoprolol succinate (TOPROL XL) 50 MG extended release tablet    Referral to Cardiac Cath    CBC    Basic Metabolic Panel   6. Abnormal stress test  Referral to Cardiac Cath    CBC    Basic Metabolic Panel       PLAN:     Abnormal Stress Test: Recurrent chest and shoulder pressure with increased shortness of breath with exertion as well as new abnormal ECG changes. Persistent in spite of starting Toprol XL.  For these reasons, I recommended that the patient consider undergoing a cardiac catheterization with coronary angiography to assess their coronary anatomy and facilitate better treatment recommendations. I discussed the risks, benefits, and alternatives to the procedure including the risk of bleeding, contrast induced allergy and/or kidney damage, arrythmia, stroke, heart attack, death, or the need for further procedures. I also discussed the fact that although treatment with simple medical management is a potential treatment option in place of cardiac catheterization, I expressed my opinion that cardiac catheterization in order to define their coronary anatomy and rule out severe 3 vessel or left main coronary artery disease would significant help guide the most appropriate treatment strategy ranging from no treatment, to medications, stents, to even coronary bypass surgery. The patient verbalized understanding of the risks benefits and alternatives and stated that they would like to undergo the procedure. We will plan to schedule the procedure here at [facility] on 4/1/2021. · Atypical Chest Pain: mild to moderate  · Beta Blocker: INCREASE to Metoprolol succinate (Toprol XL) 50 mg daily. I also discussed the potential side effects of this medication including lightheadedness and dizziness and instructed them to stop the medication of this occurs and call our office if this occurs. · Counseling: I advised Ms. Boone to call our office or go to the emergency room if she develops worsening or persistent chest pain or shortness of breath as this could be life threatening.   Recurrent intermittent palpitations: moderate  · Beta Blocker: INCREASE to Metoprolol succinate (Toprol XL) 50 mg daily. I also discussed the potential side effects of this medication including lightheadedness and dizziness and instructed them to stop the medication of this occurs and call our office if this occurs. · Calcium Channel Blocker: Not indicated at this time.  Medical Management for suspected Coronary artery disease and myocardial ischemia:   Antiplatelet Agent: Not indicated at this time.  Beta Blocker: INCREASE to Metoprolol succinate (Toprol XL) 50 mg daily. I also discussed the potential side effects of this medication including lightheadedness and dizziness and instructed them to stop the medication of this occurs and call our office if this occurs.  Anti-anginal medications: Not indicated at this time.  Cholesterol Reduction Therapy: Not indicated at this time.  Additional Testing List: I took the liberty of ordering a BMP for today to assess their potassium and renal function. I told them that they could get their lab work performed at the location of their choosing, unfortunately, if the lab work was not performed at a [facility]) facility I could not guarantee my ability to follow up with them on their results. and  I took the liberty of ordering a CBC. I told them that they could get their lab work performed at the location of their choosing, unfortunately, if the lab work was not performed at a [facility]) facility I could not guarantee my ability to follow up with them on their results. Once again, thank you for allowing me to participate in this patients care. Please do not hesitate to contact me if I could be of any further assistance. I told Ms. Boone to call my office if she had any problems, but otherwise told her to Return in about 6 weeks (around 5/11/2021). However, I would be happy to see her sooner should the need arise. Sincerely,  Roselyn Coon. Sidney ARAGON, MS, F.A.C.C. [facility] Cardiology Specialist     [address]  [address] [facility]  Phone: [phone], Fax: [phone]     I believe that the risk of significant morbidity and mortality related to the patient's current medical conditions are: intermediate-high. The documentation recorded by the scribe, accurately and completely reflects the services I personally performed and the decisions made by me. Lm Mcknight MD, MS, F.A.C.C.  March 30, 2021

## 2021-03-31 ENCOUNTER — TELEPHONE (OUTPATIENT)
Dept: CARDIOLOGY | Age: 51
End: 2021-03-31

## 2021-03-31 NOTE — TELEPHONE ENCOUNTER
----- Message from Harsh Parish MD sent at 3/31/2021 12:06 AM EDT -----  Let Ms. Boone know their test result was ok. Thanks.

## 2021-04-01 ENCOUNTER — HOSPITAL ENCOUNTER (OUTPATIENT)
Dept: CARDIAC CATH/INVASIVE PROCEDURES | Age: 51
Discharge: HOME | End: 2021-04-01
Attending: FAMILY MEDICINE | Admitting: FAMILY MEDICINE
Payer: COMMERCIAL

## 2021-04-01 VITALS
RESPIRATION RATE: 12 BRPM | WEIGHT: 207 LBS | HEIGHT: 66 IN | HEART RATE: 86 BPM | OXYGEN SATURATION: 96 % | DIASTOLIC BLOOD PRESSURE: 46 MMHG | TEMPERATURE: 97.4 F | SYSTOLIC BLOOD PRESSURE: 118 MMHG | BODY MASS INDEX: 33.27 KG/M2

## 2021-04-01 PROBLEM — R94.39 ABNORMAL CARDIOVASCULAR STRESS TEST: Status: ACTIVE | Noted: 2021-04-01

## 2021-04-01 PROCEDURE — C1769 GUIDE WIRE: HCPCS

## 2021-04-01 PROCEDURE — 2500000003 HC RX 250 WO HCPCS

## 2021-04-01 PROCEDURE — 2709999900 HC NON-CHARGEABLE SUPPLY

## 2021-04-01 PROCEDURE — 2580000003 HC RX 258: Performed by: FAMILY MEDICINE

## 2021-04-01 PROCEDURE — C1894 INTRO/SHEATH, NON-LASER: HCPCS

## 2021-04-01 PROCEDURE — 93458 L HRT ARTERY/VENTRICLE ANGIO: CPT | Performed by: FAMILY MEDICINE

## 2021-04-01 PROCEDURE — 6360000002 HC RX W HCPCS

## 2021-04-01 PROCEDURE — C1887 CATHETER, GUIDING: HCPCS

## 2021-04-01 PROCEDURE — 6360000004 HC RX CONTRAST MEDICATION: Performed by: FAMILY MEDICINE

## 2021-04-01 PROCEDURE — 99152 MOD SED SAME PHYS/QHP 5/>YRS: CPT | Performed by: FAMILY MEDICINE

## 2021-04-01 RX ORDER — SODIUM CHLORIDE 9 MG/ML
INJECTION, SOLUTION INTRAVENOUS CONTINUOUS
Status: DISCONTINUED | OUTPATIENT
Start: 2021-04-01 | End: 2021-04-01 | Stop reason: HOSPADM

## 2021-04-01 RX ORDER — ACETAMINOPHEN 325 MG/1
650 TABLET ORAL EVERY 4 HOURS PRN
Status: DISCONTINUED | OUTPATIENT
Start: 2021-04-01 | End: 2021-04-01 | Stop reason: HOSPADM

## 2021-04-01 RX ORDER — DIPHENHYDRAMINE HCL 25 MG
50 CAPSULE ORAL ONCE
Status: DISCONTINUED | OUTPATIENT
Start: 2021-04-01 | End: 2021-04-01 | Stop reason: HOSPADM

## 2021-04-01 RX ORDER — NITROGLYCERIN 0.4 MG/1
0.4 TABLET SUBLINGUAL EVERY 5 MIN PRN
Status: DISCONTINUED | OUTPATIENT
Start: 2021-04-01 | End: 2021-04-01 | Stop reason: HOSPADM

## 2021-04-01 RX ADMIN — IOPAMIDOL 50 ML: 755 INJECTION, SOLUTION INTRAVENOUS at 09:33

## 2021-04-01 RX ADMIN — SODIUM CHLORIDE: 9 INJECTION, SOLUTION INTRAVENOUS at 09:02

## 2021-04-01 NOTE — PROGRESS NOTES
Patient returned to pre/post area. Alert and oriented, denies pain. Right radial palpable distal to puncture site. Pressure dressing in place no bleeding/brusing/swelling noted. Will continue to monitor.

## 2021-04-01 NOTE — PROGRESS NOTES
Patient returned to post procedure area. Report given to [facility]. Pressure bandage to right radial artery in place. No bleeding noted. Pulse ox on right index finger reads 100%.

## 2021-04-01 NOTE — PROGRESS NOTES
Inpatients must meet criteria 1 through 7.   1. Minimum 30 minutes after last dose of sedative medication, minimum 120 minutes after last dose of reversal agent. Yes   2. Systolic BP stable within 20 mmHg for 30 minutes & systolic BP between 90 & 891 or within 10 mmHg of baseline. Yes   3. Pulse between 60 and 100 or within 10 bpm of baseline. Yes   4. Spontaneous respiratory rate >/= 10 per minute. Yes   5. SaO2 >/= 95 or >/= baseline. Yes   6. Able to cough and swallow or return to baseline function. Yes   7. Alert and oriented or return to baseline mental status. Yes   8. Demonstrates controlled, coordinated movements, ambulates with steady gait, or return to baseline activity function. Yes   9. Minimal or no pain or nausea, or at a level tolerable and acceptable to patient. Yes   10. Takes and retains oral fluids as allowed. Yes   11. Procedural / perioperative site stable. Minimal or no bleeding. Yes   12. If GI endoscopy procedure, minimal or no abdominal distention or passing flatus. N/A   13. Written discharge instructions and emergency telephone number provided. Yes   14. Accompanied by a responsible adult. Yes   Adult patient discharged from facility without responsible person meets above criteria plus the following:   a) remains awake without stimulus for 30 minutes   b) oriented appropriate for age   c) all vital signs stable   d) no significant risk of losing protective reflexes   e) able to maintain pre-procedure mobility without assistance   f) no nausea or dizziness   g) transportation arrangements that do not require patient to operate motor Vehicle.    N/A

## 2021-04-01 NOTE — PROGRESS NOTES
Discharge instructions given to patient and Dawood Birmingham patient's friend, both voice understanding.

## 2021-04-05 ENCOUNTER — HOSPITAL ENCOUNTER (OUTPATIENT)
Age: 51
Setting detail: SPECIMEN
Discharge: HOME | End: 2021-04-05
Payer: COMMERCIAL

## 2021-04-05 ENCOUNTER — OFFICE VISIT (OUTPATIENT)
Dept: SURGERY | Age: 51
End: 2021-04-05
Payer: COMMERCIAL

## 2021-04-05 VITALS
TEMPERATURE: 98.2 F | HEART RATE: 64 BPM | BODY MASS INDEX: 33.07 KG/M2 | HEIGHT: 66 IN | OXYGEN SATURATION: 98 % | SYSTOLIC BLOOD PRESSURE: 124 MMHG | WEIGHT: 205.8 LBS | DIASTOLIC BLOOD PRESSURE: 72 MMHG

## 2021-04-05 DIAGNOSIS — L72.0 EPIDERMAL CYST: ICD-10-CM

## 2021-04-05 DIAGNOSIS — L72.0 EPIDERMAL CYST: Primary | ICD-10-CM

## 2021-04-05 PROCEDURE — 11404 EXC TR-EXT B9+MARG 3.1-4 CM: CPT | Performed by: SURGERY

## 2021-04-05 PROCEDURE — 88304 TISSUE EXAM BY PATHOLOGIST: CPT

## 2021-04-07 LAB — DERMATOLOGY PATHOLOGY REPORT: NORMAL

## 2021-04-16 ENCOUNTER — OFFICE VISIT (OUTPATIENT)
Dept: SURGERY | Age: 51
End: 2021-04-16
Payer: COMMERCIAL

## 2021-04-16 VITALS
HEART RATE: 60 BPM | SYSTOLIC BLOOD PRESSURE: 136 MMHG | WEIGHT: 209 LBS | BODY MASS INDEX: 33.73 KG/M2 | TEMPERATURE: 98.1 F | DIASTOLIC BLOOD PRESSURE: 81 MMHG

## 2021-04-16 DIAGNOSIS — Z48.89 ENCOUNTER FOR POSTOPERATIVE WOUND CHECK: Primary | ICD-10-CM

## 2021-04-16 DIAGNOSIS — L72.0 EPIDERMAL CYST: ICD-10-CM

## 2021-04-16 PROCEDURE — 99024 POSTOP FOLLOW-UP VISIT: CPT | Performed by: SURGERY

## 2021-04-17 NOTE — PATIENT INSTRUCTIONS
Patient Education        Epidermoid Cyst: Care Instructions  Your Care Instructions  An epidermoid (say \"[id number]-[id number]-fadi\") cyst is a lump just under the skin. These cysts can form when a hair follicle becomes blocked. They are common in acne and may occur on the face, neck, back, and genitals. However, they can form anywhere on the body. These cysts are not cancer and do not lead to cancer. They tend not to hurt, but they can sometimes become swollen and painful. They also may break open (rupture) and cause scarring. These cysts sometimes do not cause problems and may not need treatment. If you have a cyst that is swollen and hurts, your doctor may inject it with a medicine to help it heal. But it is more likely that a painful cyst will need to be removed. Your doctor will give you a shot of numbing medicine and cut into the cyst to drain it or remove it. This makes the symptoms go away. Follow-up care is a key part of your treatment and safety. Be sure to make and go to all appointments, and call your doctor if you are having problems. It's also a good idea to know your test results and keep a list of the medicines you take. How can you care for yourself at home? · Do not squeeze the cyst or poke it with a needle to open it. This can cause swelling, redness, and infection. · Always have a doctor look at any new lumps you get to make sure that they are not serious. When should you call for help? Watch closely for changes in your health, and be sure to contact your doctor if:    · You have a fever, redness, or swelling after you get a shot of medicine in the cyst.     · You see or feel a new lump on your skin. Where can you learn more? Go to https://[email or web address]. org and sign in to your [employer] account. Enter A169 in the [employer] box to learn more about \"Epidermoid Cyst: Care Instructions. \"     If you do not have an account, please click on the \"Sign Up Now\" link.   Current as of: July 2, 2020               Content Version: 12.8  © 9001-5789 [employer], [employer]. Care instructions adapted under license by [facility] ([facility]). If you have questions about a medical condition or this instruction, always ask your healthcare professional. [address] any warranty or liability for your use of this information.

## 2021-04-17 NOTE — PROGRESS NOTES
Kiet Cast MD  General Surgery, Endoscopy  Chief Medical Officer    [facility] Jodi Wheeler  [address] [address]  Dept: [phone]  Fax: 07 Felicita Reyna  Chief Complaint   Patient presents with    Post-Op Check     s/p sebaceous cyst excsion 4/5. no complaints at this time, reports minimal drainage       HPI    Ms Deja Manzo returns for follow-up after excision of sebaceous cyst April 5, 2021. She is doing well. No complaints. Review of Systems    Past Medical History:   Diagnosis Date    Anemia, unspecified     Anxiety     Edema     H/O cardiovascular stress test 03/08/2021    [place] treadmill score is 0    H/O echocardiogram 01/26/2021    EF >60% Normal study    History of cardiac cath 04/01/2021    [facility]) Nia/Dr. Little/Right Radial     History of cardiovascular stress test 03/22/2021    Equivocal study. Small perfusion defect of mild intensity in the anterior and anteroseptal regions during stress and rest imaging, which is most consistent with artifact EF 71% Duke Treadmill score is -2intermediate risk.  History of normal Holter exam 02/22/2021    CAM 6 days 19 hrs Normal sinus. AT 2 episodes Longest/fastest 9 beats at ave 127 bpm p to 153 bpm PAC 0.08% PVC 0.07%    Hypertension     Hypothyroidism     Thyroid disease     Tilt table evaluation 03/22/2021    Negative study Although sensitivity of study certainly is not 507% relatively normal results signifcantly decrease the likelihood of neurocardiogenic source for pt symptoms. Clinically suspision remains high possible repeat study maybe indicated       Past Surgical History:   Procedure Laterality Date    ANTERIOR CRUCIATE LIGAMENT REPAIR Left     HYSTERECTOMY, VAGINAL      07/2020 robotic, lap supracervical hyst and BS, perineopexy, A&P repair, cysto, Lynx retropubic slin    INFECTED SKIN DEBRIDEMENT Left     Left third finger       Family History   Adopted:  Yes Allergies:  See list    Current Outpatient Medications   Medication Sig Dispense Refill    metoprolol succinate (TOPROL XL) 50 MG extended release tablet Take 1 tablet by mouth daily 90 tablet 3    buPROPion (WELLBUTRIN XL) 300 MG extended release tablet TAKE 1 TABLET BY MOUTH ONCE DAILY      topiramate (TOPAMAX) 25 MG tablet TAKE 1 TABLET BY MOUTH ONCE DAILY FOR 7 DAYS THEN 1 TAB TWICE DAILY FOR 7 DAYS THEN 1 TAB IN THE MORNING AND 2 IN THE EVENING FOR 7 DAYS AND      ALPRAZolam (XANAX) 0.25 MG tablet Take one 1 hr before mri,may repeat in 1 hr if needed (Patient taking differently: as needed. ) 5 tablet 0    prochlorperazine (COMPAZINE) 5 MG tablet Take 5 mg by mouth every 6 hours as needed      Ubrogepant (UBRELVY) 100 MG TABS Take 100 mg by mouth as needed       ferrous sulfate (IRON 325) 325 (65 Fe) MG tablet Take 1 tablet by mouth daily       Cholecalciferol 50 MCG (2000 UT) CAPS Take 1 capsule by mouth daily       Turmeric (QC TUMERIC COMPLEX PO) Take 1 capsule by mouth daily       Garlic 10 MG CAPS Take 1 capsule by mouth daily       levothyroxine (EUTHYROX) 88 MCG tablet Take 1 tablet by mouth once daily 90 tablet 0    omeprazole (PRILOSEC) 40 MG delayed release capsule Take 1 capsule by mouth daily 30 capsule 0    buPROPion (WELLBUTRIN XL) 150 MG extended release tablet Take 150 mg by mouth daily       traZODone (DESYREL) 50 MG tablet Take 50 mg by mouth nightly       escitalopram (LEXAPRO) 10 MG tablet TAKE 1 & 1/2 (ONE & ONE-HALF) TABLETS BY MOUTH ONCE DAILY (Patient taking differently: 20 mg ) 135 tablet 0    albuterol sulfate HFA (VENTOLIN HFA) 108 (90 Base) MCG/ACT inhaler Inhale 2 puffs into the lungs 4 times daily as needed for Wheezing 1 Inhaler 0     No current facility-administered medications for this visit.         Social History     Socioeconomic History    Marital status: Single     Spouse name: Not on file    Number of children: Not on file    Years of education: Not on file    Highest education level: Not on file   Occupational History    Occupation: nurse aid   Social Needs    Financial resource strain: Not on file    Food insecurity     Worry: Not on file     Inability: Not on file    Transportation needs     Medical: Not on file     Non-medical: Not on file   Tobacco Use    Smoking status: Never Smoker    Smokeless tobacco: Never Used   Substance and Sexual Activity    Alcohol use: No     Alcohol/week: 0.0 standard drinks     Comment: rarely    Drug use: No    Sexual activity: Not Currently   Lifestyle    Physical activity     Days per week: Not on file     Minutes per session: Not on file    Stress: Not on file   Relationships    Social connections     Talks on phone: Not on file     Gets together: Not on file     Attends Judaism service: Not on file     Active member of club or organization: Not on file     Attends meetings of clubs or organizations: Not on file     Relationship status: Not on file    Intimate partner violence     Fear of current or ex partner: Not on file     Emotionally abused: Not on file     Physically abused: Not on file     Forced sexual activity: Not on file   Other Topics Concern    Not on file   Social History Narrative    Not on file       /81   Pulse 60   Temp 98.1 °F (36.7 °C)   Wt 209 lb (94.8 kg)   LMP 07/15/2020 (Approximate)   BMI 33.73 kg/m²      Physical Exam  Vitals signs and nursing note reviewed. Constitutional:       General: She is not in acute distress. Appearance: She is well-developed. HENT:      Head: Normocephalic and atraumatic. Eyes:      General: No scleral icterus. Conjunctiva/sclera: Conjunctivae normal.      Pupils: Pupils are equal, round, and reactive to light. Neck:      Trachea: No tracheal deviation. Cardiovascular:      Rate and Rhythm: Normal rate. Pulmonary:      Effort: Pulmonary effort is normal. No respiratory distress.    Abdominal:      General: There is no distension. Palpations: Abdomen is soft. Tenderness: There is no abdominal tenderness. Comments: Wound is clean, dry, intact. Sutures removed. Skin:     General: Skin is warm and dry. Neurological:      Mental Status: She is alert and oriented to person, place, and time. Psychiatric:         Behavior: Behavior normal.         Thought Content: Thought content normal.         Judgment: Judgment normal.         IMAGING/LABS    4/7/2021  8:51 AM - Valer[facility], [facility] Incoming Lab Results From [facility]    Component Collected Lab   Dermatology Pathology Report 04/05/2021  9:52  [address]   -- Diagnosis --   SKIN, LEFT ABDOMEN, EXCISION:        -  RUPTURED EPIDERMOID CYST WITH ABSCESS. Pérez De La Paz M.D.   **Electronically Signed Out**   jet/4/7/2021         Clinical Information   Pre-op Diagnosis:  EPIDERMAL CYST   Operative Findings:  SEBACEOUS CYST LEFT ABDOMEN     Source of Specimen   1: SEBACEOUS CYST LEFT ABDOMEN     Gross Description   \"AURELIANO SOSTARIC, LEFT ABDOMEN\"  A 2.7 x 0.8 cm. elongated portion of   multilobulated tan-yellow fibrofatty tissue, excised to a maximum   depth of 1.3 cm. with an attached, overlying 1.3 x 0.2 cm. elongate   strip of pale pink-tan skin showing no definite mass lesion. Sectioning reveals a central 1.3 x 0.8 x 0.7 cm. ovoid, subcutaneous   possible cyst.  A small amount of laminated white-tan friable   material.  The cyst wall averages 0.1 cm. in thickness without   definitive hemorrhage, necrosis or calcifications.  The surrounding   fibrofatty tissue appears unremarkable.  Representative sections are   submitted in 1 cassette.  dw       Microscopic Description   The sections show portions of a squamous epithelial cyst, which   keratinizes with a granular cell layer.  There is partial rupture of   the cyst wall with surrounding fibrosis, mixed granulomatous   inflammation and abscess.      SURGICAL PATHOLOGY CONSULTATION         Patient Name:

## 2021-04-22 ENCOUNTER — OFFICE VISIT (OUTPATIENT)
Dept: PRIMARY CARE CLINIC | Age: 51
End: 2021-04-22
Payer: COMMERCIAL

## 2021-04-22 VITALS
DIASTOLIC BLOOD PRESSURE: 86 MMHG | HEART RATE: 72 BPM | SYSTOLIC BLOOD PRESSURE: 138 MMHG | BODY MASS INDEX: 33.83 KG/M2 | WEIGHT: 209.6 LBS | RESPIRATION RATE: 16 BRPM

## 2021-04-22 DIAGNOSIS — U07.1 COVID-19 VIRUS INFECTION: Primary | ICD-10-CM

## 2021-04-22 PROCEDURE — 1036F TOBACCO NON-USER: CPT | Performed by: FAMILY MEDICINE

## 2021-04-22 PROCEDURE — 99213 OFFICE O/P EST LOW 20 MIN: CPT | Performed by: FAMILY MEDICINE

## 2021-04-22 PROCEDURE — G8427 DOCREV CUR MEDS BY ELIG CLIN: HCPCS | Performed by: FAMILY MEDICINE

## 2021-04-22 PROCEDURE — G8417 CALC BMI ABV UP PARAM F/U: HCPCS | Performed by: FAMILY MEDICINE

## 2021-04-22 PROCEDURE — 3017F COLORECTAL CA SCREEN DOC REV: CPT | Performed by: FAMILY MEDICINE

## 2021-04-22 RX ORDER — KETOROLAC TROMETHAMINE 10 MG/1
10 TABLET, FILM COATED ORAL EVERY 6 HOURS PRN
COMMUNITY
Start: 2021-04-13 | End: 2021-07-21

## 2021-04-22 NOTE — PROGRESS NOTES
Patient is here for a one month follow up. Last month she was here with complaints of dizziness, fatigue, headache, and palpitations. She said she is doing a little better since the last we have seen her. She said that the neurologist and cardiologist upped her medications. She states that she is still having symptoms but she would like to go back to work. She would like to go back on Monday if possible. She would like written that she cannot work over the 12 hour shifts, because they sometimes mandate them to work 16 hour shifts. She would like to stay to just 12 hours till at least June. She said that she will be having a nerve block done in June. Patient also had a cyst near her left abdomen removed since the last we have seen her. CURRENT ALLERGIES: Rocephin [ceftriaxone]    PAST MEDICAL HISTORY:   Past Medical History:   Diagnosis Date    Anemia, unspecified     Anxiety     Edema     H/O cardiovascular stress test 03/08/2021    C[place]ova treadmill score is 0    H/O echocardiogram 01/26/2021    EF >60% Normal study    History of cardiac cath 04/01/2021    [facility]) Nia/Dr. Little/Right Radial     History of cardiovascular stress test 03/22/2021    Equivocal study. Small perfusion defect of mild intensity in the anterior and anteroseptal regions during stress and rest imaging, which is most consistent with artifact EF 71% Duke Treadmill score is -2intermediate risk.  History of normal Holter exam 02/22/2021    CAM 6 days 19 hrs Normal sinus. AT 2 episodes Longest/fastest 9 beats at ave 127 bpm p to 153 bpm PAC 0.08% PVC 0.07%    Hypertension     Hypothyroidism     Thyroid disease     Tilt table evaluation 03/22/2021    Negative study Although sensitivity of study certainly is not 170% relatively normal results signifcantly decrease the likelihood of neurocardiogenic source for pt symptoms.  Clinically suspision remains high possible repeat study maybe indicated       SURGICAL HISTORY: Evelina Preston MD   buPROPion (WELLBUTRIN XL) 150 MG extended release tablet Take 150 mg by mouth daily  1/21/21   Historical Provider, MD   albuterol sulfate HFA (VENTOLIN HFA) 108 (90 Base) MCG/ACT inhaler Inhale 2 puffs into the lungs 4 times daily as needed for Wheezing 12/19/20   Joanne Velez MD   traZODone (DESYREL) 50 MG tablet Take 50 mg by mouth nightly  1/10/20   Historical Provider, MD   escitalopram (LEXAPRO) 10 MG tablet TAKE 1 & 1/2 (ONE & ONE-HALF) TABLETS BY MOUTH ONCE DAILY  Patient taking differently: 20 mg  9/23/19   Fabien Miramontes MD       Review of Systems:  Constitutional: negative for fevers or chills, has headaches every day  Eyes: negative for visual disturbance   ENT: negative for sore throat or nasal congestion  Respiratory: negative for shortness of breath or cough  Cardiovascular: negative for chest pain ,palpitations,pnd,syncope  Gastrointestinal: negative for abd pain, nausea, vomiting, diarrhea , constipation,hemetemesis,armand,blood in stool  Genitourinary: negative for dysuria, urgency ,frequency,hematuria  Integument/breast: negative for skin rash or lesions  Neurological: negative for unilateral weakness, numbness or tingling. Skeletal Muscular: no joint pain,jont swelling,back pain    Subjective:  Vitals:    04/22/21 1446   BP: 138/86   Pulse: 72   Resp: 16         Exam:  GEN:   A & O x3, no apparent distress  EYES: No gross abnormalities. ENT:ENT exam normal, no neck nodes or sinus tenderness  NECK: normal, supple, no lymphadenopathy,  no carotid bruits  PULM: clear to auscultation bilaterally- no wheezes, rales or rhonchi, normal air movement, no respiratory distress  COR: regular rate & rhythm, no murmurs and no gallops  ABD:  soft, non-tender, non-distended, normal bowel sounds, no masses or organomegaly  : deferred  EXT: Extremities: + 2 pedal pulses, no edema or calf tenderness, and warm to touch.  Normal nails without lesions  Skeletomuscular: no joint pain  NEURO: Motor and sensory grossly intact  SKIN:  No skin lesions or rashes      Assessment:  1. COVID-19 virus infection          Plan:  No orders of the defined types were placed in this encounter. No follow-ups on file. No orders of the defined types were placed in this encounter.   return to work next wk with restrictions not to work more than 12 hours in a day  Medication directions and side effects discussed      Electronically signed by Sandra Pierson MD on 4/22/2021 at 3:14 PM         Sandra Pierson MD

## 2021-04-22 NOTE — LETTER
[facility]  [address] [place] [address]  Phone: [phone]  Fax: [phone]    Alex Barbour MD        April 22, 2021     Patient: Leilani Grace   YOB: 1970   Date of Visit: 4/22/2021       To Whom It May Concern: It is my medical opinion that Christina Taylor may return to work on 4/26/2021 with restriction of working not more than 12 hours in a day. If you have any questions or concerns, please don't hesitate to call.     Sincerely,        Alex Barbour MD

## 2021-05-13 ENCOUNTER — TELEPHONE (OUTPATIENT)
Dept: CARDIOLOGY | Age: 51
End: 2021-05-13

## 2021-05-13 ENCOUNTER — HOSPITAL ENCOUNTER (OUTPATIENT)
Age: 51
Discharge: HOME | End: 2021-05-13
Payer: COMMERCIAL

## 2021-05-13 ENCOUNTER — OFFICE VISIT (OUTPATIENT)
Dept: PRIMARY CARE CLINIC | Age: 51
End: 2021-05-13
Payer: COMMERCIAL

## 2021-05-13 VITALS
HEART RATE: 76 BPM | RESPIRATION RATE: 16 BRPM | BODY MASS INDEX: 35.86 KG/M2 | WEIGHT: 222.2 LBS | DIASTOLIC BLOOD PRESSURE: 78 MMHG | SYSTOLIC BLOOD PRESSURE: 140 MMHG

## 2021-05-13 DIAGNOSIS — U07.1 COVID-19: ICD-10-CM

## 2021-05-13 DIAGNOSIS — E03.9 HYPOTHYROIDISM, UNSPECIFIED TYPE: ICD-10-CM

## 2021-05-13 DIAGNOSIS — E87.6 HYPOKALEMIA: ICD-10-CM

## 2021-05-13 DIAGNOSIS — I10 ESSENTIAL HYPERTENSION: ICD-10-CM

## 2021-05-13 DIAGNOSIS — I95.1 ORTHOSTATIC HYPOTENSION: ICD-10-CM

## 2021-05-13 DIAGNOSIS — R06.02 SHORTNESS OF BREATH: Primary | ICD-10-CM

## 2021-05-13 LAB
ANION GAP SERPL CALCULATED.3IONS-SCNC: 5 MMOL/L (ref 9–17)
BUN BLDV-MCNC: 13 MG/DL (ref 6–20)
BUN/CREAT BLD: 17 (ref 9–20)
C-REACTIVE PROTEIN: <3 MG/L (ref 0–5)
CALCIUM SERPL-MCNC: 8.9 MG/DL (ref 8.6–10.4)
CHLORIDE BLD-SCNC: 109 MMOL/L (ref 98–107)
CO2: 28 MMOL/L (ref 20–31)
CREAT SERPL-MCNC: 0.77 MG/DL (ref 0.5–0.9)
FERRITIN: 15 UG/L (ref 13–150)
GFR AFRICAN AMERICAN: >60 ML/MIN
GFR NON-AFRICAN AMERICAN: >60 ML/MIN
GFR SERPL CREATININE-BSD FRML MDRD: ABNORMAL ML/MIN/{1.73_M2}
GFR SERPL CREATININE-BSD FRML MDRD: ABNORMAL ML/MIN/{1.73_M2}
GLUCOSE BLD-MCNC: 119 MG/DL (ref 70–99)
POTASSIUM SERPL-SCNC: 4.8 MMOL/L (ref 3.7–5.3)
SODIUM BLD-SCNC: 142 MMOL/L (ref 135–144)
TSH SERPL DL<=0.05 MIU/L-ACNC: 4.01 MIU/L (ref 0.3–5)

## 2021-05-13 PROCEDURE — 36415 COLL VENOUS BLD VENIPUNCTURE: CPT

## 2021-05-13 PROCEDURE — 1036F TOBACCO NON-USER: CPT | Performed by: FAMILY MEDICINE

## 2021-05-13 PROCEDURE — 82728 ASSAY OF FERRITIN: CPT

## 2021-05-13 PROCEDURE — 86140 C-REACTIVE PROTEIN: CPT

## 2021-05-13 PROCEDURE — 99214 OFFICE O/P EST MOD 30 MIN: CPT | Performed by: FAMILY MEDICINE

## 2021-05-13 PROCEDURE — 80048 BASIC METABOLIC PNL TOTAL CA: CPT

## 2021-05-13 PROCEDURE — G8417 CALC BMI ABV UP PARAM F/U: HCPCS | Performed by: FAMILY MEDICINE

## 2021-05-13 PROCEDURE — 84443 ASSAY THYROID STIM HORMONE: CPT

## 2021-05-13 PROCEDURE — 3017F COLORECTAL CA SCREEN DOC REV: CPT | Performed by: FAMILY MEDICINE

## 2021-05-13 PROCEDURE — G8427 DOCREV CUR MEDS BY ELIG CLIN: HCPCS | Performed by: FAMILY MEDICINE

## 2021-05-13 RX ORDER — TRIAMTERENE AND HYDROCHLOROTHIAZIDE 37.5; 25 MG/1; MG/1
CAPSULE ORAL
Qty: 90 CAPSULE | Refills: 0 | Status: ON HOLD | OUTPATIENT
Start: 2021-05-13 | End: 2021-05-20

## 2021-05-13 NOTE — TELEPHONE ENCOUNTER
Ms. Berhane Parrish called and states she continues to have palpitations, edema, lightheaded/dizziness she did fall again also. She just wants you to know she had blood work done today.

## 2021-05-13 NOTE — PROGRESS NOTES
Patient is here with complaints of shortness of breath, leg swelling, foot swelling, joint pain, and dizziness. Patient states she does see a neurologist in [place]. Patient states after covid is when this all began. She has been wearing her stockings  She had fall  No chest pain        CURRENT ALLERGIES: Rocephin [ceftriaxone]    PAST MEDICAL HISTORY:   Past Medical History:   Diagnosis Date    Anemia, unspecified     Anxiety     Edema     H/O cardiovascular stress test 03/08/2021    C[place]ova treadmill score is 0    H/O echocardiogram 01/26/2021    EF >60% Normal study    History of cardiac cath 04/01/2021    [facility]) Nia/Dr. Little/Right Radial     History of cardiovascular stress test 03/22/2021    Equivocal study. Small perfusion defect of mild intensity in the anterior and anteroseptal regions during stress and rest imaging, which is most consistent with artifact EF 71% Duke Treadmill score is -2intermediate risk.  History of normal Holter exam 02/22/2021    CAM 6 days 19 hrs Normal sinus. AT 2 episodes Longest/fastest 9 beats at ave 127 bpm p to 153 bpm PAC 0.08% PVC 0.07%    Hypertension     Hypothyroidism     Thyroid disease     Tilt table evaluation 03/22/2021    Negative study Although sensitivity of study certainly is not 670% relatively normal results signifcantly decrease the likelihood of neurocardiogenic source for pt symptoms.  Clinically suspision remains high possible repeat study maybe indicated       SURGICAL HISTORY:   Past Surgical History:   Procedure Laterality Date    ANTERIOR CRUCIATE LIGAMENT REPAIR Left     HYSTERECTOMY, VAGINAL      07/2020 robotic, lap supracervical hyst and BS, perineopexy, A&P repair, cysto, Lynx retropubic slin    INFECTED SKIN DEBRIDEMENT Left     Left third finger       FAMILY HISTORY:   Family History   Adopted: Yes       SOCIAL HISTORY:   Social History     Tobacco Use    Smoking status: Never Smoker    Smokeless tobacco: Never Used   Substance Use Topics    Alcohol use: No     Alcohol/week: 0.0 standard drinks     Comment: rarely    Drug use: No     Prior to Admission medications    Medication Sig Start Date End Date Taking?  Authorizing Provider   triamterene-hydroCHLOROthiazide (DYAZIDE) 37.5-25 MG per capsule TAKE 1 CAPSULE BY MOUTH ONCE DAILY 5/13/21  Yes Grazyna Kumar MD   ketorolac (TORADOL) 10 MG tablet Take 10 mg by mouth every 6 hours as needed 4/13/21   Historical Provider, MD   metoprolol succinate (TOPROL XL) 50 MG extended release tablet Take 1 tablet by mouth daily 3/30/21   Yahaira Ignacio MD   buPROPion (WELLBUTRIN XL) 300 MG extended release tablet TAKE 1 TABLET BY MOUTH ONCE DAILY 2/24/21   Historical Provider, MD   topiramate (TOPAMAX) 25 MG tablet 100 mg 2 times daily  3/11/21   Historical Provider, MD   prochlorperazine (COMPAZINE) 5 MG tablet Take 5 mg by mouth every 6 hours as needed 3/15/21   Historical Provider, MD   Ubrogepant (UBRELVY) 100 MG TABS Take 100 mg by mouth as needed  3/16/21   Historical Provider, MD   ferrous sulfate (IRON 325) 325 (65 Fe) MG tablet Take 1 tablet by mouth daily     Historical Provider, MD   Cholecalciferol 50 MCG (2000 UT) CAPS Take 1 capsule by mouth daily     Historical Provider, MD   Turmeric (QC TUMERIC COMPLEX PO) Take 1 capsule by mouth daily     Historical Provider, MD   Garlic 10 MG CAPS Take 1 capsule by mouth daily     Historical Provider, MD   levothyroxine (EUTHYROX) 88 MCG tablet Take 1 tablet by mouth once daily 2/18/21   Grazyna Kumar MD   omeprazole (PRILOSEC) 40 MG delayed release capsule Take 1 capsule by mouth daily  Patient not taking: Reported on 5/13/2021 2/2/21   Grazyna Kumar MD   buPROPion (WELLBUTRIN XL) 150 MG extended release tablet Take 150 mg by mouth daily  1/21/21   Historical Provider, MD   albuterol sulfate HFA (VENTOLIN HFA) 108 (90 Base) MCG/ACT inhaler Inhale 2 puffs into the lungs 4 times daily as needed for Wheezing 12/19/20   Madison Gilbert MD   traZODone (DESYREL) 50 MG tablet Take 50 mg by mouth nightly  1/10/20   Historical Provider, MD   escitalopram (LEXAPRO) 10 MG tablet TAKE 1 & 1/2 (ONE & ONE-HALF) TABLETS BY MOUTH ONCE DAILY  Patient taking differently: 20 mg  9/23/19   Lamin Chase MD       Review of Systems:  Constitutional: negative for fevers or chills  Eyes: negative for visual disturbance   ENT: negative for sore throat or nasal congestion  Respiratory: positive for shortness of breath,neg cough  Cardiovascular: negative for chest pain ,palpitations,pnd,syncope,has bilat leg edema  Gastrointestinal: negative for abd pain, nausea, vomiting, diarrhea , constipation,hemetemesis,armand,blood in stool  Genitourinary: negative for dysuria, urgency ,frequency,hematuria  Integument/breast: negative for skin rash or lesions  Neurological: negative for unilateral weakness, numbness or tingling. Skeletal Muscular: no joint pain,jont swelling,back pain    Subjective:  Vitals:    05/13/21 1017   BP: (!) 140/78   Pulse: 76   Resp: 16         Exam:  GEN:   A & O x3, no apparent distress  EYES: No gross abnormalities. ENT:ENT exam normal, no neck nodes or sinus tenderness  NECK: normal, supple, no lymphadenopathy,  no carotid bruits  PULM: clear to auscultation bilaterally- no wheezes, rales or rhonchi, normal air movement, no respiratory distress  COR: regular rate & rhythm, no murmurs and no gallops, has bilat leg edema  ABD:  soft, non-tender, non-distended, normal bowel sounds, no masses or organomegaly  : deferred  EXT: Extremities: + 2 pedal pulses, no edema or calf tenderness, and warm to touch. Normal nails without lesions  NEURO: Motor and sensory grossly intact  SKIN:  No skin lesions or rashes      Assessment:  1. Shortness of breath - probably due to fluid retention,resume dyazide and mintie electrolytes   2. Essential hypertension -start dyazide    3. Orthostatic hypotension - to wear pressure stockings all the time   4. Hypothyroidism, unspecified type -chesk tsh   5. COVID-19 - continues to have various symptoms,check acute phase ractants     Labs reviewed: none  Other tests reviewed: tsh,srp ,ferritin  Socioeconomic determinants : has difficulty getting work done    Plan:  Orders Placed This Encounter   Procedures    TSH With Reflex Ft4     Standing Status:   Future     Standing Expiration Date:   5/13/2022    C-Reactive Protein     Standing Status:   Future     Standing Expiration Date:   5/13/2022    Ferritin     Standing Status:   Future     Standing Expiration Date:   5/13/2022     Return in about 2 weeks (around 5/27/2021).    Orders Placed This Encounter   Medications    triamterene-hydroCHLOROthiazide (DYAZIDE) 37.5-25 MG per capsule     Sig: TAKE 1 CAPSULE BY MOUTH ONCE DAILY     Dispense:  90 capsule     Refill:  0     Medication directions and side effects discussed      Electronically signed by Amos Warren MD on 5/13/2021 at 10:41 AM         Amos Warren MD

## 2021-05-14 ENCOUNTER — TELEPHONE (OUTPATIENT)
Dept: PRIMARY CARE CLINIC | Age: 51
End: 2021-05-14

## 2021-05-14 DIAGNOSIS — E03.9 HYPOTHYROIDISM, UNSPECIFIED TYPE: Primary | ICD-10-CM

## 2021-05-14 RX ORDER — LEVOTHYROXINE SODIUM 0.1 MG/1
100 TABLET ORAL DAILY
Qty: 90 TABLET | Refills: 0 | Status: SHIPPED | OUTPATIENT
Start: 2021-05-14 | End: 2021-08-25

## 2021-05-15 NOTE — TELEPHONE ENCOUNTER
Please let patient know that we could try to go up to 75 mg daily on her Toprol XL if she would like. Will discuss at next visit. Thanks.

## 2021-05-19 ENCOUNTER — OFFICE VISIT (OUTPATIENT)
Dept: CARDIOLOGY | Age: 51
End: 2021-05-19
Payer: COMMERCIAL

## 2021-05-19 VITALS
SYSTOLIC BLOOD PRESSURE: 149 MMHG | RESPIRATION RATE: 18 BRPM | BODY MASS INDEX: 35.52 KG/M2 | DIASTOLIC BLOOD PRESSURE: 84 MMHG | WEIGHT: 221 LBS | HEIGHT: 66 IN | HEART RATE: 77 BPM

## 2021-05-19 DIAGNOSIS — R07.89 ATYPICAL CHEST PAIN: Primary | ICD-10-CM

## 2021-05-19 DIAGNOSIS — R00.2 PALPITATIONS: ICD-10-CM

## 2021-05-19 DIAGNOSIS — I10 ESSENTIAL HYPERTENSION: ICD-10-CM

## 2021-05-19 PROCEDURE — 1036F TOBACCO NON-USER: CPT | Performed by: FAMILY MEDICINE

## 2021-05-19 PROCEDURE — 3017F COLORECTAL CA SCREEN DOC REV: CPT | Performed by: FAMILY MEDICINE

## 2021-05-19 PROCEDURE — 99214 OFFICE O/P EST MOD 30 MIN: CPT | Performed by: FAMILY MEDICINE

## 2021-05-19 PROCEDURE — G8427 DOCREV CUR MEDS BY ELIG CLIN: HCPCS | Performed by: FAMILY MEDICINE

## 2021-05-19 PROCEDURE — G8417 CALC BMI ABV UP PARAM F/U: HCPCS | Performed by: FAMILY MEDICINE

## 2021-05-19 RX ORDER — DILTIAZEM HYDROCHLORIDE 120 MG/1
120 CAPSULE, COATED, EXTENDED RELEASE ORAL DAILY
Qty: 30 CAPSULE | Refills: 3 | Status: SHIPPED | OUTPATIENT
Start: 2021-05-19 | End: 2021-06-03

## 2021-05-19 NOTE — PROGRESS NOTES
Riley MCKEON am scribing for and in the presence of Samson Little MD, MS, F.A.C.C. Patient: Matthew Landa  : ICNUTWV Care Physician: Grazyna Kumar  Today's Date: 2021    REASON FOR VISIT: Follow-up (Patient here for follow up after cath on . Weight gain of 12 lbs. Since cath, patient has had chest pain which she describes as a sharp, stabbing pain after which she felt hot. Lasted for a few minutes. Happens a couple times a day. SOB on exertion. Patient feels like she's having palpitations daily. Was dizzy this week and fell. No LOC. Dizziness daily. Bilateral foot, ankle and hand swelling. Hx of orthostatic hypotension, SOB, abnormal EKG, chest pain, palpitations, abnormal stress. )    Dear Grazyna Kumar MD,    HPI: Ms. Deja Manzo is a 49 y.o. female who was admitted to the hospital with lightheaded and dizziness. She also had been feeling nauseated and having migraine that started last night. She denied any previous heart related history. She is adopted so she was unaware of any family history. She does drink 1-2 cups off coffee daily. Her echocardiogram on 2021 was fairly unremarkable and showed an ejection fraction of >60%. Her CAM monitor was done on 2021 she wore 6 days, 19 hours showed one epos ode of atrial tachycardia with symptoms. She had treadmill stress test done on 3/8/2021 her  Duke treadmill score is 0 which is intermediate risk. Ms. Deja Manzo recently had Negative Tilt Table test on 3/22/21 and an Equivocal stress test. Patient heart catheterization on 21 was largely normal.     Ms. Deja Manzo is here today for a  follow up and continues to have intermittent palpitations which she thinks are worse since going up on Toprol XL. She has them several times a day. They can be moderate in intensity and she has intermittent dizziness with them as well as other times when standing. She did have one fall due to dizziness. No LOC.   She reports a new episode recently in which she developed a stabbing chest pain that lasted a couple of minutes, she felt palpitations as well as hot and sweaty. She does stay very well hydrated. Her breathing on exertion is worse with moderate exertion She says her palpitations are actually a bit worse though with the Toprol XL. Also has bilateral foot and hand edema. She denied any abdominal pain, bleeding problems, problems with her medications or any other concerns at this time. She also denied any fever or chills. Past Medical History:   Diagnosis Date    Anemia, unspecified     Anxiety     Edema     H/O cardiovascular stress test 03/08/2021    [place] treadmill score is 0    H/O echocardiogram 01/26/2021    EF >60% Normal study    History of cardiac cath 04/01/2021    [facility]) Nia/Dr. Little/Right Radial     History of cardiovascular stress test 03/22/2021    Equivocal study. Small perfusion defect of mild intensity in the anterior and anteroseptal regions during stress and rest imaging, which is most consistent with artifact EF 71% Duke Treadmill score is -2intermediate risk.  History of normal Holter exam 02/22/2021    CAM 6 days 19 hrs Normal sinus. AT 2 episodes Longest/fastest 9 beats at ave 127 bpm p to 153 bpm PAC 0.08% PVC 0.07%    Hypertension     Hypothyroidism     Thyroid disease     Tilt table evaluation 03/22/2021    Negative study Although sensitivity of study certainly is not 991% relatively normal results signifcantly decrease the likelihood of neurocardiogenic source for pt symptoms. Clinically suspision remains high possible repeat study maybe indicated       CURRENT ALLERGIES: Rocephin [ceftriaxone] REVIEW OF SYSTEMS: 14 systems were reviewed. Pertinent positives and negatives as above, all else negative.      Past Surgical History:   Procedure Laterality Date    ANTERIOR CRUCIATE LIGAMENT REPAIR Left     HYSTERECTOMY, VAGINAL      07/2020 robotic, lap supracervical hyst and BS, inhaler Inhale 2 puffs into the lungs 4 times daily as needed for Wheezing 12/19/20  Yes Jess Toure MD   traZODone (DESYREL) 50 MG tablet Take 50 mg by mouth nightly  1/10/20  Yes Historical Provider, MD   escitalopram (LEXAPRO) 10 MG tablet TAKE 1 & 1/2 (ONE & ONE-HALF) TABLETS BY MOUTH ONCE DAILY  Patient taking differently: 20 mg  9/23/19  Yes [address], MD   triamterene-hydroCHLOROthiazide (Lorna Mt) 37.5-25 MG per capsule TAKE 1 CAPSULE BY MOUTH ONCE DAILY  Patient not taking: Reported on 5/19/2021 5/13/21   [address], MD   omeprazole (PRILOSEC) 40 MG delayed release capsule Take 1 capsule by mouth daily  Patient not taking: Reported on 5/13/2021 2/2/21   [address], MD       FAMILY HISTORY: family history is not on file. She was adopted. PHYSICAL EXAM:   BP (!) 149/84 (Site: Right Upper Arm, Position: Sitting, Cuff Size: Large Adult)   Pulse 77   Resp 18   Ht 5' 6\" (1.676 m)   Wt 221 lb (100.2 kg)   LMP 07/15/2020 (Approximate)   BMI 35.67 kg/m²  Body mass index is 35.67 kg/m². Constitutional: She is oriented to person, place, and time. She appears well-developed and well-nourished. In no acute distress. HEENT: Normocephalic and atraumatic. No JVD present. Carotid bruit is not present. No mass and no thyromegaly present. No lymphadenopathy present. Cardiovascular: Normal rate, regular rhythm, normal heart sounds. Exam reveals no gallop and no friction rubs. 1/6 systolic murmur, 5th intercostal space on the LEFT in the mid-clavicular line (cardiac apex). Pulmonary/Chest: Effort normal and breath sounds normal. No respiratory distress. She has no wheezes, rhonchi or rales. Abdominal: Soft, non-tender. Bowel sounds and aorta are normal. She exhibits no organomegaly, mass or bruit. Extremities: Trace lower extremity edema. No cyanosis and no clubbing. Pulses are 2+ radial and carotid pulses. Neurological: She is alert and oriented to person, place, and time.  No evidence of gross cranial nerve deficit. Coordination appeared normal.   Skin: Skin is warm and dry. There is no rash or diaphoresis. Psychiatric: She has a normal mood and affect. Her speech is normal and behavior is normal.      MOST RECENT LABS ON RECORD:   Lab Results   Component Value Date    WBC 5.4 03/30/2021    HGB 14.2 03/30/2021    HCT 43.6 03/30/2021     03/30/2021    CHOL 185 04/22/2020    TRIG 79 04/22/2020    HDL 48 04/22/2020    ALT 12 01/25/2021    AST 25 01/25/2021     05/13/2021    K 4.8 05/13/2021     (H) 05/13/2021    CREATININE 0.77 05/13/2021    BUN 13 05/13/2021    CO2 28 05/13/2021    TSH 4.01 05/13/2021       ASSESSMENT:  Patient Active Problem List    Diagnosis Date Noted    Abnormal cardiovascular stress test 04/01/2021    Orthostatic hypotension     Diarrhea     Recurrent falls while walking     Dizziness 01/26/2021    Persistent vomiting 01/25/2021    Uncontrollable vomiting 01/25/2021    Intractable nausea and vomiting 01/25/2021    Lab test positive for detection of COVID-19 virus 12/20/2020    Shortness of breath 12/20/2020    Myalgia 12/20/2020    Iron deficiency anemia 02/15/2019    Moderate single current episode of major depressive disorder ([facility] [address] 03/21/2018    Idiopathic gout 04/20/2016    Anxiety state 04/20/2016    Essential hypertension 05/06/2015    Hypothyroidism 05/06/2015       Diagnosis Orders   1. Atypical chest pain  dilTIAZem (CARDIZEM CD) 120 MG extended release capsule    Referral to Cardiac Cath   2. Palpitations  dilTIAZem (CARDIZEM CD) 120 MG extended release capsule    Referral to Cardiac Cath   3. Essential hypertension         PLAN:    · Atypical Chest Pain: mild to moderate. Grossly normal stress test on 4/1/21. Suspect that this is non cardiac but could be some kind of an paroxysmal supraventricular tachycardia   · Beta Blocker: Continue Metoprolol succinate (Toprol XL) 50 mg daily.    · Calcium Channel Blocker: START diltiazem CD (Cardizem CD) 120 mg once daily. I also discussed the potential side effects of this medication including lightheadedness and dizziness and instructed them to stop the medication of this occurs and call our office if this occurs. · Counseling: I advised Ms. Boone to call our office or go to the emergency room if she develops worsening or persistent chest pain or shortness of breath as this could be life threatening.   Recurrent intermittent palpitations: moderate with a recent episode of possible paroxysmal supraventricular tachycardia   · Beta Blocker: Continue Metoprolol succinate (Toprol XL) 50 mg daily. · Calcium Channel Blocker: START diltiazem CD (Cardizem CD) 120 mg once daily. · Discussed heart cath results and possible treatment for palpitations. Also reviewed CAM patient had done in February which was unremarkable. I answered any questions she had. · Additional Testing List: Additional Testing List: We discussed multiple testing and treatment options including a watch and wait approach, starting medication such as a beta blocker or calcium channel blocker in order to potentially decrease frequency and/or severity of symptoms, doing a 30 day event monitor, or the possibility of placing a Medtronic LINQ loop recorder. After discussing the risks and benefits of the different options, The patient said that they would prefer to proceed with placement of a Medtronic LINQ loop recorder here at [facility] which I think is very reasonable. Once again, thank you for allowing me to participate in this patients care. Please do not hesitate to contact me if I could be of any further assistance. I told Ms. Boone to call my office if she had any problems, but otherwise told her to Return in about 4 weeks (around 6/16/2021). However, I would be happy to see her sooner should the need arise. Sincerely,  Marzena Bill. Sidney ARAGON, MS, F.A.C.C.   [address] Cardiology Specialist    [address] [place] [address] Omid Garcia [address] [facility]  Phone: [phone], Fax: [phone]     I believe that the risk of significant morbidity and mortality related to the patient's current medical conditions are: Intermediate. The documentation recorded by the scribe, accurately and completely reflects the services I personally performed and the decisions made by me. Ally Bear MD, MS, F.A.C.C.  May 19, 2021

## 2021-05-19 NOTE — PATIENT INSTRUCTIONS
SURVEY:    You may be receiving a survey from [employer] regarding your visit today. Please complete the survey to enable us to provide the highest quality of care to you and your family. If you cannot score us a very good on any question, please call the office to discuss how we could have made your experience a very good one. Thank you. Schedule a Vaccine  When you qualify to receive the vaccine per the [address] guidelines, call the [facility]) COVID-19 Vaccination Hotline to schedule your appointment or to get additional information about the [facility]) locations which are offering the COVID-19 vaccine. To be most effective, it's important that you receive two doses of one of the COVID-19 vaccines. -If you are receiving the [place] vaccine, your second shot will be scheduled as close to 21 days after the first shot as possible. -If you are receiving the Moderna vaccine, your second shot will be scheduled as close to 28 days after the first shot as possible. [address] Vaccination Hotline: [phone]    In partnership with [facility] and [facility] HEALTH Departments, patients can call [phone], Monday-Friday 8:00am-4:00pm for scheduling at our Hospitals. Or visit the [facility] websites for additional information of vaccine administration locations. Links to [facility]) website and Health Department websites:    [employer]/[facility]-monitoring-coronavirus-covid-19/covid-19-vaccine/[place]-vaccine    [facility].tn    [email or web address]

## 2021-05-20 ENCOUNTER — HOSPITAL ENCOUNTER (OUTPATIENT)
Dept: CARDIAC CATH/INVASIVE PROCEDURES | Age: 51
Discharge: HOME | End: 2021-05-20
Attending: FAMILY MEDICINE | Admitting: FAMILY MEDICINE
Payer: COMMERCIAL

## 2021-05-20 VITALS
RESPIRATION RATE: 19 BRPM | BODY MASS INDEX: 35.52 KG/M2 | OXYGEN SATURATION: 100 % | SYSTOLIC BLOOD PRESSURE: 130 MMHG | HEIGHT: 66 IN | WEIGHT: 221 LBS | HEART RATE: 78 BPM | DIASTOLIC BLOOD PRESSURE: 55 MMHG | TEMPERATURE: 97.5 F

## 2021-05-20 PROBLEM — I47.1 PSVT (PAROXYSMAL SUPRAVENTRICULAR TACHYCARDIA) (HCC): Status: ACTIVE | Noted: 2021-05-20

## 2021-05-20 PROBLEM — R55 RECURRENT SYNCOPE: Status: ACTIVE | Noted: 2021-05-20

## 2021-05-20 PROBLEM — I47.10 PSVT (PAROXYSMAL SUPRAVENTRICULAR TACHYCARDIA): Status: ACTIVE | Noted: 2021-05-20

## 2021-05-20 PROCEDURE — 33285 INSJ SUBQ CAR RHYTHM MNTR: CPT | Performed by: FAMILY MEDICINE

## 2021-05-20 PROCEDURE — C1764 EVENT RECORDER, CARDIAC: HCPCS

## 2021-05-20 PROCEDURE — 2709999900 HC NON-CHARGEABLE SUPPLY

## 2021-05-20 RX ORDER — SODIUM CHLORIDE 9 MG/ML
25 INJECTION, SOLUTION INTRAVENOUS PRN
Status: DISCONTINUED | OUTPATIENT
Start: 2021-05-20 | End: 2021-05-20 | Stop reason: HOSPADM

## 2021-05-20 RX ORDER — SODIUM CHLORIDE 0.9 % (FLUSH) 0.9 %
10 SYRINGE (ML) INJECTION PRN
Status: DISCONTINUED | OUTPATIENT
Start: 2021-05-20 | End: 2021-05-20 | Stop reason: HOSPADM

## 2021-05-20 RX ORDER — SODIUM CHLORIDE 0.9 % (FLUSH) 0.9 %
10 SYRINGE (ML) INJECTION EVERY 12 HOURS SCHEDULED
Status: DISCONTINUED | OUTPATIENT
Start: 2021-05-20 | End: 2021-05-20 | Stop reason: HOSPADM

## 2021-05-20 RX ORDER — ACETAMINOPHEN 325 MG/1
650 TABLET ORAL EVERY 4 HOURS PRN
Status: DISCONTINUED | OUTPATIENT
Start: 2021-05-20 | End: 2021-05-20 | Stop reason: HOSPADM

## 2021-05-20 NOTE — PROGRESS NOTES
Patient returned to post procedure area. Report given to staff. Padded tegaderm to left upper chest insertion site remains CDI. Patient denies pain. Vitals obtained.

## 2021-05-20 NOTE — PROGRESS NOTES
Discharge instructions reviewed with patient who reports understanding of all. Patient dressed self. Discharged home with belongings.

## 2021-05-20 NOTE — OP NOTE
OPERATIVE REPORT    Patient: Anastasiya Gonzalez   YOB: 1970       Attending: Ike Landaverde M.D. Date of Surgery: 5/20/2021   PCP Favio Dominguez MD      SURGEON:  Ike Landaverde M.D. PROCEDURE:  Medtronic LINQ loop recorder placement    INDICATION:  Recurrent syncope       NARRATIVE SUMMARY:  Ms. Galina Mejia was brought to the pre-operative waiting room and after explaining the risks, benefits and alternatives of the procedure and informed written consent was obtained. The patient was prepped and draped in the standard surgical fashion. After adequate sedation Marcaine was used to anesthetize the area over the planned loop recorder placement. An incision was made and once an adequate sized pocket was formed using a dilator. The device was placed subcutaneously in a 10 and 4 O'clock position with the device lettering facing up. Finally, the pocket was closed with demabond. Overall the patient tolerated the procedure well and there were no complications. Blood loss was minimal.    IMPLANTED DEVICE DETAIL:  Medtronic REVEAL LINQ loop recorder, [id number], serial number [id number]. Estimated Blood Loss: Less than 2 ml. Leigh Little MD, MS, F.A.C.C.   [facility]   [address] [facility]  Phone: [phone], Fax: [phone]

## 2021-05-26 ENCOUNTER — OFFICE VISIT (OUTPATIENT)
Dept: CARDIOLOGY | Age: 51
End: 2021-05-26
Payer: COMMERCIAL

## 2021-05-26 DIAGNOSIS — Z51.89 VISIT FOR WOUND CHECK: Primary | ICD-10-CM

## 2021-05-26 PROCEDURE — 99024 POSTOP FOLLOW-UP VISIT: CPT | Performed by: FAMILY MEDICINE

## 2021-05-26 NOTE — PROGRESS NOTES
Patient was seen in office today for wound check s/p Linq insertion. Patient states area is tender. Area is clean & dry. There is dried blood over site. No signs of redness, warmth or infection. I advised patient to leave area open to air and allow water to wash over wound. Do not scrub area. Patient expressed understanding.

## 2021-05-27 ENCOUNTER — TELEPHONE (OUTPATIENT)
Dept: PRIMARY CARE CLINIC | Age: 51
End: 2021-05-27

## 2021-05-27 ENCOUNTER — HOSPITAL ENCOUNTER (OUTPATIENT)
Age: 51
Discharge: HOME | End: 2021-05-27
Payer: COMMERCIAL

## 2021-05-27 ENCOUNTER — OFFICE VISIT (OUTPATIENT)
Dept: PRIMARY CARE CLINIC | Age: 51
End: 2021-05-27
Payer: COMMERCIAL

## 2021-05-27 VITALS
HEART RATE: 84 BPM | BODY MASS INDEX: 35.44 KG/M2 | SYSTOLIC BLOOD PRESSURE: 130 MMHG | WEIGHT: 219.6 LBS | RESPIRATION RATE: 18 BRPM | DIASTOLIC BLOOD PRESSURE: 68 MMHG

## 2021-05-27 DIAGNOSIS — U07.1 COVID-19 VIRUS INFECTION: ICD-10-CM

## 2021-05-27 DIAGNOSIS — M10.9 ACUTE GOUTY ARTHRITIS: ICD-10-CM

## 2021-05-27 DIAGNOSIS — R06.02 SHORTNESS OF BREATH: Primary | ICD-10-CM

## 2021-05-27 DIAGNOSIS — E03.9 HYPOTHYROIDISM, UNSPECIFIED TYPE: ICD-10-CM

## 2021-05-27 DIAGNOSIS — R06.02 SHORTNESS OF BREATH: ICD-10-CM

## 2021-05-27 LAB
SARS-COV-2 ANTIBODY, TOTAL: POSITIVE
URIC ACID: 4.7 MG/DL (ref 2.4–5.7)

## 2021-05-27 PROCEDURE — G8427 DOCREV CUR MEDS BY ELIG CLIN: HCPCS | Performed by: FAMILY MEDICINE

## 2021-05-27 PROCEDURE — G8417 CALC BMI ABV UP PARAM F/U: HCPCS | Performed by: FAMILY MEDICINE

## 2021-05-27 PROCEDURE — 84550 ASSAY OF BLOOD/URIC ACID: CPT

## 2021-05-27 PROCEDURE — 99214 OFFICE O/P EST MOD 30 MIN: CPT | Performed by: FAMILY MEDICINE

## 2021-05-27 PROCEDURE — 86769 SARS-COV-2 COVID-19 ANTIBODY: CPT

## 2021-05-27 PROCEDURE — 1036F TOBACCO NON-USER: CPT | Performed by: FAMILY MEDICINE

## 2021-05-27 PROCEDURE — 36415 COLL VENOUS BLD VENIPUNCTURE: CPT

## 2021-05-27 PROCEDURE — 3017F COLORECTAL CA SCREEN DOC REV: CPT | Performed by: FAMILY MEDICINE

## 2021-05-27 RX ORDER — ALLOPURINOL 100 MG/1
100 TABLET ORAL DAILY
Qty: 90 TABLET | Refills: 1 | Status: SHIPPED | OUTPATIENT
Start: 2021-05-27 | End: 2021-12-10

## 2021-05-27 RX ORDER — PREDNISONE 20 MG/1
20 TABLET ORAL 2 TIMES DAILY
Qty: 10 TABLET | Refills: 0 | Status: SHIPPED | OUTPATIENT
Start: 2021-05-27 | End: 2021-06-01

## 2021-05-27 RX ORDER — COLCHICINE 0.6 MG/1
0.6 TABLET ORAL 2 TIMES DAILY
Qty: 30 TABLET | Refills: 3 | Status: SHIPPED | OUTPATIENT
Start: 2021-05-27 | End: 2021-05-27

## 2021-05-27 SDOH — ECONOMIC STABILITY: FOOD INSECURITY: WITHIN THE PAST 12 MONTHS, THE FOOD YOU BOUGHT JUST DIDN'T LAST AND YOU DIDN'T HAVE MONEY TO GET MORE.: NEVER TRUE

## 2021-05-27 SDOH — ECONOMIC STABILITY: TRANSPORTATION INSECURITY
IN THE PAST 12 MONTHS, HAS LACK OF TRANSPORTATION KEPT YOU FROM MEETINGS, WORK, OR FROM GETTING THINGS NEEDED FOR DAILY LIVING?: NO

## 2021-05-27 ASSESSMENT — SOCIAL DETERMINANTS OF HEALTH (SDOH): HOW HARD IS IT FOR YOU TO PAY FOR THE VERY BASICS LIKE FOOD, HOUSING, MEDICAL CARE, AND HEATING?: NOT HARD AT ALL

## 2021-05-27 NOTE — TELEPHONE ENCOUNTER
Patient informed that insurance will not cover Colcrys, so we are sending a new RX to [facility].

## 2021-05-27 NOTE — PROGRESS NOTES
Patient is here for a two week follow-up on shortness of breath, foot and leg swelling, dizziness, and joint pain. She said that she is still having all of the above. She said no improvements in the past two weeks. She said that her legs don't seem too swollen today but she also hasn't done much today. Patient states that she is having a little chest discomfort as well today. Patient would also like to talk to the doctor about gout, she is unsure if it is back or not. Has been back to work ,does 12 hour shifts 3 days a wk and tolerating well. CURRENT ALLERGIES: Rocephin [ceftriaxone]    PAST MEDICAL HISTORY:   Past Medical History:   Diagnosis Date    Anemia, unspecified     Anxiety     Edema     H/O cardiovascular stress test 03/08/2021    [place] treadmill score is 0    H/O echocardiogram 01/26/2021    EF >60% Normal study    History of cardiac cath 04/01/2021    [facility]) Nia/Dr. Little/Right Radial     History of cardiovascular stress test 03/22/2021    Equivocal study. Small perfusion defect of mild intensity in the anterior and anteroseptal regions during stress and rest imaging, which is most consistent with artifact EF 71% Duke Treadmill score is -2intermediate risk.  History of normal Holter exam 02/22/2021    CAM 6 days 19 hrs Normal sinus. AT 2 episodes Longest/fastest 9 beats at ave 127 bpm p to 153 bpm PAC 0.08% PVC 0.07%    Hypertension     Hypothyroidism     Thyroid disease     Tilt table evaluation 03/22/2021    Negative study Although sensitivity of study certainly is not 357% relatively normal results signifcantly decrease the likelihood of neurocardiogenic source for pt symptoms.  Clinically suspision remains high possible repeat study maybe indicated       SURGICAL HISTORY:   Past Surgical History:   Procedure Laterality Date    ANTERIOR CRUCIATE LIGAMENT REPAIR Left     HYSTERECTOMY, VAGINAL      07/2020 robotic, lap supracervical hyst and BS, perineopexy, A&P repair, CAPS Take 1 capsule by mouth daily     Historical Provider, MD   buPROPion (WELLBUTRIN XL) 150 MG extended release tablet Take 150 mg by mouth daily  1/21/21   Historical Provider, MD   albuterol sulfate HFA (VENTOLIN HFA) 108 (90 Base) MCG/ACT inhaler Inhale 2 puffs into the lungs 4 times daily as needed for Wheezing 12/19/20   Meghan Adames MD   traZODone (DESYREL) 50 MG tablet Take 50 mg by mouth nightly  1/10/20   Historical Provider, MD   escitalopram (LEXAPRO) 10 MG tablet TAKE 1 & 1/2 (ONE & ONE-HALF) TABLETS BY MOUTH ONCE DAILY  Patient taking differently: 20 mg  9/23/19   Aren King MD       Review of Systems:  Constitutional: negative for fevers or chills  Eyes: negative for visual disturbance   ENT: negative for sore throat or nasal congestion  Respiratory: negative for shortness of breath or cough  Cardiovascular: negative for chest pain , continues to have palpitations,no pnd,syncope  Gastrointestinal: negative for abd pain, nausea, vomiting, diarrhea , constipation,hemetemesis,armand,blood in stool  Genitourinary: negative for dysuria, urgency ,frequency,hematuria  Integument/breast: negative for skin rash or lesions  Neurological: negative for unilateral weakness, numbness or tingling. Skeletal Muscular: has rt foot great toe  Pain,swelling,neg for back pain    Subjective:  Vitals:    05/27/21 0848   BP: 130/68   Pulse: 84   Resp: 18         Exam:  GEN:   A & O x3, no apparent distress  EYES: No gross abnormalities. ENT:ENT exam normal, no neck nodes or sinus tenderness  NECK: normal, supple, no lymphadenopathy,  no carotid bruits  PULM: clear to auscultation bilaterally- no wheezes, rales or rhonchi, normal air movement, no respiratory distress  COR: regular rate & rhythm, no murmurs and no gallops  ABD:  soft, non-tender, non-distended, normal bowel sounds, no masses or organomegaly  : deferred  EXT: Extremities: + 2 pedal pulses, no edema or calf tenderness, and warm to touch. Normal nails without lesions  Skeletomuscular:  NEURO: Motor and sensory grossly intact  SKIN:  No skin lesions or rashes      Assessment:  1. Shortness of breath    2. Hypothyroidism, unspecified type    3. COVID-19 virus infection    4. Acute gouty arthritis      Labs reviewed: none  Other tests reviewed: none  Labs ordered uric acid  Socioeconomic determinants : has been back to work with restricted hours and tolerating ok    Plan:  No orders of the defined types were placed in this encounter. No follow-ups on file.    Orders Placed This Encounter   Medications    colchicine (COLCRYS) 0.6 MG tablet     Sig: Take 1 tablet by mouth 2 times daily     Dispense:  30 tablet     Refill:  3    allopurinol (ZYLOPRIM) 100 MG tablet     Sig: Take 1 tablet by mouth daily     Dispense:  90 tablet     Refill:  1   add colchicin,allopurinol  Medication directions and side effects discussed  Filled out her disability papers    Electronically signed by Sandra Pierson MD on 5/27/2021 at 9:19 AM         Sandra Pierson MD, MD

## 2021-05-28 ENCOUNTER — APPOINTMENT (OUTPATIENT)
Dept: GENERAL RADIOLOGY | Age: 51
End: 2021-05-28
Payer: COMMERCIAL

## 2021-05-28 ENCOUNTER — HOSPITAL ENCOUNTER (EMERGENCY)
Age: 51
Discharge: HOME | End: 2021-05-29
Attending: EMERGENCY MEDICINE
Payer: COMMERCIAL

## 2021-05-28 ENCOUNTER — TELEPHONE (OUTPATIENT)
Dept: CARDIOLOGY | Age: 51
End: 2021-05-28

## 2021-05-28 ENCOUNTER — APPOINTMENT (OUTPATIENT)
Dept: CT IMAGING | Age: 51
End: 2021-05-28
Payer: COMMERCIAL

## 2021-05-28 VITALS
OXYGEN SATURATION: 99 % | RESPIRATION RATE: 17 BRPM | HEART RATE: 82 BPM | BODY MASS INDEX: 35.67 KG/M2 | SYSTOLIC BLOOD PRESSURE: 135 MMHG | WEIGHT: 221 LBS | DIASTOLIC BLOOD PRESSURE: 75 MMHG | TEMPERATURE: 96 F

## 2021-05-28 DIAGNOSIS — R07.89 ATYPICAL CHEST PAIN: Primary | ICD-10-CM

## 2021-05-28 DIAGNOSIS — I10 ESSENTIAL HYPERTENSION: ICD-10-CM

## 2021-05-28 LAB
ABSOLUTE EOS #: 0.11 K/UL (ref 0–0.44)
ABSOLUTE IMMATURE GRANULOCYTE: <0.03 K/UL (ref 0–0.3)
ABSOLUTE LYMPH #: 1.84 K/UL (ref 1.1–3.7)
ABSOLUTE MONO #: 0.41 K/UL (ref 0.1–1.2)
ALBUMIN SERPL-MCNC: 4.2 G/DL (ref 3.5–5.2)
ALBUMIN/GLOBULIN RATIO: 1.6 (ref 1–2.5)
ALP BLD-CCNC: 44 U/L (ref 35–104)
ALT SERPL-CCNC: 15 U/L (ref 5–33)
ANION GAP SERPL CALCULATED.3IONS-SCNC: 12 MMOL/L (ref 9–17)
AST SERPL-CCNC: 23 U/L
BASOPHILS # BLD: 1 % (ref 0–2)
BASOPHILS ABSOLUTE: 0.06 K/UL (ref 0–0.2)
BILIRUB SERPL-MCNC: 0.27 MG/DL (ref 0.3–1.2)
BNP INTERPRETATION: NORMAL
BUN BLDV-MCNC: 13 MG/DL (ref 6–20)
BUN/CREAT BLD: 20 (ref 9–20)
CALCIUM SERPL-MCNC: 9.4 MG/DL (ref 8.6–10.4)
CHLORIDE BLD-SCNC: 102 MMOL/L (ref 98–107)
CO2: 24 MMOL/L (ref 20–31)
CREAT SERPL-MCNC: 0.65 MG/DL (ref 0.5–0.9)
D-DIMER QUANTITATIVE: <0.27 MG/L FEU (ref 0–0.59)
DIFFERENTIAL TYPE: NORMAL
EOSINOPHILS RELATIVE PERCENT: 2 % (ref 1–4)
GFR AFRICAN AMERICAN: >60 ML/MIN
GFR NON-AFRICAN AMERICAN: >60 ML/MIN
GFR SERPL CREATININE-BSD FRML MDRD: ABNORMAL ML/MIN/{1.73_M2}
GFR SERPL CREATININE-BSD FRML MDRD: ABNORMAL ML/MIN/{1.73_M2}
GLUCOSE BLD-MCNC: 96 MG/DL (ref 70–99)
HCT VFR BLD CALC: 43.1 % (ref 36.3–47.1)
HEMOGLOBIN: 14.1 G/DL (ref 11.9–15.1)
IMMATURE GRANULOCYTES: 0 %
LIPASE: 28 U/L (ref 13–60)
LYMPHOCYTES # BLD: 36 % (ref 24–43)
MCH RBC QN AUTO: 32.8 PG (ref 25.2–33.5)
MCHC RBC AUTO-ENTMCNC: 32.7 G/DL (ref 28.4–34.8)
MCV RBC AUTO: 100.2 FL (ref 82.6–102.9)
MONOCYTES # BLD: 8 % (ref 3–12)
NRBC AUTOMATED: 0 PER 100 WBC
PDW BLD-RTO: 12.8 % (ref 11.8–14.4)
PLATELET # BLD: 298 K/UL (ref 138–453)
PLATELET ESTIMATE: NORMAL
PMV BLD AUTO: 9 FL (ref 8.1–13.5)
POTASSIUM SERPL-SCNC: 3.8 MMOL/L (ref 3.7–5.3)
PRO-BNP: 209 PG/ML
RBC # BLD: 4.3 M/UL (ref 3.95–5.11)
RBC # BLD: NORMAL 10*6/UL
SEG NEUTROPHILS: 53 % (ref 36–65)
SEGMENTED NEUTROPHILS ABSOLUTE COUNT: 2.72 K/UL (ref 1.5–8.1)
SODIUM BLD-SCNC: 138 MMOL/L (ref 135–144)
TOTAL PROTEIN: 6.9 G/DL (ref 6.4–8.3)
TROPONIN INTERP: NORMAL
TROPONIN INTERP: NORMAL
TROPONIN T: NORMAL NG/ML
TROPONIN T: NORMAL NG/ML
TROPONIN, HIGH SENSITIVITY: <6 NG/L (ref 0–14)
TROPONIN, HIGH SENSITIVITY: <6 NG/L (ref 0–14)
WBC # BLD: 5.2 K/UL (ref 3.5–11.3)
WBC # BLD: NORMAL 10*3/UL

## 2021-05-28 PROCEDURE — 71260 CT THORAX DX C+: CPT

## 2021-05-28 PROCEDURE — 36415 COLL VENOUS BLD VENIPUNCTURE: CPT

## 2021-05-28 PROCEDURE — 85379 FIBRIN DEGRADATION QUANT: CPT

## 2021-05-28 PROCEDURE — 83880 ASSAY OF NATRIURETIC PEPTIDE: CPT

## 2021-05-28 PROCEDURE — 6360000004 HC RX CONTRAST MEDICATION: Performed by: EMERGENCY MEDICINE

## 2021-05-28 PROCEDURE — 83690 ASSAY OF LIPASE: CPT

## 2021-05-28 PROCEDURE — 85025 COMPLETE CBC W/AUTO DIFF WBC: CPT

## 2021-05-28 PROCEDURE — 93005 ELECTROCARDIOGRAM TRACING: CPT | Performed by: EMERGENCY MEDICINE

## 2021-05-28 PROCEDURE — 99283 EMERGENCY DEPT VISIT LOW MDM: CPT

## 2021-05-28 PROCEDURE — 80053 COMPREHEN METABOLIC PANEL: CPT

## 2021-05-28 PROCEDURE — 71045 X-RAY EXAM CHEST 1 VIEW: CPT

## 2021-05-28 PROCEDURE — 84484 ASSAY OF TROPONIN QUANT: CPT

## 2021-05-28 RX ADMIN — IOPAMIDOL 75 ML: 755 INJECTION, SOLUTION INTRAVENOUS at 21:51

## 2021-05-28 ASSESSMENT — ENCOUNTER SYMPTOMS
GASTROINTESTINAL NEGATIVE: 1
CHEST TIGHTNESS: 1
STRIDOR: 0
ALLERGIC/IMMUNOLOGIC NEGATIVE: 1
EYES NEGATIVE: 1
WHEEZING: 0
SHORTNESS OF BREATH: 1

## 2021-05-28 ASSESSMENT — PAIN SCALES - GENERAL: PAINLEVEL_OUTOF10: 8

## 2021-05-28 ASSESSMENT — PAIN DESCRIPTION - LOCATION: LOCATION: CHEST

## 2021-05-28 NOTE — TELEPHONE ENCOUNTER
Called patient to see what her symptoms were yesterday as she did hit her button in her linq and did show some fast heart rates. She states that she has had some more frequent palpitations that take her breath away. When this occurs she does get diaphoretic as well chest pain-this can be when she is sitting or laying down and it does wake her up from sleep. She says that you and her have talked about this and this is why the LINQ was placed. However she says she notices these happening more frequently. I confirmed her medication of Toprol XL 50 mg once daily and diltiazem 120 mg once daily. She did take her heart rate one time during one of these episodes and it was 130. She did not take any blood pressures but will start. I scanned in Patient activated symptoms into media attached this note. Please look through all activated symptoms and advise.  Thanks

## 2021-05-29 LAB
EKG ATRIAL RATE: 87 BPM
EKG P AXIS: 47 DEGREES
EKG P-R INTERVAL: 128 MS
EKG Q-T INTERVAL: 382 MS
EKG QRS DURATION: 84 MS
EKG QTC CALCULATION (BAZETT): 459 MS
EKG R AXIS: 52 DEGREES
EKG T AXIS: 52 DEGREES
EKG VENTRICULAR RATE: 87 BPM

## 2021-05-29 PROCEDURE — 93010 ELECTROCARDIOGRAM REPORT: CPT | Performed by: FAMILY MEDICINE

## 2021-05-29 ASSESSMENT — HEART SCORE: ECG: 0

## 2021-05-29 NOTE — ED PROVIDER NOTES
[facility] ED  EMERGENCY DEPARTMENT ENCOUNTER      Pt Name: Matthew Landa  MRN: [id number]  [place] 1970  Date of evaluation: 5/28/2021  Provider: Julieta Baldwin MD    CHIEF COMPLAINT       Chief Complaint   Patient presents with    Chest Pain     ongoing few days, intermittent, had loop recorder placed last week.  Hypertension     170s at home. HISTORY OF PRESENT ILLNESS   (Location/Symptom, Timing/Onset, Context/Setting, Quality, Duration, Modifying Factors, Severity)  Note limiting factors. Matthew Landa is a 49 y.o. female who presents to the emergency department     60-year-old female presents emergency department with history for chest pain substernal sharp no radiation disc comfort neck arm or jaw. Patient has had similar chest discomfort for approximately 6 months in duration after being diagnosed with COVID-19. She did require hospitalization in January for COVID-19. The patient did have a fainting episode the day 2 before Mother's Day. She has been followed by cardiologist Dr. Poncho Hernandez and she is current time also has a loop recorder in place. Patient denies any history for myocardial infarction or pulmonary emboli. She does have a history for hypertension and currently taking metoprolol and Cardizem. Denies any fever chills. .  Her discomfort at the worst has been 7 out of 10. Nursing Notes were reviewed. REVIEW OF SYSTEMS    (2-9 systems for level 4, 10 or more for level 5)     Review of Systems   Constitutional: Positive for fatigue. HENT: Negative. Eyes: Negative. Respiratory: Positive for chest tightness and shortness of breath. Negative for wheezing and stridor. Cardiovascular: Positive for chest pain. Negative for palpitations and leg swelling. Gastrointestinal: Negative. Endocrine: Negative. Genitourinary: Negative. Musculoskeletal: Negative. Skin: Negative. Allergic/Immunologic: Negative.     Neurological: tablet by mouth 2 times daily for 5 days, Disp-10 tablet, R-0Normal      dilTIAZem (CARDIZEM CD) 120 MG extended release capsule Take 1 capsule by mouth daily, Disp-30 capsule, R-3Normal      levothyroxine (SYNTHROID) 100 MCG tablet Take 1 tablet by mouth daily, Disp-90 tablet, R-0Normal      metoprolol succinate (TOPROL XL) 50 MG extended release tablet Take 1 tablet by mouth daily, Disp-90 tablet, R-3Normal      !! buPROPion (WELLBUTRIN XL) 300 MG extended release tablet TAKE 1 TABLET BY MOUTH ONCE DAILYHistorical Med      topiramate (TOPAMAX) 25 MG tablet 100 mg 2 times daily Historical Med      Ubrogepant (UBRELVY) 100 MG TABS Take 100 mg by mouth as needed Historical Med      ferrous sulfate (IRON 325) 325 (65 Fe) MG tablet Take 1 tablet by mouth daily Historical Med      Turmeric (QC TUMERIC COMPLEX PO) Take 1 capsule by mouth daily Historical Med      Garlic 10 MG CAPS Take 1 capsule by mouth daily Historical Med      !! buPROPion (WELLBUTRIN XL) 150 MG extended release tablet Take 150 mg by mouth daily Historical Med      traZODone (DESYREL) 50 MG tablet Take 50 mg by mouth nightly Historical Med      escitalopram (LEXAPRO) 10 MG tablet TAKE 1 & 1/2 (ONE & ONE-HALF) TABLETS BY MOUTH ONCE DAILY, Disp-135 tablet, R-0Please consider 90 day supplies to promote better adherenceNormal      ketorolac (TORADOL) 10 MG tablet Take 10 mg by mouth every 6 hours as neededHistorical Med      prochlorperazine (COMPAZINE) 5 MG tablet Take 5 mg by mouth every 6 hours as neededHistorical Med      Cholecalciferol 50 MCG (2000 UT) CAPS Take 1 capsule by mouth daily Historical Med      albuterol sulfate HFA (VENTOLIN HFA) 108 (90 Base) MCG/ACT inhaler Inhale 2 puffs into the lungs 4 times daily as needed for Wheezing, Disp-1 Inhaler, R-0Normal       !! - Potential duplicate medications found. Please discuss with provider. ALLERGIES     Rocephin [ceftriaxone]    FAMILY HISTORY       Family History   Adopted:  Yes SOCIAL HISTORY       Social History     Socioeconomic History    Marital status: Single     Spouse name: None    Number of children: None    Years of education: None    Highest education level: None   Occupational History    Occupation: nurse aid   Tobacco Use    Smoking status: Never Smoker    Smokeless tobacco: Never Used   Vaping Use    Vaping Use: Never used   Substance and Sexual Activity    Alcohol use: Yes     Alcohol/week: 0.0 standard drinks     Comment: rarely    Drug use: No    Sexual activity: Not Currently   Other Topics Concern    None   Social History Narrative    None     Social Determinants of Health     Financial Resource Strain: Low Risk     Difficulty of Paying Living Expenses: Not hard at all   Food Insecurity: No Food Insecurity    Worried About Running Out of Food in the Last Year: Never true    Sonia of Food in the Last Year: Never true   Transportation Needs: No Transportation Needs    Lack of Transportation (Medical): No    Lack of Transportation (Non-Medical):  No   Physical Activity:     Days of Exercise per Week:     Minutes of Exercise per Session:    Stress:     Feeling of Stress :    Social Connections:     Frequency of Communication with Friends and Family:     Frequency of Social Gatherings with Friends and Family:     Attends [employer] Services:     Active Member of Clubs or Organizations:     Attends Club or Organization Meetings:     Marital Status:    Intimate Partner Violence:     Fear of Current or Ex-Partner:     Emotionally Abused:     Physically Abused:     Sexually Abused:        SCREENINGS          Heart Score for chest pain patients  History: Slightly Suspicious  ECG: Normal  Patient Age: > 45 and < 65 years  Risk Factors: No risk factors known  Troponin: < 1X normal limit  Heart Score Total: 1             PHYSICAL EXAM    (up to 7 for level 4, 8 or more for level 5)     ED Triage Vitals [05/28/21 2010]   BP Temp Temp src Pulse Resp SpO2 Height Weight   (!) 146/86 96 °F (35.6 °C) -- 87 16 100 % -- 221 lb (100.2 kg)       Physical Exam  Vitals and nursing note reviewed. Constitutional:       General: She is not in acute distress. Appearance: Normal appearance. She is not ill-appearing, toxic-appearing or diaphoretic. HENT:      Head: Normocephalic and atraumatic. Nose: Nose normal.      Mouth/Throat:      Mouth: Mucous membranes are moist.   Eyes:      Extraocular Movements: Extraocular movements intact. Pupils: Pupils are equal, round, and reactive to light. Neck:      Vascular: No carotid bruit. Cardiovascular:      Rate and Rhythm: Normal rate and regular rhythm. Pulses: Normal pulses. Heart sounds: Normal heart sounds. No murmur heard. Pulmonary:      Effort: Pulmonary effort is normal.      Breath sounds: Normal breath sounds. Abdominal:      General: Abdomen is flat. There is no distension. Palpations: Abdomen is soft. There is no mass. Musculoskeletal:         General: Normal range of motion. Cervical back: Normal range of motion. No rigidity or tenderness. Right lower leg: No edema. Left lower leg: No edema. Lymphadenopathy:      Cervical: No cervical adenopathy. Skin:     General: Skin is warm and dry. Capillary Refill: Capillary refill takes less than 2 seconds. Findings: No lesion or rash. Neurological:      General: No focal deficit present. Mental Status: She is alert and oriented to person, place, and time. Cranial Nerves: No cranial nerve deficit.    Psychiatric:         Mood and Affect: Mood normal.         DIAGNOSTIC RESULTS     EKG: All EKG's are interpreted by the Emergency Department Physician who either signs or Co-signs this chart in the absence of a cardiologist.      RADIOLOGY:   Non-plain film images such as CT, Ultrasound and MRI are read by the radiologist. Plain radiographic images are visualized and preliminarily interpreted by the emergency physician with the below findings:      Interpretation per the Radiologist below, if available at the time of this note:    CT CHEST PULMONARY EMBOLISM W CONTRAST   Final Result   Unremarkable chest CT pulmonary angiogram study. No evidence of pulmonary embolism or acute thoracic aortic abnormality. Clear   lungs. XR CHEST PORTABLE   Final Result   Clear lungs. No acute cardiopulmonary abnormality. ED BEDSIDE ULTRASOUND:   Performed by ED Physician - none    LABS:  Labs Reviewed   COMPREHENSIVE METABOLIC PANEL W/ REFLEX TO MG FOR LOW K - Abnormal; Notable for the following components:       Result Value    Total Bilirubin 0.27 (*)     All other components within normal limits   CBC WITH AUTO DIFFERENTIAL   LIPASE   BRAIN NATRIURETIC PEPTIDE   D-DIMER, QUANTITATIVE   TROPONIN   TROPONIN       All other labs were within normal range or not returned as of this dictation. EMERGENCY DEPARTMENT COURSE and DIFFERENTIAL DIAGNOSIS/MDM:   Vitals:    Vitals:    05/28/21 2100 05/28/21 2115 05/28/21 2130 05/28/21 2145   BP: (!) 161/92 (!) 147/85 (!) 147/68 135/75   Pulse: 76 78 75 82   Resp: 16 20 13 17   Temp:       SpO2: 98% 97% 97% 99%   Weight:             MDM  Number of Diagnoses or Management Options  Atypical chest pain  Essential hypertension  Diagnosis management comments: 55-year-old female presents emergency department with chest discomfort described as sharp without radiation to neck arm or jaw occurring intermittently over approximately 5 months in duration. Patient was documented with COVID-19 in December. She has not received vaccinations in the interim. She is currently on the loop recorder    Laboratory studies imaging studies electrocardiogram have been requested    . Given history, physical exam and diagnostic testing I feel this patient does not have an acute medical emergency.   Patient has been adequately risk stratified and using reasonable practice norms it is felt the patient is unlikely to be suffering from significant cardiovascular pathology; ACS, pulmonary embolism, pneumothorax, pneumonia, dissection of the aorta, pericardial tamponade.     Laboratory studies imaging studies electrocardiogram interpretation have been reviewed with the patient    Patient voiced understanding discharge instructions and follow-up having no additional questions or concerns was released in a stable condition        REASSESSMENT     Patient's blood pressure did improve without therapeutic intervention during emergency department course  ED Course as of May 29 0030   Fri May 28, 2021   2044 Chest x-ray no acute process radiologist read   XR CHEST PORTABLE [RS]   2052 Performed at 2044-the ventricular rate 87-poor tracing quality-[place] interval 0.128, QRS duration 0.084, QTc corrected 0.459, no definitive ischemic or infarct changes appreciated there appears to be atrial premature beat no ST segment elevation   EKG 12 Lead [RS]   2255 CT pulmonary-no embolism per radiologist   CT CHEST PULMONARY EMBOLISM W CONTRAST [RS]   Sat May 29, 2021   0026 Brain Natriuretic Peptide:    Pro-   BNP Interpretation Pro-BNP Reference Range: [RS]   0027 Lipase:    Lipase 28 [RS]   0027 Comprehensive Metabolic Panel w/ Reflex to MG(!):    Glucose 96   BUN 13   Creatinine 0.65   Bun/Cre Ratio 20   Calcium 9.4   Sodium 138   Potassium 3.8   Chloride 102   CO2 24   Anion Gap 12   Alk Phos 44   ALT 15   AST 23   Bilirubin 0.27(!)   Total Protein 6.9   Albumin 4.2   Albumin/Globulin Ratio 1.6   GFR Non-African American >60   GFR  >60   GFR Comment        GFR Staging      [RS]   0027 D-Dimer, Quantitative:    D-Dimer, Quant <0.27 [RS]   0027 CBC Auto Differential:    WBC 5.2   RBC 4.30   Hemoglobin Quant 14.1   Hematocrit 43.1   .2   MCH 32.8   MCHC 32.7   RDW 12.8   Platelet Count 103   MPV 9.0   NRBC Automated 0.0   Differential Type NOT REPORTED   Seg Neutrophils 53   Lymphocytes 36 Monocytes 8   Eosinophils % 2   Basophils 1   Immature Granulocytes 0   Segs Absolute 2.72   Absolute Lymph # 1.84   Absolute Mono # 0.41   Absolute Eos # 0.11   Basophils Absolute 0.06   Absolute Immature Granulocyte <0.03   WBC Morphology NOT REPORTED   RBC Mo. .. [RS]   5703 Troponin:    Troponin, High Sensitivity <6   Troponin T NOT REPORTED   Troponin Interp NOT REPORTED [RS]      ED Course User Index  [RS] Sudhir Hawley MD         CRITICAL CARE TIME   Total Critical Care time was minutes, excluding separately reportable procedures. There was a high probability of clinically significant/life threatening deterioration in the patient's condition which required my urgent intervention. CONSULTS:  None    PROCEDURES:  Unless otherwise noted below, none     Procedures    FINAL IMPRESSION      1. Atypical chest pain    2. Essential hypertension          DISPOSITION/PLAN   DISPOSITION Decision To Discharge 05/28/2021 11:51:14 PM      PATIENT REFERRED TO:  [address], [place]  [address] [address]    Call in 3 days        DISCHARGE MEDICATIONS:  Discharge Medication List as of 5/28/2021 11:52 PM        Controlled Substances Monitoring:     RX Monitoring 1/27/2020   Attestation -   Periodic Controlled Substance Monitoring No signs of potential drug abuse or diversion identified.        (Please note that portions of this note were completed with a voice recognition program.  Efforts were made to edit the dictations but occasionally words are mis-transcribed.)    Sudhir Hawley MD (electronically signed)  Attending Emergency Physician            Sudhir Hawlye MD  05/29/21 2863       Sudhir Hawley MD  05/29/21 2393

## 2021-06-01 ENCOUNTER — PATIENT MESSAGE (OUTPATIENT)
Dept: PRIMARY CARE CLINIC | Age: 51
End: 2021-06-01

## 2021-06-01 ENCOUNTER — OFFICE VISIT (OUTPATIENT)
Dept: PRIMARY CARE CLINIC | Age: 51
End: 2021-06-01
Payer: COMMERCIAL

## 2021-06-01 ENCOUNTER — TELEPHONE (OUTPATIENT)
Dept: CARDIOLOGY | Age: 51
End: 2021-06-01

## 2021-06-01 VITALS
DIASTOLIC BLOOD PRESSURE: 86 MMHG | HEART RATE: 76 BPM | RESPIRATION RATE: 16 BRPM | BODY MASS INDEX: 35.25 KG/M2 | SYSTOLIC BLOOD PRESSURE: 142 MMHG | WEIGHT: 218.4 LBS

## 2021-06-01 DIAGNOSIS — I10 ESSENTIAL HYPERTENSION: Primary | ICD-10-CM

## 2021-06-01 DIAGNOSIS — R00.2 HEART PALPITATIONS: ICD-10-CM

## 2021-06-01 PROCEDURE — 3017F COLORECTAL CA SCREEN DOC REV: CPT | Performed by: FAMILY MEDICINE

## 2021-06-01 PROCEDURE — 1111F DSCHRG MED/CURRENT MED MERGE: CPT | Performed by: FAMILY MEDICINE

## 2021-06-01 PROCEDURE — G8427 DOCREV CUR MEDS BY ELIG CLIN: HCPCS | Performed by: FAMILY MEDICINE

## 2021-06-01 PROCEDURE — 99213 OFFICE O/P EST LOW 20 MIN: CPT | Performed by: FAMILY MEDICINE

## 2021-06-01 PROCEDURE — G8417 CALC BMI ABV UP PARAM F/U: HCPCS | Performed by: FAMILY MEDICINE

## 2021-06-01 PROCEDURE — 1036F TOBACCO NON-USER: CPT | Performed by: FAMILY MEDICINE

## 2021-06-01 RX ORDER — LOSARTAN POTASSIUM AND HYDROCHLOROTHIAZIDE 12.5; 5 MG/1; MG/1
1 TABLET ORAL DAILY
Qty: 90 TABLET | Refills: 0 | Status: SHIPPED | OUTPATIENT
Start: 2021-06-01 | End: 2021-07-21 | Stop reason: ALTCHOICE

## 2021-06-01 NOTE — PROGRESS NOTES
Patient is here today for follow-up for an emergency room visit for hypertension and chest pain. Symptoms have -stayed the same. Current complaints-Patient states her BP at home was 164/94, and her heart rate was 92. She said her BP fluctuates all day. In office it was 142/86. She said she is still having chest discomfort. She said that this is all the time. Medication change-None. Follow-up with specialists-None. She could not see Bassamkaylie because he is on vacation. Special needs-None. Post-Discharge Transitional Care Management Services or Hospital Follow Up      [facility]   YOB: 1970    Date of Office Visit:  6/1/2021  Date of Hospital Admission: 5/28/21  Date of Hospital Discharge: 5/29/21  Readmission Risk Score(high >=14%.  Medium >=10%):No data recorded    Care management risk score Rising risk (score 2-5) and Complex Care (Scores >=6): 1     Non face to face  following discharge, date last encounter closed (first attempt may have been earlier): *No documented post hospital discharge outreach found in the last 14 days *No documented post hospital discharge outreach found in the last 14 days    Call initiated 2 business days of discharge: *No response recorded in the last 14 days     Patient Active Problem List   Diagnosis    Essential hypertension    Hypothyroidism    Idiopathic gout    Anxiety state    Moderate single current episode of major depressive disorder (HCC)    Iron deficiency anemia    Lab test positive for detection of COVID-19 virus    Shortness of breath    Myalgia    Persistent vomiting    Uncontrollable vomiting    Intractable nausea and vomiting    Dizziness    Orthostatic hypotension    Diarrhea    Recurrent falls while walking    Abnormal cardiovascular stress test    Recurrent syncope    PSVT (paroxysmal supraventricular tachycardia) ([facility])       Allergies   Allergen Reactions    Rocephin [Ceftriaxone] Anaphylaxis       Medications listed as ordered at the time of discharge from hospital   [place], [address] Medication Instructions [place]:    Printed on:06/01/21 3368   Medication Information                      albuterol sulfate HFA (VENTOLIN HFA) 108 (90 Base) MCG/ACT inhaler  Inhale 2 puffs into the lungs 4 times daily as needed for Wheezing             allopurinol (ZYLOPRIM) 100 MG tablet  Take 1 tablet by mouth daily             buPROPion (WELLBUTRIN XL) 150 MG extended release tablet  Take 150 mg by mouth daily              buPROPion (WELLBUTRIN XL) 300 MG extended release tablet  TAKE 1 TABLET BY MOUTH ONCE DAILY             Cholecalciferol 50 MCG (2000 UT) CAPS  Take 1 capsule by mouth daily              dilTIAZem (CARDIZEM CD) 120 MG extended release capsule  Take 1 capsule by mouth daily             escitalopram (LEXAPRO) 10 MG tablet  TAKE 1 & 1/2 (ONE & ONE-HALF) TABLETS BY MOUTH ONCE DAILY             ferrous sulfate (IRON 325) 325 (65 Fe) MG tablet  Take 1 tablet by mouth daily              Garlic 10 MG CAPS  Take 1 capsule by mouth daily              ketorolac (TORADOL) 10 MG tablet  Take 10 mg by mouth every 6 hours as needed             levothyroxine (SYNTHROID) 100 MCG tablet  Take 1 tablet by mouth daily             losartan-hydroCHLOROthiazide (HYZAAR) 50-12.5 MG per tablet  Take 1 tablet by mouth daily             metoprolol succinate (TOPROL XL) 50 MG extended release tablet  Take 1 tablet by mouth daily             predniSONE (DELTASONE) 20 MG tablet  Take 1 tablet by mouth 2 times daily for 5 days             prochlorperazine (COMPAZINE) 5 MG tablet  Take 5 mg by mouth every 6 hours as needed             topiramate (TOPAMAX) 25 MG tablet  100 mg 2 times daily              traZODone (DESYREL) 50 MG tablet  Take 50 mg by mouth nightly              Turmeric (QC TUMERIC COMPLEX PO)  Take 1 capsule by mouth daily              Ubrogepant (UBRELVY) 100 MG TABS  Take 100 mg by mouth as needed                    Medications marked

## 2021-06-01 NOTE — TELEPHONE ENCOUNTER
Hello.  I have been having problems with high blood pressure since Friday, I ended up going to the ER as my blood pressure got to 178/104. I've been monitoring it since and have had readings of 172/96, 163/98, 151/83, 161/74, 148/84. These have also accompanied chest pain and pressure, shortness of breath, pressure in my ears and it ends up giving me a severe headache/migraine, and became very hot, sweaty, and light headed. I'm still having the palpitations as well. During these times I was not doing anything to over exert myself, at times I was just laying down.

## 2021-06-01 NOTE — PATIENT INSTRUCTIONS
SURVEY:    You may be receiving a survey from [employer] regarding your visit today. You may get this in the mail, through your MyChart, or in your email. Please complete the survey to enable us to provide the highest quality of care to you and your family. If you cannot score us a very good (5 Stars) on any question, please call the office to discuss how we could of made your experience exceptional.    Thank you!     [facility] [place] [facility], [address] [place] [address]    Phone: [phone]  Fax: [phone]    Office Hours:   [facility] 8-5 Wednesday: 9-11

## 2021-06-02 NOTE — TELEPHONE ENCOUNTER
What have her HR's been? I would like to go up to 240 on her cardizem but I need to know what her HR is. Thanks.

## 2021-06-03 DIAGNOSIS — R00.2 PALPITATIONS: ICD-10-CM

## 2021-06-03 DIAGNOSIS — R07.89 ATYPICAL CHEST PAIN: ICD-10-CM

## 2021-06-03 RX ORDER — DILTIAZEM HYDROCHLORIDE 120 MG/1
240 CAPSULE, COATED, EXTENDED RELEASE ORAL DAILY
Qty: 30 CAPSULE | Refills: 3 | Status: SHIPPED | OUTPATIENT
Start: 2021-06-03 | End: 2021-07-02 | Stop reason: SDUPTHER

## 2021-06-29 PROCEDURE — G2066 INTER DEVC REMOTE 30D: HCPCS | Performed by: FAMILY MEDICINE

## 2021-06-29 PROCEDURE — 93298 REM INTERROG DEV EVAL SCRMS: CPT | Performed by: FAMILY MEDICINE

## 2021-07-01 ENCOUNTER — NURSE ONLY (OUTPATIENT)
Dept: CARDIOLOGY | Age: 51
End: 2021-07-01
Payer: COMMERCIAL

## 2021-07-01 DIAGNOSIS — Z45.09 ENCOUNTER FOR LOOP RECORDER CHECK: Primary | ICD-10-CM

## 2021-07-01 DIAGNOSIS — R00.2 PALPITATIONS: ICD-10-CM

## 2021-07-02 ENCOUNTER — OFFICE VISIT (OUTPATIENT)
Dept: CARDIOLOGY | Age: 51
End: 2021-07-02
Payer: COMMERCIAL

## 2021-07-02 VITALS
HEART RATE: 61 BPM | OXYGEN SATURATION: 98 % | DIASTOLIC BLOOD PRESSURE: 79 MMHG | HEIGHT: 66 IN | SYSTOLIC BLOOD PRESSURE: 132 MMHG | WEIGHT: 220.6 LBS | RESPIRATION RATE: 18 BRPM | BODY MASS INDEX: 35.45 KG/M2

## 2021-07-02 DIAGNOSIS — R07.89 ATYPICAL CHEST PAIN: Primary | ICD-10-CM

## 2021-07-02 DIAGNOSIS — R00.2 PALPITATIONS: ICD-10-CM

## 2021-07-02 PROCEDURE — G8427 DOCREV CUR MEDS BY ELIG CLIN: HCPCS | Performed by: FAMILY MEDICINE

## 2021-07-02 PROCEDURE — G8417 CALC BMI ABV UP PARAM F/U: HCPCS | Performed by: FAMILY MEDICINE

## 2021-07-02 PROCEDURE — 3017F COLORECTAL CA SCREEN DOC REV: CPT | Performed by: FAMILY MEDICINE

## 2021-07-02 PROCEDURE — 99211 OFF/OP EST MAY X REQ PHY/QHP: CPT | Performed by: FAMILY MEDICINE

## 2021-07-02 PROCEDURE — 99213 OFFICE O/P EST LOW 20 MIN: CPT | Performed by: FAMILY MEDICINE

## 2021-07-02 PROCEDURE — 1036F TOBACCO NON-USER: CPT | Performed by: FAMILY MEDICINE

## 2021-07-02 RX ORDER — DILTIAZEM HYDROCHLORIDE 240 MG/1
240 CAPSULE, COATED, EXTENDED RELEASE ORAL DAILY
Qty: 90 CAPSULE | Refills: 3 | Status: SHIPPED | OUTPATIENT
Start: 2021-07-02 | End: 2021-09-17

## 2021-07-02 NOTE — PATIENT INSTRUCTIONS
SURVEY:    You may be receiving a survey from [employer] regarding your visit today. Please complete the survey to enable us to provide the highest quality of care to you and your family. If you cannot score us a very good on any question, please call the office to discuss how we could have made your experience a very good one. Thank you. Your MA today was Marlena Baker!

## 2021-07-02 NOTE — PROGRESS NOTES
Geo Milan am scribing for and in the presence of Christianne Soles. Macey Webster MD, MS, F.A.C.C..    Patient: Tuan Class  : /[id number] Care Physician: Azar Villaseñor  Today's Date: 2021    REASON FOR VISIT: 1 Month Follow-Up (HX:CP,palp,HTN Pt is here for 1 month follow up she states she was in ER  for CP and HTN she continues to have problems lightheaded/dizziness,sweats, palp,SOB  denies falls.)    Dear Azar Villaseñor MD,    HPI: Ms. Roddy Aviles is a 49 y.o. female who was admitted to the hospital with lightheaded and dizziness. She also had been feeling nauseated and having migraine that started last night. She denied any previous heart related history. She is adopted so she was unaware of any family history. She does drink 1-2 cups off coffee daily. Her echocardiogram on 2021 was fairly unremarkable and showed an ejection fraction of >60%. Her CAM monitor was done on 2021 she wore 6 days, 19 hours showed one epos ode of atrial tachycardia with symptoms. She had treadmill stress test done on 3/8/2021 her  [facility] treadmill score is 0 which is intermediate risk. Ms. Roddy Aviles recently had Negative Tilt Table test on 3/22/21 and an Equivocal stress test. Ms. Roddy Aviles heart catheterization on 21 was largely normal.     Ms. Roddy Aviles is here today for one month follow up and continues to have intermittent palpitations. The palpations are her biggest complaint but says it is better since being on Cardizem and Toprol XL. They can be moderate in intensity and she has intermittent dizziness with them as well as other times when standing. She went to ER for chest pain and hypertension on 2021. Her blood pressures run between 120-140's highest being 167. She continues to have sharp pain last seconds, moderate bothersome, happens a couple of times a week. She does stay very well hydrated. Her breathing on exertion is worse with moderate exertion.  Also has bilateral foot and hand Yes     Alcohol/week: 0.0 standard drinks     Comment: rarely    Drug use: No        MEDICATIONS:  No current facility-administered medications for this visit. CURRENT INPATIENT MEDICATIONS:  Prior to Admission medications    Medication Sig Start Date End Date Taking?  Authorizing Provider   dilTIAZem (CARDIZEM CD) 120 MG extended release capsule Take 2 capsules by mouth daily 6/3/21  Yes Liana Cordoba MD   allopurinol (ZYLOPRIM) 100 MG tablet Take 1 tablet by mouth daily 5/27/21  Yes Lacey Ayala MD   levothyroxine (SYNTHROID) 100 MCG tablet Take 1 tablet by mouth daily 5/14/21 8/12/21 Yes Lacey Ayala MD   ketorolac (TORADOL) 10 MG tablet Take 10 mg by mouth every 6 hours as needed 4/13/21  Yes Historical Provider, MD   metoprolol succinate (TOPROL XL) 50 MG extended release tablet Take 1 tablet by mouth daily 3/30/21  Yes Liana Cordoba MD   buPROPion (WELLBUTRIN XL) 300 MG extended release tablet TAKE 1 TABLET BY MOUTH ONCE DAILY 2/24/21  Yes Historical Provider, MD   topiramate (TOPAMAX) 25 MG tablet 100 mg 2 times daily  3/11/21  Yes Historical Provider, MD   prochlorperazine (COMPAZINE) 5 MG tablet Take 5 mg by mouth every 6 hours as needed 3/15/21  Yes Historical Provider, MD   Ubrogepant (UBRELVY) 100 MG TABS Take 100 mg by mouth as needed  3/16/21  Yes Historical Provider, MD   ferrous sulfate (IRON 325) 325 (65 Fe) MG tablet Take 1 tablet by mouth daily    Yes Historical Provider, MD   Cholecalciferol 50 MCG (2000 UT) CAPS Take 1 capsule by mouth daily    Yes Historical Provider, MD   Turmeric (QC TUMERIC COMPLEX PO) Take 1 capsule by mouth daily    Yes Historical Provider, MD   Garlic 10 MG CAPS Take 1 capsule by mouth daily    Yes Historical Provider, MD   buPROPion (WELLBUTRIN XL) 150 MG extended release tablet Take 150 mg by mouth daily  1/21/21  Yes Historical Provider, MD   albuterol sulfate HFA (VENTOLIN HFA) 108 (90 Base) MCG/ACT inhaler Inhale 2 puffs into the lungs 4 times daily as needed for Wheezing 12/19/20  Yes Nakia Graham MD   traZODone (DESYREL) 50 MG tablet Take 50 mg by mouth nightly  1/10/20  Yes Mitzy Ann MD   escitalopram (LEXAPRO) 10 MG tablet TAKE 1 & 1/2 (ONE & ONE-HALF) TABLETS BY MOUTH ONCE DAILY  Patient taking differently: 20 mg  9/23/19  Yes Av Art MD   losartan-hydroCHLOROthiazide (HYZAAR) 50-12.5 MG per tablet Take 1 tablet by mouth daily  Patient not taking: Reported on 7/2/2021 6/1/21   Av Art MD       FAMILY HISTORY: family history is not on file. She was adopted. PHYSICAL EXAM:   /79 (Site: Left Upper Arm, Position: Sitting, Cuff Size: Large Adult)   Pulse 61   Resp 18   Ht 5' 6\" (1.676 m)   Wt 220 lb 9.6 oz (100.1 kg)   LMP 07/15/2020 (Approximate)   SpO2 98%   BMI 35.61 kg/m²  Body mass index is 35.61 kg/m². Constitutional: She is oriented to person, place, and time. She appears well-developed and well-nourished. In no acute distress. HEENT: Normocephalic and atraumatic. No JVD present. Carotid bruit is not present. No mass and no thyromegaly present. No lymphadenopathy present. Cardiovascular: Normal rate, regular rhythm, normal heart sounds. Exam reveals no gallop and no friction rubs. 2/6 systolic murmur, 5th intercostal space on the LEFT in the mid-clavicular line (cardiac apex). Pulmonary/Chest: Effort normal and breath sounds normal. No respiratory distress. She has no wheezes, rhonchi or rales. Abdominal: Soft, non-tender. Bowel sounds and aorta are normal. She exhibits no organomegaly, mass or bruit. Extremities: Trace lower extremity edema. No cyanosis and no clubbing. Pulses are 2+ radial and carotid pulses. Neurological: She is alert and oriented to person, place, and time. No evidence of gross cranial nerve deficit. Coordination appeared normal.   Skin: Skin is warm and dry. There is no rash or diaphoresis. Psychiatric: She has a normal mood and affect.  Her speech is normal and behavior is normal.      MOST RECENT LABS ON RECORD:   Lab Results   Component Value Date    WBC 5.2 05/28/2021    HGB 14.1 05/28/2021    HCT 43.1 05/28/2021     05/28/2021    CHOL 185 04/22/2020    TRIG 79 04/22/2020    HDL 48 04/22/2020    ALT 15 05/28/2021    AST 23 05/28/2021     05/28/2021    K 3.8 05/28/2021     05/28/2021    CREATININE 0.65 05/28/2021    BUN 13 05/28/2021    CO2 24 05/28/2021    TSH 4.01 05/13/2021       ASSESSMENT:  Patient Active Problem List    Diagnosis Date Noted    Recurrent syncope 05/20/2021    PSVT (paroxysmal supraventricular tachycardia) ([facility] [address] 05/20/2021    Abnormal cardiovascular stress test 04/01/2021    Orthostatic hypotension     Diarrhea     Recurrent falls while walking     Dizziness 01/26/2021    Persistent vomiting 01/25/2021    Uncontrollable vomiting 01/25/2021    Intractable nausea and vomiting 01/25/2021    Lab test positive for detection of COVID-19 virus 12/20/2020    Shortness of breath 12/20/2020    Myalgia 12/20/2020    Iron deficiency anemia 02/15/2019    Moderate single current episode of major depressive disorder ([facility] [address] 03/21/2018    Idiopathic gout 04/20/2016    Anxiety state 04/20/2016    Essential hypertension 05/06/2015    Hypothyroidism 05/06/2015       Diagnosis Orders   1. Atypical chest pain  dilTIAZem (CARDIZEM CD) 240 MG extended release capsule   2. Palpitations  dilTIAZem (CARDIZEM CD) 240 MG extended release capsule       PLAN:    · Atypical Chest Pain: mild. Grossly normal stress test on 4/1/21. Suspect that this is non cardiac but could be some kind of an paroxysmal supraventricular tachycardia. Improved with Toprol XL and Cardizem and usually only last seconds. · Beta Blocker: Continue Metoprolol succinate (Toprol XL) 50 mg daily. · Calcium Channel Blocker: Continue diltiazem CD (Cardizem CD) 240 mg once daily. · Counseling: I advised Ms. Boone to call our office or go to the emergency room if she develops worsening or persistent chest pain or shortness of breath as this could be life threatening.   Recurrent intermittent palpitations: moderate with a recent episode of possible paroxysmal supraventricular tachycardia   · Beta Blocker: Continue Metoprolol succinate (Toprol XL) 50 mg daily. · Calcium Channel Blocker: Continue diltiazem CD (Cardizem CD) 240 mg once daily. · Additional Testing List: None    Once again, thank you for allowing me to participate in this patients care. Please do not hesitate to contact me if I could be of any further assistance. I told Ms. Boone to call my office if she had any problems, but otherwise told her to Return in about 6 months (around 1/2/2022). However, I would be happy to see her sooner should the need arise. Sincerely,  Tarun Coates. Sidney ARAGON, MS, F.A.C.C. [facility] Cardiology Specialist    [address] [place] [address]  Phone: [phone], Fax: [phone]     I believe that the risk of significant morbidity and mortality related to the patient's current medical conditions are: low-intermediate. The documentation recorded by the scribe, accurately and completely reflects the services I personally performed and the decisions made by me. Leeann Ivey MD, MS, F.A.C.C.  July 2, 2021

## 2021-07-08 ENCOUNTER — TELEPHONE (OUTPATIENT)
Dept: CARDIOLOGY | Age: 51
End: 2021-07-08

## 2021-07-08 NOTE — TELEPHONE ENCOUNTER
Patient has been having quite a bit of lower leg edema. She wanted to know if you could send in a water pill to help.

## 2021-07-09 ENCOUNTER — TELEPHONE (OUTPATIENT)
Dept: PRIMARY CARE CLINIC | Age: 51
End: 2021-07-09

## 2021-07-09 DIAGNOSIS — R60.0 LEG EDEMA: Primary | ICD-10-CM

## 2021-07-09 RX ORDER — FUROSEMIDE 20 MG/1
20 TABLET ORAL DAILY PRN
Qty: 30 TABLET | Refills: 0 | Status: SHIPPED | OUTPATIENT
Start: 2021-07-09 | End: 2021-07-21 | Stop reason: ALTCHOICE

## 2021-07-09 NOTE — TELEPHONE ENCOUNTER
----- Message from Agustín Willams sent at 7/9/2021  1:39 PM EDT -----  Subject: Message to Provider    QUESTIONS  Information for Provider? PT was wanting Dr Marin Hollingsworth to start up the lasix   water pill again, she cardiologist had to not take the Hyzaar so without a   water pill her legs and ankles are swelling. [address] [place] [address]  ---------------------------------------------------------------------------  --------------  Naomi Ramos INFO  What is the best way for the office to contact you? OK to leave message on   voicemail  Preferred Call Back Phone Number? [phone]  ---------------------------------------------------------------------------  --------------  SCRIPT ANSWERS  Relationship to Patient?  Self

## 2021-07-09 NOTE — TELEPHONE ENCOUNTER
Please let patient know that I think it would be better for her to either see Dr. Marce Wiggins to be evaluated for the need for water pills rather than just starting something. In the meantime, have her get her legs up, wear compression stockings reduce salt in diet and if she would like we can get her into the Pharmacy HF clinic sooner to be evaluated. Thanks.

## 2021-07-12 ENCOUNTER — TELEPHONE (OUTPATIENT)
Dept: OBGYN | Age: 51
End: 2021-07-12

## 2021-07-12 DIAGNOSIS — I47.1 PSVT (PAROXYSMAL SUPRAVENTRICULAR TACHYCARDIA) (HCC): Primary | ICD-10-CM

## 2021-07-12 NOTE — TELEPHONE ENCOUNTER
Patient calls with concerns, Patient had hysterectomy 1 year ago. Patient also had COVID 12/2020. Patient c/o hot flashes, weight gain, not sleeping well and mood swings. Patient concerned about possible hormone imbalance, labs ? Appointment ?

## 2021-07-13 ENCOUNTER — HOSPITAL ENCOUNTER (OUTPATIENT)
Age: 51
Discharge: HOME | End: 2021-07-13
Payer: COMMERCIAL

## 2021-07-13 DIAGNOSIS — Z78.0 MENOPAUSE: Primary | ICD-10-CM

## 2021-07-13 DIAGNOSIS — Z78.0 MENOPAUSE: ICD-10-CM

## 2021-07-13 LAB
ESTRADIOL LEVEL: 55 PG/ML (ref 27–314)
FOLLICLE STIMULATING HORMONE: 20.1 U/L (ref 1.7–21.5)
LH: 19.5 U/L (ref 1–95.6)
TSH SERPL DL<=0.05 MIU/L-ACNC: 4.03 MIU/L (ref 0.3–5)

## 2021-07-13 PROCEDURE — 83001 ASSAY OF GONADOTROPIN (FSH): CPT

## 2021-07-13 PROCEDURE — 84443 ASSAY THYROID STIM HORMONE: CPT

## 2021-07-13 PROCEDURE — 82670 ASSAY OF TOTAL ESTRADIOL: CPT

## 2021-07-13 PROCEDURE — 36415 COLL VENOUS BLD VENIPUNCTURE: CPT

## 2021-07-13 PROCEDURE — 83002 ASSAY OF GONADOTROPIN (LH): CPT

## 2021-07-13 NOTE — TELEPHONE ENCOUNTER
Dr. Neyda Saucedo operative notes obtained and scanned into patient chart for review by Gaylord Cogan.

## 2021-07-20 ENCOUNTER — TELEPHONE (OUTPATIENT)
Dept: PHARMACY | Age: 51
End: 2021-07-20

## 2021-07-20 DIAGNOSIS — R94.39 ABNORMAL CARDIOVASCULAR STRESS TEST: Primary | ICD-10-CM

## 2021-07-20 NOTE — TELEPHONE ENCOUNTER
Recent Travel Screening and Travel History documentation:     Travel Screening     Question   Response    In the last month, have you been in contact with someone who was confirmed or suspected to have Coronavirus / COVID-19? Unable to assess    Have you had a COVID-19 viral test in the last 14 days? Unable to assess    Do you have any of the following new or worsening symptoms? Unable to assess    Have you traveled internationally or domestically in the last month?   Unable to assess      Travel History   Travel since 06/20/21     No documented travel since 06/20/21

## 2021-07-20 NOTE — PROGRESS NOTES
[address] [facility]  Medication Management  Pharmacist  Heart Failure    [address] [facility]  [place] [address] [address]  Phone: [phone]  Fax: [phone]      NAME: Contreras Boone  MEDICAL RECORD NUMBER:  [id number]  AGE: 49 y.o. GENDER: female  : 1970  EPISODE DATE:  2021      Heart Failure Management referral by Dr. Juan Pablo Watters    Dry weight: ?? pounds     Today's Wt: 221  Wt Readings from Last 6 Encounters:   21 221 lb (100.2 kg)   21 220 lb 9.6 oz (100.1 kg)   21 218 lb 6.4 oz (99.1 kg)   21 221 lb (100.2 kg)   21 219 lb 9.6 oz (99.6 kg)   21 221 lb (100.2 kg)    and   Ht Readings from Last 1 Encounters:   21 5' 6\" (1.676 m)                 /97   HR:82  O2Sat: 96     Extremities and pitting edema none  Skin intact     Subjective   Ms. Boone is a 49 y.o. female here for the Heart Failure Services. They are here today for a comprehensive medication review including over the counter medications and herbal products, overall wellbeing assessment, transition of care and any needed adjustments with updates and recommendations communicated to the referring physician. Patient Symptoms: Ms. Kylee Suggs is here today for one month follow up and continues to have intermittent palpitations. The palpations are her biggest complaint but says it is NOT better since being on Cardizem and Toprol XL. These palpitations wake her up at night. She has intermittent dizziness with them as well as other times when standing. Patient fell on 21 while going up some steps. She went to ER for chest pain and hypertension on 2021. Her blood pressures run between 120-140's highest being 167. She continues to have sharp pain last seconds, moderate bothersome, happens a couple of times a week. She does stay very well hydrated. Her breathing on exertion is worse with moderate exertion. Also has bilateral foot and hand edema. Patient is a runner. She could only run a half mile yesterday but was running 6-7 miles before Covid in December 2020. Shortness of Breath:  []   None   [x]   Dyspnea on exertion   []   Dyspnea with normal activities  []   Dyspnea at rest  []   Dyspnea while sleeping    Patient Findings:   []  Missed doses  []  Diet changes  []  Sodium intake changes   []  Night time cough   []  Other   [x]  Early saiety, abd fullness [x]  Chest pain   []  Constipation   []  Night time bathroom trips  []  Alcohol intake changes  []  Acute illness  [x]  Edema    []  Number of pillows or recliner  []  Activity changes   [x]  Fatigue that limits activity [x]  Heart racing or skipping beats  []  Light headedness  [x]  Dizziness    []      [x]  Problems sleeping    Functional Activity New York Heart Association Classification  []   Class I No limitation of physical activity. Ordinary physical activity does not cause undue fatigue, palpitation, dyspnea (shortness of breath). [x]   Class II Slight limitation of physical activity. Comfortable at rest. Ordinary physical activity results in fatigue, palpitation, dyspnea (shortness of breath). []   Class III  Pb limitation of physical activity. Comfortable at rest.  Less than ordinary activity causes fatigue, palpitation, dyspnea. []   Class IV Unable to carry on any physical activity without discomfort. Symptoms of heart failure at rest.  If any physical activity is undertaken, discomfort increases. Last CHF Hospital Admission: never for HF    OUTPATIENT MEDICATIONS:  Outpatient Medications Marked as Taking for the 7/21/21 encounter [facility] Encounter) with [address]   Medication Sig Dispense Refill    ALPRAZolam (XANAX) 0.25 MG tablet Take 0.25 mg by mouth daily as needed.  gabapentin (NEURONTIN) 300 MG capsule Take 300 mg by mouth 3 times daily.  For migraines      escitalopram (LEXAPRO) 20 MG tablet Take 20 mg by mouth daily      dilTIAZem (CARDIZEM CD) 240 MG extended release capsule Take 1 capsule by mouth daily 90 capsule 3    allopurinol (ZYLOPRIM) 100 MG tablet Take 1 tablet by mouth daily 90 tablet 1    levothyroxine (SYNTHROID) 100 MCG tablet Take 1 tablet by mouth daily 90 tablet 0    metoprolol succinate (TOPROL XL) 50 MG extended release tablet Take 1 tablet by mouth daily 90 tablet 3    buPROPion (WELLBUTRIN XL) 300 MG extended release tablet Take 300 mg by mouth every morning With 150 mg for total of 450 mg      prochlorperazine (COMPAZINE) 5 MG tablet Take 5 mg by mouth every 6 hours as needed (Migraines) Migraines      Ubrogepant (UBRELVY) 100 MG TABS Take 100 mg by mouth as needed       ferrous sulfate (IRON 325) 325 (65 Fe) MG tablet Take 1 tablet by mouth daily Only takes sometimes due to constipation      Cholecalciferol 50 MCG (2000 UT) CAPS Take 1 capsule by mouth daily       Turmeric (QC TUMERIC COMPLEX PO) Take 1 capsule by mouth daily       Garlic 10 MG CAPS Take 1 capsule by mouth daily       buPROPion (WELLBUTRIN XL) 150 MG extended release tablet Take 150 mg by mouth daily With 300 mg for total of 450 mg      albuterol sulfate HFA (VENTOLIN HFA) 108 (90 Base) MCG/ACT inhaler Inhale 2 puffs into the lungs 4 times daily as needed for Wheezing 1 Inhaler 0    traZODone (DESYREL) 50 MG tablet Take 50 mg by mouth nightly            Objective / Assessment     Patient Active Problem List   Diagnosis Code    Essential hypertension I10    Hypothyroidism E03.9    Idiopathic gout M10.00    Anxiety state F41.1    Moderate single current episode of major depressive disorder (HCC) F32.1    Iron deficiency anemia D50.9    Lab test positive for detection of COVID-19 virus U07.1    Shortness of breath R06.02    Myalgia M79.10    Persistent vomiting R11.15    Uncontrollable vomiting R11.10    Intractable nausea and vomiting R11.2    Dizziness R42    Orthostatic hypotension I95.1    Diarrhea R19.7    Recurrent falls while walking R29.6    Calcium Channel Blocker: Continue diltiazem CD (Cardizem CD) 240 mg once daily. Patient Education/Instructions: I did spend about 15 minutes with patient going over his heart failure packet including dietary guidelines, the signs and symptoms to watch for including shortness of breath, weight gain, lightheadedness/dizziness. I asked patient to call the clinic if develops persistent or worsening shortness of breath or weight gain of more than 3 pounds in 1-7 days. Patient verbalized understanding. 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 Patient states she is wearing her compression socks to work but does not have them on today. She says her legs swell more at work and after she's on her feet all day. NO edema seen today in hands or feet/legs. Patient has gained weight over the past 6 months. This may be attributed to not running as she previously ran 6 miles before she had Covid. Thyroid tests are WNL and Synthroid was increased from 88 mcg to 100 mcg about a month ago. FU with Dr Era Hale on 21  FU pharmacist Heart failure clinic 21 with labs (BMP, BNP) before appt      pg/mL on 21 WNL  FU with Dr Yessenia Lopez on 22    Medication changes since last visit Stopped Losartan/HCTZ, Toradol oral and Topamax.   Changed Lexapro dosage to 20 mg    Thank you for the referral,    Ally Miller, [facility] PharmD    For Pharmacy Admin Tracking Only     Intervention Detail: Adherence Monitorin, Lab(s) Ordered and New Rx: 4, reason: Needs Additional Therapy   Total # of Interventions Recommended: 4   Total # of Interventions Accepted: 4   Time Spent (min): 90

## 2021-07-21 ENCOUNTER — HOSPITAL ENCOUNTER (OUTPATIENT)
Age: 51
Discharge: HOME | End: 2021-07-21
Payer: COMMERCIAL

## 2021-07-21 ENCOUNTER — HOSPITAL ENCOUNTER (OUTPATIENT)
Dept: PHARMACY | Age: 51
Setting detail: THERAPIES SERIES
Discharge: HOME | End: 2021-07-21
Payer: COMMERCIAL

## 2021-07-21 VITALS
SYSTOLIC BLOOD PRESSURE: 128 MMHG | HEART RATE: 82 BPM | OXYGEN SATURATION: 98 % | DIASTOLIC BLOOD PRESSURE: 97 MMHG | BODY MASS INDEX: 35.67 KG/M2 | WEIGHT: 221 LBS

## 2021-07-21 DIAGNOSIS — R60.0 LEG EDEMA: ICD-10-CM

## 2021-07-21 DIAGNOSIS — R94.39 ABNORMAL CARDIOVASCULAR STRESS TEST: ICD-10-CM

## 2021-07-21 DIAGNOSIS — R42 DIZZINESS: ICD-10-CM

## 2021-07-21 DIAGNOSIS — R29.6 RECURRENT FALLS WHILE WALKING: ICD-10-CM

## 2021-07-21 DIAGNOSIS — I95.1 ORTHOSTATIC HYPOTENSION: Primary | ICD-10-CM

## 2021-07-21 DIAGNOSIS — R55 RECURRENT SYNCOPE: ICD-10-CM

## 2021-07-21 LAB
ANION GAP SERPL CALCULATED.3IONS-SCNC: 8 MMOL/L (ref 9–17)
BUN BLDV-MCNC: 16 MG/DL (ref 6–20)
BUN/CREAT BLD: 21 (ref 9–20)
CALCIUM SERPL-MCNC: 8.9 MG/DL (ref 8.6–10.4)
CHLORIDE BLD-SCNC: 105 MMOL/L (ref 98–107)
CO2: 28 MMOL/L (ref 20–31)
CREAT SERPL-MCNC: 0.78 MG/DL (ref 0.5–0.9)
GFR AFRICAN AMERICAN: >60 ML/MIN
GFR NON-AFRICAN AMERICAN: >60 ML/MIN
GFR SERPL CREATININE-BSD FRML MDRD: ABNORMAL ML/MIN/{1.73_M2}
GFR SERPL CREATININE-BSD FRML MDRD: ABNORMAL ML/MIN/{1.73_M2}
GLUCOSE BLD-MCNC: 116 MG/DL (ref 70–99)
POTASSIUM SERPL-SCNC: 4.9 MMOL/L (ref 3.7–5.3)
SODIUM BLD-SCNC: 141 MMOL/L (ref 135–144)

## 2021-07-21 PROCEDURE — 80048 BASIC METABOLIC PNL TOTAL CA: CPT

## 2021-07-21 PROCEDURE — 36415 COLL VENOUS BLD VENIPUNCTURE: CPT

## 2021-07-21 PROCEDURE — 99213 OFFICE O/P EST LOW 20 MIN: CPT

## 2021-07-21 RX ORDER — ESCITALOPRAM OXALATE 20 MG/1
20 TABLET ORAL DAILY
COMMUNITY
Start: 2021-07-08 | End: 2022-06-17

## 2021-07-21 RX ORDER — ALPRAZOLAM 0.25 MG/1
0.25 TABLET ORAL DAILY PRN
COMMUNITY
Start: 2021-06-16

## 2021-07-21 RX ORDER — GABAPENTIN 300 MG/1
900 CAPSULE ORAL 3 TIMES DAILY
COMMUNITY
Start: 2021-07-02 | End: 2022-06-08

## 2021-07-21 NOTE — PROGRESS NOTES
I spoke to Dr Gary Muñoz regarding patient's case and he agreed. I ordered a BNP as suggested by Dr Gary Muñoz. I called patient and left her a message regarding getting the BNP.   Juan Ann, PharmD 7/21/2021 1:50 PM

## 2021-07-22 ENCOUNTER — OFFICE VISIT (OUTPATIENT)
Dept: PRIMARY CARE CLINIC | Age: 51
End: 2021-07-22
Payer: COMMERCIAL

## 2021-07-22 ENCOUNTER — TELEPHONE (OUTPATIENT)
Dept: CARDIOLOGY | Age: 51
End: 2021-07-22

## 2021-07-22 ENCOUNTER — HOSPITAL ENCOUNTER (OUTPATIENT)
Age: 51
Discharge: HOME | End: 2021-07-22
Payer: COMMERCIAL

## 2021-07-22 VITALS
SYSTOLIC BLOOD PRESSURE: 118 MMHG | HEART RATE: 76 BPM | BODY MASS INDEX: 35.77 KG/M2 | WEIGHT: 221.6 LBS | DIASTOLIC BLOOD PRESSURE: 70 MMHG | RESPIRATION RATE: 16 BRPM

## 2021-07-22 DIAGNOSIS — M25.512 ACUTE PAIN OF LEFT SHOULDER: ICD-10-CM

## 2021-07-22 DIAGNOSIS — J02.9 SORE THROAT: Primary | ICD-10-CM

## 2021-07-22 DIAGNOSIS — I10 ESSENTIAL HYPERTENSION: ICD-10-CM

## 2021-07-22 DIAGNOSIS — B00.9 HERPES SIMPLEX: ICD-10-CM

## 2021-07-22 DIAGNOSIS — R94.39 ABNORMAL CARDIOVASCULAR STRESS TEST: ICD-10-CM

## 2021-07-22 LAB
ANION GAP SERPL CALCULATED.3IONS-SCNC: 8 MMOL/L (ref 9–17)
BUN BLDV-MCNC: 12 MG/DL (ref 6–20)
BUN/CREAT BLD: 16 (ref 9–20)
CALCIUM SERPL-MCNC: 9 MG/DL (ref 8.6–10.4)
CHLORIDE BLD-SCNC: 106 MMOL/L (ref 98–107)
CO2: 27 MMOL/L (ref 20–31)
CREAT SERPL-MCNC: 0.74 MG/DL (ref 0.5–0.9)
GFR AFRICAN AMERICAN: >60 ML/MIN
GFR NON-AFRICAN AMERICAN: >60 ML/MIN
GFR SERPL CREATININE-BSD FRML MDRD: ABNORMAL ML/MIN/{1.73_M2}
GFR SERPL CREATININE-BSD FRML MDRD: ABNORMAL ML/MIN/{1.73_M2}
GLUCOSE BLD-MCNC: 65 MG/DL (ref 70–99)
POTASSIUM SERPL-SCNC: 5 MMOL/L (ref 3.7–5.3)
SODIUM BLD-SCNC: 141 MMOL/L (ref 135–144)

## 2021-07-22 PROCEDURE — 36415 COLL VENOUS BLD VENIPUNCTURE: CPT

## 2021-07-22 PROCEDURE — 3017F COLORECTAL CA SCREEN DOC REV: CPT | Performed by: FAMILY MEDICINE

## 2021-07-22 PROCEDURE — 99214 OFFICE O/P EST MOD 30 MIN: CPT | Performed by: FAMILY MEDICINE

## 2021-07-22 PROCEDURE — G8427 DOCREV CUR MEDS BY ELIG CLIN: HCPCS | Performed by: FAMILY MEDICINE

## 2021-07-22 PROCEDURE — 1036F TOBACCO NON-USER: CPT | Performed by: FAMILY MEDICINE

## 2021-07-22 PROCEDURE — G8417 CALC BMI ABV UP PARAM F/U: HCPCS | Performed by: FAMILY MEDICINE

## 2021-07-22 PROCEDURE — 80048 BASIC METABOLIC PNL TOTAL CA: CPT

## 2021-07-22 RX ORDER — AZITHROMYCIN 250 MG/1
250 TABLET, FILM COATED ORAL SEE ADMIN INSTRUCTIONS
Qty: 6 TABLET | Refills: 0 | Status: SHIPPED | OUTPATIENT
Start: 2021-07-22 | End: 2021-07-27

## 2021-07-22 RX ORDER — VALACYCLOVIR HYDROCHLORIDE 500 MG/1
500 TABLET, FILM COATED ORAL 2 TIMES DAILY
Qty: 20 TABLET | Refills: 0 | Status: SHIPPED | OUTPATIENT
Start: 2021-07-22 | End: 2021-08-01

## 2021-07-22 NOTE — PATIENT INSTRUCTIONS
SURVEY:    You may be receiving a survey from [employer] regarding your visit today. You may get this in the mail, through your MyChart, or in your email. Please complete the survey to enable us to provide the highest quality of care to you and your family. If you cannot score us a very good (5 Stars) on any question, please call the office to discuss how we could of made your experience exceptional.    Thank you! Dr. Mari Lala, FANI Miller, RN   [facility], [address] [place] [address]    Phone: [phone]  Fax: [phone]    Office Hours:   Sol Bush, F: 8-5 Wednesday: 9-11Patient Education        Shoulder Arthritis: Exercises  Introduction  Here are some examples of exercises for you to try. The exercises may be suggested for a condition or for rehabilitation. Start each exercise slowly. Ease off the exercises if you start to have pain. You will be told when to start these exercises and which ones will work best for you. How to do the exercises  Shoulder flexion (lying down)   To make a wand for this exercise, use a piece of PVC pipe or a broom handle with the broom removed. Make the wand about a foot wider than your shoulders. 1. Lie on your back, holding a wand with both hands. Your palms should face down as you hold the wand. 2. Keeping your elbows straight, slowly raise your arms over your head. Raise them until you feel a stretch in your shoulders, upper back, and chest.  3. Hold for 15 to 30 seconds. 4. Repeat 2 to 4 times. Shoulder rotation (lying down)   To make a wand for this exercise, use a piece of PVC pipe or a broom handle with the broom removed. Make the wand about a foot wider than your shoulders. 1. Lie on your back. Hold a wand with both hands with your elbows bent and palms up. 2. Keep your elbows close to your body, and move the wand across your body toward the sore arm. 3. Hold for 8 to 12 seconds. 4. Repeat 2 to 4 times. Shoulder internal rotation with towel   1. Hold a towel above and behind your head with the arm that is not sore. 2. With your sore arm, reach behind your back and grasp the towel. 3. With the arm above your head, pull the towel upward. Do this until you feel a stretch on the front and outside of your sore shoulder. 4. Hold 15 to 30 seconds. 5. Repeat 2 to 4 times. Shoulder blade squeeze   1. Stand with your arms at your sides, and squeeze your shoulder blades together. Do not raise your shoulders up as you squeeze. 2. Hold 6 seconds. 3. Repeat 8 to 12 times. Resisted rows   For this exercise, you will need elastic exercise material, such as surgical tubing or Thera-Band. 1. Put the band around a solid object at about waist level. (A bedpost will work well.) Each hand should hold an end of the band. 2. With your elbows at your sides and bent to 90 degrees, pull the band back. Your shoulder blades should move toward each other. Return to the starting position. 3. Repeat 8 to 12 times. External rotator strengthening exercise   1. Start by tying a piece of elastic exercise material to a doorknob. You can use surgical tubing or Thera-Band. (You may also hold one end of the band in each hand.)  2. Stand or sit with your shoulder relaxed and your elbow bent 90 degrees. Your upper arm should rest comfortably against your side. Squeeze a rolled towel between your elbow and your body for comfort. This will help keep your arm at your side. 3. Hold one end of the elastic band with the hand of the painful arm. 4. Start with your forearm across your belly. Slowly rotate the forearm out away from your body. Keep your elbow and upper arm tucked against the towel roll or the side of your body until you begin to feel tightness in your shoulder. Slowly move your arm back to where you started. 5. Repeat 8 to 12 times. Internal rotator strengthening exercise   1.  Start by tying a piece of elastic exercise material to a doorknob. You can use surgical tubing or Thera-Band. 2. Stand or sit with your shoulder relaxed and your elbow bent 90 degrees. Your upper arm should rest comfortably against your side. Squeeze a rolled towel between your elbow and your body for comfort. This will help keep your arm at your side. 3. Hold one end of the elastic band in the hand of the painful arm. 4. Slowly rotate your forearm toward your body until it touches your belly. Slowly move it back to where you started. 5. Keep your elbow and upper arm firmly tucked against the towel roll or at your side. 6. Repeat 8 to 12 times. Pendulum swing   If you have pain in your back, do not do this exercise. 1. Hold on to a table or the back of a chair with your good arm. Then bend forward a little and let your sore arm hang straight down. This exercise does not use the arm muscles. Rather, use your legs and your hips to create movement that makes your arm swing freely. 2. Use the movement from your hips and legs to guide the slightly swinging arm back and forth like a pendulum (or elephant trunk). Then guide it in circles that start small (about the size of a dinner plate). Make the circles a bit larger each day, as your pain allows. 3. Do this exercise for 5 minutes, 5 to 7 times each day. 4. As you have less pain, try bending over a little farther to do this exercise. This will increase the amount of movement at your shoulder. Follow-up care is a key part of your treatment and safety. Be sure to make and go to all appointments, and call your doctor if you are having problems. It's also a good idea to know your test results and keep a list of the medicines you take. Where can you learn more? Go to [email or web address] and sign in to your [employer] account. Enter H562 in the [employer] box to learn more about \"Shoulder Arthritis: Exercises. \"     If you do not have an account, please click on the \"Sign Up Now\" link. Current as of: November 16, 2020               Content Version: 12.9  © 6836-1552 Healthwise, Incorporated. Care instructions adapted under license by [facility] [place] ([facility]). If you have questions about a medical condition or this instruction, always ask your healthcare professional. [address] any warranty or liability for your use of this information.

## 2021-07-22 NOTE — TELEPHONE ENCOUNTER
----- Message from Richard Stearns MD sent at 7/22/2021 12:42 AM EDT -----  Let Ms. Boone know their test result was ok. Thanks.

## 2021-07-22 NOTE — PROGRESS NOTES
Patient is here today with complaints of cough and dizziness. She states that she fell on Sunday from being dizzy. She said because of her fall, her shoulder is now bothering her as well. She said that her sore throat she has had for about a week off and on, and she has had the dizziness since having covid. She states she has just taken tylenol for treatment. Allergies:  Rocephin [ceftriaxone]    Past Medical History:    Past Medical History:   Diagnosis Date    Anemia, unspecified     Anxiety     Edema     H/O cardiovascular stress test 03/08/2021    [place]ova treadmill score is 0    H/O echocardiogram 01/26/2021    EF >60% Normal study    History of cardiac cath 04/01/2021    [facility]) Nia/Dr. Little/Right Radial     History of cardiovascular stress test 03/22/2021    Equivocal study. Small perfusion defect of mild intensity in the anterior and anteroseptal regions during stress and rest imaging, which is most consistent with artifact EF 71% Duke Treadmill score is -2intermediate risk.  History of normal Holter exam 02/22/2021    [facility] 6 days 19 hrs Normal sinus. AT 2 episodes Longest/fastest 9 beats at ave 127 bpm p to 153 bpm PAC 0.08% PVC 0.07%    Hypertension     Hypothyroidism     Thyroid disease     Tilt table evaluation 03/22/2021    Negative study Although sensitivity of study certainly is not 514% relatively normal results signifcantly decrease the likelihood of neurocardiogenic source for pt symptoms.  Clinically suspision remains high possible repeat study maybe indicated       Past Surgical History:    Past Surgical History:   Procedure Laterality Date    ANTERIOR CRUCIATE LIGAMENT REPAIR Left     HYSTERECTOMY, VAGINAL      07/2020 robotic, lap supracervical hyst and BS, perineopexy, A&P repair, cysto, Lynx retropubic slin    INFECTED SKIN DEBRIDEMENT Left     Left third finger    INSERTABLE CARDIAC MONITOR  05/20/2021       Social History:   Social History     Tobacco Use    or earlier if symptoms worsen    No orders of the defined types were placed in this encounter. Current Outpatient Medications   Medication Sig Dispense Refill    azithromycin (ZITHROMAX) 250 MG tablet Take 1 tablet by mouth See Admin Instructions for 5 days 500mg on day 1 followed by 250mg on days 2 - 5 6 tablet 0    valACYclovir (VALTREX) 500 MG tablet Take 1 tablet by mouth 2 times daily for 10 days 20 tablet 0    ALPRAZolam (XANAX) 0.25 MG tablet Take 0.25 mg by mouth daily as needed.  gabapentin (NEURONTIN) 300 MG capsule Take 300 mg by mouth 3 times daily.  For migraines      escitalopram (LEXAPRO) 20 MG tablet Take 20 mg by mouth daily      dilTIAZem (CARDIZEM CD) 240 MG extended release capsule Take 1 capsule by mouth daily 90 capsule 3    allopurinol (ZYLOPRIM) 100 MG tablet Take 1 tablet by mouth daily 90 tablet 1    levothyroxine (SYNTHROID) 100 MCG tablet Take 1 tablet by mouth daily 90 tablet 0    metoprolol succinate (TOPROL XL) 50 MG extended release tablet Take 1 tablet by mouth daily 90 tablet 3    buPROPion (WELLBUTRIN XL) 300 MG extended release tablet Take 300 mg by mouth every morning With 150 mg for total of 450 mg      prochlorperazine (COMPAZINE) 5 MG tablet Take 5 mg by mouth every 6 hours as needed (Migraines) Migraines      Ubrogepant (UBRELVY) 100 MG TABS Take 100 mg by mouth as needed       ferrous sulfate (IRON 325) 325 (65 Fe) MG tablet Take 1 tablet by mouth daily Only takes sometimes due to constipation      Cholecalciferol 50 MCG (2000 UT) CAPS Take 1 capsule by mouth daily       Turmeric (QC TUMERIC COMPLEX PO) Take 1 capsule by mouth daily       Garlic 10 MG CAPS Take 1 capsule by mouth daily       buPROPion (WELLBUTRIN XL) 150 MG extended release tablet Take 150 mg by mouth daily With 300 mg for total of 450 mg      albuterol sulfate HFA (VENTOLIN HFA) 108 (90 Base) MCG/ACT inhaler Inhale 2 puffs into the lungs 4 times daily as needed for Wheezing 1 Inhaler 0    traZODone (DESYREL) 50 MG tablet Take 50 mg by mouth nightly        No current facility-administered medications for this visit. Return in about 3 months (around 10/22/2021).       Electronically signed by Anita Spears MD on 7/22/2021 at 2:23 PM

## 2021-07-23 ENCOUNTER — TELEPHONE (OUTPATIENT)
Dept: CARDIOLOGY | Age: 51
End: 2021-07-23

## 2021-07-23 NOTE — TELEPHONE ENCOUNTER
----- Message from Casimiro Morgan MD sent at 7/22/2021  8:52 PM EDT -----  Let Ms. Boone know their test result was ok. Thanks.

## 2021-07-26 ENCOUNTER — TELEPHONE (OUTPATIENT)
Dept: PRIMARY CARE CLINIC | Age: 51
End: 2021-07-26

## 2021-07-26 NOTE — TELEPHONE ENCOUNTER
Patient called and stated we referred her to therapy but they cannot see her until we complete a C-9 due to it being workers comp?

## 2021-08-03 PROCEDURE — 93298 REM INTERROG DEV EVAL SCRMS: CPT | Performed by: FAMILY MEDICINE

## 2021-08-04 ENCOUNTER — NURSE ONLY (OUTPATIENT)
Dept: CARDIOLOGY | Age: 51
End: 2021-08-04
Payer: COMMERCIAL

## 2021-08-04 DIAGNOSIS — Z45.09 ENCOUNTER FOR LOOP RECORDER CHECK: ICD-10-CM

## 2021-08-04 DIAGNOSIS — R55 SYNCOPE, UNSPECIFIED SYNCOPE TYPE: Primary | ICD-10-CM

## 2021-08-06 ENCOUNTER — TELEPHONE (OUTPATIENT)
Dept: PRIMARY CARE CLINIC | Age: 51
End: 2021-08-06

## 2021-08-06 NOTE — TELEPHONE ENCOUNTER
C 9 already sent ,waiting for approval  We can not give letter about not needing vacine  Will need to wait for c 9 approval to see Dr Malachi Pena

## 2021-08-06 NOTE — TELEPHONE ENCOUNTER
Patient calling in to check on C9 form and how that is going/if we have it done yet. Patient also states she needs a referral to see Dr Denver for ortho. Patient would also like to know if she could have a letter for work stating she does not need the covid 19 vaccination due to \"everything going on right now\" Please advise.

## 2021-08-10 ENCOUNTER — TELEPHONE (OUTPATIENT)
Dept: CARDIOLOGY | Age: 51
End: 2021-08-10

## 2021-08-10 NOTE — TELEPHONE ENCOUNTER
I just wanted to inform Dr. Steven Story that I had another fall on Sunday due to being dizzy/lightheaded. I hit my face and ended up with a black eye. I had another fall approximately three weeks prior to this one.    Thank you,  Sabiha Machado

## 2021-08-12 ENCOUNTER — NURSE TRIAGE (OUTPATIENT)
Dept: OTHER | Facility: CLINIC | Age: 51
End: 2021-08-12

## 2021-08-12 ENCOUNTER — TELEPHONE (OUTPATIENT)
Dept: PRIMARY CARE CLINIC | Age: 51
End: 2021-08-12

## 2021-08-12 NOTE — TELEPHONE ENCOUNTER
Patient called to report she is having body aches, fever and fatigue since getting her first COVID vaccine--Moderna. Reviewed normal side effects from vaccine and patient states her symptoms are not severe. Patient denies having shortness of breath or chest pain. Reviewed when to call PCP or go to ED for change in condition. Advised patient to take OTC the medications as needed for body aches. Patient verbalizes understanding of all information.

## 2021-08-12 NOTE — TELEPHONE ENCOUNTER
Reason for Disposition   Fever present > 3 days (72 hours)    Answer Assessment - Initial Assessment Questions  1. SYMPTOMS: \"What is the main symptom? \" (e.g., redness, swelling, pain)       Headache, fever 100.7, fatigue, muscle aches    2. ONSET: \"When was the vaccine (shot) given? \" \"How much later did the  begin? \" (e.g., hours, days ago)       maderna vaccine 8/10 symptoms started 36 hours later    3. SEVERITY: \"How bad is it?\"       10/10    4. FEVER: \"Is there a fever? \" If so, ask: \"What is it, how was it measured, and when did it start? \"       100.7 yesterday evening      5. IMMUNIZATIONS GIVEN: \"What shots have you recently received? \"     maderna covid vaccine    6. PAST REACTIONS: \"Have you reacted to immunizations before? \" If so, ask: \"What happened? \"      Denies    7. OTHER SYMPTOMS: \"Do you have any other symptoms? \"      H/a, body aches, fatigue and fever    Protocols used: IMMUNIZATION REACTIONS-ADULT-OH    Received call from Padma Tom at [facility] with [employer]. Brief description of triage: as above had covid vaccine on 8/10 and started with symptoms of h/a 10/10, body aches, fever and fatigue, states had covid back in december    Triage indicates for patient to be seen today if unable to go to [facility]    Care advice provided, patient verbalizes understanding; denies any other questions or concerns; instructed to call back for any new or worsening symptoms. Writer provided warm transfer to Brianna Gibson at [facility] for appointment scheduling. Attention Provider: Thank you for allowing me to participate in the care of your patient. The patient was connected to triage in response to information provided to the [facility]. Please do not respond through this encounter as the response is not directed to a shared pool.

## 2021-08-25 DIAGNOSIS — E03.9 HYPOTHYROIDISM, UNSPECIFIED TYPE: ICD-10-CM

## 2021-08-25 RX ORDER — LEVOTHYROXINE SODIUM 100 UG/1
TABLET ORAL
Qty: 30 TABLET | Refills: 0 | Status: SHIPPED | OUTPATIENT
Start: 2021-08-25 | End: 2021-10-01

## 2021-08-27 ENCOUNTER — HOSPITAL ENCOUNTER (OUTPATIENT)
Age: 51
Discharge: HOME | End: 2021-08-27
Payer: COMMERCIAL

## 2021-08-27 ENCOUNTER — OFFICE VISIT (OUTPATIENT)
Dept: PRIMARY CARE CLINIC | Age: 51
End: 2021-08-27
Payer: COMMERCIAL

## 2021-08-27 VITALS
DIASTOLIC BLOOD PRESSURE: 88 MMHG | BODY MASS INDEX: 37.96 KG/M2 | RESPIRATION RATE: 16 BRPM | HEART RATE: 70 BPM | WEIGHT: 235.2 LBS | SYSTOLIC BLOOD PRESSURE: 136 MMHG

## 2021-08-27 DIAGNOSIS — I10 ESSENTIAL HYPERTENSION: ICD-10-CM

## 2021-08-27 DIAGNOSIS — I10 ESSENTIAL HYPERTENSION: Primary | ICD-10-CM

## 2021-08-27 DIAGNOSIS — I95.1 ORTHOSTATIC HYPOTENSION: ICD-10-CM

## 2021-08-27 DIAGNOSIS — E03.9 HYPOTHYROIDISM, UNSPECIFIED TYPE: ICD-10-CM

## 2021-08-27 LAB
ANION GAP SERPL CALCULATED.3IONS-SCNC: 7 MMOL/L (ref 9–17)
BUN BLDV-MCNC: 10 MG/DL (ref 6–20)
BUN/CREAT BLD: 13 (ref 9–20)
CALCIUM SERPL-MCNC: 8.5 MG/DL (ref 8.6–10.4)
CHLORIDE BLD-SCNC: 104 MMOL/L (ref 98–107)
CO2: 25 MMOL/L (ref 20–31)
CREAT SERPL-MCNC: 0.77 MG/DL (ref 0.5–0.9)
GFR AFRICAN AMERICAN: >60 ML/MIN
GFR NON-AFRICAN AMERICAN: >60 ML/MIN
GFR SERPL CREATININE-BSD FRML MDRD: ABNORMAL ML/MIN/{1.73_M2}
GFR SERPL CREATININE-BSD FRML MDRD: ABNORMAL ML/MIN/{1.73_M2}
GLUCOSE BLD-MCNC: 93 MG/DL (ref 70–99)
POTASSIUM SERPL-SCNC: 4.4 MMOL/L (ref 3.7–5.3)
SODIUM BLD-SCNC: 136 MMOL/L (ref 135–144)

## 2021-08-27 PROCEDURE — 80048 BASIC METABOLIC PNL TOTAL CA: CPT

## 2021-08-27 PROCEDURE — G8427 DOCREV CUR MEDS BY ELIG CLIN: HCPCS | Performed by: FAMILY MEDICINE

## 2021-08-27 PROCEDURE — 1036F TOBACCO NON-USER: CPT | Performed by: FAMILY MEDICINE

## 2021-08-27 PROCEDURE — 36415 COLL VENOUS BLD VENIPUNCTURE: CPT

## 2021-08-27 PROCEDURE — 3017F COLORECTAL CA SCREEN DOC REV: CPT | Performed by: FAMILY MEDICINE

## 2021-08-27 PROCEDURE — G8417 CALC BMI ABV UP PARAM F/U: HCPCS | Performed by: FAMILY MEDICINE

## 2021-08-27 PROCEDURE — 99213 OFFICE O/P EST LOW 20 MIN: CPT | Performed by: FAMILY MEDICINE

## 2021-08-27 PROCEDURE — 99211 OFF/OP EST MAY X REQ PHY/QHP: CPT | Performed by: FAMILY MEDICINE

## 2021-08-27 NOTE — PATIENT INSTRUCTIONS
SURVEY:    You may be receiving a survey from [employer] regarding your visit today. You may get this in the mail, through your MyChart, or in your email. Please complete the survey to enable us to provide the highest quality of care to you and your family. If you cannot score us a very good (5 Stars) on any question, please call the office to discuss how we could of made your experience exceptional.    Thank you!     Dr. Nila Sierra, FANI Aguirre, LISSA   [facility], [address] [place] [address] Cindy Alba    Phone: [phone]  Fax: [phone]    Office Hours:   Sol Ramirez, F: 8-5 Wednesday: 9-11

## 2021-08-27 NOTE — PROGRESS NOTES
Hypertension: Patient here for follow-up of elevated blood pressure. She is exercising by walking and is adherent to low salt diet. Blood pressure is not well controlled at home. Patient states yesterday it was 182/91. Cardiac symptoms chest pain, fatigue and palpitations. She said that she does get them often. She said that when her blood pressure was high she had chest pain. Patient denies none. Cardiovascular risk factors: hypertension and obesity (BMI >= 30 kg/m2). Use of agents associated with hypertension: none. Patient states that she is still having dizziness, and shortness of breath. She has not noticed any improvement in either since her last visit. She said that she has fallen since the last time she has been in. Allergies:  Rocephin [ceftriaxone]    Past Medical History:    Past Medical History:   Diagnosis Date    Anemia, unspecified     Anxiety     Edema     H/O cardiovascular stress test 03/08/2021    Cordova treadmill score is 0    H/O echocardiogram 01/26/2021    EF >60% Normal study    History of cardiac cath 04/01/2021    [facility]) Nia/Dr. Little/Right Radial     History of cardiovascular stress test 03/22/2021    Equivocal study. Small perfusion defect of mild intensity in the anterior and anteroseptal regions during stress and rest imaging, which is most consistent with artifact EF 71% Duke Treadmill score is -2intermediate risk.  History of normal Holter exam 02/22/2021    [facility] 6 days 19 hrs Normal sinus. AT 2 episodes Longest/fastest 9 beats at ave 127 bpm p to 153 bpm PAC 0.08% PVC 0.07%    Hypertension     Hypothyroidism     Thyroid disease     Tilt table evaluation 03/22/2021    Negative study Although sensitivity of study certainly is not 782% relatively normal results signifcantly decrease the likelihood of neurocardiogenic source for pt symptoms.  Clinically suspision remains high possible repeat study maybe indicated       Past Surgical History: Past Surgical History:   Procedure Laterality Date    ANTERIOR CRUCIATE LIGAMENT REPAIR Left     HYSTERECTOMY, VAGINAL      07/2020 robotic, lap supracervical hyst and BS, perineopexy, A&P repair, cysto, Lynx retropubic slin    INFECTED SKIN DEBRIDEMENT Left     Left third finger    INSERTABLE CARDIAC MONITOR  05/20/2021       Social History:   Social History     Tobacco Use    Smoking status: Never Smoker    Smokeless tobacco: Never Used   Substance Use Topics    Alcohol use: Yes     Alcohol/week: 0.0 standard drinks     Comment: rarely       Family History:   Family History   Adopted: Yes         Review of Systems:  Constitutional: negative for fever or chills, has dizziness and falls. Blood pressure fluctuates  Eyes: negative for visual disturbance   ENT: negative for sore throat or nasal congestion  Respiratory: neg for cough or shortness of breath  Cardiovascular: negative for chest pain or palpitations  Gastrointestinal: negative for abd pain, nausea, vomiting, diarrhea or constipation  Genitourinary: negative for dysuria, urgency or frequency  Integument/breast: negative for skin rash or lesions  Neurological: negative for unilateral weakness, numbness or tingling. Skeletal Muscular: no joint pain or swelling      Objective:  Physical Exam:  GEN:   A & O x3, no apparent distress  EYES: No gross abnormalities.   ENT:ENT exam normal, no neck nodes or sinus tenderness  NECK: normal, supple, no lymphadenopathy,  no carotid bruits  PULM: clear to auscultation bilaterally- no wheezes, rales or rhonchi, normal air movement, no respiratory distress  COR: regular rate & rhythm, no murmurs and no gallops, has minimal edema  ABD:  soft, non-tender, non-distended, normal bowel sounds, no masses or organomegaly  : deferred  EXT: normal strength, tone, and muscle mass  NEURO: Motor and sensory grossly intact  SKIN:  No skin lesions or rashes        Labs:  Lab Results   Component Value Date    GLUCOSE 65 (L) 07/22/2021    BUN 12 07/22/2021    CREATININE 0.74 07/22/2021     07/22/2021    K 5.0 07/22/2021    CALCIUM 9.0 07/22/2021     07/22/2021    CO2 27 07/22/2021    LABGLOM >60 07/22/2021       Assessment:  1. Essential hypertension    2. Hypothyroidism, unspecified type    3. Orthostatic hypotension          Plan:      ICD-10-CM    1. Essential hypertension - well controlled today- fluctuates,close monitoring S17 Basic Metabolic Panel   2. Hypothyroidism, unspecified type - stable E03.9    3. Orthostatic hypotension  I95.1        · Encouraged fluid replacement. · Goal for blood pressure control is 130/80  · Recommended regular exercise as tolerated, 3-5 times per day  · Labs: bmp today  · Waist high suport stockings  · Continue work restriction to work no more than 12 hours in a shift    Orders Placed This Encounter   Procedures    Basic Metabolic Panel     Standing Status:   Future     Standing Expiration Date:   8/27/2022       Current Outpatient Medications   Medication Sig Dispense Refill    EUTHYROX 100 MCG tablet Take 1 tablet by mouth once daily 30 tablet 0    ALPRAZolam (XANAX) 0.25 MG tablet Take 0.25 mg by mouth daily as needed.  gabapentin (NEURONTIN) 300 MG capsule Take 300 mg by mouth 3 times daily.  For migraines      escitalopram (LEXAPRO) 20 MG tablet Take 20 mg by mouth daily      dilTIAZem (CARDIZEM CD) 240 MG extended release capsule Take 1 capsule by mouth daily 90 capsule 3    allopurinol (ZYLOPRIM) 100 MG tablet Take 1 tablet by mouth daily 90 tablet 1    metoprolol succinate (TOPROL XL) 50 MG extended release tablet Take 1 tablet by mouth daily 90 tablet 3    buPROPion (WELLBUTRIN XL) 300 MG extended release tablet Take 300 mg by mouth every morning With 150 mg for total of 450 mg      prochlorperazine (COMPAZINE) 5 MG tablet Take 5 mg by mouth every 6 hours as needed (Migraines) Migraines      Ubrogepant (UBRELVY) 100 MG TABS Take 100 mg by mouth as needed       ferrous sulfate (IRON 325) 325 (65 Fe) MG tablet Take 1 tablet by mouth daily Only takes sometimes due to constipation      Cholecalciferol 50 MCG (2000 UT) CAPS Take 1 capsule by mouth daily       Turmeric (QC TUMERIC COMPLEX PO) Take 1 capsule by mouth daily       Garlic 10 MG CAPS Take 1 capsule by mouth daily       buPROPion (WELLBUTRIN XL) 150 MG extended release tablet Take 150 mg by mouth daily With 300 mg for total of 450 mg      albuterol sulfate HFA (VENTOLIN HFA) 108 (90 Base) MCG/ACT inhaler Inhale 2 puffs into the lungs 4 times daily as needed for Wheezing 1 Inhaler 0    traZODone (DESYREL) 50 MG tablet Take 50 mg by mouth nightly        No current facility-administered medications for this visit. No follow-ups on file.       Electronically signed by Yogi Escalona MD on 8/27/2021 at 9:36 AM

## 2021-08-30 ENCOUNTER — TELEPHONE (OUTPATIENT)
Dept: CARDIOLOGY | Age: 51
End: 2021-08-30

## 2021-08-31 DIAGNOSIS — R55 SYNCOPE, UNSPECIFIED SYNCOPE TYPE: Primary | ICD-10-CM

## 2021-08-31 DIAGNOSIS — R07.89 ATYPICAL CHEST PAIN: ICD-10-CM

## 2021-08-31 DIAGNOSIS — I47.1 PSVT (PAROXYSMAL SUPRAVENTRICULAR TACHYCARDIA) (HCC): ICD-10-CM

## 2021-09-07 ENCOUNTER — HOSPITAL ENCOUNTER (OUTPATIENT)
Dept: NON INVASIVE DIAGNOSTICS | Age: 51
Discharge: HOME | End: 2021-09-07
Payer: COMMERCIAL

## 2021-09-07 DIAGNOSIS — I47.1 PSVT (PAROXYSMAL SUPRAVENTRICULAR TACHYCARDIA) (HCC): ICD-10-CM

## 2021-09-07 DIAGNOSIS — R07.89 ATYPICAL CHEST PAIN: ICD-10-CM

## 2021-09-07 DIAGNOSIS — R55 SYNCOPE, UNSPECIFIED SYNCOPE TYPE: ICD-10-CM

## 2021-09-07 PROCEDURE — 93242 EXT ECG>48HR<7D RECORDING: CPT

## 2021-09-07 PROCEDURE — 93243 EXT ECG>48HR<7D SCAN A/R: CPT

## 2021-09-17 ENCOUNTER — VIRTUAL VISIT (OUTPATIENT)
Dept: CARDIOLOGY | Age: 51
End: 2021-09-17
Payer: COMMERCIAL

## 2021-09-17 DIAGNOSIS — R00.2 INTERMITTENT PALPITATIONS: ICD-10-CM

## 2021-09-17 DIAGNOSIS — R07.89 ATYPICAL CHEST PAIN: Primary | ICD-10-CM

## 2021-09-17 DIAGNOSIS — Z95.818 STATUS POST PLACEMENT OF IMPLANTABLE LOOP RECORDER: ICD-10-CM

## 2021-09-17 PROCEDURE — 99214 OFFICE O/P EST MOD 30 MIN: CPT | Performed by: FAMILY MEDICINE

## 2021-09-17 RX ORDER — DILTIAZEM HYDROCHLORIDE 300 MG/1
300 CAPSULE, COATED, EXTENDED RELEASE ORAL DAILY
Qty: 90 CAPSULE | Refills: 3 | Status: SHIPPED | OUTPATIENT
Start: 2021-09-17

## 2021-09-17 NOTE — PROGRESS NOTES
Selvin Cisneros RN am scribing for and in the presence of Cali Nguyen. Sidney ARAGON, MS, F.A.C.C..    Patient: Santi Campa  : [id number] Care Physician: David Tesfaye  Today's Date: 2021    REASON FOR VISIT: Follow-up (HX:   Pt reports that she continues to have palpitations and shortness of breath at times. CP every once in a while (every week) duration of a minute. C/O: lightheadedness/dizziness but has stayed the same. going out in the heat makes dizziness alot worse.)    Dear David Tesfaye MD,    HPI: Ms. Damir Velasco is a 49 y.o. female who was admitted to the hospital with lightheaded and dizziness. She also had been feeling nauseated and having migraine that started last night. She denied any previous heart related history. She is adopted so she was unaware of any family history. She does drink 1-2 cups off coffee daily. Her echocardiogram on 2021 was fairly unremarkable and showed an ejection fraction of >60%. Her CAM monitor was done on 2021 she wore 6 days, 19 hours showed one epos ode of atrial tachycardia with symptoms. She had treadmill stress test done on 3/8/2021 her  Duke treadmill score is 0 which is intermediate risk. Ms. Damir Velasco recently had Negative Tilt Table test on 3/22/21 and an Equivocal stress test. Ms. Damir Velasco heart catheterization on 21 was largely normal.     Ms. Damir Velasco reports doing ok, however does say she continues to have palpitations and shortness of breath at times but no different than before. She says that these remain moderately bothersome to her on a daily basis, sometimes takes her breath away. Occasional lightheadedness and dizziness but says that when she takes her blood pressure it is usually high at these times in the 180's and tends to make her palpitations worse. She says that she never sees low blood pressures. She denied any abdominal pain, bleeding problems, problems with her medications or any other concerns at this time. She also denied any fever or chills. Past Medical History:   Diagnosis Date    Anemia, unspecified     Anxiety     Edema     H/O cardiovascular stress test 03/08/2021    [place] treadmill score is 0    H/O echocardiogram 01/26/2021    EF >60% Normal study    History of cardiac cath 04/01/2021    [facility]) Nia/Dr. Little/Right Radial     History of cardiovascular stress test 03/22/2021    Equivocal study. Small perfusion defect of mild intensity in the anterior and anteroseptal regions during stress and rest imaging, which is most consistent with artifact EF 71% Duke Treadmill score is -2intermediate risk.  History of normal Holter exam 02/22/2021    CAM 6 days 19 hrs Normal sinus. AT 2 episodes Longest/fastest 9 beats at ave 127 bpm p to 153 bpm PAC 0.08% PVC 0.07%    Hypertension     Hypothyroidism     Thyroid disease     Tilt table evaluation 03/22/2021    Negative study Although sensitivity of study certainly is not 293% relatively normal results signifcantly decrease the likelihood of neurocardiogenic source for pt symptoms. Clinically suspision remains high possible repeat study maybe indicated       CURRENT ALLERGIES: Rocephin [ceftriaxone] REVIEW OF SYSTEMS: 14 systems were reviewed. Pertinent positives and negatives as above, all else negative. Past Surgical History:   Procedure Laterality Date    ANTERIOR CRUCIATE LIGAMENT REPAIR Left     HYSTERECTOMY, VAGINAL      07/2020 robotic, lap supracervical hyst and BS, perineopexy, A&P repair, cysto, Lynx retropubic slin    INFECTED SKIN DEBRIDEMENT Left     Left third finger    INSERTABLE CARDIAC MONITOR  05/20/2021    Social History:  Social History     Tobacco Use    Smoking status: Never Smoker    Smokeless tobacco: Never Used   Vaping Use    Vaping Use: Never used   Substance Use Topics    Alcohol use:  Yes     Alcohol/week: 0.0 standard drinks     Comment: rarely    Drug use: No        MEDICATIONS:  No current facility-administered medications for this visit. CURRENT INPATIENT MEDICATIONS:  Prior to Admission medications    Medication Sig Start Date End Date Taking? Authorizing Provider   EUTHYROX 100 MCG tablet Take 1 tablet by mouth once daily 8/25/21  Yes Stefanie Monge MD   ALPRAZolam (XANAX) 0.25 MG tablet Take 0.25 mg by mouth daily as needed. 6/16/21  Yes Historical Provider, MD   gabapentin (NEURONTIN) 300 MG capsule Take 300 mg by mouth 3 times daily.  For migraines 7/2/21  Yes Historical Provider, MD   escitalopram (LEXAPRO) 20 MG tablet Take 20 mg by mouth daily 7/8/21  Yes Historical Provider, MD   dilTIAZem (CARDIZEM CD) 240 MG extended release capsule Take 1 capsule by mouth daily 7/2/21  Yes Axel Guillen MD   allopurinol (ZYLOPRIM) 100 MG tablet Take 1 tablet by mouth daily 5/27/21  Yes Stefanie Monge MD   metoprolol succinate (TOPROL XL) 50 MG extended release tablet Take 1 tablet by mouth daily 3/30/21  Yes Axel Guillen MD   buPROPion (WELLBUTRIN XL) 300 MG extended release tablet Take 300 mg by mouth every morning With 150 mg for total of 450 mg 2/24/21  Yes Historical Provider, MD   prochlorperazine (COMPAZINE) 5 MG tablet Take 5 mg by mouth every 6 hours as needed (Migraines) Migraines 3/15/21  Yes Historical Provider, MD   Ubrogepant (UBRELVY) 100 MG TABS Take 100 mg by mouth as needed  3/16/21  Yes Historical Provider, MD   ferrous sulfate (IRON 325) 325 (65 Fe) MG tablet Take 1 tablet by mouth daily Only takes sometimes due to constipation   Yes Historical Provider, MD   Cholecalciferol 50 MCG (2000 UT) CAPS Take 1 capsule by mouth daily    Yes Historical Provider, MD   Turmeric (QC TUMERIC COMPLEX PO) Take 1 capsule by mouth daily    Yes Historical Provider, MD   Garlic 10 MG CAPS Take 1 capsule by mouth daily    Yes Historical Provider, MD   buPROPion (WELLBUTRIN XL) 150 MG extended release tablet Take 150 mg by mouth daily With 300 mg for total of 450 mg 1/21/21  Yes Historical Provider, MD   albuterol sulfate HFA (VENTOLIN HFA) 108 (90 Base) MCG/ACT inhaler Inhale 2 puffs into the lungs 4 times daily as needed for Wheezing 12/19/20  Yes Sheryle Heller, MD   traZODone (DESYREL) 50 MG tablet Take 50 mg by mouth nightly  1/10/20  Yes Historical Provider, MD       FAMILY HISTORY: family history is not on file. She was adopted. [ INSTRUCTIONS:  \"[x]\" Indicates a positive item  \"[]\" Indicates a negative item   Vital Signs: (As obtained by patient/caregiver or practitioner observation)  Patient-Reported Vitals 9/17/2021   Patient-Reported Weight 223 lb   Patient-Reported Height 5' 5\"   Patient-Reported Systolic 607   Patient-Reported Diastolic 88   Patient-Reported Pulse 91        Constitutional: [x] Appears well-developed and well-nourished [x] No apparent distress      [] Abnormal-   Mental status  [x] Alert and awake  [x] Oriented to person/place/time [x]Able to follow commands      Eyes:  EOM    [x]  Normal  [] Abnormal-  Sclera  [x]  Normal  [] Abnormal -         Discharge [x]  None visible  [] Abnormal -    HENT:   [x] Normocephalic, atraumatic.   [] Abnormal   [] Mouth/Throat: Mucous membranes are moist.     External Ears [x] Normal  [] Abnormal-     Neck: [x] No visualized mass     Pulmonary/Chest: [x] Respiratory effort normal.  [x] No visualized signs of difficulty breathing or respiratory distress        [] Abnormal-     Neurological:        [x] No Facial Asymmetry (Cranial nerve 7 motor function) (limited exam to video visit)          [] No gaze palsy        [] Abnormal-         Skin:        [x] No significant exanthematous lesions or discoloration noted on facial skin         [] Abnormal-            Psychiatric:       [x] Normal Affect [] No Hallucinations        [] Abnormal-     Other pertinent observable physical exam findings: None      MOST RECENT LABS ON RECORD:   Lab Results   Component Value Date    WBC 5.2 05/28/2021    HGB 14.1 05/28/2021    HCT 43.1 05/28/2021  05/28/2021    CHOL 185 04/22/2020    TRIG 79 04/22/2020    HDL 48 04/22/2020    ALT 15 05/28/2021    AST 23 05/28/2021     08/27/2021    K 4.4 08/27/2021     08/27/2021    CREATININE 0.77 08/27/2021    BUN 10 08/27/2021    CO2 25 08/27/2021    TSH 4.03 07/13/2021       ASSESSMENT:  Patient Active Problem List    Diagnosis Date Noted    Recurrent syncope 05/20/2021    PSVT (paroxysmal supraventricular tachycardia) (HCC) 05/20/2021    Abnormal cardiovascular stress test 04/01/2021    Orthostatic hypotension     Diarrhea     Recurrent falls while walking     Dizziness 01/26/2021    Persistent vomiting 01/25/2021    Uncontrollable vomiting 01/25/2021    Intractable nausea and vomiting 01/25/2021    Lab test positive for detection of COVID-19 virus 12/20/2020    Shortness of breath 12/20/2020    Myalgia 12/20/2020    Iron deficiency anemia 02/15/2019    Moderate single current episode of major depressive disorder ([facility] [address] 03/21/2018    Idiopathic gout 04/20/2016    Anxiety state 04/20/2016    Essential hypertension 05/06/2015    Hypothyroidism 05/06/2015       Diagnosis Orders   1. Atypical chest pain     2. Intermittent palpitations     3. Status post placement of implantable loop recorder         PLAN:  · Atypical Chest Pain: mild. Grossly normal stress test on 4/1/21. Suspect that this is non cardiac but could be some kind of an paroxysmal supraventricular tachycardia-Monitoring with LINQ loop recorder via home monitoring. I reviewed her latest download with her and re assured her no dangerous arrhythmias were seen and expect these are simply PAC\"s and/or PVC's which were seen on a 3/2021 Holter. · Beta Blocker: Continue Metoprolol succinate (Toprol XL) 50 mg daily.    · Calcium Channel Blocker: INCREASE Diltiazem CD to 300 mg once daily I also discussed the potential side effects of this medication including lightheadedness and dizziness and instructed them to stop the medication of this occurs and call our office if this occurs. · Counseling: I advised Ms. Boone to call our office or go to the emergency room if she develops worsening or persistent chest pain or shortness of breath as this could be life threatening.   Recurrent intermittent palpitations: moderate with a recent episode of possible paroxysmal supraventricular tachycardia   · Beta Blocker: Continue Metoprolol succinate (Toprol XL) 50 mg daily. · Calcium Channel Blocker: INCREASE to Diltiazem  mg daily I also discussed the potential side effects of this medication including lightheadedness and dizziness and instructed them to stop the medication of this occurs and call our office if this occurs.  Implantable Loop Recorder:   Indication for Device Placement: Unexplained Syncope   Interrogation Findings: I reviewed the results of their most recent home interrogation from 9/6/2021. Heart rate controlled with no dysrhythmias seen. Once again, thank you for allowing me to participate in this patients care. Please do not hesitate to contact me if I could be of any further assistance. I told Ms. Boone to call my office if she had any problems, but otherwise told her to Return in about 6 months (around 3/17/2022). However, I would be happy to see her sooner should the need arise. Sincerely,  Kanchan Melchor. Sidney RAAGON, MS, F.A.C.C. [facility] Cardiology Specialist     Place [address]  Phone: [phone], Fax: [phone]     I believe that the risk of significant morbidity and mortality related to the patient's current medical conditions are: low-intermediate. >30 minutes were spent during prep work, discussion and exam of the patient, and follow up documentation and all of their questions were answered. The documentation recorded by the scribe, accurately and completely reflects the services I personally performed and the decisions made by me.  Neris Aly MD, MS, KEENA HOWELL September 17, 2021     Rama Wolf is a 49 y.o. female being evaluated by a Virtual Visit (video visit) encounter to address concerns as mentioned above. A caregiver was present when appropriate. Due to this being a TeleHealth encounter (During OOHDF-60 public health emergency), evaluation of the following organ systems was limited: Vitals/Constitutional/EENT/Resp/CV/GI//MS/Neuro/Skin/Heme-Lymph-Imm. Pursuant to the emergency declaration under the [address] [place] [address] authority and the [employer] Act, this Virtual Visit was conducted with patient's (and/or legal guardian's) consent, to reduce the patient's risk of exposure to COVID-19 and provide necessary medical care. The patient (and/or legal guardian) has also been advised to contact this office for worsening conditions or problems, and seek emergency medical treatment and/or call 911 if deemed necessary. Services were provided through a video synchronous discussion virtually to substitute for in-person visit. Ms. Brandie Salinas was located in the patients home  John Richardson RN performed the visit from my home in [facility]    --John Richardson RN on 9/17/2021 at 11:19 AM    An electronic signature was used to authenticate this note.

## 2021-09-17 NOTE — PATIENT INSTRUCTIONS
SURVEY:    You may be receiving a survey from [employer] regarding your visit today. Please complete the survey to enable us to provide the highest quality of care to you and your family. If you cannot score us a very good on any question, please call the office to discuss how we could have made your experience a very good one. Thank you.     Sarai Barber RN :)

## 2021-09-20 NOTE — PROCEDURES
[address], [place] [address]                                 EVENT MONITOR    PATIENT NAME: Karmen Villarreal                  :        1970  MED REC NO:   [id number]                              ROOM:  ACCOUNT NO:   [de-identified]                           ADMIT DATE: 2021  PROVIDER:     Lu Farias MD    CARDIOVASCULAR DIAGNOSTIC DEPARTMENT    DATE OF STUDY:  2021    ORDERING PROVIDER:  Lu Farias MD    PRIMARY CARE PROVIDER:  Rafa Hanks MD    INTERPRETING PHYSICIAN: Lu Farias MD    DIAGNOSIS:  Syncope and collapse. PHYSICIAN INTERPRETATION:  Recording Length: 5 days, 8 hours. Frequent premature ventricular contractions comprising 3.2% of total  beats. Five symptoms were reported and some were associated with  premature ventricular contractions. Clinical correlation required.         Ne Kenyon MD    D: 2021 11:02:12       T: 2021 11:03:40     [email or web address]  Job#: [id number]     Doc#: Unknown    CC:  Donny Llanos

## 2021-09-21 ENCOUNTER — TELEPHONE (OUTPATIENT)
Dept: CARDIOLOGY | Age: 51
End: 2021-09-21

## 2021-09-21 NOTE — TELEPHONE ENCOUNTER
----- Message from Lori Parra MD sent at 9/20/2021 11:45 PM EDT -----  Let Ms. Boone know their test result was ok. Will discuss at next visit. Thanks.

## 2021-09-28 ENCOUNTER — TELEPHONE (OUTPATIENT)
Dept: PHARMACY | Age: 51
End: 2021-09-28

## 2021-09-28 NOTE — TELEPHONE ENCOUNTER
COVID-19 phone screening     Call placed to screen patient prior to upcoming Medication Management visit for Anticoagulation on 9/29/21. Does patient have any of the following symptoms?   [] Fever    [] Lower respiratory symptoms (SOB, difficulty breathing, cough)  [] None      VM full

## 2021-09-29 ENCOUNTER — HOSPITAL ENCOUNTER (OUTPATIENT)
Age: 51
Discharge: HOME | End: 2021-09-29
Payer: COMMERCIAL

## 2021-09-29 ENCOUNTER — TELEPHONE (OUTPATIENT)
Dept: CARDIOLOGY | Age: 51
End: 2021-09-29

## 2021-09-29 ENCOUNTER — HOSPITAL ENCOUNTER (OUTPATIENT)
Dept: PHARMACY | Age: 51
Setting detail: THERAPIES SERIES
Discharge: HOME | End: 2021-09-29
Payer: COMMERCIAL

## 2021-09-29 VITALS
BODY MASS INDEX: 37.12 KG/M2 | HEART RATE: 64 BPM | DIASTOLIC BLOOD PRESSURE: 75 MMHG | WEIGHT: 231 LBS | OXYGEN SATURATION: 98 % | SYSTOLIC BLOOD PRESSURE: 125 MMHG | HEIGHT: 66 IN

## 2021-09-29 DIAGNOSIS — E03.8 OTHER SPECIFIED HYPOTHYROIDISM: ICD-10-CM

## 2021-09-29 DIAGNOSIS — R42 DIZZINESS: ICD-10-CM

## 2021-09-29 DIAGNOSIS — R94.39 ABNORMAL CARDIOVASCULAR STRESS TEST: ICD-10-CM

## 2021-09-29 DIAGNOSIS — R55 RECURRENT SYNCOPE: ICD-10-CM

## 2021-09-29 DIAGNOSIS — I95.1 ORTHOSTATIC HYPOTENSION: ICD-10-CM

## 2021-09-29 DIAGNOSIS — I47.1 PSVT (PAROXYSMAL SUPRAVENTRICULAR TACHYCARDIA) (HCC): Primary | ICD-10-CM

## 2021-09-29 LAB
ANION GAP SERPL CALCULATED.3IONS-SCNC: 11 MMOL/L (ref 9–17)
BNP INTERPRETATION: NORMAL
BUN BLDV-MCNC: 12 MG/DL (ref 6–20)
BUN/CREAT BLD: 14 (ref 9–20)
CALCIUM SERPL-MCNC: 9 MG/DL (ref 8.6–10.4)
CHLORIDE BLD-SCNC: 103 MMOL/L (ref 98–107)
CO2: 25 MMOL/L (ref 20–31)
CREAT SERPL-MCNC: 0.86 MG/DL (ref 0.5–0.9)
GFR AFRICAN AMERICAN: >60 ML/MIN
GFR NON-AFRICAN AMERICAN: >60 ML/MIN
GFR SERPL CREATININE-BSD FRML MDRD: NORMAL ML/MIN/{1.73_M2}
GFR SERPL CREATININE-BSD FRML MDRD: NORMAL ML/MIN/{1.73_M2}
GLUCOSE BLD-MCNC: 87 MG/DL (ref 70–99)
POTASSIUM SERPL-SCNC: 4.4 MMOL/L (ref 3.7–5.3)
PRO-BNP: 64 PG/ML
SODIUM BLD-SCNC: 139 MMOL/L (ref 135–144)

## 2021-09-29 PROCEDURE — 80048 BASIC METABOLIC PNL TOTAL CA: CPT

## 2021-09-29 PROCEDURE — 36415 COLL VENOUS BLD VENIPUNCTURE: CPT

## 2021-09-29 PROCEDURE — 99212 OFFICE O/P EST SF 10 MIN: CPT

## 2021-09-29 PROCEDURE — 83880 ASSAY OF NATRIURETIC PEPTIDE: CPT

## 2021-09-29 NOTE — TELEPHONE ENCOUNTER
----- Message from Lori Parra MD sent at 9/29/2021  2:08 PM EDT -----  Please notify patient that their lab results are normal.

## 2021-09-29 NOTE — PROGRESS NOTES
[facility]  Medication Management  Pharmacist  Heart Failure    [address] [facility]  [place] [address]  Phone: [phone]  Fax: [phone]      NAME: Tomas Boone  MEDICAL RECORD NUMBER:  [id number]  AGE: 49 y.o. GENDER: female  : 1970  EPISODE DATE:  2021      Heart Failure Management referral by Dr. Gary Muñoz    Dry weight: ?? pounds     Today's Wt: 231 lb  Wt Readings from Last 6 Encounters:   21 231 lb (104.8 kg)   21 235 lb 3.2 oz (106.7 kg)   21 221 lb 9.6 oz (100.5 kg)   21 221 lb (100.2 kg)   21 220 lb 9.6 oz (100.1 kg)   21 218 lb 6.4 oz (99.1 kg)    and   Ht Readings from Last 1 Encounters:   21 5' 6\" (1.676 m)       /75  HR:64  O2Sat: 98     Extremities and pitting edema: none  Skin: Warm dry intact NOT wearing Compression socks     Subjective   Ms. Boone is a 49 y.o. female here for the Heart Failure Services. They are here today for a comprehensive medication review including over the counter medications and herbal products, overall wellbeing assessment, transition of care and any needed adjustments with updates and recommendations communicated to the referring physician. Patient Symptoms Ms. Boone is here today for one month follow up and continues to have intermittent palpitations. The palpations are her biggest complaint but says it is better since increasing Cardizem. These palpitations wake her up at night but less than before Cardizem increase. She has intermittent dizziness with them as well as other times when standing. Patient has not fallen for awhile. Edema is better but patient claims she still has it sometimes. Patient is a runner. She could only run a half mile yesterday but was running 6-7 miles before Covid in 2020. Patient is walking about 3 miles a day.     Shortness of Breath:   []   None   [x]   Dyspnea on exertion   []   Dyspnea with normal activities  [] Dyspnea at rest  []   Dyspnea while sleeping    Patient Findings:   []  Missed doses  []  Diet changes  []  Sodium intake changes   []  Night time cough   []  Other   []  Early saiety, abd fullness [x]  Chest pain   []  Constipation   []  Night time bathroom trips  []  Alcohol intake changes  []  Acute illness  []  Edema    []  Number of pillows or recliner  []  Activity changes   []  Fatigue that limits activity [x]  Heart racing or skipping beats  [x]  Light headedness  [x]  Dizziness    []      []  Problems sleeping    Functional Activity New York Heart Association Classification  [x]   Class I No limitation of physical activity. Ordinary physical activity does not cause undue fatigue, palpitation, dyspnea (shortness of breath). []   Class II Slight limitation of physical activity. Comfortable at rest. Ordinary physical activity results in fatigue, palpitation, dyspnea (shortness of breath). []   Class III  Pb limitation of physical activity. Comfortable at rest.  Less than ordinary activity causes fatigue, palpitation, dyspnea. []   Class IV Unable to carry on any physical activity without discomfort. Symptoms of heart failure at rest.  If any physical activity is undertaken, discomfort increases. Last CHF Hospital Admission: Never for HF    OUTPATIENT MEDICATIONS:  Outpatient Medications Marked as Taking for the 9/29/21 encounter [facility] Encounter) with [employer]   Medication Sig Dispense Refill    dilTIAZem (CARDIZEM CD) 300 MG extended release capsule Take 1 capsule by mouth daily 90 capsule 3    EUTHYROX 100 MCG tablet Take 1 tablet by mouth once daily 30 tablet 0    ALPRAZolam (XANAX) 0.25 MG tablet Take 0.25 mg by mouth daily as needed.  gabapentin (NEURONTIN) 300 MG capsule Take 300 mg by mouth 3 times daily.  For migraines      escitalopram (LEXAPRO) 20 MG tablet Take 20 mg by mouth daily      allopurinol (ZYLOPRIM) 100 MG tablet Take 1 tablet by mouth daily 90 Never used   Substance Use Topics    Alcohol use: Yes     Alcohol/week: 0.0 standard drinks     Comment: rarely    Drug use: No       Potassium (mmol/L)   Date Value   09/29/2021 4.4   08/27/2021 4.4   07/22/2021 5.0   07/21/2021 4.9   05/28/2021 3.8   05/13/2021 4.8     BUN (mg/dL)   Date Value   09/29/2021 12   08/27/2021 10   07/22/2021 12   07/21/2021 16   05/28/2021 13   05/13/2021 13     CREATININE (mg/dL)   Date Value   09/29/2021 0.86   08/27/2021 0.77   07/22/2021 0.74   07/21/2021 0.78   05/28/2021 0.65   05/13/2021 0.77     TSH   Date Value   07/13/2021 4.03 mIU/L   05/13/2021 4.01 mIU/L   01/25/2021 1.07 mIU/L   04/22/2020 1.27 uIU/mL   02/08/2019 1.78 uIU/mL     No components found for: T4    Pro-BNP 64  <300 pg/mL Final 09/29/2021  8:33         Plan:      · Chronic Systolic and/or Diastolic Heart Failure (diagnosis): EF: >60%  via echocardiogram on 1-25-21  ? Beta Blocker for EF </= 40%: Continue Metoprolol succinate (Toprol XL) 50 mg daily.   · Calcium Channel Blocker: INCREASE to Diltiazem  mg daily   · Diuretics: None  · ACE/ARB/ARNI for EF </= 40%: none  · Spironolactone for EF </= 35%: none  · Nonpharmacologic management of Heart Failure: I told patient to continue wearing lower extremity compression stockings and I advised patient to try and keep legs up whenever possible and to limit salt in diet.   · Laboratory testing:   ? Basic metabolic panel (BMP) in 12 weeks from now to assess her potassium and renal function  TSH due to increased levothyroxine dose      Essential Hypertension:   ? Diuretics:  None  ? Beta Blocker: Metoprolol succinate (Toprol XL) 50 mg daily.               Calcium Channel Blocker: INCREASE to Diltiazem  mg daily.     Medication changes since last visit Levothyroxine increased from 88 mcg to 100 mcg on 8-25-21.     Last TSH 4.03 on 7-13-21  Cardizem increased from 240 mg to 300 mg on 9-17-21    Thank you for the referral,    Harleen Alan, [id number] PharmD    For Pharmacy Admin Tracking Only     Intervention Detail: Adherence Monitorin, Dose Adjustment: 1, reason: Therapy Optimization and Lab(s) Ordered   Total # of Interventions Recommended: 1   Total # of Interventions Accepted: 1   Time Spent (min): 60

## 2021-09-30 ENCOUNTER — TELEPHONE (OUTPATIENT)
Dept: PRIMARY CARE CLINIC | Age: 51
End: 2021-09-30

## 2021-09-30 DIAGNOSIS — I50.9 DYSPNEA DUE TO CONGESTIVE HEART FAILURE (HCC): Primary | ICD-10-CM

## 2021-09-30 DIAGNOSIS — R06.00 DYSPNEA DUE TO COVID-19: ICD-10-CM

## 2021-09-30 DIAGNOSIS — U07.1 DYSPNEA DUE TO COVID-19: ICD-10-CM

## 2021-10-01 DIAGNOSIS — E03.9 HYPOTHYROIDISM, UNSPECIFIED TYPE: ICD-10-CM

## 2021-10-01 RX ORDER — LEVOTHYROXINE SODIUM 100 UG/1
TABLET ORAL
Qty: 30 TABLET | Refills: 0 | Status: SHIPPED | OUTPATIENT
Start: 2021-10-01 | End: 2021-12-13

## 2021-10-28 ENCOUNTER — OFFICE VISIT (OUTPATIENT)
Dept: PULMONOLOGY | Age: 51
End: 2021-10-28
Payer: COMMERCIAL

## 2021-10-28 VITALS
TEMPERATURE: 98.6 F | HEART RATE: 86 BPM | RESPIRATION RATE: 20 BRPM | SYSTOLIC BLOOD PRESSURE: 165 MMHG | DIASTOLIC BLOOD PRESSURE: 97 MMHG | HEIGHT: 65 IN | BODY MASS INDEX: 39.29 KG/M2 | OXYGEN SATURATION: 95 % | WEIGHT: 235.8 LBS

## 2021-10-28 DIAGNOSIS — U09.9 COVID-19 LONG HAULER MANIFESTING CHRONIC DYSPNEA: Primary | ICD-10-CM

## 2021-10-28 DIAGNOSIS — E66.01 CLASS 2 SEVERE OBESITY DUE TO EXCESS CALORIES WITH SERIOUS COMORBIDITY AND BODY MASS INDEX (BMI) OF 39.0 TO 39.9 IN ADULT (HCC): ICD-10-CM

## 2021-10-28 DIAGNOSIS — R06.09 COVID-19 LONG HAULER MANIFESTING CHRONIC DYSPNEA: Primary | ICD-10-CM

## 2021-10-28 PROCEDURE — 3017F COLORECTAL CA SCREEN DOC REV: CPT | Performed by: INTERNAL MEDICINE

## 2021-10-28 PROCEDURE — G8484 FLU IMMUNIZE NO ADMIN: HCPCS | Performed by: INTERNAL MEDICINE

## 2021-10-28 PROCEDURE — 99214 OFFICE O/P EST MOD 30 MIN: CPT | Performed by: INTERNAL MEDICINE

## 2021-10-28 PROCEDURE — 99204 OFFICE O/P NEW MOD 45 MIN: CPT | Performed by: INTERNAL MEDICINE

## 2021-10-28 PROCEDURE — G8417 CALC BMI ABV UP PARAM F/U: HCPCS | Performed by: INTERNAL MEDICINE

## 2021-10-28 PROCEDURE — 1036F TOBACCO NON-USER: CPT | Performed by: INTERNAL MEDICINE

## 2021-10-28 PROCEDURE — G8427 DOCREV CUR MEDS BY ELIG CLIN: HCPCS | Performed by: INTERNAL MEDICINE

## 2021-10-28 ASSESSMENT — ENCOUNTER SYMPTOMS
EYES NEGATIVE: 1
SHORTNESS OF BREATH: 1
GASTROINTESTINAL NEGATIVE: 1

## 2021-10-28 NOTE — PROGRESS NOTES
Subjective:      Patient ID: Nery Ludwig is a 49 y.o. female being seen in my clinic for   Chief Complaint   Patient presents with    New Patient     shortness of breathe since covid in Dec       HPI  New patient visit, referred by Dr. Nitin Scott, for evaluation and management of dyspnea. Patient states that she developed COVID-19 infection in mid December 2020. 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 \"It was really bad we lost 30 residents. \"  She was seen in the emergency department at [facility] on 1220. Chest x-ray done at that time reviewed. No infiltrates. Sent home. Return to [facility] on 12/23. CT angiogram of the chest was negative for pulmonary embolism and no infiltrates were noted. Reviewed. Was discharged home as she did not have oxygen desaturations. She was admitted to [facility] on 1/27/2021 with intractable nausea and vomiting. She was treated with intravenous fluids and observed. At that time, also reported diarrhea. Over the ensuing months she reported racing heart with minimal exercise, severe, intractable dyspnea, generalized fatigue, brain fog, and diffuse myalgias and arthralgias. She had numerous visits to her primary care provider. In May, she presented to the emergency department again and had a CT angiogram of the chest which is reviewed. No vascular filling defects. No infiltrates. The patient's GI symptoms have largely resolved. She continues to report exertional dyspnea. States that prior to Covid, she had weighed about 165 pounds. Since then she has gained nearly 75 pounds. She states that she had previously been a runner. Competed in a half marathon about 2 years ago. Now she become short of breath just crossing the room. She continues to work. No history of asthma or allergies. Never smoked. Received both of her Covid vaccinations. Reported no improvement in her symptoms.     The patient specifically denies symptoms of sleep apnea. She is unaware of snoring. Denies frequent nocturnal awakenings because of choking or shortness of breath. Denies daytime sleepiness or unrefreshing sleep. Current Outpatient Medications   Medication Sig Dispense Refill    EUTHYROX 100 MCG tablet Take 1 tablet by mouth once daily 30 tablet 0    dilTIAZem (CARDIZEM CD) 300 MG extended release capsule Take 1 capsule by mouth daily 90 capsule 3    ALPRAZolam (XANAX) 0.25 MG tablet Take 0.25 mg by mouth daily as needed.  gabapentin (NEURONTIN) 300 MG capsule Take 300 mg by mouth 3 times daily. For migraines      escitalopram (LEXAPRO) 20 MG tablet Take 20 mg by mouth daily      allopurinol (ZYLOPRIM) 100 MG tablet Take 1 tablet by mouth daily 90 tablet 1    metoprolol succinate (TOPROL XL) 50 MG extended release tablet Take 1 tablet by mouth daily 90 tablet 3    buPROPion (WELLBUTRIN XL) 300 MG extended release tablet Take 300 mg by mouth every morning With 150 mg for total of 450 mg      prochlorperazine (COMPAZINE) 5 MG tablet Take 5 mg by mouth every 6 hours as needed (Migraines) Migraines      Ubrogepant (UBRELVY) 100 MG TABS Take 100 mg by mouth as needed (migraines)       ferrous sulfate (IRON 325) 325 (65 Fe) MG tablet Take 1 tablet by mouth daily Only takes sometimes due to constipation      Cholecalciferol 50 MCG (2000 UT) CAPS Take 1 capsule by mouth daily       Turmeric (QC TUMERIC COMPLEX PO) Take 1 capsule by mouth daily       buPROPion (WELLBUTRIN XL) 150 MG extended release tablet Take 150 mg by mouth daily With 300 mg for total of 450 mg      traZODone (DESYREL) 50 MG tablet Take 50 mg by mouth nightly       albuterol sulfate HFA (VENTOLIN HFA) 108 (90 Base) MCG/ACT inhaler Inhale 2 puffs into the lungs 4 times daily as needed for Wheezing (Patient not taking: Reported on 10/28/2021) 1 Inhaler 0     No current facility-administered medications for this visit.        Family History   Adopted: Yes       Social History     Tobacco Use    Smoking status: Never Smoker    Smokeless tobacco: Never Used   Vaping Use    Vaping Use: Never used   Substance Use Topics    Alcohol use: Yes     Alcohol/week: 0.0 standard drinks     Comment: rarely    Drug use: No       Review of Systems   Constitutional: Positive for fatigue and unexpected weight change. HENT: Negative. Eyes: Negative. Respiratory: Positive for shortness of breath. Cardiovascular: Negative. Gastrointestinal: Negative. Musculoskeletal: Positive for arthralgias and myalgias. Neurological: Positive for headaches. All other systems reviewed and are negative. Objective:     Vitals:    10/28/21 1129 10/28/21 1134   BP: (!) 158/88 (!) 165/97   Site: Left Upper Arm Left Upper Arm   Position: Sitting Sitting   Cuff Size: Medium Adult Medium Adult   Pulse: 80 86   Resp: 20    Temp: 98.6 °F (37 °C)    TempSrc: Temporal    SpO2: 95%    Weight: 235 lb 12.8 oz (107 kg)    Height: 5' 5\" (1.651 m)      Physical Exam  Vitals and nursing note reviewed. Constitutional:       Appearance: She is well-developed. She is obese. HENT:      Head: Normocephalic and atraumatic. Right Ear: External ear normal.      Left Ear: External ear normal.      Nose: Nose normal. No congestion. Mouth/Throat:      Mouth: Mucous membranes are moist.      Pharynx: Oropharynx is clear. No oropharyngeal exudate. Eyes:      General: No scleral icterus. Conjunctiva/sclera: Conjunctivae normal.   Neck:      Thyroid: No thyromegaly. Vascular: No JVD. Trachea: No tracheal deviation. Cardiovascular:      Rate and Rhythm: Normal rate and regular rhythm. Heart sounds: Normal heart sounds. No murmur heard. No gallop. Pulmonary:      Effort: Pulmonary effort is normal. No respiratory distress. Breath sounds: No wheezing or rales. Chest:      Chest wall: No tenderness.    Abdominal:      Palpations: Abdomen is soft.      Tenderness: There is no abdominal tenderness. Musculoskeletal:      Cervical back: Neck supple. Right lower leg: No edema. Left lower leg: No edema. Lymphadenopathy:      Cervical: No cervical adenopathy. Skin:     General: Skin is warm and dry. Neurological:      Mental Status: She is alert and oriented to person, place, and time. Wt Readings from Last 3 Encounters:   10/28/21 235 lb 12.8 oz (107 kg)   09/29/21 231 lb (104.8 kg)   08/27/21 235 lb 3.2 oz (106.7 kg)     Results for orders placed or performed during the hospital encounter of 09/29/21   Brain Natriuretic Peptide   Result Value Ref Range    Pro-BNP 64 <300 pg/mL    BNP Interpretation Pro-BNP Reference Range:    Basic Metabolic Panel   Result Value Ref Range    Glucose 87 70 - 99 mg/dL    BUN 12 6 - 20 mg/dL    CREATININE 0.86 0.50 - 0.90 mg/dL    Bun/Cre Ratio 14 9 - 20    Calcium 9.0 8.6 - 10.4 mg/dL    Sodium 139 135 - 144 mmol/L    Potassium 4.4 3.7 - 5.3 mmol/L    Chloride 103 98 - 107 mmol/L    CO2 25 20 - 31 mmol/L    Anion Gap 11 9 - 17 mmol/L    GFR Non-African American >60 >60 mL/min    GFR African American >60 >60 mL/min    GFR Comment          GFR Staging             Assessment:      1. COVID-19 long hauler manifesting chronic dyspnea    2.  Class 2 severe obesity due to excess calories with serious comorbidity and body mass index (BMI) of 39.0 to 39.9 in adult [facility])      Patient Active Problem List   Diagnosis    Essential hypertension    Hypothyroidism    Idiopathic gout    Anxiety state    Moderate single current episode of major depressive disorder (HCC)    Iron deficiency anemia    Lab test positive for detection of COVID-19 virus    Shortness of breath    Myalgia    Persistent vomiting    Uncontrollable vomiting    Intractable nausea and vomiting    Dizziness    Orthostatic hypotension    Diarrhea    Recurrent falls while walking    Abnormal cardiovascular stress test    Recurrent syncope    PSVT (paroxysmal supraventricular tachycardia) ([facility] [address]         Plan:      1. Long discussion with patient regarding the pathophysiology, manifestations, and treatment approaches to post Covid syndrome. Occurs in 10-30% of patients post Covid, frequently mild disease. Symptoms can linger for months. Testing is usually negative/normal.  2. Pulmonary function studies to screen for asthma. 3. Weight loss. 4. Encouraged regular exercise begin slowly. 5. Ensure adequate sleep. 6. Demonstrated breathing exercises. 7. Reassured patient that recovery is likely however time course may be prolonged. 8. Return in 3 months. No orders of the defined types were placed in this encounter. Orders Placed This Encounter   Procedures    Full PFT Study With Bronchodilator     If an ABG is needed along with this PFT procedure, please place the appropriate lab order     Standing Status:   Future     Standing Expiration Date:   10/28/2022     Return in about 3 months (around 1/28/2022).      Electronically signed by Mariama Terry DO on 10/28/2021 at 12:05 PM

## 2021-10-28 NOTE — PATIENT INSTRUCTIONS
SURVEY:    You may be receiving a survey from [employer] regarding your visit today. Please complete the survey to enable us to provide the highest quality of care to you and your family. If you cannot score us a very good on any question, please call the office to discuss how we could have made your experience a very good one. Thank you.

## 2021-11-04 ENCOUNTER — TELEPHONE (OUTPATIENT)
Dept: CARDIOLOGY | Age: 51
End: 2021-11-04

## 2021-11-04 NOTE — TELEPHONE ENCOUNTER
I'm contacting you in regards to my blood pressure. Yesterday I was up to 188/81, today it was up to 197/91, the lowest I've been for the past two days is 176/81. My heart rate has been in the 70's and 80's. I've not been doing anything different. I just wanted to advise you of what's going on.   Thank you,  Serge Virk

## 2021-11-05 NOTE — TELEPHONE ENCOUNTER
Please call Ms. Boone in order to arrange an appointment ASAP. Lets get her in with Molly on a Thursday. Thanks.

## 2021-11-10 ENCOUNTER — OFFICE VISIT (OUTPATIENT)
Dept: CARDIOLOGY | Age: 51
End: 2021-11-10
Payer: COMMERCIAL

## 2021-11-10 VITALS
WEIGHT: 243 LBS | HEART RATE: 76 BPM | HEIGHT: 65 IN | SYSTOLIC BLOOD PRESSURE: 136 MMHG | BODY MASS INDEX: 40.48 KG/M2 | DIASTOLIC BLOOD PRESSURE: 76 MMHG | RESPIRATION RATE: 18 BRPM | OXYGEN SATURATION: 98 %

## 2021-11-10 DIAGNOSIS — Z79.899 ENCOUNTER FOR MONITORING DIURETIC THERAPY: ICD-10-CM

## 2021-11-10 DIAGNOSIS — Z51.81 ENCOUNTER FOR MONITORING DIURETIC THERAPY: ICD-10-CM

## 2021-11-10 DIAGNOSIS — Z45.09 ENCOUNTER FOR LOOP RECORDER CHECK: ICD-10-CM

## 2021-11-10 DIAGNOSIS — I10 ESSENTIAL HYPERTENSION: ICD-10-CM

## 2021-11-10 DIAGNOSIS — R07.89 ATYPICAL CHEST PAIN: Primary | ICD-10-CM

## 2021-11-10 DIAGNOSIS — R00.2 INTERMITTENT PALPITATIONS: ICD-10-CM

## 2021-11-10 PROCEDURE — G8484 FLU IMMUNIZE NO ADMIN: HCPCS | Performed by: PHYSICIAN ASSISTANT

## 2021-11-10 PROCEDURE — G8427 DOCREV CUR MEDS BY ELIG CLIN: HCPCS | Performed by: PHYSICIAN ASSISTANT

## 2021-11-10 PROCEDURE — 1036F TOBACCO NON-USER: CPT | Performed by: PHYSICIAN ASSISTANT

## 2021-11-10 PROCEDURE — 99214 OFFICE O/P EST MOD 30 MIN: CPT | Performed by: PHYSICIAN ASSISTANT

## 2021-11-10 PROCEDURE — G8417 CALC BMI ABV UP PARAM F/U: HCPCS | Performed by: PHYSICIAN ASSISTANT

## 2021-11-10 PROCEDURE — 3017F COLORECTAL CA SCREEN DOC REV: CPT | Performed by: PHYSICIAN ASSISTANT

## 2021-11-10 RX ORDER — BISOPROLOL FUMARATE AND HYDROCHLOROTHIAZIDE 5; 6.25 MG/1; MG/1
1 TABLET ORAL DAILY
Qty: 90 TABLET | Refills: 3 | Status: SHIPPED | OUTPATIENT
Start: 2021-11-10 | End: 2022-06-24 | Stop reason: DRUGHIGH

## 2021-11-10 NOTE — PROGRESS NOTES
Waynette Spurling am scribing for and in the presence of Audrey Peres PA-C. Patient: Farhan Rey  : /[id number] Care Physician: Erik Pagan  Today's Date: 11/10/2021    REASON FOR VISIT: Follow-up (hx:cp, PALP,LOOP Pt is here for BP being high 197/91 she feels off when they are high. she notices swelling in hands, feet and legs. SOB,lightheaded/dizziness,palp  denies:CP,)    Dear Erik Pagan MD,    HPI: Ms. Rafael Bryan is a 49 y.o. female who was admitted to the hospital with lightheaded and dizziness. She also had been feeling nauseated and having migraine that started last night. She denied any previous heart related history. She is adopted so she was unaware of any family history. She does drink 1-2 cups off coffee daily. Her echocardiogram on 2021 was fairly unremarkable and showed an ejection fraction of >60%. Her CAM monitor was done on 2021 she wore 6 days, 19 hours showed one epos ode of atrial tachycardia with symptoms. She had treadmill stress test done on 3/8/2021 her  [facility]uke treadmill score is 0 which is intermediate risk. Ms. Rafael Bryan recently had Negative Tilt Table test on 3/22/21 and an Equivocal stress test. Ms. Rafael Bryan heart catheterization on 21 was largely normal.  Cardiac event monitor worn on 2021 showed frequent premature ventricular contractions 3.2% of the beats, 5 symptoms were reported and associated with the PVCs. Ms. Rafael Bryan is here today for elevated blood pressures. Last week her blood pressure was running in the 190s to 170s was the lowest she was able to get it. She states she was taking her medication and does not know what changed. She does know occasional lightheadedness and dizziness she has had since having Covid last number. She does note occasionally that when she has lightheadedness and dizziness she takes her blood pressure it is usually high at these times in the 180's.  She notes since last being seen then going up on her medications her palpitations has lessened in frequency. Continues to have shortness of breath which is staying stable, she began having shortness of breath after being diagnosed with Covid last December. She denied any abdominal pain, bleeding problems, problems with her medications or any other concerns at this time. She also denied any fever or chills. Past Medical History:   Diagnosis Date    Anemia, unspecified     Anxiety     Edema     H/O cardiovascular stress test 03/08/2021    [place] treadmill score is 0    H/O echocardiogram 01/26/2021    EF >60% Normal study    History of cardiac cath 04/01/2021    [facility] N[place]/Dr. Little/Right Radial     History of cardiovascular stress test 03/22/2021    Equivocal study. Small perfusion defect of mild intensity in the anterior and anteroseptal regions during stress and rest imaging, which is most consistent with artifact EF 71% [facility] Treadmill score is -2intermediate risk.  History of normal Holter exam 02/22/2021    CAM 6 days 19 hrs Normal sinus. AT 2 episodes Longest/fastest 9 beats at ave 127 bpm p to 153 bpm PAC 0.08% PVC 0.07%    Hypertension     Hypothyroidism     Thyroid disease     Tilt table evaluation 03/22/2021    Negative study Although sensitivity of study certainly is not 203% relatively normal results signifcantly decrease the likelihood of neurocardiogenic source for pt symptoms. Clinically suspision remains high possible repeat study maybe indicated       CURRENT ALLERGIES: Rocephin [ceftriaxone] REVIEW OF SYSTEMS: 14 systems were reviewed. Pertinent positives and negatives as above, all else negative.      Past Surgical History:   Procedure Laterality Date    ANTERIOR CRUCIATE LIGAMENT REPAIR Left     HYSTERECTOMY, VAGINAL      07/2020 robotic, lap supracervical hyst and BS, perineopexy, A&P repair, cysto, Lynx retropubic slin    INFECTED SKIN DEBRIDEMENT Left     Left third finger    INSERTABLE CARDIAC MONITOR 05/20/2021    Social History:  Social History     Tobacco Use    Smoking status: Never Smoker    Smokeless tobacco: Never Used   Vaping Use    Vaping Use: Never used   Substance Use Topics    Alcohol use: Yes     Alcohol/week: 0.0 standard drinks     Comment: rarely    Drug use: No        MEDICATIONS:  No current facility-administered medications for this visit. CURRENT INPATIENT MEDICATIONS:  Prior to Admission medications    Medication Sig Start Date End Date Taking? Authorizing Provider   bisoprolol-hydroCHLOROthiazide [facility]) 5-6.25 MG per tablet Take 1 tablet by mouth daily 11/10/21  Yes Melvina Cutler PA-C   EUTHYROX 100 MCG tablet Take 1 tablet by mouth once daily 10/1/21  Yes Som Steele MD   dilTIAZem (CARDIZEM CD) 300 MG extended release capsule Take 1 capsule by mouth daily 9/17/21  Yes Krystian Hansen MD   ALPRAZolam [facility]) 0.25 MG tablet Take 0.25 mg by mouth daily as needed. 6/16/21  Yes Historical Provider, MD   gabapentin (NEURONTIN) 300 MG capsule Take 300 mg by mouth 3 times daily.  For migraines 7/2/21  Yes Historical Provider, MD   escitalopram (LEXAPRO) 20 MG tablet Take 20 mg by mouth daily 7/8/21  Yes Historical Provider, MD   allopurinol (ZYLOPRIM) 100 MG tablet Take 1 tablet by mouth daily 5/27/21  Yes Som Steele MD   buPROPion (WELLBUTRIN XL) 300 MG extended release tablet Take 300 mg by mouth every morning With 150 mg for total of 450 mg 2/24/21  Yes Historical Provider, MD   prochlorperazine (COMPAZINE) 5 MG tablet Take 5 mg by mouth every 6 hours as needed (Migraines) Migraines 3/15/21  Yes Historical Provider, MD   Ubrogepant (UBRELVY) 100 MG TABS Take 100 mg by mouth as needed (migraines)  3/16/21  Yes Historical Provider, MD   ferrous sulfate (IRON 325) 325 (65 Fe) MG tablet Take 1 tablet by mouth daily Only takes sometimes due to constipation   Yes Historical Provider, MD   Cholecalciferol 50 MCG (2000 UT) CAPS Take 1 capsule by mouth daily    Yes Historical MOST RECENT LABS ON RECORD:   Lab Results   Component Value Date    WBC 5.2 05/28/2021    HGB 14.1 05/28/2021    HCT 43.1 05/28/2021     05/28/2021    CHOL 185 04/22/2020    TRIG 79 04/22/2020    HDL 48 04/22/2020    ALT 15 05/28/2021    AST 23 05/28/2021     09/29/2021    K 4.4 09/29/2021     09/29/2021    CREATININE 0.86 09/29/2021    BUN 12 09/29/2021    CO2 25 09/29/2021    TSH 4.03 07/13/2021       ASSESSMENT:  Patient Active Problem List    Diagnosis Date Noted    Recurrent syncope 05/20/2021    [facility] 05/20/2021    Abnormal cardiovascular stress test 04/01/2021    Orthostatic hypotension     Diarrhea     Recurrent falls while walking     Dizziness 01/26/2021    Persistent vomiting 01/25/2021    Uncontrollable vomiting 01/25/2021    Intractable nausea and vomiting 01/25/2021    Lab test positive for detection of COVID-19 virus 12/20/2020    Shortness of breath 12/20/2020    Myalgia 12/20/2020    Iron deficiency anemia 02/15/2019    Moderate single current episode of major depressive disorder ([facility] [address] 03/21/2018    Idiopathic gout 04/20/2016    Anxiety state 04/20/2016    Essential hypertension 05/06/2015    Hypothyroidism 05/06/2015       Diagnosis Orders   1. Atypical chest pain     2. Essential hypertension     3. Intermittent palpitations     4. Encounter for loop recorder check     5. Encounter for monitoring diuretic therapy  Basic Metabolic Panel       PLAN:  · Essential Hypertension: Uncontrolled   Beta Blocker: STOP Metoprolol succinate (Toprol XL) and START bisoprolol/HCTZ (Ziac) 5/6.25 mg daily. I also discussed the potential side effects of this medication including lightheadedness and dizziness and instructed them to stop the medication of this occurs and call our office if this occurs.  ACE Inibitor/ARB: Not indicated at this time.  Calcium Channel Blocker: Continue Diltizem 300 mg daily.  Diuretics: Not indicated at this time.  Additional Testing List: I took the liberty of ordering a BMP in 5-7 days to assess their potassium and renal function. I told them that they could get their lab work performed at the location of their choosing, unfortunately, if the lab work was not performed at a [facility] facility I could not guarantee my ability to follow up with them on their results. · Atypical Chest Pain: mild. Grossly normal stress test on 4/1/21. Suspect that this is non cardiac but could be some kind of an paroxysmal supraventricular tachycardia-Monitoring with LINQ loop recorder via home monitoring. Last download no dangerous arrhythmias were seen and expect these are simply PAC\"s and/or PVC's which were seen on a 3/2021 Holter. · Beta Blocker: STOP Metoprolol succinate (Toprol XL) and START bisoprolol/HCTZ (Ziac) 5/6.25 mg daily. I also discussed the potential side effects of this medication including lightheadedness and dizziness and instructed them to stop the medication of this occurs and call our office if this occurs. · Calcium Channel Blocker: Continue Diltiazem CD to 300 mg once daily I also discussed the potential side effects of this medication including lightheadedness and dizziness and instructed them to stop the medication of this occurs and call our office if this occurs. · Counseling: I advised Ms. Boone to call our office or go to the emergency room if she develops worsening or persistent chest pain or shortness of breath as this could be life threatening.   Recurrent intermittent palpitations: moderate with a recent episode of possible paroxysmal supraventricular tachycardia   · Beta Blocker: STOP Metoprolol succinate (Toprol XL) and START bisoprolol/HCTZ (Ziac) 5/6.25 mg daily.  I also discussed the potential side effects of this medication including lightheadedness and dizziness and instructed them to stop the medication of this occurs and call our office if this occurs. · Calcium Channel Blocker: Continue Diltiazem 300 mg daily       Implantable Loop Recorder:   Indication for Device Placement: Unexplained Syncope   Interrogation Findings: I reviewed the results of their most recent home interrogation from 9/30/2021. Symptom recorder, consistant with PVC, heart rate controlled with no dysrhythmias seen. Once again, thank you for allowing me to participate in this patients care. Please do not hesitate to contact me if I could be of any further assistance. I told Ms. Boone to call my office if she had any problems, but otherwise told her to Return in about 6 weeks (around 12/22/2021). However, I would be happy to see her sooner should the need arise. Sincerely,  Ron Oropeza PA-C  [facility]    [address]  [facility], [address] [place] [address]  Phone: [phone], Fax: [phone]     I believe that the risk of significant morbidity and mortality related to the patient's current medical conditions are: Intermediate. >15 minutes were spent during prep work, discussion and exam of the patient, and follow up documentation and all of their questions were answered.       November 10, 2021

## 2021-11-16 ENCOUNTER — HOSPITAL ENCOUNTER (OUTPATIENT)
Dept: PULMONOLOGY | Age: 51
Discharge: HOME | End: 2021-11-16
Payer: COMMERCIAL

## 2021-11-16 DIAGNOSIS — R06.09 COVID-19 LONG HAULER MANIFESTING CHRONIC DYSPNEA: ICD-10-CM

## 2021-11-16 DIAGNOSIS — U09.9 COVID-19 LONG HAULER MANIFESTING CHRONIC DYSPNEA: ICD-10-CM

## 2021-11-16 PROCEDURE — 6370000000 HC RX 637 (ALT 250 FOR IP): Performed by: INTERNAL MEDICINE

## 2021-11-16 PROCEDURE — 94729 DIFFUSING CAPACITY: CPT

## 2021-11-16 PROCEDURE — 94726 PLETHYSMOGRAPHY LUNG VOLUMES: CPT

## 2021-11-16 PROCEDURE — 94664 DEMO&/EVAL PT USE INHALER: CPT

## 2021-11-16 PROCEDURE — 94060 EVALUATION OF WHEEZING: CPT

## 2021-11-16 RX ORDER — ALBUTEROL SULFATE 90 UG/1
4 AEROSOL, METERED RESPIRATORY (INHALATION) ONCE
Status: COMPLETED | OUTPATIENT
Start: 2021-11-16 | End: 2021-11-16

## 2021-11-16 RX ADMIN — ALBUTEROL SULFATE 4 PUFF: 90 AEROSOL, METERED RESPIRATORY (INHALATION) at 08:19

## 2021-11-23 NOTE — PROCEDURES
[address], [place] [address]                               PULMONARY FUNCTION    PATIENT NAME: Donnie Nails                  :        1970  MED REC NO:   [id number]                              ROOM:  ACCOUNT NO:   [de-identified]                           ADMIT DATE: 2021  PROVIDER:     Jazmin Kent    DATE OF PROCEDURE:  2021    FINDINGS:  Spirometry is normal.  FEV1 is 85% of predicted with 5%  bronchodilator change to 90% of predicted. FVC 87% of predicted with 1%  bronchodilator change to 88% of predicted. FEV1/FVC ratio is 77,  postbronchodilator 81. Total lung capacity by body box 104% of predicted. % of predicted  and diffusion capacity uncorrected 81% of predicted. Airway resistance  is normal.    FINAL IMPRESSION:  Normal PFT. Clinical correlation advised.         Yanira Morales    D: 2021 21:10:18       T: 2021 21:12:47     /S__  Job#: [id number]     Doc#: [id number]    CC:  [address]

## 2021-11-26 ENCOUNTER — OFFICE VISIT (OUTPATIENT)
Dept: PRIMARY CARE CLINIC | Age: 51
End: 2021-11-26
Payer: COMMERCIAL

## 2021-11-26 VITALS
SYSTOLIC BLOOD PRESSURE: 137 MMHG | HEART RATE: 70 BPM | DIASTOLIC BLOOD PRESSURE: 83 MMHG | WEIGHT: 240.2 LBS | BODY MASS INDEX: 39.97 KG/M2

## 2021-11-26 DIAGNOSIS — I10 ESSENTIAL HYPERTENSION: Primary | ICD-10-CM

## 2021-11-26 DIAGNOSIS — R06.00 DYSPNEA DUE TO COVID-19: ICD-10-CM

## 2021-11-26 DIAGNOSIS — E03.9 HYPOTHYROIDISM, UNSPECIFIED TYPE: ICD-10-CM

## 2021-11-26 DIAGNOSIS — U07.1 DYSPNEA DUE TO COVID-19: ICD-10-CM

## 2021-11-26 DIAGNOSIS — F51.01 PRIMARY INSOMNIA: ICD-10-CM

## 2021-11-26 DIAGNOSIS — I95.1 ORTHOSTATIC HYPOTENSION: ICD-10-CM

## 2021-11-26 PROCEDURE — 3017F COLORECTAL CA SCREEN DOC REV: CPT | Performed by: FAMILY MEDICINE

## 2021-11-26 PROCEDURE — 1036F TOBACCO NON-USER: CPT | Performed by: FAMILY MEDICINE

## 2021-11-26 PROCEDURE — G8484 FLU IMMUNIZE NO ADMIN: HCPCS | Performed by: FAMILY MEDICINE

## 2021-11-26 PROCEDURE — 99214 OFFICE O/P EST MOD 30 MIN: CPT | Performed by: FAMILY MEDICINE

## 2021-11-26 PROCEDURE — G8427 DOCREV CUR MEDS BY ELIG CLIN: HCPCS | Performed by: FAMILY MEDICINE

## 2021-11-26 PROCEDURE — G8417 CALC BMI ABV UP PARAM F/U: HCPCS | Performed by: FAMILY MEDICINE

## 2021-11-26 RX ORDER — ALBUTEROL SULFATE 90 UG/1
2 AEROSOL, METERED RESPIRATORY (INHALATION) 4 TIMES DAILY PRN
Qty: 1 EACH | Refills: 0 | Status: SHIPPED | OUTPATIENT
Start: 2021-11-26

## 2021-11-26 RX ORDER — ZOLPIDEM TARTRATE 5 MG/1
5 TABLET ORAL NIGHTLY PRN
Qty: 30 TABLET | Refills: 2 | Status: SHIPPED | OUTPATIENT
Start: 2021-11-26 | End: 2021-12-10

## 2021-11-26 ASSESSMENT — PATIENT HEALTH QUESTIONNAIRE - PHQ9
SUM OF ALL RESPONSES TO PHQ QUESTIONS 1-9: 0
2. FEELING DOWN, DEPRESSED OR HOPELESS: 0
SUM OF ALL RESPONSES TO PHQ QUESTIONS 1-9: 0
SUM OF ALL RESPONSES TO PHQ QUESTIONS 1-9: 0
1. LITTLE INTEREST OR PLEASURE IN DOING THINGS: 0
SUM OF ALL RESPONSES TO PHQ9 QUESTIONS 1 & 2: 0

## 2021-11-26 NOTE — PROGRESS NOTES
Hypertension: Patient here for follow-up of elevated blood pressure. She is exercising and is adherent to low salt diet. Blood pressure is well controlled at home. Cardiac symptoms fatigue and palpitations. Patient denies chest pain. Cardiovascular risk factors: hypertension and obesity (BMI >= 30 kg/m2). Use of agents associated with hypertension: none. Hypothyroidism: Patient states she thinks it is fine. Patient states she is feeling fatigue. SOB: Patient states she still has SOB quit a bit. CURRENT ALLERGIES: Rocephin [ceftriaxone]    PAST MEDICAL HISTORY:   Past Medical History:   Diagnosis Date    Anemia, unspecified     Anxiety     Edema     H/O cardiovascular stress test 03/08/2021    [place] treadmill score is 0    H/O echocardiogram 01/26/2021    EF >60% Normal study    History of cardiac cath 04/01/2021    [facility]) Nia/Dr. Little/Right Radial     History of cardiovascular stress test 03/22/2021    Equivocal study. Small perfusion defect of mild intensity in the anterior and anteroseptal regions during stress and rest imaging, which is most consistent with artifact EF 71% [place] Treadmill score is -2intermediate risk.  History of normal Holter exam 02/22/2021    CAM 6 days 19 hrs Normal sinus. AT 2 episodes Longest/fastest 9 beats at ave 127 bpm p to 153 bpm PAC 0.08% PVC 0.07%    Hypertension     Hypothyroidism     Thyroid disease     Tilt table evaluation 03/22/2021    Negative study Although sensitivity of study certainly is not 248% relatively normal results signifcantly decrease the likelihood of neurocardiogenic source for pt symptoms.  Clinically suspision remains high possible repeat study maybe indicated       SURGICAL HISTORY:   Past Surgical History:   Procedure Laterality Date    ANTERIOR CRUCIATE LIGAMENT REPAIR Left     HYSTERECTOMY, VAGINAL      07/2020 robotic, lap supracervical hyst and BS, perineopexy, A&P repair, cysto, Lynx retropubic slin    INFECTED SKIN DEBRIDEMENT Left     Left third finger    INSERTABLE CARDIAC MONITOR  05/20/2021       FAMILY HISTORY:   Family History   Adopted: Yes       SOCIAL HISTORY:   Social History     Tobacco Use    Smoking status: Never Smoker    Smokeless tobacco: Never Used   Vaping Use    Vaping Use: Never used   Substance Use Topics    Alcohol use: Yes     Alcohol/week: 0.0 standard drinks     Comment: rarely    Drug use: No     Prior to Admission medications    Medication Sig Start Date End Date Taking? Authorizing Provider   albuterol sulfate HFA (VENTOLIN HFA) 108 (90 Base) MCG/ACT inhaler Inhale 2 puffs into the lungs 4 times daily as needed for Wheezing 11/26/21  Yes Leana Neri MD   zolpidem (AMBIEN) 5 MG tablet Take 1 tablet by mouth nightly as needed for Sleep for up to 14 days. 11/26/21 12/10/21 Yes Leana Neri MD   bisoprolol-hydroCHLOROthiazide [facility]) 5-6.25 MG per tablet Take 1 tablet by mouth daily 11/10/21  Yes Sharri Olea PA-C   EUTHYROX 100 MCG tablet Take 1 tablet by mouth once daily 10/1/21  Yes Leana Neri MD   dilTIAZem (CARDIZEM CD) 300 MG extended release capsule Take 1 capsule by mouth daily 9/17/21  Yes Manoj Khan MD   ALPRAZolam Sherie Arias) 0.25 MG tablet Take 0.25 mg by mouth daily as needed. 6/16/21  Yes Historical Provider, MD   gabapentin (NEURONTIN) 300 MG capsule Take 300 mg by mouth 3 times daily.  For migraines 7/2/21  Yes Historical Provider, MD   escitalopram (LEXAPRO) 20 MG tablet Take 20 mg by mouth daily 7/8/21  Yes Historical Provider, MD   allopurinol (ZYLOPRIM) 100 MG tablet Take 1 tablet by mouth daily 5/27/21  Yes Leana Neri MD   buPROPion (WELLBUTRIN XL) 300 MG extended release tablet Take 300 mg by mouth every morning With 150 mg for total of 450 mg 2/24/21  Yes Historical Provider, MD   Ubrogepant (UBRELVY) 100 MG TABS Take 100 mg by mouth as needed (migraines)  3/16/21  Yes Historical Provider, MD   ferrous sulfate (IRON 325) 325 (65 Fe) MG tablet Take 1 tablet by mouth daily Only takes sometimes due to constipation   Yes Historical Provider, MD   Cholecalciferol 50 MCG (2000 UT) CAPS Take 1 capsule by mouth daily    Yes Historical Provider, MD   Turmeric (QC TUMERIC COMPLEX PO) Take 1 capsule by mouth daily    Yes Historical Provider, MD   buPROPion (WELLBUTRIN XL) 150 MG extended release tablet Take 150 mg by mouth daily With 300 mg for total of 450 mg 1/21/21  Yes Historical Provider, MD   prochlorperazine (COMPAZINE) 5 MG tablet Take 5 mg by mouth every 6 hours as needed (Migraines) Migraines  Patient not taking: Reported on 11/26/2021 3/15/21   Historical Provider, MD       Review of Systems:  Constitutional: negative for fevers or chills, has fatigue,dizziness  Eyes: negative for visual disturbance   ENT: negative for sore throat or nasal congestion  Respiratory: positive for shortness of breath ,neg for  cough  Cardiovascular: negative for chest pain ,palpitations,pnd,syncope  Gastrointestinal: negative for abd pain, nausea, vomiting, diarrhea , constipation,hemetemesis,armand,blood in stool  Genitourinary: negative for dysuria, urgency ,frequency,hematuria  Integument/breast: negative for skin rash or lesions  Neurological: negative for unilateral weakness, numbness or tingling. Skeletal Muscular: no joint pain,jont swelling,back pain  Psych- has insomnia and takes trazodone. Anxiety better  Subjective:  Vitals:    11/26/21 0812   BP: 137/83   Pulse: 70         Exam:  GEN:   A & O x3, no apparent distress  EYES: No gross abnormalities.   ENT:ENT exam normal, no neck nodes or sinus tenderness  NECK: normal, supple, no lymphadenopathy,  no carotid bruits  PULM: clear to auscultation bilaterally- no wheezes, rales or rhonchi, normal air movement, no respiratory distress  COR: regular rate & rhythm, no murmurs and no gallops  ABD:  soft, non-tender, non-distended, normal bowel sounds, no masses or organomegaly  : deferred  EXT: Extremities: + 2 pedal pulses, no edema or calf tenderness, and warm to touch. Normal nails without lesions  Skeletomuscular:  NEURO: Motor and sensory grossly intact  SKIN:  No skin lesions or rashes      Assessment:  1. Essential hypertension    2. Orthostatic hypotension    3. Hypothyroidism, unspecified type    4. Primary insomnia    5. Dyspnea due to COVID-19      Socioeconomic determinants : has difficulty continuing work more than her 12 hour shift,to continue restrictions    Plan:    ICD-10-CM    1. Essential hypertension - improved with ziac and cardizem I10    2. Orthostatic hypotension - d/c trazodone which may agrave her symptoms and start ambien 5 mg hs for insomnia I95.1    3. Hypothyroidism, unspecified type - to continue lexapro and support stockings E03.9    4. Primary insomnia - ambien 5 mg hs F51.01 zolpidem (AMBIEN) 5 MG tablet   5. Dyspnea due to COVID-19 - continue albuterol q6 prn U07.1     R06.00        No orders of the defined types were placed in this encounter. No follow-ups on file. Orders Placed This Encounter   Medications    albuterol sulfate HFA (VENTOLIN HFA) 108 (90 Base) MCG/ACT inhaler     Sig: Inhale 2 puffs into the lungs 4 times daily as needed for Wheezing     Dispense:  1 each     Refill:  0    zolpidem (AMBIEN) 5 MG tablet     Sig: Take 1 tablet by mouth nightly as needed for Sleep for up to 14 days.      Dispense:  30 tablet     Refill:  2     Medication directions and side effects discussed      Electronically signed by Lizabeth Aaron MD on 11/26/2021 at 8:42 AM         Lizabeth Aaron MD, MD

## 2021-11-26 NOTE — PATIENT INSTRUCTIONS
SURVEY:    You may be receiving a survey from [employer] regarding your visit today. You may get this in the mail, through your MyChart, or in your email. Please complete the survey to enable us to provide the highest quality of care to you and your family. If you cannot score us a very good (5 Stars) on any question, please call the office to discuss how we could of made your experience exceptional.    Thank you!     Dr. Dicky Jeans Dr. Alberto Peer, LISSA Darnell, [address] [place] [address] Cindy Alba    Phone: [phone]  Fax: [phone]    Office Hours:   Sol Curtis, F: 8-5 Wednesday: 9-11

## 2021-12-03 ENCOUNTER — HOSPITAL ENCOUNTER (OUTPATIENT)
Age: 51
Discharge: HOME | End: 2021-12-03
Payer: COMMERCIAL

## 2021-12-03 DIAGNOSIS — E03.8 OTHER SPECIFIED HYPOTHYROIDISM: ICD-10-CM

## 2021-12-03 DIAGNOSIS — I95.1 ORTHOSTATIC HYPOTENSION: ICD-10-CM

## 2021-12-03 DIAGNOSIS — Z51.81 ENCOUNTER FOR MONITORING DIURETIC THERAPY: ICD-10-CM

## 2021-12-03 DIAGNOSIS — Z79.899 ENCOUNTER FOR MONITORING DIURETIC THERAPY: ICD-10-CM

## 2021-12-03 DIAGNOSIS — R42 DIZZINESS: ICD-10-CM

## 2021-12-03 LAB
ANION GAP SERPL CALCULATED.3IONS-SCNC: 13 MMOL/L (ref 9–17)
BUN BLDV-MCNC: 20 MG/DL (ref 6–20)
BUN/CREAT BLD: 22 (ref 9–20)
CALCIUM SERPL-MCNC: 9.3 MG/DL (ref 8.6–10.4)
CHLORIDE BLD-SCNC: 105 MMOL/L (ref 98–107)
CO2: 24 MMOL/L (ref 20–31)
CREAT SERPL-MCNC: 0.89 MG/DL (ref 0.5–0.9)
GFR AFRICAN AMERICAN: >60 ML/MIN
GFR NON-AFRICAN AMERICAN: >60 ML/MIN
GFR SERPL CREATININE-BSD FRML MDRD: ABNORMAL ML/MIN/{1.73_M2}
GFR SERPL CREATININE-BSD FRML MDRD: ABNORMAL ML/MIN/{1.73_M2}
GLUCOSE BLD-MCNC: 99 MG/DL (ref 70–99)
POTASSIUM SERPL-SCNC: 4.6 MMOL/L (ref 3.7–5.3)
SODIUM BLD-SCNC: 142 MMOL/L (ref 135–144)
TSH SERPL DL<=0.05 MIU/L-ACNC: 2.65 MIU/L (ref 0.3–5)

## 2021-12-03 PROCEDURE — 36415 COLL VENOUS BLD VENIPUNCTURE: CPT

## 2021-12-03 PROCEDURE — 80048 BASIC METABOLIC PNL TOTAL CA: CPT

## 2021-12-03 PROCEDURE — 84443 ASSAY THYROID STIM HORMONE: CPT

## 2021-12-06 ENCOUNTER — TELEPHONE (OUTPATIENT)
Dept: CARDIOLOGY | Age: 51
End: 2021-12-06

## 2021-12-06 NOTE — TELEPHONE ENCOUNTER
----- Message from Grace Lino MD sent at 12/3/2021  2:32 PM EST -----  Let Ms. Boone know their test result was ok. Will discuss at next visit. Thanks.

## 2021-12-07 ENCOUNTER — TELEPHONE (OUTPATIENT)
Dept: PHARMACY | Age: 51
End: 2021-12-07

## 2021-12-07 PROCEDURE — 93298 REM INTERROG DEV EVAL SCRMS: CPT | Performed by: FAMILY MEDICINE

## 2021-12-07 NOTE — TELEPHONE ENCOUNTER
COVID-19 phone screening     Call placed to screen patient prior to upcoming Medication Management visit for Heart Failure on 12/8/21. Does patient have any of the following symptoms? [] Fever    [] Lower respiratory symptoms (SOB, difficulty breathing, cough)  [x] None    Travel Screening completed.

## 2021-12-08 ENCOUNTER — HOSPITAL ENCOUNTER (OUTPATIENT)
Dept: PHARMACY | Age: 51
Setting detail: THERAPIES SERIES
Discharge: HOME | End: 2021-12-08
Payer: COMMERCIAL

## 2021-12-08 VITALS
DIASTOLIC BLOOD PRESSURE: 53 MMHG | SYSTOLIC BLOOD PRESSURE: 117 MMHG | BODY MASS INDEX: 40.44 KG/M2 | WEIGHT: 243 LBS | HEART RATE: 65 BPM

## 2021-12-08 PROCEDURE — 99212 OFFICE O/P EST SF 10 MIN: CPT

## 2021-12-08 RX ORDER — TRAZODONE HYDROCHLORIDE 50 MG/1
50 TABLET ORAL NIGHTLY
COMMUNITY

## 2021-12-08 NOTE — PROGRESS NOTES
[facility] [place]  Medication Management  Pharmacist  Heart Failure    [facility]  [address]  Phone: [phone]  Fax: [phone]      NAME: Jackelin Boone  MEDICAL RECORD NUMBER:  [id number]  AGE: 49 y.o. GENDER: female  : 1970  EPISODE DATE:  2021      Heart Failure Management referral by Dr. Bard Matute    Dry weight: ?? pounds     Today's Wt: 243 lb up another 12 lb  Wt Readings from Last 6 Encounters:   21 243 lb (110.2 kg)   21 240 lb 3.2 oz (109 kg)   11/10/21 243 lb (110.2 kg)   10/28/21 235 lb 12.8 oz (107 kg)   21 231 lb (104.8 kg)   21 235 lb 3.2 oz (106.7 kg)    and   Ht Readings from Last 1 Encounters:   11/10/21 5' 5\" (1.651 m)              /53   HR: 65  O2Sat: 98     Extremities and pitting edema none  Skin Warm dry intact NOT wearing Compression socks     Subjective   Ms. Boone is a 49 y.o. female here for the Heart Failure Services. They are here today for a comprehensive medication review including over the counter medications and herbal products, overall wellbeing assessment, transition of care and any needed adjustments with updates and recommendations communicated to the referring physician. Patient Symptoms Ms. Boone is here today for follow up and continues to have intermittent palpitations. The palpations are her biggest complaint but says it is better since increasing Cardizem. These palpitations wake her up at night but less than before.  She has intermittent dizziness with them as well as other times when standing.  Patient has not fallen for awhile.  Edema is better but patient claims she still has it sometimes.  Patient was a runner. Megan Liu is walking now about 4 times a week.     Shortness of Breath:   []   None   [x]   Dyspnea on exertion   []   Dyspnea with normal activities  []   Dyspnea at rest  []   Dyspnea while sleeping    Patient Findings:  []  Missed doses  []  Diet changes  []  Sodium intake changes   []  Night time cough   []  Other   []  Early saiety, abd fullness [x]  Chest pain   []  Constipation   []  Night time bathroom trips  []  Alcohol intake changes  []  Acute illness  []  Edema    []  Number of pillows or recliner  []  Activity changes   []  Fatigue that limits activity []  Heart racing or skipping beats  [x]  Light headedness  [x]  Dizziness    []      [x]  Problems sleeping    Functional Activity New York Heart Association Classification  [x]   Class I No limitation of physical activity. Ordinary physical activity does not cause undue fatigue, palpitation, dyspnea (shortness of breath). []   Class II Slight limitation of physical activity. Comfortable at rest. Ordinary physical activity results in fatigue, palpitation, dyspnea (shortness of breath). []   Class III  Pb limitation of physical activity. Comfortable at rest.  Less than ordinary activity causes fatigue, palpitation, dyspnea. []   Class IV Unable to carry on any physical activity without discomfort. Symptoms of heart failure at rest.  If any physical activity is undertaken, discomfort increases. Last CHF Hospital Admission: NEVER for heart failure    OUTPATIENT MEDICATIONS:  Outpatient Medications Marked as Taking for the 12/8/21 encounter [facility] Encounter) with 70 Heavy   Medication Sig Dispense Refill    traZODone (DESYREL) 50 MG tablet Take 50 mg by mouth nightly      albuterol sulfate HFA (VENTOLIN HFA) 108 (90 Base) MCG/ACT inhaler Inhale 2 puffs into the lungs 4 times daily as needed for Wheezing 1 each 0    bisoprolol-hydroCHLOROthiazide (ZIAC) 5-6.25 MG per tablet Take 1 tablet by mouth daily 90 tablet 3    EUTHYROX 100 MCG tablet Take 1 tablet by mouth once daily 30 tablet 0    dilTIAZem (CARDIZEM CD) 300 MG extended release capsule Take 1 capsule by mouth daily 90 capsule 3    ALPRAZolam (XANAX) 0.25 MG tablet Take 0.25 mg by mouth daily as needed.       gabapentin (NEURONTIN) 300 MG capsule Take 600 mg by mouth 3 times daily. For migraines      escitalopram (LEXAPRO) 20 MG tablet Take 20 mg by mouth daily      allopurinol (ZYLOPRIM) 100 MG tablet Take 1 tablet by mouth daily 90 tablet 1    buPROPion (WELLBUTRIN XL) 300 MG extended release tablet Take 300 mg by mouth every morning With 150 mg for total of 450 mg      Ubrogepant (UBRELVY) 100 MG TABS Take 100 mg by mouth as needed (migraines)       ferrous sulfate (IRON 325) 325 (65 Fe) MG tablet Take 1 tablet by mouth daily Only takes sometimes due to constipation      Cholecalciferol 50 MCG (2000 UT) CAPS Take 1 capsule by mouth daily       Turmeric (QC TUMERIC COMPLEX PO) Take 1 capsule by mouth daily       buPROPion (WELLBUTRIN XL) 150 MG extended release tablet Take 150 mg by mouth daily With 300 mg for total of 450 mg         Objective / Assessment     Patient Active Problem List   Diagnosis Code    Essential hypertension I10    Hypothyroidism E03.9    Idiopathic gout M10.00    Anxiety state F41.1    Moderate single current episode of major depressive disorder (HCC) F32.1    Iron deficiency anemia D50.9    Lab test positive for detection of COVID-19 virus U07.1    Shortness of breath R06.02    Myalgia M79.10    Persistent vomiting R11.15    Uncontrollable vomiting R11.10    Intractable nausea and vomiting R11.2    Dizziness R42    Orthostatic hypotension I95.1    Diarrhea R19.7    Recurrent falls while walking R29.6    Abnormal cardiovascular stress test R94.39    Recurrent syncope R55    PSVT (paroxysmal supraventricular tachycardia) ([facility]) I47.1     Social History     Tobacco Use    Smoking status: Never Smoker    Smokeless tobacco: Never Used   Vaping Use    Vaping Use: Never used   Substance Use Topics    Alcohol use:  Yes     Alcohol/week: 0.0 standard drinks     Comment: rarely    Drug use: No       Potassium (mmol/L)   Date Value   12/03/2021 4.6   09/29/2021 4.4   08/27/2021 4.4   07/22/2021 5.0 07/21/2021 4.9   05/28/2021 3.8     BUN (mg/dL)   Date Value   12/03/2021 20   09/29/2021 12   08/27/2021 10   07/22/2021 12   07/21/2021 16   05/28/2021 13     CREATININE (mg/dL)   Date Value   12/03/2021 0.89   09/29/2021 0.86   08/27/2021 0.77   07/22/2021 0.74   07/21/2021 0.78   05/28/2021 0.65     TSH   Date Value   12/03/2021 2.65 mIU/L   07/13/2021 4.03 mIU/L   05/13/2021 4.01 mIU/L   01/25/2021 1.07 mIU/L   04/22/2020 1.27 uIU/mL   02/08/2019 1.78 uIU/mL     No components found for: T4      Plan:      · Chronic Systolic and/or Diastolic Heart Failure (diagnosis): EF: >60%  via echocardiogram on 1-25-21  ? Beta Blocker for EF </= 40%: CHANGED Metoprolol succ to Ziac 5/6.25 mg (sotalol/HCTZ) per NUNU Ordonez on 11-10-21  · Calcium Channel Blocker: Continue  Diltiazem  mg daily   · Diuretics: None  · ACE/ARB/ARNI for EF </= 40%: none  · Spironolactone for EF </= 35%: none  · Nonpharmacologic management of Heart Failure: I told patient to continue wearing lower extremity compression stockings and I advised patient to try and keep legs up whenever possible and to limit salt in diet.   · Laboratory testing:   ? Basic metabolic panel (BMP) none ordered  to assess her potassium and renal function      Essential Hypertension:   ? Diuretics:  None  ? Beta Blocker: CHANGED Metoprolol succ to Ziac (sotalol/HCTZ)               Calcium Channel Blocker: Continue  Diltiazem  mg daily.      Medication changes since last visit Levothyroxine increased from 88 mcg to 100 mcg on 8-25-21. Last TSH 2.65 on 12-3-21  CHANGED Metoprolol succ to Ziac (sotalol/HCTZ) due to elevated BP. BP is great today at 117/53. Dr Giorgio Yuen - Orthostatic hypotension - d/c trazodone which may agravate her symptoms and start ambien 5 mg hs for insomnia - 11-26-21 Patient is not taking the Ambien because it makes her feel weird the next day.   Patient is taking the Trazodone again but will follow up with Dr Giorgio Yuen about changing to a different medication for sleep. Testing in [place] 12-15-21 for autonomic testing due to migraines and memory issues. Patient has another year and a half of college to become a . 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     Follow up with Lisa Samuels 21  Follow up with Dr Kaitlynn Park 2-10-22  Follow up with Dr Cornelio Singh 22    Thank you for the referral,    Aidee Sharp, [address] [place] [address] PharmD    For Pharmacy Admin Tracking Only     Intervention Detail: Adherence Monitorin and New Rx: 3, reason: Needs Additional Therapy   Total # of Interventions Recommended: 3   Total # of Interventions Accepted: 3   Time Spent (min): 60

## 2021-12-10 RX ORDER — ALLOPURINOL 100 MG/1
TABLET ORAL
Qty: 30 TABLET | Refills: 0 | Status: SHIPPED | OUTPATIENT
Start: 2021-12-10 | End: 2022-02-08

## 2021-12-13 ENCOUNTER — TELEPHONE (OUTPATIENT)
Dept: OBGYN | Age: 51
End: 2021-12-13

## 2021-12-13 DIAGNOSIS — E03.9 HYPOTHYROIDISM, UNSPECIFIED TYPE: ICD-10-CM

## 2021-12-13 RX ORDER — LEVOTHYROXINE SODIUM 100 UG/1
TABLET ORAL
Qty: 30 TABLET | Refills: 0 | Status: SHIPPED | OUTPATIENT
Start: 2021-12-13 | End: 2022-02-10

## 2021-12-13 NOTE — TELEPHONE ENCOUNTER
Patient having menopausal symptoms, hot flashes etc. Would like to have labs to check. Patient also had COVID and does not know if some of the symptoms are form that.

## 2021-12-14 ENCOUNTER — NURSE ONLY (OUTPATIENT)
Dept: CARDIOLOGY | Age: 51
End: 2021-12-14
Payer: COMMERCIAL

## 2021-12-14 ENCOUNTER — HOSPITAL ENCOUNTER (OUTPATIENT)
Age: 51
Discharge: HOME | End: 2021-12-14
Payer: COMMERCIAL

## 2021-12-14 DIAGNOSIS — Z45.09 ENCOUNTER FOR LOOP RECORDER CHECK: ICD-10-CM

## 2021-12-14 DIAGNOSIS — R23.2 HOT FLASHES: ICD-10-CM

## 2021-12-14 DIAGNOSIS — R23.2 HOT FLASHES: Primary | ICD-10-CM

## 2021-12-14 DIAGNOSIS — R55 SYNCOPE, UNSPECIFIED SYNCOPE TYPE: Primary | ICD-10-CM

## 2021-12-14 PROCEDURE — 36415 COLL VENOUS BLD VENIPUNCTURE: CPT

## 2021-12-14 PROCEDURE — 83002 ASSAY OF GONADOTROPIN (LH): CPT

## 2021-12-14 PROCEDURE — 83001 ASSAY OF GONADOTROPIN (FSH): CPT

## 2021-12-14 PROCEDURE — 82670 ASSAY OF TOTAL ESTRADIOL: CPT

## 2021-12-15 LAB
ESTRADIOL LEVEL: <5 PG/ML (ref 27–314)
FOLLICLE STIMULATING HORMONE: 46.3 U/L (ref 1.7–21.5)
LH: 39.1 U/L (ref 1–95.6)

## 2021-12-16 ENCOUNTER — TELEPHONE (OUTPATIENT)
Dept: OBGYN | Age: 51
End: 2021-12-16

## 2021-12-16 NOTE — TELEPHONE ENCOUNTER
Patient was advised that she was menopausal however is asking about HRT. Do you want to see her for appointment ?

## 2021-12-21 NOTE — TELEPHONE ENCOUNTER
I'd be leary with her hx - you just don't want to be the straw that breaks the camels back so to speak - maybe could consider prog only but best with effexor

## 2021-12-21 NOTE — TELEPHONE ENCOUNTER
I dont really want to do estrogen with her heart history. We could try prometrium 200 QD, Dr. Vince Alston suggested effexor but she's already on lexapro, wellbutrin and desyrel at night so I wouldn't add to that.

## 2021-12-22 ENCOUNTER — OFFICE VISIT (OUTPATIENT)
Dept: CARDIOLOGY | Age: 51
End: 2021-12-22
Payer: COMMERCIAL

## 2021-12-22 VITALS
SYSTOLIC BLOOD PRESSURE: 122 MMHG | BODY MASS INDEX: 39.99 KG/M2 | HEIGHT: 65 IN | DIASTOLIC BLOOD PRESSURE: 82 MMHG | WEIGHT: 240 LBS | HEART RATE: 60 BPM

## 2021-12-22 DIAGNOSIS — I10 ESSENTIAL HYPERTENSION: Primary | ICD-10-CM

## 2021-12-22 DIAGNOSIS — R07.89 ATYPICAL CHEST PAIN: ICD-10-CM

## 2021-12-22 DIAGNOSIS — R00.2 INTERMITTENT PALPITATIONS: ICD-10-CM

## 2021-12-22 DIAGNOSIS — Z45.09 ENCOUNTER FOR LOOP RECORDER CHECK: ICD-10-CM

## 2021-12-22 PROCEDURE — G8484 FLU IMMUNIZE NO ADMIN: HCPCS | Performed by: PHYSICIAN ASSISTANT

## 2021-12-22 PROCEDURE — 99213 OFFICE O/P EST LOW 20 MIN: CPT | Performed by: PHYSICIAN ASSISTANT

## 2021-12-22 PROCEDURE — G8427 DOCREV CUR MEDS BY ELIG CLIN: HCPCS | Performed by: PHYSICIAN ASSISTANT

## 2021-12-22 PROCEDURE — 1036F TOBACCO NON-USER: CPT | Performed by: PHYSICIAN ASSISTANT

## 2021-12-22 PROCEDURE — 3017F COLORECTAL CA SCREEN DOC REV: CPT | Performed by: PHYSICIAN ASSISTANT

## 2021-12-22 PROCEDURE — G8417 CALC BMI ABV UP PARAM F/U: HCPCS | Performed by: PHYSICIAN ASSISTANT

## 2021-12-22 NOTE — PROGRESS NOTES
Mary MICHELLE am scribing for and in the presence of Audrey Glez PA-C. Patient: Yasmin De Leon  : [id number] Care Physician: Alexandro Johnson  Today's Date: 2021    REASON FOR VISIT: Follow-up (Patient is here for 6 week follow up. Patient had sharp stabbing pain in middle of chest one day last week. Intermittant palpitations in the last week. Dizziness daily. No SOB above baseline. )    Dear Alexandro Johnson MD,    HPI: Ms. Edison Wolff is a 49 y.o. female presents to the office today for follow up. She previously has been admitted to the hospital with lightheaded and dizziness. She denied any previous heart related history. She is adopted so she was unaware of any family history. She does drink 1-2 cups off coffee daily. Her echocardiogram on 2021 was fairly unremarkable and showed an ejection fraction of >60%. Her CAM monitor was done on 2021 she wore 6 days, 19 hours showed one epos ode of atrial tachycardia with symptoms. She had treadmill stress test done on 3/8/2021 her  Duke treadmill score is 0 which is intermediate risk. Ms. Edison Wolff recently had Negative Tilt Table test on 3/22/21 and an Equivocal stress test. Ms. Edison Wolff heart catheterization on 21 was largely normal.  Cardiac event monitor worn on 2021 showed frequent premature ventricular contractions 3.2% of the beats, 5 symptoms were reported and associated with the PVCs. Ms. Edison Wolff is here today for 6 week follow up since starting [address] [place] [address]. Patient feels like blood pressures are under better control and her symptoms have improved. She did have a sharp chest pain last week that lasted for a minute or so, she states this was mildly bothersome and not associated with activity. She continues to have intermittent palpitations, but they don't last long and again are very mild in nature. She continues to have shortness of breath since having COVID which is staying stable.  She denied any abdominal pain, bleeding problems, problems with her medications or any other concerns at this time. She also denied any fever or chills. Continued difficulty sleeping, she reports issues falling asleep but is able to stay asleep without difficulties. LINQ report reviewed from 12/14/2021: 1 symptom recorded however no abnormal heart rhythms noted. Battery life is good. Past Medical History:   Diagnosis Date    Anemia, unspecified     Anxiety     Edema     H/O cardiovascular stress test 03/08/2021    [place] treadmill score is 0    H/O echocardiogram 01/26/2021    EF >60% Normal study    History of cardiac cath 04/01/2021    [facility]) Nia/Dr. Little/Right Radial     History of cardiovascular stress test 03/22/2021    Equivocal study. Small perfusion defect of mild intensity in the anterior and anteroseptal regions during stress and rest imaging, which is most consistent with artifact EF 71% Duke Treadmill score is -2intermediate risk.  History of normal Holter exam 02/22/2021    CAM 6 days 19 hrs Normal sinus. AT 2 episodes Longest/fastest 9 beats at ave 127 bpm p to 153 bpm PAC 0.08% PVC 0.07%    Hypertension     Hypothyroidism     Thyroid disease     Tilt table evaluation 03/22/2021    Negative study Although sensitivity of study certainly is not 651% relatively normal results signifcantly decrease the likelihood of neurocardiogenic source for pt symptoms. Clinically suspision remains high possible repeat study maybe indicated       CURRENT ALLERGIES: Rocephin [ceftriaxone] REVIEW OF SYSTEMS: 14 systems were reviewed. Pertinent positives and negatives as above, all else negative.      Past Surgical History:   Procedure Laterality Date    ANTERIOR CRUCIATE LIGAMENT REPAIR Left     HYSTERECTOMY, VAGINAL      07/2020 robotic, lap supracervical hyst and BS, perineopexy, A&P repair, cysto, Lynx retropubic slin    INFECTED SKIN DEBRIDEMENT Left     Left third finger    INSERTABLE CARDIAC MONITOR 05/20/2021    Social History:  Social History     Tobacco Use    Smoking status: Never Smoker    Smokeless tobacco: Never Used   Vaping Use    Vaping Use: Never used   Substance Use Topics    Alcohol use: Yes     Alcohol/week: 0.0 standard drinks     Comment: rarely    Drug use: No        MEDICATIONS:  No current facility-administered medications for this visit. CURRENT INPATIENT MEDICATIONS:  Prior to Admission medications    Medication Sig Start Date End Date Taking? Authorizing Provider   EUTHYROX 100 MCG tablet Take 1 tablet by mouth once daily 12/13/21  Yes Lizabeth Aaron MD   allopurinol (ZYLOPRIM) 100 MG tablet Take 1 tablet by mouth once daily 12/10/21  Yes Lizabeth Aaron MD   traZODone (DESYREL) 50 MG tablet Take 50 mg by mouth nightly   Yes Historical Provider, MD   albuterol sulfate HFA (VENTOLIN HFA) 108 (90 Base) MCG/ACT inhaler Inhale 2 puffs into the lungs 4 times daily as needed for Wheezing 11/26/21  Yes Lizabeth Aaron MD   bisoprolol-hydroCHLOROthiazide [facility]) 5-6.25 MG per tablet Take 1 tablet by mouth daily 11/10/21  Yes Sara Flores PA-C   dilTIAZem (CARDIZEM CD) 300 MG extended release capsule Take 1 capsule by mouth daily 9/17/21  Yes Sharyn Pacheco MD   ALPRAZolam Jackqueline Jostin) 0.25 MG tablet Take 0.25 mg by mouth daily as needed. 6/16/21  Yes Historical Provider, MD   gabapentin (NEURONTIN) 300 MG capsule Take 900 mg by mouth 3 times daily.  For migraines 7/2/21  Yes Historical Provider, MD   escitalopram (LEXAPRO) 20 MG tablet Take 20 mg by mouth daily 7/8/21  Yes Historical Provider, MD   buPROPion (WELLBUTRIN XL) 300 MG extended release tablet Take 300 mg by mouth every morning With 150 mg for total of 450 mg 2/24/21  Yes Historical Provider, MD   Ubrogepant (UBRELVY) 100 MG TABS Take 100 mg by mouth as needed (migraines)  3/16/21  Yes Historical Provider, MD   ferrous sulfate (IRON 325) 325 (65 Fe) MG tablet Take 1 tablet by mouth daily Only takes sometimes due to constipation   Yes Historical Provider, MD   Cholecalciferol 50 MCG (2000 UT) CAPS Take 1 capsule by mouth daily    Yes Historical Provider, MD   Turmeric (QC TUMERIC COMPLEX PO) Take 1 capsule by mouth daily    Yes Historical Provider, MD   buPROPion (WELLBUTRIN XL) 150 MG extended release tablet Take 150 mg by mouth daily With 300 mg for total of 450 mg 1/21/21  Yes Historical Provider, MD   prochlorperazine (COMPAZINE) 5 MG tablet Take 5 mg by mouth every 6 hours as needed (Migraines) Migraines  Patient not taking: Reported on 11/26/2021 3/15/21   Historical Provider, MD       FAMILY HISTORY: family history is not on file. She was adopted. Physical Examination:     /82 (Site: Left Upper Arm, Position: Sitting, Cuff Size: Large Adult)   Pulse 60   Ht 5' 5\" (1.651 m)   Wt 240 lb (108.9 kg)   LMP 07/15/2020 (Approximate)   BMI 39.94 kg/m²  Body mass index is 39.94 kg/m². Constitutional: She appeared oriented to person and place. She appears well-developed and well-nourished. In no acute distress. HEENT: Normocephalic and atraumatic. No JVD present. Carotid bruit is not present. No mass and no thyromegaly present. No lymphadenopathy noted. Cardiovascular: Normal rate, regular rhythm, normal heart sounds. Exam reveals no gallop and no friction rubs. No murmur was heard. Pulmonary/Chest: Effort normal and breath sounds normal. No respiratory distress. She has no wheezes, rhonchi or rales. Abdominal: Soft, non-tender. She exhibits no organomegaly, mass or bruit. Extremities: Trace. No cyanosis or clubbing. 2+ radial and carotid pulses. Distal extremity pulses: 2+ bilaterally. Neurological: Alertness and orientation as per Constitutional exam. No evidence of gross cranial nerve deficit. Coordination appeared normal.   Skin: Skin is warm and dry. There is no rash or diaphoresis. Psychiatric: She has a normal mood and affect.  Her speech is normal and behavior is normal.        MOST RECENT LABS ON PVC's.  · Beta Blocker: Continue bisoprolol/HCTZ (Ziac) 5/6.25 mg daily. · Calcium Channel Blocker: Continue Diltiazem CD to 300 mg once daily I also discussed the potential side effects of this medication including lightheadedness and dizziness and instructed them to stop the medication of this occurs and call our office if this occurs. · Counseling: I advised Ms. Boone to call our office or go to the emergency room if she develops worsening or persistent chest pain or shortness of breath as this could be life threatening.   Recurrent intermittent palpitations: Currently mild and not bothersome to her, Loop recorder implanted  · Beta Blocker: Continue bisoprolol/HCTZ (Ziac) 5/6.25 mg daily. · Calcium Channel Blocker: Continue Diltiazem 300 mg daily       Implantable Loop Recorder:    Indication for Device Placement: Unexplained Syncope   Interrogation Findings: I reviewed the results of their most recent home interrogation from 12/14/2021. Symptom recorded, no abnormalities noted, heart rate controlled with no dysrhythmias seen. Once again, thank you for allowing me to participate in this patients care. Please do not hesitate to contact me if I could be of any further assistance. I told Ms. Boone to call my office if she had any problems, but otherwise told her to Return in about 6 months (around 6/22/2022). However, I would be happy to see her sooner should the need arise. Sincerely,  Reynold Olszewski PA-C  [facility]    [address]  [facility], [address] [place] [address]  Phone: [phone], Fax: [phone]     I believe that the risk of significant morbidity and mortality related to the patient's current medical conditions are: Intermediate. >15 minutes were spent during prep work, discussion and exam of the patient, and follow up documentation and all of their questions were answered.     The documentation recorded by the scribe, accurately and completely reflects the services I personally performed and the decisions made by me.   Obdulia Hannah PA-C    December 22, 2021

## 2021-12-22 NOTE — PATIENT INSTRUCTIONS
SURVEY:    You may be receiving a survey from [employer] regarding your visit today. Please complete the survey to enable us to provide the highest quality of care to you and your family. If you cannot score us a very good on any question, please call the office to discuss how we could have made your experience a very good one. Thank you. Your Medical Assistant today was Denis Caballero.

## 2021-12-23 ENCOUNTER — HOSPITAL ENCOUNTER (OUTPATIENT)
Age: 51
Discharge: HOME | End: 2021-12-23
Payer: COMMERCIAL

## 2021-12-23 LAB
ANGIOTENSIN-CONVERTING ENZYME: 17 U/L (ref 8–52)
C-REACTIVE PROTEIN: 3.8 MG/L (ref 0–5)
HEPATITIS C ANTIBODY: NONREACTIVE
SEDIMENTATION RATE, ERYTHROCYTE: 7 MM (ref 0–20)
VITAMIN B-12: 437 PG/ML (ref 232–1245)

## 2021-12-23 PROCEDURE — 84165 PROTEIN E-PHORESIS SERUM: CPT

## 2021-12-23 PROCEDURE — 83036 HEMOGLOBIN GLYCOSYLATED A1C: CPT

## 2021-12-23 PROCEDURE — 82607 VITAMIN B-12: CPT

## 2021-12-23 PROCEDURE — 84155 ASSAY OF PROTEIN SERUM: CPT

## 2021-12-23 PROCEDURE — 85652 RBC SED RATE AUTOMATED: CPT

## 2021-12-23 PROCEDURE — 83516 IMMUNOASSAY NONANTIBODY: CPT

## 2021-12-23 PROCEDURE — 82784 ASSAY IGA/IGD/IGG/IGM EACH: CPT

## 2021-12-23 PROCEDURE — 86803 HEPATITIS C AB TEST: CPT

## 2021-12-23 PROCEDURE — 86140 C-REACTIVE PROTEIN: CPT

## 2021-12-23 PROCEDURE — 86235 NUCLEAR ANTIGEN ANTIBODY: CPT

## 2021-12-23 PROCEDURE — 82164 ANGIOTENSIN I ENZYME TEST: CPT

## 2021-12-23 PROCEDURE — 86225 DNA ANTIBODY NATIVE: CPT

## 2021-12-23 PROCEDURE — 86334 IMMUNOFIX E-PHORESIS SERUM: CPT

## 2021-12-23 PROCEDURE — 36415 COLL VENOUS BLD VENIPUNCTURE: CPT

## 2021-12-24 LAB
ESTIMATED AVERAGE GLUCOSE: 114 MG/DL
HBA1C MFR BLD: 5.6 % (ref 4–6)

## 2021-12-26 LAB
GLIADIN DEAMINIDATED PEPTIDE AB IGA: 21 U/ML
GLIADIN DEAMINIDATED PEPTIDE AB IGG: 20 U/ML
IGA: 183 MG/DL (ref 70–400)
TISSUE TRANSGLUTAMINASE IGA: 55 U/ML

## 2021-12-27 LAB
ALBUMIN (CALCULATED): 4.1 G/DL (ref 3.2–5.2)
ALBUMIN PERCENT: 64 % (ref 45–65)
ALPHA 1 PERCENT: 2 % (ref 3–6)
ALPHA 2 PERCENT: 11 % (ref 6–13)
ALPHA-1-GLOBULIN: 0.1 G/DL (ref 0.1–0.4)
ALPHA-2-GLOBULIN: 0.7 G/DL (ref 0.5–0.9)
ANTI DNA DOUBLE STRANDED: 3 IU/ML
ANTI JO-1 IGG: <0.4 U/ML
ANTI SSA: 0.5 U/ML
ANTI SSB: 0.4 U/ML
ANTI-CENTROMERE: 0.5 U/ML
ANTI-RNP70: <0.3 U/ML
ANTI-SCLERODERMA: 0.7 U/ML
ANTI-SMITH: 2.9 U/ML
ANTI-U1RNP: 1.6 U/ML
BETA GLOBULIN: 0.7 G/DL (ref 0.5–1.1)
BETA PERCENT: 10 % (ref 11–19)
GAMMA GLOBULIN %: 13 % (ref 9–20)
GAMMA GLOBULIN: 0.9 G/DL (ref 0.5–1.5)
PATHOLOGIST: ABNORMAL
PATHOLOGIST: NORMAL
PROTEIN ELECTROPHORESIS, SERUM: ABNORMAL
SERUM IFX INTERP: NORMAL
TOTAL PROT. SUM,%: 100 % (ref 98–102)
TOTAL PROT. SUM: 6.5 G/DL (ref 6.3–8.2)
TOTAL PROTEIN: 6.4 G/DL (ref 6.4–8.3)

## 2022-01-06 DIAGNOSIS — R89.9 ABNORMAL LABORATORY TEST RESULT: Primary | ICD-10-CM

## 2022-01-06 NOTE — PROGRESS NOTES
This nurse called patient. Dr. Edna Stephens is a general surgeon, not a GI doctor. Patient would like referral to [facility]. Referral sent.

## 2022-02-08 RX ORDER — ALLOPURINOL 100 MG/1
TABLET ORAL
Qty: 30 TABLET | Refills: 0 | Status: SHIPPED | OUTPATIENT
Start: 2022-02-08

## 2022-02-09 ENCOUNTER — HOSPITAL ENCOUNTER (OUTPATIENT)
Dept: PHARMACY | Age: 52
Setting detail: THERAPIES SERIES
Discharge: HOME | End: 2022-02-09
Payer: COMMERCIAL

## 2022-02-09 VITALS
HEART RATE: 63 BPM | BODY MASS INDEX: 40.77 KG/M2 | SYSTOLIC BLOOD PRESSURE: 131 MMHG | DIASTOLIC BLOOD PRESSURE: 86 MMHG | WEIGHT: 245 LBS

## 2022-02-09 DIAGNOSIS — I95.1 ORTHOSTATIC HYPOTENSION: ICD-10-CM

## 2022-02-09 DIAGNOSIS — R94.39 ABNORMAL CARDIOVASCULAR STRESS TEST: ICD-10-CM

## 2022-02-09 DIAGNOSIS — I47.1 PSVT (PAROXYSMAL SUPRAVENTRICULAR TACHYCARDIA) (HCC): Primary | ICD-10-CM

## 2022-02-09 PROCEDURE — 99212 OFFICE O/P EST SF 10 MIN: CPT

## 2022-02-09 NOTE — PROGRESS NOTES
[facility]  Medication Management  Pharmacist  Heart Failure    [address] [facility] [address] [address]  Phone: [phone]  Fax: [phone]      NAME: Kit Boone  MEDICAL RECORD NUMBER:  [id number]  AGE: 49 y.o. GENDER: female  : 1970  EPISODE DATE:  2022      Heart Failure Management referral by Dr. Rey Obrien    Dry weight: ?? pounds     Today's Wt: 245 lb (Up 2 lb from 2 months ago)  Wt Readings from Last 6 Encounters:   22 245 lb (111.1 kg)   21 240 lb (108.9 kg)   21 243 lb (110.2 kg)   21 240 lb 3.2 oz (109 kg)   11/10/21 243 lb (110.2 kg)   10/28/21 235 lb 12.8 oz (107 kg)    and   Ht Readings from Last 1 Encounters:   21 5' 5\" (1.651 m)                 /86   HR:  63 O2Sat: 98     Extremities and pitting edema NONE  Skin Warm dry intact NOT wearing Compression socks     Subjective   Ms. Boone is a 49 y.o. female here for the Heart Failure Services. They are here today for a comprehensive medication review including over the counter medications and herbal products, overall wellbeing assessment, transition of care and any needed adjustments with updates and recommendations communicated to the referring physician. Patient Symptoms Patient called clinic today complaining of high BP post endoscopy/coloscopy 22 with Dr Mary Payton. Patient also complains of increased swelling but NO pitting edema found upon exam.  Patient diagnosed with celiac disease so she is following a strict diet.     Shortness of Breath:   []   None   [x]   Dyspnea on exertion   []   Dyspnea with normal activities  []   Dyspnea at rest  []   Dyspnea while sleeping    Patient Findings:  []  Missed doses  [x]  Diet changes  []  Sodium intake changes   []  Night time cough   []  Other   []  Early saiety, abd fullness [x]  Chest pain   []  Constipation   []  Night time bathroom trips  []  Alcohol intake changes  []  Acute illness  [x]  Edema    []  Number of Outpatient Medications Marked as Taking for the 3/19/20 encounter (Refill) with Francis Dumont MD   Medication Sig Dispense Refill   • famotidine (PEPCID) 20 MG tablet Take 1 tablet by mouth 2 times daily as needed (heartburn). 60 tablet 2        Ok to leave detailed Message: Yes  Informed caller of refill policy- 24-48 hours: Yes  No call back needed unless nurse has questions.     Pharmacy: [facility]   pillows or recliner  []  Activity changes   []  Fatigue that limits activity [x]  Heart racing or skipping beats  [x]  Light headedness  [x]  Dizziness    []      []  Problems sleeping    Functional Activity New York Heart Association Classification  [x]   Class I No limitation of physical activity. Ordinary physical activity does not cause undue fatigue, palpitation, dyspnea (shortness of breath). []   Class II Slight limitation of physical activity. Comfortable at rest. Ordinary physical activity results in fatigue, palpitation, dyspnea (shortness of breath). []   Class III  Pb limitation of physical activity. Comfortable at rest.  Less than ordinary activity causes fatigue, palpitation, dyspnea. []   Class IV Unable to carry on any physical activity without discomfort. Symptoms of heart failure at rest.  If any physical activity is undertaken, discomfort increases. Last CHF Hospital Admission: NEVER for heart failure    OUTPATIENT MEDICATIONS:  Outpatient Medications Marked as Taking for the 2/9/22 encounter [facility] Encounter) with [facility] [employer]   Medication Sig Dispense Refill    allopurinol (ZYLOPRIM) 100 MG tablet Take 1 tablet by mouth once daily 30 tablet 0    EUTHYROX 100 MCG tablet Take 1 tablet by mouth once daily 30 tablet 0    traZODone (DESYREL) 50 MG tablet Take 50 mg by mouth nightly      albuterol sulfate HFA (VENTOLIN HFA) 108 (90 Base) MCG/ACT inhaler Inhale 2 puffs into the lungs 4 times daily as needed for Wheezing 1 each 0    bisoprolol-hydroCHLOROthiazide (ZIAC) 5-6.25 MG per tablet Take 1 tablet by mouth daily 90 tablet 3    dilTIAZem (CARDIZEM CD) 300 MG extended release capsule Take 1 capsule by mouth daily 90 capsule 3    ALPRAZolam (XANAX) 0.25 MG tablet Take 0.25 mg by mouth daily as needed.  gabapentin (NEURONTIN) 300 MG capsule Take 900 mg by mouth 3 times daily.  For migraines      escitalopram (LEXAPRO) 20 MG tablet Take 20 mg by mouth daily  buPROPion (WELLBUTRIN XL) 300 MG extended release tablet Take 300 mg by mouth every morning With 150 mg for total of 450 mg      prochlorperazine (COMPAZINE) 5 MG tablet Take 5 mg by mouth every 6 hours as needed (Migraines) Migraines      Ubrogepant (UBRELVY) 100 MG TABS Take 100 mg by mouth as needed (migraines)       ferrous sulfate (IRON 325) 325 (65 Fe) MG tablet Take 1 tablet by mouth daily Only takes sometimes due to constipation      Cholecalciferol 50 MCG (2000 UT) CAPS Take 1 capsule by mouth daily       Turmeric (QC TUMERIC COMPLEX PO) Take 1 capsule by mouth daily       buPROPion (WELLBUTRIN XL) 150 MG extended release tablet Take 150 mg by mouth daily With 300 mg for total of 450 mg           Objective / Assessment     Patient Active Problem List   Diagnosis Code    Essential hypertension I10    Hypothyroidism E03.9    Idiopathic gout M10.00    Anxiety state F41.1    Moderate single current episode of major depressive disorder ([facility]) F32.1    Iron deficiency anemia D50.9    Lab test positive for detection of COVID-19 virus U07.1    Shortness of breath R06.02    Myalgia M79.10    Persistent vomiting R11.15    Uncontrollable vomiting R11.10    Intractable nausea and vomiting R11.2    Dizziness R42    Orthostatic hypotension I95.1    Diarrhea R19.7    Recurrent falls while walking R29.6    Abnormal cardiovascular stress test R94.39    Recurrent syncope R55    PSVT (paroxysmal supraventricular tachycardia) ([facility]) I47.1     Social History     Tobacco Use    Smoking status: Never Smoker    Smokeless tobacco: Never Used   Vaping Use    Vaping Use: Never used   Substance Use Topics    Alcohol use:  Yes     Alcohol/week: 0.0 standard drinks     Comment: rarely    Drug use: No       Potassium (mmol/L)   Date Value   12/03/2021 4.6   09/29/2021 4.4   08/27/2021 4.4   07/22/2021 5.0   07/21/2021 4.9   05/28/2021 3.8     BUN (mg/dL)   Date Value   12/03/2021 20   09/29/2021 12 08/27/2021 10   07/22/2021 12   07/21/2021 16   05/28/2021 13     CREATININE (mg/dL)   Date Value   12/03/2021 0.89   09/29/2021 0.86   08/27/2021 0.77   07/22/2021 0.74   07/21/2021 0.78   05/28/2021 0.65     TSH   Date Value   12/03/2021 2.65 mIU/L   07/13/2021 4.03 mIU/L   05/13/2021 4.01 mIU/L   01/25/2021 1.07 mIU/L   04/22/2020 1.27 uIU/mL   02/08/2019 1.78 uIU/mL     No components found for: T4      Plan:      · Chronic Systolic and/or Diastolic Heart Failure (diagnosis): EF: >60%  via echocardiogram on 1-25-21  ? Beta Blocker for EF </= 40%: CHANGED Metoprolol succ to Ziac 5/6.25 mg (sotalol/HCTZ) per NUNU Ordonez on 11-10-21  · Calcium Channel Blocker: Continue Diltiazem  mg daily   · Diuretics: None  · ACE/ARB/ARNI for EF </= 40%: none  · Spironolactone for EF </= 35%: none  · Nonpharmacologic management of Heart Failure: I told patient to continue wearing lower extremity compression stockings and I advised patient to try and keep legs up whenever possible and to limit salt in diet.   · Laboratory testing:   ? Basic metabolic panel (BMP) and BNP 1 month to assess her potassium and renal function and fluid      Essential Hypertension:   ? Diuretics:  None  ? Beta Blocker: CHANGED Metoprolol succ to Ziac (sotalol/HCTZ)               Calcium Channel Blocker: Continue  Diltiazem  mg daily.     Patient Education/Instructions: I did spend about 15 minutes with patient going over dietary guidelines, the signs and symptoms to watch for including shortness of breath, weight gain, lightheadedness/dizziness. I asked patient to call the clinic if develops persistent or worsening shortness of breath or weight gain of more than 3 pounds in 1-7 days. Patient verbalized understanding.    Patient is eating 2 eggs for breakfast, string cheese or apple and peanut butter for snack and then chicken and broccoli for lunch then quest bar for snack and dinner is usual chicken with cauliflower or broccoli and cottage cheese. Only uses Mrs Myles Jackelyn no salt. Goes to [facility] for cardio drumming 2-3 times a week and also walks about 3 miles at a time.        Medication changes since last visit none    Follow up with Dr Evelina Preston 22  Pharmacist heart failure clinic 3-9-22  Follow up with Dr Shanel Garza 22    Thank you for the referral,    Prasad Saha [facility] PharmD    For Pharmacy Admin Tracking Only     Intervention Detail: Adherence Monitorin and Lab(s) Ordered   Total # of Interventions Recommended: 1   Total # of Interventions Accepted: 1   Time Spent (min): 60

## 2022-02-10 DIAGNOSIS — E03.9 HYPOTHYROIDISM, UNSPECIFIED TYPE: ICD-10-CM

## 2022-02-10 RX ORDER — LEVOTHYROXINE SODIUM 100 UG/1
TABLET ORAL
Qty: 30 TABLET | Refills: 0 | Status: SHIPPED | OUTPATIENT
Start: 2022-02-10 | End: 2022-04-09

## 2022-03-01 PROCEDURE — 93298 REM INTERROG DEV EVAL SCRMS: CPT | Performed by: FAMILY MEDICINE

## 2022-03-02 ENCOUNTER — NURSE ONLY (OUTPATIENT)
Dept: CARDIOLOGY | Age: 52
End: 2022-03-02
Payer: COMMERCIAL

## 2022-03-02 DIAGNOSIS — Z45.09 ENCOUNTER FOR LOOP RECORDER CHECK: ICD-10-CM

## 2022-03-02 DIAGNOSIS — R55 SYNCOPE, UNSPECIFIED SYNCOPE TYPE: Primary | ICD-10-CM

## 2022-03-03 ENCOUNTER — TELEPHONE (OUTPATIENT)
Dept: PRIMARY CARE CLINIC | Age: 52
End: 2022-03-03

## 2022-03-03 NOTE — TELEPHONE ENCOUNTER
----- Message from Ben sent at 3/3/2022  3:17 PM EST -----  Subject: Message to Provider    QUESTIONS  Information for Provider? Patient requesting eye drops for pink eye   patient was seen by a LPN she does not wish to schedule an appt at this   time. ---------------------------------------------------------------------------  --------------  Ashley ROJAS  What is the best way for the office to contact you? OK to leave message on   voicemail  Preferred Call Back Phone Number? [phone]  ---------------------------------------------------------------------------  --------------  SCRIPT ANSWERS  Relationship to Patient?  Self

## 2022-03-04 ENCOUNTER — TELEPHONE (OUTPATIENT)
Dept: PRIMARY CARE CLINIC | Age: 52
End: 2022-03-04

## 2022-03-04 ENCOUNTER — TELEPHONE (OUTPATIENT)
Dept: PULMONOLOGY | Age: 52
End: 2022-03-04

## 2022-03-04 RX ORDER — CIPROFLOXACIN HYDROCHLORIDE 3.5 MG/ML
1 SOLUTION/ DROPS TOPICAL
Qty: 1 EACH | Refills: 0 | Status: SHIPPED | OUTPATIENT
Start: 2022-03-04 | End: 2022-03-14

## 2022-03-04 NOTE — TELEPHONE ENCOUNTER
Spoke with Margaret CESAR With [facility] and she indicated a [facility] claim cannot be made for COVD. Barbara Rico from [facility] she earlier left a message we needed to do a Retro PA for the patients PFT. Called and [facility] regarding a claim cannot be done for this and indicated to call me back if further questions.

## 2022-03-04 NOTE — TELEPHONE ENCOUNTER
----- Message from Ben sent at 3/3/2022  3:17 PM EST -----  Subject: Message to Provider    QUESTIONS  Information for Provider? Patient requesting eye drops for pink eye   patient was seen by a LPN she does not wish to schedule an appt at this   time. ---------------------------------------------------------------------------  --------------  Isa ROJAS  What is the best way for the office to contact you? OK to leave message on   voicemail  Preferred Call Back Phone Number? [phone]  ---------------------------------------------------------------------------  --------------  SCRIPT ANSWERS  Relationship to Patient?  Self

## 2022-03-04 NOTE — TELEPHONE ENCOUNTER
Patient informed, she requested that eye drops be sent to [facility] in [facility].

## 2022-03-08 ENCOUNTER — HOSPITAL ENCOUNTER (OUTPATIENT)
Age: 52
Discharge: HOME | End: 2022-03-08
Payer: COMMERCIAL

## 2022-03-08 ENCOUNTER — TELEPHONE (OUTPATIENT)
Dept: CARDIOLOGY | Age: 52
End: 2022-03-08

## 2022-03-08 DIAGNOSIS — I47.1 PSVT (PAROXYSMAL SUPRAVENTRICULAR TACHYCARDIA) (HCC): ICD-10-CM

## 2022-03-08 DIAGNOSIS — I95.1 ORTHOSTATIC HYPOTENSION: ICD-10-CM

## 2022-03-08 DIAGNOSIS — R94.39 ABNORMAL CARDIOVASCULAR STRESS TEST: ICD-10-CM

## 2022-03-08 LAB
ANION GAP SERPL CALCULATED.3IONS-SCNC: 10 MMOL/L (ref 9–17)
BUN BLDV-MCNC: 19 MG/DL (ref 6–20)
BUN/CREAT BLD: 24 (ref 9–20)
CALCIUM SERPL-MCNC: 9.9 MG/DL (ref 8.6–10.4)
CHLORIDE BLD-SCNC: 102 MMOL/L (ref 98–107)
CO2: 29 MMOL/L (ref 20–31)
CREAT SERPL-MCNC: 0.78 MG/DL (ref 0.5–0.9)
GFR AFRICAN AMERICAN: >60 ML/MIN
GFR NON-AFRICAN AMERICAN: >60 ML/MIN
GFR SERPL CREATININE-BSD FRML MDRD: ABNORMAL ML/MIN/{1.73_M2}
GFR SERPL CREATININE-BSD FRML MDRD: ABNORMAL ML/MIN/{1.73_M2}
GLUCOSE BLD-MCNC: 89 MG/DL (ref 70–99)
POTASSIUM SERPL-SCNC: 5.1 MMOL/L (ref 3.7–5.3)
PRO-BNP: 121 PG/ML
SODIUM BLD-SCNC: 141 MMOL/L (ref 135–144)

## 2022-03-08 PROCEDURE — 80048 BASIC METABOLIC PNL TOTAL CA: CPT

## 2022-03-08 PROCEDURE — 83880 ASSAY OF NATRIURETIC PEPTIDE: CPT

## 2022-03-08 PROCEDURE — 36415 COLL VENOUS BLD VENIPUNCTURE: CPT

## 2022-03-08 NOTE — PROGRESS NOTES
[facility]  Medication Management  Pharmacist  Heart Failure    [facility]  [place] [address]  Phone: [phone]  Fax: [phone]      NAME: Radhika Boone  MEDICAL RECORD NUMBER:  [id number]  AGE: 47 y.o. GENDER: female  : 1970  EPISODE DATE:  3/9/2022      Heart Failure Management referral by Dr. Samara Lyman    Dry weight: ?? pounds     Today's Wt: 244 lb  Wt Readings from Last 6 Encounters:   22 244 lb (110.7 kg)   22 245 lb (111.1 kg)   21 240 lb (108.9 kg)   21 243 lb (110.2 kg)   21 240 lb 3.2 oz (109 kg)   11/10/21 243 lb (110.2 kg)    and   Ht Readings from Last 1 Encounters:   21 5' 5\" (1.651 m)                   /72   HR: 58  O2Sat: 98%     Extremities non-pitting edema BLE and feet - compression socks in place  Skin warm dry intact     Subjective   Ms. Boone is a 47 y.o. female here for the Heart Failure Services. They are here today for a comprehensive medication review including over the counter medications and herbal products, overall wellbeing assessment, transition of care and any needed adjustments with updates and recommendations communicated to the referring physician. Patient Symptoms:  Patient is here today for 1 month follow-up after complaints of high BP following endoscopy/colonoscopy on 22 with Dr. Kailey Skaggs. Patient complains of increased swelling of legs and feet. NO pitting edema found on exam.  She has reduced her daily calorie intake as she was recently diagnosed with celiac disease and is following a very strict diet. However, she is not losing any weight with weight staying 240-245 lb. Denies SOB. Reports some dizziness and lightheadedness. Patient stated that she is staying well hydrated - drinking >/= 64 oz water daily.      Shortness of Breath:   []   None   [x]   Dyspnea on exertion   []   Dyspnea with normal activities  []   Dyspnea at rest  []   Dyspnea while sleeping    Patient Findings:   []  Missed doses  []  Diet changes  []  Sodium intake changes   []  Night time cough   []  Other   []  Early saiety, abd fullness []  Chest pain   []  Constipation   []  Night time bathroom trips  []  Alcohol intake changes  []  Acute illness  [x]  Edema    []  Number of pillows or recliner  []  Activity changes   []  Fatigue that limits activity [x]  Heart racing or skipping beats  [x]  Light headedness  [x]  Dizziness    []      []  Problems sleeping    Functional Activity New York Heart Association Classification  [x]   Class I No limitation of physical activity. Ordinary physical activity does not cause undue fatigue, palpitation, dyspnea (shortness of breath). []   Class II Slight limitation of physical activity. Comfortable at rest. Ordinary physical activity results in fatigue, palpitation, dyspnea (shortness of breath). []   Class III  Pb limitation of physical activity. Comfortable at rest.  Less than ordinary activity causes fatigue, palpitation, dyspnea. []   Class IV Unable to carry on any physical activity without discomfort. Symptoms of heart failure at rest.  If any physical activity is undertaken, discomfort increases.       Last CHF Hospital Admission: No hospitalizations for heart failure    OUTPATIENT MEDICATIONS:  Outpatient Medications Marked as Taking for the 3/9/22 encounter [facility] Encounter) with [email or web address]   Medication Sig Dispense Refill    ketorolac (TORADOL) 10 MG tablet TAKE 1 TABLET BY MOUTH EVERY 6 HOURS AS NEEDED FOR PAIN (LIMIT TO NO MORE THAN THREE TIMES PER WEEK)      ciprofloxacin (CILOXAN) 0.3 % ophthalmic solution Place 1 drop into both eyes every 2 hours for 10 days 1 each 0    EUTHYROX 100 MCG tablet Take 1 tablet by mouth once daily 30 tablet 0    allopurinol (ZYLOPRIM) 100 MG tablet Take 1 tablet by mouth once daily 30 tablet 0    traZODone (DESYREL) 50 MG tablet Take 50 mg by mouth nightly      albuterol sulfate HFA (VENTOLIN HFA) 108 (90 Base) MCG/ACT inhaler Inhale 2 puffs into the lungs 4 times daily as needed for Wheezing 1 each 0    bisoprolol-hydroCHLOROthiazide (ZIAC) 5-6.25 MG per tablet Take 1 tablet by mouth daily 90 tablet 3    dilTIAZem (CARDIZEM CD) 300 MG extended release capsule Take 1 capsule by mouth daily 90 capsule 3    ALPRAZolam (XANAX) 0.25 MG tablet Take 0.25 mg by mouth daily as needed.  gabapentin (NEURONTIN) 300 MG capsule Take 900 mg by mouth 3 times daily.  For migraines      escitalopram (LEXAPRO) 20 MG tablet Take 20 mg by mouth daily      buPROPion (WELLBUTRIN XL) 300 MG extended release tablet Take 300 mg by mouth every morning With 150 mg for total of 450 mg      prochlorperazine (COMPAZINE) 5 MG tablet Take 5 mg by mouth every 6 hours as needed (Migraines) Migraines      Ubrogepant (UBRELVY) 100 MG TABS Take 100 mg by mouth as needed (migraines)       Turmeric (QC TUMERIC COMPLEX PO) Take 1 capsule by mouth daily       buPROPion (WELLBUTRIN XL) 150 MG extended release tablet Take 150 mg by mouth daily With 300 mg for total of 450 mg               Objective / Assessment     Patient Active Problem List   Diagnosis Code    Essential hypertension I10    Hypothyroidism E03.9    Idiopathic gout M10.00    Anxiety state F41.1    Moderate single current episode of major depressive disorder (HCC) F32.1    Iron deficiency anemia D50.9    Lab test positive for detection of COVID-19 virus U07.1    Shortness of breath R06.02    Myalgia M79.10    Persistent vomiting R11.15    Uncontrollable vomiting R11.10    Intractable nausea and vomiting R11.2    Dizziness R42    Orthostatic hypotension I95.1    Diarrhea R19.7    Recurrent falls while walking R29.6    Abnormal cardiovascular stress test R94.39    Recurrent syncope R55    PSVT (paroxysmal supraventricular tachycardia) ([facility]) I47.1     Social History     Tobacco Use    Smoking status: Never Smoker    Smokeless tobacco: Never Used   Vaping Use    Vaping Use: Never used   Substance Use Topics    Alcohol use: Yes     Alcohol/week: 0.0 standard drinks     Comment: rarely    Drug use: No       Potassium (mmol/L)   Date Value   03/08/2022 5.1   12/03/2021 4.6   09/29/2021 4.4   08/27/2021 4.4   07/22/2021 5.0   07/21/2021 4.9     BUN (mg/dL)   Date Value   03/08/2022 19   12/03/2021 20   09/29/2021 12   08/27/2021 10   07/22/2021 12   07/21/2021 16     CREATININE (mg/dL)   Date Value   03/08/2022 0.78   12/03/2021 0.89   09/29/2021 0.86   08/27/2021 0.77   07/22/2021 0.74   07/21/2021 0.78     TSH   Date Value   12/03/2021 2.65 mIU/L   07/13/2021 4.03 mIU/L   05/13/2021 4.01 mIU/L   01/25/2021 1.07 mIU/L   04/22/2020 1.27 uIU/mL   02/08/2019 1.78 uIU/mL     No results found for: T4      Plan:      · Chronic Systolic and/or Diastolic Heart Failure (diagnosis): EF: > 60% via echocardiogram on 1/25/21  · Beta Blocker for EF </= 40%: Ziac 5/6.25 mg (bisoprolol/HCTZ) per NUNU Ordonez on 11/10/21  · Diuretics: None  · CCB:  Diltiazem  mg po daily  · ACE/ARB/ARNI for EF </= 40%: None  · Spironolactone for EF </= 35%: None  · Nonpharmacologic management of Heart Failure: I told patient to continue wearing lower extremity compression stockings and I advised patient to try and keep legs up whenever possible and to limit salt in diet. · Laboratory testing:   ? Basic metabolic panel (BMP) per cardiology to assess potassium and renal function. ? BNP per cardiology to assess fluid volume. Essential Hypertension:   ? Diuretics: None  ? Beta Blocker: Ziac (bisoprolol/HCTZ)  ? CCB:  Diltiazem  mg po daily    Patient Education/Instructions: I did spend about 15 minutes with patient going over his heart failure packet including dietary guidelines, the signs and symptoms to watch for including shortness of breath, weight gain, lightheadedness/dizziness.  I asked patient to call the clinic if develops persistent or worsening shortness of breath or weight gain of more than 3 pounds in 1-7 days. Patient verbalized understanding. Medication changes since last visit:  None    Patient's chief complaint today is swelling in lower legs and feet. It is NON-pitting edema. She is maintaining a low sodium diet. No complaints of SOB (she is exercising at the [facility] routinely),  (3/8/22), renal function stable with serum creatinine 0.78 (3/8/22), /72 and pulse 58 today - clinical findings not suggestive of fluid overload. Recommendation to patient to follow up with PCP for evaluation of possible lymphedema. Follow up cardiology - 22  Return to CHF clinic in 3 months. I instructed patient to please call right away with any signs or symptoms of heart failure or other medical conditions that need attention or with any questions at all. Do not wait. Thank you for the referral,    Lyle Peña.  Jersey Gonzalez, [address] [facility] Admin Tracking Only     Intervention Detail: Adherence Monitorin   Total # of Interventions Recommended: 1   Total # of Interventions Accepted: 1   Time Spent (min): 60

## 2022-03-09 ENCOUNTER — HOSPITAL ENCOUNTER (OUTPATIENT)
Dept: PHARMACY | Age: 52
Setting detail: THERAPIES SERIES
Discharge: HOME | End: 2022-03-09
Payer: COMMERCIAL

## 2022-03-09 VITALS
DIASTOLIC BLOOD PRESSURE: 72 MMHG | SYSTOLIC BLOOD PRESSURE: 119 MMHG | BODY MASS INDEX: 40.6 KG/M2 | WEIGHT: 244 LBS | HEART RATE: 58 BPM

## 2022-03-09 PROCEDURE — 99212 OFFICE O/P EST SF 10 MIN: CPT

## 2022-03-09 RX ORDER — KETOROLAC TROMETHAMINE 10 MG/1
TABLET, FILM COATED ORAL
COMMUNITY
Start: 2021-12-30 | End: 2022-06-17 | Stop reason: ALTCHOICE

## 2022-03-11 ENCOUNTER — OFFICE VISIT (OUTPATIENT)
Dept: PRIMARY CARE CLINIC | Age: 52
End: 2022-03-11
Payer: COMMERCIAL

## 2022-03-11 VITALS — BODY MASS INDEX: 41.34 KG/M2 | HEART RATE: 73 BPM | WEIGHT: 248.4 LBS | OXYGEN SATURATION: 99 %

## 2022-03-11 DIAGNOSIS — I50.9 DYSPNEA DUE TO CONGESTIVE HEART FAILURE (HCC): ICD-10-CM

## 2022-03-11 DIAGNOSIS — I89.0 LYMPHEDEMA: ICD-10-CM

## 2022-03-11 DIAGNOSIS — I10 ESSENTIAL HYPERTENSION: Primary | ICD-10-CM

## 2022-03-11 PROCEDURE — 99214 OFFICE O/P EST MOD 30 MIN: CPT | Performed by: FAMILY MEDICINE

## 2022-03-11 PROCEDURE — 1036F TOBACCO NON-USER: CPT | Performed by: FAMILY MEDICINE

## 2022-03-11 PROCEDURE — 3017F COLORECTAL CA SCREEN DOC REV: CPT | Performed by: FAMILY MEDICINE

## 2022-03-11 PROCEDURE — G8417 CALC BMI ABV UP PARAM F/U: HCPCS | Performed by: FAMILY MEDICINE

## 2022-03-11 PROCEDURE — G8427 DOCREV CUR MEDS BY ELIG CLIN: HCPCS | Performed by: FAMILY MEDICINE

## 2022-03-11 PROCEDURE — G8484 FLU IMMUNIZE NO ADMIN: HCPCS | Performed by: FAMILY MEDICINE

## 2022-03-11 ASSESSMENT — PATIENT HEALTH QUESTIONNAIRE - PHQ9
SUM OF ALL RESPONSES TO PHQ QUESTIONS 1-9: 0
1. LITTLE INTEREST OR PLEASURE IN DOING THINGS: 0
SUM OF ALL RESPONSES TO PHQ9 QUESTIONS 1 & 2: 0
SUM OF ALL RESPONSES TO PHQ QUESTIONS 1-9: 0
2. FEELING DOWN, DEPRESSED OR HOPELESS: 0

## 2022-03-11 NOTE — PROGRESS NOTES
Hypertension: Patient here for follow-up of elevated blood pressure. She is exercising and is adherent to low salt diet. Blood pressure is well controlled at home. Cardiac symptoms chest pain, fatigue and palpitations. Patient denies none. Cardiovascular risk factors: hypertension and obesity (BMI >= 30 kg/m2). Use of agents associated with hypertension: none. Hypothyroidism: patient states she thinks ok. Insomnia: Patient states ok. SOB: Patient states she is doing better. Patient states Dr. Blayne Ojeda office and  Dr. David Ruiz thinks she has Lymphedema in both legs,right leg is worse      RECHECK B/P 144/91      Allergies:  Rocephin [ceftriaxone]    Past Medical History:    Past Medical History:   Diagnosis Date    Anemia, unspecified     Anxiety     Edema     H/O cardiovascular stress test 03/08/2021    Crodova treadmill score is 0    H/O echocardiogram 01/26/2021    EF >60% Normal study    History of cardiac cath 04/01/2021    [facility]) Nia/Dr. Little/Right Radial     History of cardiovascular stress test 03/22/2021    Equivocal study. Small perfusion defect of mild intensity in the anterior and anteroseptal regions during stress and rest imaging, which is most consistent with artifact EF 71% Duke Treadmill score is -2intermediate risk.  History of normal Holter exam 02/22/2021    CAM 6 days 19 hrs Normal sinus. AT 2 episodes Longest/fastest 9 beats at ave 127 bpm p to 153 bpm PAC 0.08% PVC 0.07%    Hypertension     Hypothyroidism     Thyroid disease     Tilt table evaluation 03/22/2021    Negative study Although sensitivity of study certainly is not 961% relatively normal results signifcantly decrease the likelihood of neurocardiogenic source for pt symptoms.  Clinically suspision remains high possible repeat study maybe indicated       Past Surgical History:    Past Surgical History:   Procedure Laterality Date    ANTERIOR CRUCIATE LIGAMENT REPAIR Left     HYSTERECTOMY, VAGINAL      07/2020 robotic, lap supracervical hyst and BS, perineopexy, A&P repair, cysto, Lynx retropubic slin    INFECTED SKIN DEBRIDEMENT Left     Left third finger    INSERTABLE CARDIAC MONITOR  05/20/2021       Social History:   Social History     Tobacco Use    Smoking status: Never Smoker    Smokeless tobacco: Never Used   Substance Use Topics    Alcohol use: Yes     Alcohol/week: 0.0 standard drinks     Comment: rarely       Family History:   Family History   Adopted: Yes         Review of Systems:  Constitutional: negative for fever or chills  Eyes: negative for visual disturbance   ENT: negative for sore throat or nasal congestion  Respiratory: neg for cough or shortness of breath  Cardiovascular: negative for chest pain or palpitations,has bilat leg edema  Gastrointestinal: negative for abd pain, nausea, vomiting, diarrhea or constipation  Genitourinary: negative for dysuria, urgency or frequency  Integument/breast: negative for skin rash or lesions  Neurological: negative for unilateral weakness,haS BILAT  numbness or tingling. Skeletal Muscular: no joint pain or swelling      Objective:  Physical Exam:  GEN:   A & O x3, no apparent distress  EYES: No gross abnormalities.   ENT:ENT exam normal, no neck nodes or sinus tenderness  NECK: normal, supple, no lymphadenopathy,  no carotid bruits  PULM: clear to auscultation bilaterally- no wheezes, rales or rhonchi, normal air movement, no respiratory distress  COR: regular rate & rhythm and no gallops  ABD:  soft, non-tender, non-distended, normal bowel sounds, no masses or organomegaly  : deferred  EXT: no deformities, HAS BILAT NON PITTING EDEMA  NEURO: Motor and sensory grossly intact  SKIN:  No skin lesions or rashes        Labs:  Lab Results   Component Value Date    GLUCOSE 89 03/08/2022    BUN 19 03/08/2022    CREATININE 0.78 03/08/2022     03/08/2022    K 5.1 03/08/2022    CALCIUM 9.9 03/08/2022     03/08/2022    CO2 29 03/08/2022    LABGLOM >60 03/08/2022       Assessment:  1. Essential hypertension    2. Dyspnea due to congestive heart failure (HCC)    3. Lymphedema    ·       Plan:    ICD-10-CM    1. Essential hypertension - HOME BP WELL CONTROLLED,DISCUSSSED DIET,EXERCISE I10    2. Dyspnea due to congestive heart failure (HCC) - MONITOR SALT INTAKE I50.9    3. Lymphedema  I89.0 External Referral To Lymphedema Clinic       · Encouraged low fat and low sodium diet. · Goal for blood pressure control is 130/80  · Recommended regular exercise as tolerated, 3-5 times per day      Orders Placed This Encounter   Procedures    External Referral To Lymphedema Clinic     Referral Priority:   Routine     Referral Type:   Eval and Treat     Referral Reason:   Specialty Services Required     Requested Specialty:   Physical Therapy     Number of Visits Requested:   1       Current Outpatient Medications   Medication Sig Dispense Refill    ciprofloxacin (CILOXAN) 0.3 % ophthalmic solution Place 1 drop into both eyes every 2 hours for 10 days 1 each 0    EUTHYROX 100 MCG tablet Take 1 tablet by mouth once daily 30 tablet 0    allopurinol (ZYLOPRIM) 100 MG tablet Take 1 tablet by mouth once daily 30 tablet 0    traZODone (DESYREL) 50 MG tablet Take 50 mg by mouth nightly      albuterol sulfate HFA (VENTOLIN HFA) 108 (90 Base) MCG/ACT inhaler Inhale 2 puffs into the lungs 4 times daily as needed for Wheezing 1 each 0    bisoprolol-hydroCHLOROthiazide (ZIAC) 5-6.25 MG per tablet Take 1 tablet by mouth daily 90 tablet 3    dilTIAZem (CARDIZEM CD) 300 MG extended release capsule Take 1 capsule by mouth daily 90 capsule 3    ALPRAZolam (XANAX) 0.25 MG tablet Take 0.25 mg by mouth daily as needed.  gabapentin (NEURONTIN) 300 MG capsule Take 900 mg by mouth 3 times daily.  For migraines      escitalopram (LEXAPRO) 20 MG tablet Take 20 mg by mouth daily      buPROPion (WELLBUTRIN XL) 300 MG extended release tablet Take 300 mg by mouth every morning With 150 mg for total of 450 mg      Ubrogepant (UBRELVY) 100 MG TABS Take 100 mg by mouth as needed (migraines)       Cholecalciferol 50 MCG (2000 UT) CAPS Take 1 capsule by mouth daily       Turmeric (QC TUMERIC COMPLEX PO) Take 1 capsule by mouth daily       buPROPion (WELLBUTRIN XL) 150 MG extended release tablet Take 150 mg by mouth daily With 300 mg for total of 450 mg      ketorolac (TORADOL) 10 MG tablet TAKE 1 TABLET BY MOUTH EVERY 6 HOURS AS NEEDED FOR PAIN (LIMIT TO NO MORE THAN THREE TIMES PER WEEK) (Patient not taking: Reported on 3/11/2022)      prochlorperazine (COMPAZINE) 5 MG tablet Take 5 mg by mouth every 6 hours as needed (Migraines) Migraines (Patient not taking: Reported on 3/11/2022)      ferrous sulfate (IRON 325) 325 (65 Fe) MG tablet Take 1 tablet by mouth daily Only takes sometimes due to constipation (Patient not taking: Reported on 3/9/2022)       No current facility-administered medications for this visit. No follow-ups on file.       Electronically signed by Rosita Gallagher MD on 3/11/2022 at 3:06 PM

## 2022-03-16 ENCOUNTER — HOSPITAL ENCOUNTER (OUTPATIENT)
Dept: PHYSICAL THERAPY | Age: 52
Setting detail: THERAPIES SERIES
Discharge: HOME | End: 2022-03-16
Payer: COMMERCIAL

## 2022-03-16 PROCEDURE — 97162 PT EVAL MOD COMPLEX 30 MIN: CPT

## 2022-03-16 PROCEDURE — 97140 MANUAL THERAPY 1/> REGIONS: CPT

## 2022-03-16 NOTE — PLAN OF CARE
[facility]           Phone: [phone]             Outpatient Physical Therapy  Fax: [phone]                                           Date: 3/16/2022  Patient: Onesimo Starkey : 1970 [facility] #: [id number]   Referring Practitioner:  Dr. Tuan Rodriguez Referral Date:  03/15/22       [x] Plan of Care   [] Updated Plan of Care    Dates of Service to Include: 3/16/2022 to 22    Diagnosis:  I89.0 Lympgedema BLE/BUE    Rehab (Treatment) Diagnosis:  BLE/BUE/abdominal lymphedema             Onset Date:  03/15/22    Attendance  Total # of Visits to Date: 1        Assessment  Assessment: Pt is a 47year old female that presents with total body lymphedema including BUE/BLE and abdomin. Pt started with increased swelling after battling complication of OQUYA-63. Pt will benefit from skilled PT in order to address these deficits.       Goals  Short term goals  Time Frame for Short term goals: 3 weeks  Short term goal 1: Pt willbe educated on her POC and HEP-met  Short term goal 2: Pt will initiate pump protocol to BLE/BUE  Long term goals  Time Frame for Long term goals : 6 weeks  Long term goal 1: Pt will be safe and independent with her lymphedema management  Long term goal 2: Pt will demonstrate a 2-3cm decrease in girth measurements in BUE/BLE in order to improve quality of life  Long term goal 3: Pt will be fitted for an at home pump  Long term goal 4: Pt will report 50% improvement in symptoms     Prognosis  Prognosis: Fair    Treatment Plan   Times per week: 2x/wk  Plan weeks: 4-6 weks  [] HP/CP      [] Electrical Stim   [x] Therapeutic Exercise      [] Gait Training  [] Aquatics   [] Ultrasound         [x] Patient Education/HEP   [x] Manual Therapy  [] Traction    [] Neuro-carl        [x] Soft Tissue Mobs            [] Home TENS  [] Iontophoresis    [] Orthotic casting/fitting      [x] lymphedema management Electronically signed by: Abner Garcia PT, DPT    Date: 3/16/2022      ______________________________________ Date: 3/16/2022   Physician Signature

## 2022-03-16 NOTE — PROGRESS NOTES
Phone: [address]          Fax: [phone]                      Outpatient Physical Therapy                                                                      Evaluation  Date: 3/16/2022  Patient: Marely Mosher  : 1970  [facility] #: [id number]  Referring Practitioner: Dr. Jeanie Aj    Referral Date : 03/15/22     Medical Diagnosis: I89.0 Lympgedema BLE/BUE    Treatment Diagnosis: BLE/BUE/abdominal lymphedema  Onset Date: 03/15/22  PT Insurance Information: [employer]  Total # of Visits Approved: 18   Total # of Visits to Date: 1     Subjective  Subjective: Pt reports in Dec of 2020 she was daignosed with Covid with  hospital stay. Pt reports a complicated medical history since then. Pt has been diangosed with celica's disease, POTS amd a few months ago started with BLE/BUE lymphedema. Pt's legs are very tight and painful and she has been wearing compression socks. Pt reports swelling is staying the same but will get worse as the day goes on.  Pt is still working and is on her feet all day                Objective     Observation/Palpation  Observation: R LE midfoot 22cm, ankle 28.6cm, 3\" above 32.9cm, 6\" above 39.7cm, midcalf 44.4cm, knee 50.8cm, thigh 64.4cm: L LE midfoot 21.8cm, ankle 28.4cm, 3\" above 33.5cm, 6\" above 39.2cm, midcalf 45.5cm, knee 51.6cm, thigh 64.0cm  Edema: R UE midhand 19.5cm, wrist 16.5cm, 3\" above 22.8cm, 6\" above 28.8cm, elbow 28.3cm, 3\" above 34.6cm, 6\"above 38cm, armpit 39.2cm: L UE midhand 19.3cm, wrist 16.6cm, 3\" above 25.6cm, 6\" above 29.1cm, elbow 31cm, 3\" above 37.2cm, 6\" above 38.5cm, armpit 41.5cm        Exercises:  Exercise 1: HEP Pt educated on keeping her BLE/BUE compressed at 30mmHg and elevated throughout the day, performing exercises daily and reducing sugar and sodium in her diet    Manual:  BLE/BUE measurements     Functional Outcome Measures      Any of your usual work, housework, or school activities: Moderate Difficulty  Your usual hobbies, recreational, or sporting activities: Moderate Difficulty  Getting into or out of the bath: A Little Bit of Difficulty  Walking between rooms: A Little Bit of Difficulty  Putting on your shoes or socks: Moderate Difficulty  Squatting: Moderate Difficulty  Lifting an object, like a bag of groceries from the floor: A Little Bit of Difficulty  Performing light activities around your home: A Little Bit of Difficulty  Performing heavy activities around your home: Moderate Difficulty  Getting into or out of a car: A Little Bit of Difficulty  Walking 2 blocks: Moderate Difficulty  Walking a mile: Quite a Bit of Difficulty  Going up or down 10 stairs (about 1 flight of stairs): Moderate Difficulty  Standing for 1 hour: Moderate Difficulty  Sitting for 1 hour: A Little Bit of Difficulty  Running on even ground : Extreme Difficulty or Unable to Perform Activity  Running on uneven ground : Extreme Difficulty or Unable to Perform Activity  Making sharp turns while running fast : Extreme Difficulty or Unable to Perform Activity  Hopping: Extreme Difficulty or Unable to Perform Activity  Rolling over in bed: A Little Bit of Difficulty  LEFS Total Score: 38     Open a tight or new jar: Mild Difficulty  Do heavy household chores (e.g., wash walls, floors):  Moderate Difficulty  Alisha a shopping bag or briefcase: Mild Difficulty  Wash your back: Mild Difficulty  Use a knife to cut food: No Difficulty  Recreational activities in which you take some force or impact through your arm, shoulder, or hand (e.g., golf, hammering, tennis, etc.): Moderate Difficulty  During the past week, to what extent has your arm, shoulder or hand problem interfered with your normal social activities with family, friends, neighbors or groups?: Moderately  During the past week, were you limited in your work or other regular daily activities as a result of your arm, shoulder or hand problem?: Moderately Limited  Arm, shoulder or hand pain: Moderate  Tingling (pins and needles) in your arm, shoulder or hand: Moderate  During the past week, how much difficulty have you had sleeping because of the pain in your arm, shoulder or hand?: Mild Difficulty  QuickDASH Total Score: 27  QuickDASH Disability/Symptom Score : 36.36 %      Assessment  Assessment: Pt is a 47year old female that presents with total body lymphedema including BUE/BLE and abdomin. Pt started with increased swelling after battling complication of QPPDU-23. Pt will benefit from skilled PT in order to address these deficits. Prognosis: Fair        Decision Making: Medium Complexity    Patient Education  Patient Education: pt educated on her POC and HEP  Pt verbalized/demonstrated good understanding:     [X] Yes         [] No, pt required further clarification.       Goals  Short term goals  Time Frame for Short term goals: 3 weeks  Short term goal 1: Pt willbe educated on her POC and HEP-met  Short term goal 2: Pt will initiate pump protocol to BLE/BUE    Long term goals  Time Frame for Long term goals : 6 weeks  Long term goal 1: Pt will be safe and independent with her lymphedema management  Long term goal 2: Pt will demonstrate a 2-3cm decrease in girth measurements in BUE/BLE in order to improve quality of life  Long term goal 3: Pt will be fitted for an at home pump  Long term goal 4: Pt will report 50% improvement in symptoms      Patient goals : \"to decrease swelling in entire body\"        Minutes Tracking:  Time In: 0803  Time Out: 9251  Minutes: 47  Timed Code Treatment Minutes: 100 Marely Dai, PT, DPT    3/16/2022

## 2022-03-18 ENCOUNTER — HOSPITAL ENCOUNTER (OUTPATIENT)
Dept: PHYSICAL THERAPY | Age: 52
Setting detail: THERAPIES SERIES
Discharge: HOME | End: 2022-03-18
Payer: COMMERCIAL

## 2022-03-18 PROCEDURE — 97140 MANUAL THERAPY 1/> REGIONS: CPT

## 2022-03-18 PROCEDURE — 97110 THERAPEUTIC EXERCISES: CPT

## 2022-03-23 ENCOUNTER — HOSPITAL ENCOUNTER (OUTPATIENT)
Dept: PHYSICAL THERAPY | Age: 52
Setting detail: THERAPIES SERIES
Discharge: HOME | End: 2022-03-23
Payer: COMMERCIAL

## 2022-03-23 PROCEDURE — 97110 THERAPEUTIC EXERCISES: CPT

## 2022-03-23 PROCEDURE — 97140 MANUAL THERAPY 1/> REGIONS: CPT

## 2022-03-23 NOTE — PROGRESS NOTES
Phone: Corinne           Fax: [phone]                           Outpatient Physical Therapy                                                                            Daily Note    Patient: Marely Mosher : 1970  CSN #: [id number]   Referring Practitioner:  Dr. Jeanie Aj    Referral Date : 03/15/22     Date: 3/23/2022    Diagnosis: I89.0 Lympgedema BLE/BUE  Treatment Diagnosis: BLE/BUE/abdominal lymphedema    Onset Date: 03/15/22  PT Insurance Information: [facility]  Total # of Visits Approved: 18 Per Physician Order  Total # of Visits to Date: 3  No Show: 0  Canceled Appointment: 0      Pre-Treatment Pain:  0/10  Subjective: Pt. denies pain upon arrival and [place] [facility] Exercises:  Exercise 1: HEP Pt educated on keeping her BLE/BUE compressed at 30mmHg and elevated throughout the day, performing exercises daily and reducing sugar and sodium in her diet    Manual:  Soft Tissue Mobalization: MLD to B LE/UE    Modalities:          Assessment  Assessment: MLD to B LE/UE followed by pumps to B LE at 60 mmHg and R UE at 45 mmHg for 60 min. Pt denies pain with and post pumps, noted decrease in B feet and R wrist edema after pumps. Activity Tolerance  Activity Tolerance: Patient Tolerated treatment well    Patient Education  Patient Education: pt educated on at home lymph management  Pt verbalized/demonstrated good understanding:     [x] Yes         [] No, pt required further clarification.        Post Treatment Pain:  0/10      Plan  Times per week: 2x/wk  Plan weeks: 4-6 weks      Goals  (Total # of Visits to Date: 3)      Short term goals  Time Frame for Short term goals: 3 weeks  Short term goal 1: Pt willbe educated on her POC and HEP-met  Short term goal 2: Pt will initiate pump protocol to BLE/BUE-met    Long term goals  Time Frame for Long term goals : 6 weeks  Long term goal 1: Pt will be safe and independent with her lymphedema management-progressing  Long term goal 2: Pt will demonstrate a 2-3cm decrease in girth measurements in BUE/BLE in order to improve quality of life  Long term goal 3: Pt will be fitted for an at home pump  Long term goal 4: Pt will report 50% improvement in symptoms    Minutes Tracking:  Time In: 0708  Time Out: 6186  Minutes: [address] [place], [place]     Date: 3/23/2022

## 2022-03-25 ENCOUNTER — HOSPITAL ENCOUNTER (OUTPATIENT)
Dept: PHYSICAL THERAPY | Age: 52
Setting detail: THERAPIES SERIES
Discharge: HOME | End: 2022-03-25
Payer: COMMERCIAL

## 2022-03-25 PROCEDURE — 97140 MANUAL THERAPY 1/> REGIONS: CPT

## 2022-03-25 PROCEDURE — 97110 THERAPEUTIC EXERCISES: CPT

## 2022-03-25 NOTE — PROGRESS NOTES
Phone: Corinne           Fax: [phone]                           Outpatient Physical Therapy                                                                            Daily Note    Patient: Onesimo Starkey : 1970  CSN #: [id number]   Referring Practitioner:  Dr. Tuan Rodriguez    Referral Date : 03/15/22     Date: 3/25/2022    Diagnosis: I89.0 Lympgedema BLE/BUE  Treatment Diagnosis: BLE/BUE/abdominal lymphedema    Onset Date: 03/15/22  PT Insurance Information: [facility]  Total # of Visits Approved: 18 Per Physician Order  Total # of Visits to Date: 4  No Show: 0      Pre-Treatment Pain:  2-3/10  Subjective: Pt reports 2-3/10 pain today, stating she just got off work prior to her appt. Pt states she has been trying to exercise everyday to stay active and is wearing compression while exercising. Exercises:  Exercise 1: HEP Pt educated on keeping her BLE/BUE compressed at 30mmHg and elevated throughout the day, performing exercises daily and reducing sugar and sodium in her diet    Manual:  Soft Tissue Mobalization: MLD to B LE/UE    Modalities:          Assessment  Assessment: MLD to B LE/UE followed by pumps to B LE at 60 mmHg and R UE at 50 mmHg for 60 min. Noted increase in skin movement post pumps. Activity Tolerance       Patient Education  Graduated compression sleeves/socks. Pt verbalized/demonstrated good understanding:     [x] Yes         [] No, pt required further clarification.        Post Treatment Pain:  1-2/10      Plan  Times per week: 2x/wk  Plan weeks: 4-6 weks      Goals  (Total # of Visits to Date: 4)      Short term goals  Time Frame for Short term goals: 3 weeks  Short term goal 1: Pt willbe educated on her POC and HEP-met  Short term goal 2: Pt will initiate pump protocol to BLE/BUE-met    Long term goals  Time Frame for Long term goals : 6 weeks  Long term goal 1: Pt will be safe and independent with her lymphedema management-progressing  Long term goal 2: Pt will demonstrate a 2-3cm decrease in girth measurements in BUE/BLE in order to improve quality of life  Long term goal 3: Pt will be fitted for an at home pump  Long term goal 4: Pt will report 50% improvement in symptoms    Minutes Tracking:  Time In: 0800  Time Out: 0954  Minutes: [address] [place], [place]     Date: 3/25/2022

## 2022-03-30 ENCOUNTER — HOSPITAL ENCOUNTER (OUTPATIENT)
Dept: PHYSICAL THERAPY | Age: 52
Setting detail: THERAPIES SERIES
Discharge: HOME | End: 2022-03-30
Payer: COMMERCIAL

## 2022-03-30 PROCEDURE — 97140 MANUAL THERAPY 1/> REGIONS: CPT

## 2022-03-30 PROCEDURE — 97110 THERAPEUTIC EXERCISES: CPT

## 2022-03-30 NOTE — PROGRESS NOTES
Phone: Corinne           Fax: [phone]                           Outpatient Physical Therapy                                                                            Daily Note    Patient: Luis Miguel Arana : 1970  CSN #: [id number]   Referring Practitioner:  Dr. Shahzad King    Referral Date : 03/15/22     Date: 3/30/2022    Diagnosis: I89.0 Lympgedema BLE/BUE  Treatment Diagnosis: BLE/BUE/abdominal lymphedema    Onset Date: 03/15/22  PT Insurance Information: [employer]  Total # of Visits Approved: 18 Per Physician Order  Total # of Visits to Date: 5      Pre-Treatment Pain:  4-5/10  Subjective: Pt reports an increase inbilateral calf tightness this date    Exercises:  Exercise 1: HEP Pt educated on keeping her BLE/BUE compressed at 30mmHg and elevated throughout the day, performing exercises daily and reducing sugar and sodium in her diet    Manual:  Soft Tissue Mobalization: MLD to B LE/UE (just LE this date)        Assessment  Assessment: Pt asked about aquatic therapy for lymphedema, Pt agreed pt would be a good candidate to start aquatic therapy in order to improve her lymphedema in all extremities. Pt will start aquatic therapy 1x/wk in addition to pump protocol 2x/wk. Pt's measurements this date R LE midfoot 22.5cm, ankle 27.5cm, midcalf 49.9cm, thigh 60.4cm: L LE midfoot 22.4cm, ankle 27.3cm, midcalf 48.8cm, thigh 58.1cm      Patient Education  *Patient Education: educated on the benefits of aquatic therapy  Pt verbalized/demonstrated good understanding:     [x] Yes         [] No, pt required further clarification.        Post Treatment Pain:  4/10      Plan  Times per week: 3x/wk  Plan weeks: 4-6 weks      Goals  (Total # of Visits to Date: 5)      Short term goals  Time Frame for Short term goals: 3 weeks  Short term goal 1: Pt willbe educated on her POC and HEP-met  Short term goal 2: Pt will initiate pump protocol to

## 2022-04-01 ENCOUNTER — HOSPITAL ENCOUNTER (OUTPATIENT)
Dept: PHYSICAL THERAPY | Age: 52
Setting detail: THERAPIES SERIES
Discharge: HOME | End: 2022-04-01
Payer: COMMERCIAL

## 2022-04-01 PROCEDURE — 97140 MANUAL THERAPY 1/> REGIONS: CPT

## 2022-04-01 PROCEDURE — 97110 THERAPEUTIC EXERCISES: CPT

## 2022-04-01 NOTE — PROGRESS NOTES
Phone: Corinne           Fax: [phone]                           Outpatient Physical Therapy                                                                            Daily Note    Patient: Hiwot Renee : 1970  CSN #: [id number]   Referring Practitioner:  Dr. Abeba Pacheco    Referral Date : 03/15/22     Date: 2022    Diagnosis: I89.0 Lympgedema BLE/BUE  Treatment Diagnosis: BLE/BUE/abdominal lymphedema    Onset Date: 03/15/22  PT Insurance Information: [facility]  Total # of Visits Approved: 18 Per Physician Order  Total # of Visits to Date: 6  No Show: 0  Canceled Appointment: 0      Pre-Treatment Pain:  3-4/10  Subjective: Pt states her L UE is starting to feel better, not as swollen. Exercises:  Exercise 1: HEP Pt educated on keeping her BLE/BUE compressed at 30mmHg and elevated throughout the day, performing exercises daily and reducing sugar and sodium in her diet    Manual:  Soft Tissue Mobalization: MLD to B LE/UE    Modalities:          Assessment  Assessment: MLD to B LE and B UE followed by pumps at 60 mmHg for B LE and 55 mmHg for L UE for 60 mins. Good tolerance to pumps, decreased edema and increased skin movement. Activity Tolerance       Patient Education  Compression, exercise. Pt verbalized/demonstrated good understanding:     [x] Yes         [] No, pt required further clarification.        Post Treatment Pain:  3-10      Plan  Times per week: 3x/wk  Plan weeks: 4-6 weks      Goals  (Total # of Visits to Date: 6)      Short term goals  Time Frame for Short term goals: 3 weeks  Short term goal 1: Pt willbe educated on her POC and HEP-met  Short term goal 2: Pt will initiate pump protocol to BLE/BUE-met    Long term goals  Time Frame for Long term goals : 6 weeks  Long term goal 1: Pt will be safe and independent with her lymphedema management-progressing  Long term goal 2: Pt will demonstrate a 2-3cm decrease in girth measurements in BUE/BLE in order to improve quality of life-progressing  Long term goal 3: Pt will be fitted for an at home pump  Long term goal 4: Pt will report 50% improvement in symptoms-progressing    Minutes Tracking:  Time In: 0725  Time Out: Thuy 58  Minutes: [place], [place]     Date: 4/1/2022

## 2022-04-04 ENCOUNTER — HOSPITAL ENCOUNTER (OUTPATIENT)
Dept: PHYSICAL THERAPY | Age: 52
Setting detail: THERAPIES SERIES
Discharge: HOME | End: 2022-04-04
Payer: COMMERCIAL

## 2022-04-04 PROCEDURE — 97140 MANUAL THERAPY 1/> REGIONS: CPT

## 2022-04-04 PROCEDURE — 97110 THERAPEUTIC EXERCISES: CPT

## 2022-04-04 NOTE — PROGRESS NOTES
Phone: Corinne           Fax: [phone]                           Outpatient Physical Therapy                                                                            Daily Note    Patient: Mook Umanzor : 1970  CSN #: [id number]   Referring Practitioner:  Dr. Hira Dove    Referral Date : 03/15/22     Date: 2022    Diagnosis: I89.0 Lympgedema BLE/BUE  Treatment Diagnosis: BLE/BUE/abdominal lymphedema    Onset Date: 03/15/22  PT Insurance Information: [employer]  Total # of Visits Approved: 18 Per Physician Order  Total # of Visits to Date: 7  No Show: 0  Canceled Appointment: 0      Pre-Treatment Pain:  5/10  Subjective: Pt reports 5/10 pain today, stating it was 8/10 last night after work. Exercises:  Exercise 1: HEP Pt educated on keeping her BLE/BUE compressed at 30mmHg and elevated throughout the day, performing exercises daily and reducing sugar and sodium in her diet    Manual:  Soft Tissue Mobalization: MLD to B LE/UE    Modalities:          Assessment  Assessment: MLD to B LE and B UE followed by pumps at 60 mmHg for B LE and R UE . Pt with good tolerance to pumps, no increase in pain post pumps. Activity Tolerance       Patient Education  Compression, exercise. Pt verbalized/demonstrated good understanding:     [x] Yes         [] No, pt required further clarification.        Post Treatment Pain:  5/10      Plan  Times per week: 3x/wk  Plan weeks: 4-6 weks      Goals  (Total # of Visits to Date: 7)      Short term goals  Time Frame for Short term goals: 3 weeks  Short term goal 1: Pt willbe educated on her POC and HEP-met  Short term goal 2: Pt will initiate pump protocol to BLE/BUE-met    Long term goals  Time Frame for Long term goals : 6 weeks  Long term goal 1: Pt will be safe and independent with her lymphedema management-progressing  Long term goal 2: Pt will demonstrate a 2-3cm decrease in girth measurements in BUE/BLE in order to improve quality of life-progressing  Long term goal 3: Pt will be fitted for an at home pump  Long term goal 4: Pt will report 50% improvement in symptoms-progressing    Minutes Tracking:  Time In: 0725  Time Out: 0915  Minutes: [address] [place]     Date: 4/4/2022

## 2022-04-05 ENCOUNTER — HOSPITAL ENCOUNTER (OUTPATIENT)
Dept: PHYSICAL THERAPY | Age: 52
Setting detail: THERAPIES SERIES
Discharge: HOME | End: 2022-04-05
Payer: COMMERCIAL

## 2022-04-05 ENCOUNTER — NURSE ONLY (OUTPATIENT)
Dept: CARDIOLOGY | Age: 52
End: 2022-04-05
Payer: COMMERCIAL

## 2022-04-05 DIAGNOSIS — R55 SYNCOPE, UNSPECIFIED SYNCOPE TYPE: Primary | ICD-10-CM

## 2022-04-05 DIAGNOSIS — Z45.09 ENCOUNTER FOR LOOP RECORDER CHECK: ICD-10-CM

## 2022-04-05 PROCEDURE — 93298 REM INTERROG DEV EVAL SCRMS: CPT | Performed by: FAMILY MEDICINE

## 2022-04-05 PROCEDURE — 97113 AQUATIC THERAPY/EXERCISES: CPT

## 2022-04-08 ENCOUNTER — HOSPITAL ENCOUNTER (OUTPATIENT)
Dept: PHYSICAL THERAPY | Age: 52
Setting detail: THERAPIES SERIES
Discharge: HOME | End: 2022-04-08
Payer: COMMERCIAL

## 2022-04-08 PROCEDURE — 97140 MANUAL THERAPY 1/> REGIONS: CPT

## 2022-04-08 PROCEDURE — 97110 THERAPEUTIC EXERCISES: CPT

## 2022-04-08 NOTE — PROGRESS NOTES
Phone: Corinne           Fax: [phone]                           Outpatient Physical Therapy                                                                            Daily Note    Patient: Mira Kelly : 1970  CSN #: [id number]   Referring Practitioner:  Dr. Dian Peck    Referral Date : 03/15/22     Date: 2022    Diagnosis: I89.0 Lympgedema BLE/BUE  Treatment Diagnosis: BLE/BUE/abdominal lymphedema    Onset Date: 03/15/22  PT Insurance Information: [facility]  Total # of Visits Approved: 18 Per Physician Order  Total # of Visits to Date: 9  No Show: 0  Canceled Appointment: 0      Pre-Treatment Pain:  5/10  Subjective: Pt states her first pool visit went well. Pt reports not noticing much of a difference in edema. Exercises:  Exercise 1: HEP Pt educated on keeping her BLE/BUE compressed at 30mmHg and elevated throughout the day, performing exercises daily and reducing sugar and sodium in her diet    Manual:  Soft Tissue Mobalization: MLD to B LE/UE  Other: Kinesio taping    Modalities:          Assessment  Assessment: MLD to B LE/UE followed by pumps on R UE/B LE at 60 mmHg for 60 min. Initiated kinesio taping for lymphatic drainage on L UE this visit. Activity Tolerance       Patient Education  Kinesio taping precautions and benefits. Pt verbalized/demonstrated good understanding:     [x] Yes         [] No, pt required further clarification.        Post Treatment Pain:  5/10      Plan  Times per week: 3x/wk  Plan weeks: 4-6 weks      Goals  (Total # of Visits to Date: 5)      Short term goals  Time Frame for Short term goals: 3 weeks  Short term goal 1: Pt willbe educated on her POC and HEP-met  Short term goal 2: Pt will initiate pump protocol to BLE/BUE-met    Long term goals  Time Frame for Long term goals : 6 weeks  Long term goal 1: Pt will be safe and independent with her lymphedema management-progressing  Long term goal 2: Pt will demonstrate a 2-3cm decrease in girth measurements in BUE/BLE in order to improve quality of life-progressing  Long term goal 3: Pt will be fitted for an at home pump  Long term goal 4: Pt will report 50% improvement in symptoms-progressing    Minutes Tracking:  Time In: 8332  Time Out: 0930  Minutes: [place], [place]     Date: 4/8/2022

## 2022-04-09 DIAGNOSIS — E03.9 HYPOTHYROIDISM, UNSPECIFIED TYPE: ICD-10-CM

## 2022-04-09 RX ORDER — LEVOTHYROXINE SODIUM 100 UG/1
TABLET ORAL
Qty: 30 TABLET | Refills: 0 | Status: SHIPPED | OUTPATIENT
Start: 2022-04-09 | End: 2022-05-20

## 2022-04-13 ENCOUNTER — HOSPITAL ENCOUNTER (OUTPATIENT)
Dept: PHYSICAL THERAPY | Age: 52
Setting detail: THERAPIES SERIES
Discharge: HOME | End: 2022-04-13
Payer: COMMERCIAL

## 2022-04-13 ENCOUNTER — HOSPITAL ENCOUNTER (OUTPATIENT)
Age: 52
Discharge: HOME | End: 2022-04-13
Payer: COMMERCIAL

## 2022-04-13 LAB
ABSOLUTE EOS #: 0.15 K/UL (ref 0–0.44)
ABSOLUTE IMMATURE GRANULOCYTE: <0.03 K/UL (ref 0–0.3)
ABSOLUTE LYMPH #: 1.31 K/UL (ref 1.1–3.7)
ABSOLUTE MONO #: 0.41 K/UL (ref 0.1–1.2)
ALBUMIN SERPL-MCNC: 4.3 G/DL (ref 3.5–5.2)
ALBUMIN/GLOBULIN RATIO: 1.6 (ref 1–2.5)
ALP BLD-CCNC: 54 U/L (ref 35–104)
ALT SERPL-CCNC: 13 U/L (ref 5–33)
ANION GAP SERPL CALCULATED.3IONS-SCNC: 11 MMOL/L (ref 9–17)
AST SERPL-CCNC: 20 U/L
BASOPHILS # BLD: 1 % (ref 0–2)
BASOPHILS ABSOLUTE: 0.06 K/UL (ref 0–0.2)
BILIRUB SERPL-MCNC: 0.35 MG/DL (ref 0.3–1.2)
BUN BLDV-MCNC: 22 MG/DL (ref 6–20)
BUN/CREAT BLD: 26 (ref 9–20)
CALCIUM SERPL-MCNC: 9.3 MG/DL (ref 8.6–10.4)
CHLORIDE BLD-SCNC: 106 MMOL/L (ref 98–107)
CO2: 27 MMOL/L (ref 20–31)
CREAT SERPL-MCNC: 0.84 MG/DL (ref 0.5–0.9)
EOSINOPHILS RELATIVE PERCENT: 3 % (ref 1–4)
GFR AFRICAN AMERICAN: >60 ML/MIN
GFR NON-AFRICAN AMERICAN: >60 ML/MIN
GFR SERPL CREATININE-BSD FRML MDRD: ABNORMAL ML/MIN/{1.73_M2}
GFR SERPL CREATININE-BSD FRML MDRD: ABNORMAL ML/MIN/{1.73_M2}
GLUCOSE BLD-MCNC: 96 MG/DL (ref 70–99)
HCT VFR BLD CALC: 42.4 % (ref 36.3–47.1)
HEMOGLOBIN: 13.4 G/DL (ref 11.9–15.1)
IMMATURE GRANULOCYTES: 0 %
LYMPHOCYTES # BLD: 26 % (ref 24–43)
MCH RBC QN AUTO: 31.5 PG (ref 25.2–33.5)
MCHC RBC AUTO-ENTMCNC: 31.6 G/DL (ref 28.4–34.8)
MCV RBC AUTO: 99.5 FL (ref 82.6–102.9)
MONOCYTES # BLD: 8 % (ref 3–12)
NRBC AUTOMATED: 0 PER 100 WBC
PDW BLD-RTO: 12.9 % (ref 11.8–14.4)
PLATELET # BLD: 302 K/UL (ref 138–453)
PMV BLD AUTO: 9.5 FL (ref 8.1–13.5)
POTASSIUM SERPL-SCNC: 4.6 MMOL/L (ref 3.7–5.3)
RBC # BLD: 4.26 M/UL (ref 3.95–5.11)
SEG NEUTROPHILS: 62 % (ref 36–65)
SEGMENTED NEUTROPHILS ABSOLUTE COUNT: 3.05 K/UL (ref 1.5–8.1)
SODIUM BLD-SCNC: 144 MMOL/L (ref 135–144)
TOTAL PROTEIN: 7 G/DL (ref 6.4–8.3)
WBC # BLD: 5 K/UL (ref 3.5–11.3)

## 2022-04-13 PROCEDURE — 97140 MANUAL THERAPY 1/> REGIONS: CPT

## 2022-04-13 PROCEDURE — 85025 COMPLETE CBC W/AUTO DIFF WBC: CPT

## 2022-04-13 PROCEDURE — 36415 COLL VENOUS BLD VENIPUNCTURE: CPT

## 2022-04-13 PROCEDURE — 97110 THERAPEUTIC EXERCISES: CPT

## 2022-04-13 PROCEDURE — 80053 COMPREHEN METABOLIC PANEL: CPT

## 2022-04-13 PROCEDURE — 87338 HPYLORI STOOL AG IA: CPT

## 2022-04-13 PROCEDURE — 83516 IMMUNOASSAY NONANTIBODY: CPT

## 2022-04-13 NOTE — PROGRESS NOTES
Phone: Corinne           Fax: [phone]                           Outpatient Physical Therapy                                                                            Daily Note    Patient: Kelsie Garcia : 1970  CSN #: [id number]   Referring Practitioner:  Dr. Darion Pearce    Referral Date : 03/15/22     Date: 2022    Diagnosis: I89.0 Lympgedema BLE/BUE  Treatment Diagnosis: BLE/BUE/abdominal lymphedema    Onset Date: 03/15/22  PT Insurance Information: [employer]  Total # of Visits Approved: 18 Per Physician Order  Total # of Visits to Date: 10      Pre-Treatment Pain:  4/10  Subjective: Pt states she didn't notice much difference with the kinesio tape, pt states by the end of her work day her legs are aching    Exercises:  Exercise 1: HEP Pt educated on keeping her BLE/BUE compressed at 30mmHg and elevated throughout the day, performing exercises daily and reducing sugar and sodium in her diet    Manual:  Soft Tissue Mobalization: MLD to B LE/UE  Other: Kinesio taping    Assessment  Assessment: Pt is responding to treatment and has shown a slight decreased in lymphedema measurements. Measurements this date R UE wrist 16.5cm, elbow 27.9cm, armpit 38.4cm, L UE wrist 16cm, elbow 28.6cm, armpit 37.8cm: R LE midfoot 22.3cm, ankle 26.8cm, midcalf 48.7cm, L LE midfoot 22.2cm, ankle 27cm, midcalf 46.7cm      Patient Education  Patient Education: educated on her measurements  Pt verbalized/demonstrated good understanding:     [x] Yes         [] No, pt required further clarification.        Post Treatment Pain:  3/10      Plan  Times per week: 3x/wk  Plan weeks: 4-6 weks      Goals  (Total # of Visits to Date: 8)      Short term goals  Time Frame for Short term goals: 3 weeks  Short term goal 1: Pt willbe educated on her POC and HEP-met  Short term goal 2: Pt will initiate pump protocol to BLE/BUE-met    Long term goals  Time Frame for Long term goals : 6 weeks  Long term goal 1: Pt will be safe and independent with her lymphedema management-progressing  Long term goal 2: Pt will demonstrate a 2-3cm decrease in girth measurements in BUE/BLE in order to improve quality of life-progressing  Long term goal 3: Pt will be fitted for an at home pump  Long term goal 4: Pt will report 50% improvement in symptoms-progressing    Minutes Tracking:  Time In: 0945  Time Out: 1118  Minutes: 93  Timed Code Treatment Minutes: Zaid K[address] 80, PT DPT      Date: 4/13/2022

## 2022-04-14 ENCOUNTER — HOSPITAL ENCOUNTER (OUTPATIENT)
Dept: PHYSICAL THERAPY | Age: 52
Setting detail: THERAPIES SERIES
Discharge: HOME | End: 2022-04-14
Payer: COMMERCIAL

## 2022-04-14 LAB
DIRECT EXAM: NEGATIVE
SPECIMEN DESCRIPTION: NORMAL
TISSUE TRANSGLUTAMINASE ANTIBODY IGG: <0.6 U/ML
TISSUE TRANSGLUTAMINASE IGA: 29 U/ML

## 2022-04-14 PROCEDURE — 97113 AQUATIC THERAPY/EXERCISES: CPT

## 2022-04-14 NOTE — PROGRESS NOTES
Phone: Corinne           Fax: [phone]                           Outpatient Physical Therapy                                                                            Daily Note    Patient: Remberto Ludwig : 1970  CSN #: [id number]   Referring Practitioner:  Dr. Funmilayo Aviles    Referral Date : 03/15/22     Date: 2022    Diagnosis: I89.0 Lympgedema BLE/BUE  Treatment Diagnosis: BLE/BUE/abdominal lymphedema    PT Insurance Information: [facility]  Total # of Visits Approved: 18 Per Physician Order  Total # of Visits to Date: 11  No Show: 0  Canceled Appointment: 0      Pre-Treatment Pain:  4-5/10  Subjective: Pt reports her normal pain. Pt states she thinks the tape helped some with bringing down the swelling. Exercises:  Exercise 1: HEP Pt educated on keeping her BLE/BUE compressed at 30mmHg and elevated throughout the day, performing exercises daily and reducing sugar and sodium in her diet  Exercise 2: Aquatics: fwd/ retro/ lateral amb 2 laps ea (shallow end)  Exercise 3: Aquatics: sin ex 15x ea (deep)  Exercise 4: Aquatics: FSU/ LSU 10x ea (deep to shallow)  Exercise 5: Aquatics: deep well hangs 5 mins/ deep well bike 5 mins  Exercise 7: Aquatics: blue bar push down x15  Exercise 8: Aquatics: blue paddles horizontal abd, abd backward circles x15  Exercise 9: Aquatics: sitting on pink float pelvic tilts FWD/BWD and sideways, circles x10. Assessment  Assessment: Progressed aquatics exercises with good tolerance, no increase in pain. Discussed graduated compression vs generic compression socks. Activity Tolerance       Patient Education  Graduated compression    Pt verbalized/demonstrated good understanding:     [x] Yes         [] No, pt required further clarification.        Post Treatment Pain:  /10      Plan  Times per week: 3x/wk  Plan weeks: 4-6 weks      Goals  (Total # of Visits to Date: 6)      Short term goals  Time Frame for Short term goals: 3 weeks  Short term goal 1: Pt willbe educated on her POC and HEP-met  Short term goal 2: Pt will initiate pump protocol to BLE/BUE-met    Long term goals  Time Frame for Long term goals : 6 weeks  Long term goal 1: Pt will be safe and independent with her lymphedema management-progressing  Long term goal 2: Pt will demonstrate a 2-3cm decrease in girth measurements in BUE/BLE in order to improve quality of life-progressing  Long term goal 3: Pt will be fitted for an at home pump  Long term goal 4: Pt will report 50% improvement in symptoms-progressing    Minutes Tracking:  Time In: 3270  Time Out: 0845  Minutes: [address], PTA     Date: 4/14/2022

## 2022-04-15 ENCOUNTER — APPOINTMENT (OUTPATIENT)
Dept: PHYSICAL THERAPY | Age: 52
End: 2022-04-15
Payer: COMMERCIAL

## 2022-04-18 ENCOUNTER — HOSPITAL ENCOUNTER (OUTPATIENT)
Dept: PHYSICAL THERAPY | Age: 52
Setting detail: THERAPIES SERIES
Discharge: HOME | End: 2022-04-18
Payer: COMMERCIAL

## 2022-04-18 PROCEDURE — 97110 THERAPEUTIC EXERCISES: CPT

## 2022-04-18 PROCEDURE — 97140 MANUAL THERAPY 1/> REGIONS: CPT

## 2022-04-18 NOTE — PROGRESS NOTES
[facility]           Fax: [phone]                           Outpatient Physical Therapy                                                                            Daily Note    Patient: Leola Shah : 1970  CSN #: [id number]   Referring Practitioner:  Dr. Rowdy Ward    Referral Date : 03/15/22     Date: 2022    Diagnosis: I89.0 Lympgedema BLE/BUE  Treatment Diagnosis: BLE/BUE/abdominal lymphedema    Onset Date: 03/15/22  PT Insurance Information: [facility]  Total # of Visits Approved: 18 Per Physician Order  Total # of Visits to Date: 12  No Show: 0  Canceled Appointment: 1      Pre-Treatment Pain:  4-5/10  Subjective: Pt state she is feeling good today, has her normal pain. Pt reports having an MRI scheduled this PM.    Exercises:  Exercise 1: HEP Pt educated on keeping her BLE/BUE compressed at 30mmHg and elevated throughout the day, performing exercises daily and reducing sugar and sodium in her diet    Manual:  Soft Tissue Mobalization: MLD to B LE/UE  Other: Kinesio taping    Modalities:          Assessment  Assessment: Pre-pump measurements R UE: FL19.8 cm, wrist 17.5 cm, 3\" above 23.7 cm, 6\" above 29.6 cm. elbow 29.8 cm, 3\" above 36.4 cm, 6\" above 36.6 cm, armpit 44.9 cm. L UE: FL 19.6 cm, wrist 16.9 cm, 3\" above 25.7 cm, 6\" above 30.2 cm, elbow 31.4 cm, 3\" above 37.4 cm, 6\" above 40.3 cm, armpit 43.6 cm. L LE: MF 23.5 cm, ankle 29 cm, 3\" above 34 cm, 6\" above 42.1 cm, midcalf 50 cm, knee 47.3 cm. R LE: MF 23.1 cm, ankle 29 cm, 3\" above 34.4 cm, 6\" above 42 cm, midcalf 49.7 cm, knee 49.2 cm. Post-pumps: L UE: FL 19.4 cm, wrist 16.9 cm, 3\" above 24.7 cm, 6\" above 29.7 cm, elbow 30 cm, 3\" above 36.8 cm, 6\" above 39.4 cm, armpit 40 cm. R UE: FL 19.8 cm, wrist 17.5 cm, 3\" above 24 cm, 6\" above 29.9 cm, elbow 29.5 cm, 3\" above 36.6 cm, 6\" above 40.5 cm, armpit 44.5 cm.  L LE: MF 23.2 cm, ankle 29 cm, 3\" above 34.1 cm, 6\" above 42.5 cm, midcalf 49 cm, knee 47.5 cm. R LE: MF 23.5 cm, ankle 28.6 cm, 3\" above 34 cm, 6\" above 42.7 cm, midcalf 50 cm, knee 49.6 cm. Pt limited d/t appt this PM, only able to pump for 30 mis this date, kinesio taping to B UE this visit. Activity Tolerance       Patient Education  Measurements. Pt verbalized/demonstrated good understanding:     [x] Yes         [] No, pt required further clarification.        Post Treatment Pain:  4-5/10      Plan  Times per week: 3x/wk  Plan weeks: 4-6 weks      Goals  (Total # of Visits to Date: 15)      Short term goals  Time Frame for Short term goals: 3 weeks  Short term goal 1: Pt willbe educated on her POC and HEP-met  Short term goal 2: Pt will initiate pump protocol to BLE/BUE-met    Long term goals  Time Frame for Long term goals : 6 weeks  Long term goal 1: Pt will be safe and independent with her lymphedema management-progressing  Long term goal 2: Pt will demonstrate a 2-3cm decrease in girth measurements in BUE/BLE in order to improve quality of life-progressing  Long term goal 3: Pt will be fitted for an at home pump-progressing  Long term goal 4: Pt will report 50% improvement in symptoms-progressing    Minutes Tracking:  Time In: 1230  Time Out: [address]  Minutes: [address], PTA     Date: 4/18/2022

## 2022-04-19 ENCOUNTER — HOSPITAL ENCOUNTER (OUTPATIENT)
Dept: PHYSICAL THERAPY | Age: 52
Setting detail: THERAPIES SERIES
Discharge: HOME | End: 2022-04-19
Payer: COMMERCIAL

## 2022-04-19 NOTE — PROGRESS NOTES
[facility]  Inpatient/Observation/Outpatient Rehabilitation    Date: 2022  Patient Name: Gela Horner       [] Inpatient Acute/Observation       []  Outpatient  : 1970       [] Pt no showed for scheduled appointment    [] Pt refused/declined therapy at this time due to:           [x] Pt cancelled due to:  [] No Reason Given   [] Sick/ill   [] Other:   Cx today and Friday has to have 2 MRI's done      Therapist/Assistant will attempt to see this patient, at our earliest opportunity.        Kobi Barrios Date: 2022

## 2022-04-22 ENCOUNTER — APPOINTMENT (OUTPATIENT)
Dept: PHYSICAL THERAPY | Age: 52
End: 2022-04-22
Payer: COMMERCIAL

## 2022-04-26 ENCOUNTER — APPOINTMENT (OUTPATIENT)
Dept: PHYSICAL THERAPY | Age: 52
End: 2022-04-26
Payer: COMMERCIAL

## 2022-04-28 ENCOUNTER — HOSPITAL ENCOUNTER (OUTPATIENT)
Dept: PHYSICAL THERAPY | Age: 52
Setting detail: THERAPIES SERIES
Discharge: HOME | End: 2022-04-28
Payer: COMMERCIAL

## 2022-04-28 PROCEDURE — 97140 MANUAL THERAPY 1/> REGIONS: CPT

## 2022-04-28 PROCEDURE — 97110 THERAPEUTIC EXERCISES: CPT

## 2022-04-28 NOTE — PROGRESS NOTES
Phone: Corinne           Fax: [phone]                           Outpatient Physical Therapy                                                                            Daily Note    Patient: Rene Mascorro : 1970  CSN #: [id number]   Referring Physician: Renzo Mendez MD    Date: 2022       Treatment Diagnosis: BLE/BUE/abdominal lymphedema    Onset Date: 03/15/22  Total # of Visits Approved: 18 Per Physician Order  Total # of Visits to Date: 13  No Show: 0  Canceled Appointment: 2      Pre-Treatment Pain:  2-3/10  Subjective: Pt reports feeling sore today, states she worked last night. Pt is still waiting on MRI results. Pt states she received a call about her pumps this week. Exercises:  Exercise 1: HEP Pt educated on keeping her BLE/BUE compressed at 30mmHg and elevated throughout the day, performing exercises daily and reducing sugar and sodium in her diet    Manual:  Soft Tissue Mobilizaton: MLD to B LE/UE              Assessment  Assessment: MLD to B LE/UE followed by pumps at 60 mmHg for 60 min to B LE and L UE. Pt with good skin movement post pumps. Activity Tolerance  Activity Tolerance: Patient tolerated treatment well    Patient Education  Compression, diet. Pt verbalized/demonstrated good understanding:     [x] Yes         [] No, pt required further clarification.        Post Treatment Pain:  2-3/10      Plan  Plan Frequency: 3x/wk  Plan weeks: 4-6 weks       Goals  (Total # of Visits to Date: 15)      Short Term Goals  Time Frame for Short term goals: 3 weeks  Short term goal 1: Pt willbe educated on her POC and HEP-met  Short term goal 2: Pt will initiate pump protocol to BLE/BUE-met    Long Term Goals  Time Frame for Long term goals : 6 weeks  Long term goal 1: Pt will be safe and independent with her lymphedema management-progressing  Long term goal 2: Pt will demonstrate a 2-3cm decrease in girth measurements in BUE/BLE in order to improve quality of life-progressing  Long term goal 3: Pt will be fitted for an at home pump-progressing  Long term goal 4: Pt will report 50% improvement in symptoms-progressing    Minutes Tracking:  Time In: 4032  Time Out: 1128  Minutes: [address]     Date: 4/28/2022

## 2022-05-02 ENCOUNTER — HOSPITAL ENCOUNTER (OUTPATIENT)
Dept: PHYSICAL THERAPY | Age: 52
Setting detail: THERAPIES SERIES
Discharge: HOME | End: 2022-05-02
Payer: COMMERCIAL

## 2022-05-02 PROCEDURE — 97140 MANUAL THERAPY 1/> REGIONS: CPT

## 2022-05-02 PROCEDURE — 97110 THERAPEUTIC EXERCISES: CPT

## 2022-05-04 ENCOUNTER — APPOINTMENT (OUTPATIENT)
Dept: PHYSICAL THERAPY | Age: 52
End: 2022-05-04
Payer: COMMERCIAL

## 2022-05-06 ENCOUNTER — HOSPITAL ENCOUNTER (OUTPATIENT)
Dept: PHYSICAL THERAPY | Age: 52
Setting detail: THERAPIES SERIES
Discharge: HOME | End: 2022-05-06
Payer: COMMERCIAL

## 2022-05-06 PROCEDURE — 97140 MANUAL THERAPY 1/> REGIONS: CPT

## 2022-05-06 PROCEDURE — 97110 THERAPEUTIC EXERCISES: CPT

## 2022-05-07 NOTE — PROGRESS NOTES
Phone: [address]          Fax: [phone]                      Outpatient Physical Therapy                                                               Date: 2022  Patient: Mook Umanzor  : 1970  CSN #: [id number]  Referring Physician: Chiara Rosas MD     Diagnosis: I89.0 Lympgedema BLE/BUE    Treatment Diagnosis: BLE/BUE/abdominal lymphedema  Onset Date: 03/15/22  PT Insurance Information: [facility]  Total # of Visits Approved: 18   Total # of Visits to Date: 15  No Show: 0  Canceled Appointment: 2     Subjective  Subjective: Pt reported 2-3/10 bilat LE pain. Stated that she is feeling about the same. Exercises:  Exercise 1: HEP Pt educated on keeping her BLE/BUE compressed at 30mmHg and elevated throughout the day, performing exercises daily and reducing sugar and sodium in her diet    Manual:  Soft Tissue Mobilizaton: MLD to B LE/UE               Assessment  Assessment: MLD to B LE/UE followed by pumps at 60 mmHg for 60 min to B LE and L UE. Therapy Prognosis: Fair  Specific Instructions for Next Treatment: 1x/wk aquatics, 2x/week pump protocol          Patient Education  Patient Education: Educated pt at length on lymphedema precautions and on function of lymphatic system. Pt verbalized good understanding. Pt verbalized/demonstrated good understanding:     [x] Yes         [] No, pt required further clarification.          Goals  Short Term Goals  Time Frame for Short term goals: 3 weeks  Short term goal 1: Pt willbe educated on her POC and HEP-met  Short term goal 2: Pt will initiate pump protocol to BLE/BUE-met    Long Term Goals  Time Frame for Long term goals : 6 weeks  Long term goal 1: Pt will be safe and independent with her lymphedema management-progressing  Long term goal 2: Pt will demonstrate a 2-3cm decrease in girth measurements in BUE/BLE in order to improve quality of life-progressing  Long term goal 3: Pt will be fitted for an at home pump-progressing  Long term goal 4: Pt will report 50% improvement in symptoms-progressing      Patient goals : \"to decrease swelling in entire body\"        Minutes Tracking:  Time In: 1330  Time Out: [address]  Minutes: [address], [place]  Date: 5/6/2022

## 2022-05-09 ENCOUNTER — APPOINTMENT (OUTPATIENT)
Dept: PHYSICAL THERAPY | Age: 52
End: 2022-05-09
Payer: COMMERCIAL

## 2022-05-11 ENCOUNTER — HOSPITAL ENCOUNTER (OUTPATIENT)
Dept: PHYSICAL THERAPY | Age: 52
Setting detail: THERAPIES SERIES
Discharge: HOME | End: 2022-05-11
Payer: COMMERCIAL

## 2022-05-11 PROCEDURE — 97140 MANUAL THERAPY 1/> REGIONS: CPT

## 2022-05-11 PROCEDURE — 97110 THERAPEUTIC EXERCISES: CPT

## 2022-05-11 NOTE — PLAN OF CARE
[facility]           Phone: [phone]             Outpatient Physical Therapy  Fax: [phone]                                           Date: 2022  Patient: John Keane : 1970 CSN #: [id number]   Referring Physician: Mario Gannon MD  Referral Date:          [] Plan of Care   [x] Updated Plan of Care    Dates of Service to Include: 2022 to 22    Diagnosis:  I89.0 Lympgedema BLE/BUE    Rehab (Treatment) Diagnosis:  BLE/BUE/abdominal lymphedema             Onset Date:  03/15/22    Attendance  Total # of Visits to Date: 16        Assessment  Assessment: Pt will be fitted for her pump by hte rep on , pt demonstrated a good decrease in girth measurement with pumps and good skin movement      Goals  Short Term Goals  Time Frame for Short term goals: 3 weeks  Short term goal 1: Pt willbe educated on her POC and HEP-met  Short term goal 2: Pt will initiate pump protocol to BLE/BUE-met  Long Term Goals  Time Frame for Long term goals : 6 weeks  Long term goal 1: Pt will be safe and independent with her lymphedema management-progressing  Long term goal 2: Pt will demonstrate a 2-3cm decrease in girth measurements in BUE/BLE in order to improve quality of life-progressing  Long term goal 3: Pt will be fitted for an at home pump-progressing  Long term goal 4: Pt will report 50% improvement in symptoms-progressing     Prognosis  Therapy Prognosis: Fair    Treatment Plan   Plan Frequency: 3x/wk  Plan weeks: 4-6 weks  [] HP/CP      [] Electrical Stim   [x] Therapeutic Exercise      [] Gait Training  [] Aquatics   [] Ultrasound         [x] Patient Education/HEP   [x] Manual Therapy  [] Traction    [] Neuro-carl        [] Soft Tissue Mobs            [] Home TENS  [] Iontophoresis    [] Orthotic casting/fitting      [x] lymphedema            Electronically signed by: Thien Cortés, PT, DPT Date: 5/11/2022      ______________________________________ Date: 5/11/2022   Physician Signature

## 2022-05-11 NOTE — PROGRESS NOTES
Phone: [address] [facility]          Fax: [phone]                      Outpatient Physical Therapy                                                             Lymphedema treatment  Date: 2022  Patient: John Keane  : 1970  [facility] #: [id number]  Referring Physician: Mario Gannon MD     Diagnosis: I89.0 Lympgedema BLE/BUE    Treatment Diagnosis: BLE/BUE/abdominal lymphedema  Onset Date: 03/15/22      Total # of Visits to Date: 16     Subjective  Subjective: Pt reported her R UE is doing better and thinks she is starting to feel a difference in her legs         Manual:  RLE Complete Decongestion Therapy  Lymph Drainage Pattern: Lower extremity  Compression Technique: Vaso-pneumatic pump  Force (mmHg): 60 mmHg  Duration: 60 minutes  LLE Complete Decongestion Therapy  Lymph Drainage Pattern: Lower extremity  Compression Technique: Vaso-pneumatic pump  Force (mmHg): 60 mmHg  Duration : 60 minutes  Other Decongestion Therapy  Force (mmHg): 60 mmHg  Duration : 60 minutes  RUE Complete Decongestion Therapy  Lymph Drainage Pattern: Upper extremity  LUE Complete Decongestion Therapy  Lymph Drainage Pattern: Upper extremity  Compression Technique: Vaso-pneumatic pump  Force (mmHg): 60 mmHg  Duration : 60 minutes  Other Decongestion Therapy  Force (mmHg): 60 mmHg  Duration : 60 minutes      Assessment  Assessment: Pt will be fitted for her pump by e rep on , pt demonstrated a good decrease in girth measurement with pumps and good skin movement  Therapy Prognosis: Fair  Specific Instructions for Next Treatment: 1x/wk aquatics, 2x/week pump protocol     Decision Making: Medium Complexity    Patient Education  Patient Education: fitted on pumps by rep   Pt verbalized/demonstrated good understanding:     [x] Yes         [] No, pt required further clarification.          Goals  Short Term Goals  Time Frame for Short term goals: 3 weeks  Short term goal 1: Pt willbe educated on her POC and HEP-met  Short term goal 2: Pt will initiate pump protocol to BLE/BUE-met    Long Term Goals  Time Frame for Long term goals : 6 weeks  Long term goal 1: Pt will be safe and independent with her lymphedema management-progressing  Long term goal 2: Pt will demonstrate a 2-3cm decrease in girth measurements in BUE/BLE in order to improve quality of life-progressing  Long term goal 3: Pt will be fitted for an at home pump-progressing  Long term goal 4: Pt will report 50% improvement in symptoms-progressing      Patient goals : \"to decrease swelling in entire body\"        Minutes Tracking:  Time In: 6404  Time Out: [address]  Minutes: 87  Timed Code Treatment Minutes: [address] Dai PT, DPT   Date: 5/11/2022

## 2022-05-13 ENCOUNTER — HOSPITAL ENCOUNTER (OUTPATIENT)
Dept: PHYSICAL THERAPY | Age: 52
Setting detail: THERAPIES SERIES
Discharge: HOME | End: 2022-05-13
Payer: COMMERCIAL

## 2022-05-13 PROCEDURE — 97140 MANUAL THERAPY 1/> REGIONS: CPT

## 2022-05-13 PROCEDURE — 97110 THERAPEUTIC EXERCISES: CPT

## 2022-05-13 NOTE — PROGRESS NOTES
[facility]          Fax: [phone]                      Outpatient Physical Therapy                                                             Lymphedema Treatment  Date: 2022  Patient: Tuan Class  : 1970  [facility] #: [id number]  Referring Physician: Rosalind Sneed MD          Treatment Diagnosis: BLE/BUE/abdominal lymphedema  Onset Date: 03/15/22  Total # of Visits Approved: 18   Total # of Visits to Date: 17  No Show: 0  Canceled Appointment: 2     Subjective  Subjective: Pt states she is noticing some changes in her legs now. Exercises:  Exercise 1: HEP Pt educated on keeping her BLE/BUE compressed at 30mmHg and elevated throughout the day, performing exercises daily and reducing sugar and sodium in her diet    Manual:  Soft Tissue Mobilizaton: MLD to B LE/UE  Other: Kinesio taping     RLE Complete Decongestion Therapy  Lymph Drainage Pattern: Lower extremity  Compression Technique: Vaso-pneumatic pump  Force (mmHg): 60 mmHg  Duration: 60 minutes  Patient Position: Reclined  LLE Complete Decongestion Therapy  Lymph Drainage Pattern: Lower extremity  Compression Technique: Vaso-pneumatic pump  Force (mmHg): 60 mmHg  Duration : 60 minutes  Patient Position: Reclined  Other Decongestion Therapy  Force (mmHg): 60 mmHg  Duration : 60 minutes  Patient Position: Reclined  RUE Complete Decongestion Therapy  Lymph Drainage Pattern: Upper extremity  LUE Complete Decongestion Therapy  Lymph Drainage Pattern: Upper extremity  Compression Technique: Vaso-pneumatic pump  Force (mmHg): 60 mmHg  Duration : 60 minutes  Patient Position: Reclined  Other Decongestion Therapy  Force (mmHg): 60 mmHg  Duration : 60 minutes  Patient Position: Reclined      Assessment  Assessment: MLD to B LE/UE followed by pumps at 60 mmHg for 60 min on B LE and L UE.  Kinesio taping to B UE this visit, pt reports more noticable changes with B UE taping vs LE taping. Therapy Prognosis: Fair  Specific Instructions for Next Treatment: 1x/wk aquatics, 2x/week pump protocol     Decision Making: Medium Complexity    Patient Education  Patient Education: Continued compression. Pt verbalized/demonstrated good understanding:     [x] Yes         [] No, pt required further clarification.          Goals  Short Term Goals  Time Frame for Short term goals: 3 weeks  Short term goal 1: Pt willbe educated on her POC and HEP-met  Short term goal 2: Pt will initiate pump protocol to BLE/BUE-met    Long Term Goals  Time Frame for Long term goals : 6 weeks  Long term goal 1: Pt will be safe and independent with her lymphedema management-progressing  Long term goal 2: Pt will demonstrate a 2-3cm decrease in girth measurements in BUE/BLE in order to improve quality of life-progressing  Long term goal 3: Pt will be fitted for an at home pump-progressing  Long term goal 4: Pt will report 50% improvement in symptoms-progressing      Patient goals : \"to decrease swelling in entire body\"        Minutes Tracking:  Time In: 1304  Time Out: 1450  Minutes: Arlene Dillard [place]   Date: 5/13/2022

## 2022-05-16 ENCOUNTER — HOSPITAL ENCOUNTER (OUTPATIENT)
Dept: PHYSICAL THERAPY | Age: 52
Setting detail: THERAPIES SERIES
Discharge: HOME | End: 2022-05-16
Payer: COMMERCIAL

## 2022-05-16 PROCEDURE — 97113 AQUATIC THERAPY/EXERCISES: CPT

## 2022-05-16 NOTE — PROGRESS NOTES
Phone: Corinne           Fax: [phone]                           Outpatient Physical Therapy                                                                            Daily Note    Patient: Azra Tyson : 1970  CSN #: [id number]   Referring Physician: Emerita Odonnell MD    Date: 2022       Treatment Diagnosis: BLE/BUE/abdominal lymphedema    Onset Date: 03/15/22  Total # of Visits Approved: 18 Per Physician Order  Total # of Visits to Date: 25  No Show: 0  Canceled Appointment: 2      Pre-Treatment Pain:  8/10  Subjective: Pt reports R ankle pain, 8/10, states she does not remember doing anything to it, just started hurting at work this weekend. Exercises:  Exercise 2: Aquatics: fwd/ retro/ lateral amb 2 laps ea (shallow end)  Exercise 3: Aquatics: sin ex 15x ea (deep), lunges x15, side lunges x15. Exercise 4: Aquatics: FSU/ LSU 10x ea (deep to shallow)  Exercise 6: Aquatics: seated LAQ/ march/ toe heel raise 15x ea SKTC 3x30 ea. LE hip abd/add, circles  Exercise 7: Aquatics: blue bar push down x15  Exercise 8: Aquatics: blue paddles horizontal abd, abd backward circles x15  Exercise 9: Aquatics: sitting on pink float pelvic tilts FWD/BWD and sideways, circles x15. Assessment  Assessment: Pt tolerated progression of aquatic exercies without increase in pain. Pt repots taping is helping B UE. Activity Tolerance  Activity Tolerance: Patient tolerated treatment well    Patient Education  Patient Education: Progression of ex. Pt verbalized/demonstrated good understanding:     [x] Yes         [] No, pt required further clarification.        Post Treatment Pain:  7-8/10      Plan  Plan Frequency: 3x/wk  Plan weeks: 4-6 weks       Goals  (Total # of Visits to Date: 25)      Short Term Goals  Time Frame for Short term goals: 3 weeks  Short term goal 1: Pt willbe educated on her POC and HEP-met  Short term goal 2: Pt will initiate pump protocol to BLE/BUE-met    Long Term Goals  Time Frame for Long term goals : 6 weeks  Long term goal 1: Pt will be safe and independent with her lymphedema management-progressing  Long term goal 2: Pt will demonstrate a 2-3cm decrease in girth measurements in BUE/BLE in order to improve quality of life-progressing  Long term goal 4: Pt will report 50% improvement in symptoms-progressing    Minutes Tracking:  Time In: 1016  Time Out: 1100  Minutes: [address] [place], [place]     Date: 5/16/2022

## 2022-05-18 ENCOUNTER — APPOINTMENT (OUTPATIENT)
Dept: PHYSICAL THERAPY | Age: 52
End: 2022-05-18
Payer: COMMERCIAL

## 2022-05-19 ENCOUNTER — HOSPITAL ENCOUNTER (OUTPATIENT)
Dept: PHYSICAL THERAPY | Age: 52
Setting detail: THERAPIES SERIES
Discharge: HOME | End: 2022-05-19
Payer: COMMERCIAL

## 2022-05-19 PROCEDURE — 97140 MANUAL THERAPY 1/> REGIONS: CPT

## 2022-05-19 PROCEDURE — 97110 THERAPEUTIC EXERCISES: CPT

## 2022-05-19 NOTE — PROGRESS NOTES
fitting completed this visit, measurements taken, good understanding of measurements. Therapy Prognosis: Fair  Specific Instructions for Next Treatment: 1x/wk aquatics, 2x/week pump protocol     Decision Making: Medium Complexity    Patient Education  Patient Education: Pump fitting  Pt verbalized/demonstrated good understanding:     [x] Yes         [] No, pt required further clarification.          Goals  Short Term Goals  Time Frame for Short term goals: 3 weeks  Short term goal 1: Pt willbe educated on her POC and HEP-met  Short term goal 2: Pt will initiate pump protocol to BLE/BUE-met    Long Term Goals  Time Frame for Long term goals : 6 weeks  Long term goal 1: Pt will be safe and independent with her lymphedema management-progressing  Long term goal 2: Pt will demonstrate a 2-3cm decrease in girth measurements in BUE/BLE in order to improve quality of life-progressing  Long term goal 3: Pt will be fitted for an at home pump-progressing  Long term goal 4: Pt will report 50% improvement in symptoms-progressing      Patient goals : \"to decrease swelling in entire body\"        Minutes Tracking:  Time In: 1106  Time Out: 225 [facility]  Minutes: [address] Ramos, PTA   Date: 5/19/2022

## 2022-05-20 DIAGNOSIS — E03.9 HYPOTHYROIDISM, UNSPECIFIED TYPE: ICD-10-CM

## 2022-05-20 RX ORDER — LEVOTHYROXINE SODIUM 100 UG/1
TABLET ORAL
Qty: 30 TABLET | Refills: 0 | Status: SHIPPED | OUTPATIENT
Start: 2022-05-20 | End: 2022-07-05

## 2022-05-23 ENCOUNTER — APPOINTMENT (OUTPATIENT)
Dept: PHYSICAL THERAPY | Age: 52
End: 2022-05-23
Payer: COMMERCIAL

## 2022-05-27 ENCOUNTER — HOSPITAL ENCOUNTER (OUTPATIENT)
Dept: PHYSICAL THERAPY | Age: 52
Setting detail: THERAPIES SERIES
Discharge: HOME | End: 2022-05-27
Payer: COMMERCIAL

## 2022-05-27 PROCEDURE — 97140 MANUAL THERAPY 1/> REGIONS: CPT

## 2022-05-27 PROCEDURE — 97110 THERAPEUTIC EXERCISES: CPT

## 2022-05-27 NOTE — PROGRESS NOTES
Phone: 078 [facility]          Fax: [phone]                      Outpatient Physical Therapy                                                             Lymphedema Treatment  Date: 2022  Patient: Remberto Ludwig  : 1970  [facility] #: [id number]  Referring Physician: Ramesh Frausto MD          Treatment Diagnosis: BLE/BUE/abdominal lymphedema  Onset Date: 03/15/22  Total # of Visits Approved: 18   Total # of Visits to Date: 20  No Show: 0  Canceled Appointment: 2     Subjective  Subjective: Pt states she is feeling ok today, does not rate pain. Pt states she talked to [facility] about her pumps and they should be in within the next couple weeks. Exercises:  Exercise 1: HEP Pt educated on keeping her BLE/BUE compressed at 30mmHg and elevated throughout the day, performing exercises daily and reducing sugar and sodium in her diet    Manual:  Soft Tissue Mobilizaton: MLD to B LE/UE  Other: Kinesio taping     RLE Complete Decongestion Therapy  Lymph Drainage Pattern: Lower extremity  Compression Technique: Vaso-pneumatic pump  Force (mmHg): 60 mmHg  Duration: 60 minutes  Patient Position: Reclined  LLE Complete Decongestion Therapy  Lymph Drainage Pattern: Lower extremity  Compression Technique: Vaso-pneumatic pump  Force (mmHg): 60 mmHg  Duration : 60 minutes  Patient Position: Reclined  Other Decongestion Therapy  Force (mmHg): 60 mmHg  Duration : 60 minutes  Patient Position: Reclined  LUE Complete Decongestion Therapy  Lymph Drainage Pattern: Upper extremity  Compression Technique: Vaso-pneumatic pump  Force (mmHg): 60 mmHg  Duration : 60 minutes  Patient Position: Reclined  Other Decongestion Therapy  Force (mmHg): 60 mmHg  Duration : 60 minutes  Patient Position: Reclined      Assessment  Assessment: MLD to B LE/UE followed by pumps at 60 mmHg. Kinesio taping post tx for improved lymphatic flow.   Therapy Prognosis: Fair  Specific Instructions for Next Treatment: 1x/wk aquatics, 2x/week pump protocol     Decision Making: Medium Complexity    Patient Education  Patient Education: Kinesio taping at home. Pt verbalized/demonstrated good understanding:     [x] Yes         [] No, pt required further clarification.          Goals  Short Term Goals  Time Frame for Short term goals: 3 weeks  Short term goal 1: Pt willbe educated on her POC and HEP-met  Short term goal 2: Pt will initiate pump protocol to BLE/BUE-met    Long Term Goals  Time Frame for Long term goals : 6 weeks  Long term goal 1: Pt will be safe and independent with her lymphedema management-progressing  Long term goal 2: Pt will demonstrate a 2-3cm decrease in girth measurements in BUE/BLE in order to improve quality of life-progressing  Long term goal 3: Pt will be fitted for an at home pump-progressing  Long term goal 4: Pt will report 50% improvement in symptoms-progressing      Patient goals : \"to decrease swelling in entire body\"        Minutes Tracking:  Time In: 1140  Time Out: [address] [facility]  Minutes: [address], [place]  Date: 5/27/2022

## 2022-06-03 ENCOUNTER — HOSPITAL ENCOUNTER (OUTPATIENT)
Dept: PHYSICAL THERAPY | Age: 52
Setting detail: THERAPIES SERIES
Discharge: HOME | End: 2022-06-03
Payer: COMMERCIAL

## 2022-06-03 PROCEDURE — 97140 MANUAL THERAPY 1/> REGIONS: CPT

## 2022-06-03 PROCEDURE — 97110 THERAPEUTIC EXERCISES: CPT

## 2022-06-03 NOTE — PROGRESS NOTES
Phone: [address]          Fax: [phone]                      Outpatient Physical Therapy                                                             Lymphedema Treatment  Date: 6/3/2022  Patient: Saul Bradshaw  : 1970  [facility] #: [id number]  Referring Physician: India Lunsford MD          Treatment Diagnosis: BLE/BUE/abdominal lymphedema  Onset Date: 03/15/22  Total # of Visits Approved: 18   Total # of Visits to Date: 21  No Show: 0  Canceled Appointment: 2     Subjective  Subjective: Pt reports increased B LE edema this weekend, starting on . Pt states she was outside on  working but does not know what would have caused the increase in swelling. Pt states she is unable to wear her compression socks or shoes at this time, and reports increased pain, 8/10.        Lymph Assessment    Measurements: Area Measured: L LE,R LE      Right Measurements Left Measurements   R LE Post Girth Measurement (cm)  5th Tuberosity (cm): 24.2  Lateral malleolus: 31  Calf (cm): 52.4  Mid Knee (cm): 54  Thigh (cm): 64.5  Total Girth (cm): 226.1 L LE Post Girth Measurement (cm)  5th Tuberosity (cm): 24.4  Lateral malleolus: 31  Calf (cm): 50.4  Mid Knee (cm): 53.1  Thigh (cm): 63.7  Total Girth (cm): 222.6           Exercises:  Exercise 1: HEP Pt educated on keeping her BLE/BUE compressed at 30mmHg and elevated throughout the day, performing exercises daily and reducing sugar and sodium in her diet    Manual:  Soft Tissue Mobilizaton: MLD to B LE/UE  Other: Wrapping to B LE     RLE Complete Decongestion Therapy  Lymph Drainage Pattern: Lower extremity  Compression Technique: Vaso-pneumatic pump  Force (mmHg): 60 mmHg  Duration: 60 minutes  Patient Position: Reclined  LLE Complete Decongestion Therapy  Lymph Drainage Pattern: Lower extremity  Compression Technique: Vaso-pneumatic pump  Force (mmHg): 60 mmHg  Duration : 60 minutes  Patient Position: Reclined  Other Decongestion Therapy  Force (mmHg): 60 mmHg  Duration : 60 minutes  Patient Position: Reclined  LUE Complete Decongestion Therapy  Lymph Drainage Pattern: Upper extremity  Compression Technique: Vaso-pneumatic pump  Force (mmHg): 60 mmHg  Duration : 60 minutes  Patient Position: Reclined  Other Decongestion Therapy  Force (mmHg): 60 mmHg  Duration : 60 minutes  Patient Position: Reclined      Assessment  Assessment: MLD to B LE/UE followed by pumps at 60 mmHg for 60 min. Wrapping to B LE following tx with good tolerance. Pt reports pain still 8/10 post tx. Therapy Prognosis: Fair  Specific Instructions for Next Treatment: 1x/wk aquatics, 2x/week pump protocol     Decision Making: Medium Complexity    Patient Education  Patient Education: Wrapping to B LE. Pt verbalized/demonstrated good understanding:     [x] Yes         [] No, pt required further clarification.          Goals  Short Term Goals  Time Frame for Short term goals: 3 weeks  Short term goal 1: Pt willbe educated on her POC and HEP-met  Short term goal 2: Pt will initiate pump protocol to BLE/BUE-met    Long Term Goals  Time Frame for Long term goals : 6 weeks  Long term goal 1: Pt will be safe and independent with her lymphedema management-progressing  Long term goal 2: Pt will demonstrate a 2-3cm decrease in girth measurements in BUE/BLE in order to improve quality of life-progressing  Long term goal 3: Pt will be fitted for an at home pump-progressing  Long term goal 4: Pt will report 50% improvement in symptoms-progressing      Patient goals : \"to decrease swelling in entire body\"        Minutes Tracking:  Time In: 4029  Time Out: [address]  Minutes: Preston Izaguirre [address] [place] [address]   Date: 6/3/2022

## 2022-06-08 ENCOUNTER — HOSPITAL ENCOUNTER (OUTPATIENT)
Dept: PHARMACY | Age: 52
Setting detail: THERAPIES SERIES
Discharge: HOME | End: 2022-06-08
Payer: COMMERCIAL

## 2022-06-08 ENCOUNTER — HOSPITAL ENCOUNTER (OUTPATIENT)
Dept: PHYSICAL THERAPY | Age: 52
Setting detail: THERAPIES SERIES
Discharge: HOME | End: 2022-06-08
Payer: COMMERCIAL

## 2022-06-08 VITALS
SYSTOLIC BLOOD PRESSURE: 119 MMHG | WEIGHT: 259 LBS | BODY MASS INDEX: 43.1 KG/M2 | DIASTOLIC BLOOD PRESSURE: 63 MMHG | HEART RATE: 63 BPM

## 2022-06-08 PROCEDURE — 99212 OFFICE O/P EST SF 10 MIN: CPT

## 2022-06-08 PROCEDURE — 97110 THERAPEUTIC EXERCISES: CPT

## 2022-06-08 PROCEDURE — 97140 MANUAL THERAPY 1/> REGIONS: CPT

## 2022-06-08 RX ORDER — DULOXETIN HYDROCHLORIDE 60 MG/1
CAPSULE, DELAYED RELEASE ORAL
COMMUNITY
Start: 2022-04-27

## 2022-06-08 NOTE — PROGRESS NOTES
[facility]  Medication Management  Pharmacist  Heart Failure    [address] [facility] [employer], [address]  Phone: [phone]  Fax: [phone]      NAME: Juancho Boone  MEDICAL RECORD NUMBER:  [id number]  AGE: 47 y.o. GENDER: female  : 1970  EPISODE DATE:  2022      Heart Failure Management referral by Dr. Collette Stairs    Dry weight: ?? pounds     Today's Wt: 259 lb  Wt Readings from Last 6 Encounters:   22 259 lb (117.5 kg)   22 248 lb 6.4 oz (112.7 kg)   22 244 lb (110.7 kg)   22 245 lb (111.1 kg)   21 240 lb (108.9 kg)   21 243 lb (110.2 kg)    and   Ht Readings from Last 1 Encounters:   21 5' 5\" (1.651 m)                   BP:  119/63  HR: 63      Extremities and non-pitting edema - BLE and BUE - R>L  Skin warm dry intact     Subjective   Ms. Boone is a 47 y.o. female here for the Heart Failure Services. They are here today for a comprehensive medication review including over the counter medications and herbal products, overall wellbeing assessment, transition of care and any needed adjustments with updates and recommendations communicated to the referring physician. Patient Symptoms:  Patient is here today for 3 month follow up. Patient was seen in March with NON-pitting swelling in legs and feet. Clinical findings were not suggestive of fluid overload. Recommendation was made at that time per clinic to follow up with PCP for evaluation of lymphedema. She saw her PCP in March and was referred to lymphedema clinic. She was diagnosed with BLE/BUE lymphedema as well as abdominal lymphedema. She was measured for 30 mm Hg compression stockings at lymphedema clinic. She is NOT wearing compression stockings today. She reports increased swelling in feet and lower legs - due to spending weekend outside in the sun - and is unable to get stockings on.    Patient is doing lymphedema pump protocol twice weekly and aquatic/pool therapy once weekly with long term goal of being fitted for at home pump. Shortness of Breath:   []   None   [x]   Dyspnea on exertion   []   Dyspnea with normal activities  []   Dyspnea at rest  []   Dyspnea while sleeping    Patient Findings:   []  Missed doses  []  Diet changes  []  Sodium intake changes   []  Night time cough   []  Other   []  Early saiety, abd fullness []  Chest pain   []  Constipation   []  Night time bathroom trips  []  Alcohol intake changes  []  Acute illness  []  Edema    []  Number of pillows or recliner  []  Activity changes   []  Fatigue that limits activity [x]  Heart racing or skipping beats  []  Light headedness  []  Dizziness    []      [x]  Problems sleeping    Functional Activity New York Heart Association Classification  [x]   Class I No limitation of physical activity. Ordinary physical activity does not cause undue fatigue, palpitation, dyspnea (shortness of breath). []   Class II Slight limitation of physical activity. Comfortable at rest. Ordinary physical activity results in fatigue, palpitation, dyspnea (shortness of breath). []   Class III  Pb limitation of physical activity. Comfortable at rest.  Less than ordinary activity causes fatigue, palpitation, dyspnea. []   Class IV Unable to carry on any physical activity without discomfort. Symptoms of heart failure at rest.  If any physical activity is undertaken, discomfort increases.       Last CHF Hospital Admission: No hospitalizations for CHF    OUTPATIENT MEDICATIONS:  Outpatient Medications Marked as Taking for the 6/8/22 encounter [facility] Encounter) with [address]   Medication Sig Dispense Refill    DULoxetine (CYMBALTA) 60 MG extended release capsule TAKE 1 CAPSULE BY MOUTH ONCE DAILY DO NOT CRUSH OR CHEW      EUTHYROX 100 MCG tablet Take 1 tablet by mouth once daily 30 tablet 0    allopurinol (ZYLOPRIM) 100 MG tablet Take 1 tablet by mouth once daily 30 tablet 0    traZODone (DESYREL) 50 MG tablet Take 50 mg by mouth nightly      albuterol sulfate HFA (VENTOLIN HFA) 108 (90 Base) MCG/ACT inhaler Inhale 2 puffs into the lungs 4 times daily as needed for Wheezing 1 each 0    bisoprolol-hydroCHLOROthiazide (ZIAC) 5-6.25 MG per tablet Take 1 tablet by mouth daily 90 tablet 3    dilTIAZem (CARDIZEM CD) 300 MG extended release capsule Take 1 capsule by mouth daily 90 capsule 3    ALPRAZolam (XANAX) 0.25 MG tablet Take 0.25 mg by mouth daily as needed.  escitalopram (LEXAPRO) 20 MG tablet Take 20 mg by mouth daily      buPROPion (WELLBUTRIN XL) 300 MG extended release tablet Take 300 mg by mouth every morning With 150 mg for total of 450 mg      Ubrogepant (UBRELVY) 100 MG TABS Take 100 mg by mouth as needed (migraines)       buPROPion (WELLBUTRIN XL) 150 MG extended release tablet Take 150 mg by mouth daily With 300 mg for total of 450 mg           Objective / Assessment     Patient Active Problem List   Diagnosis Code    Essential hypertension I10    Hypothyroidism E03.9    Idiopathic gout M10.00    Anxiety state F41.1    Moderate single current episode of major depressive disorder (HCC) F32.1    Iron deficiency anemia D50.9    Lab test positive for detection of COVID-19 virus U07.1    Shortness of breath R06.02    Myalgia M79.10    Persistent vomiting R11.15    Uncontrollable vomiting R11.10    Intractable nausea and vomiting R11.2    Dizziness R42    Orthostatic hypotension I95.1    Diarrhea R19.7    Recurrent falls while walking R29.6    Abnormal cardiovascular stress test R94.39    Recurrent syncope R55    PSVT (paroxysmal supraventricular tachycardia) ([facility]) I47.1     Social History     Tobacco Use    Smoking status: Never Smoker    Smokeless tobacco: Never Used   Vaping Use    Vaping Use: Never used   Substance Use Topics    Alcohol use:  Yes     Alcohol/week: 0.0 standard drinks     Comment: rarely    Drug use: No       Potassium (mmol/L)   Date Value - in [address] [place] [address] formulation). Patient reports recent increase in swelling due to spending time outside over past weekend with excessive sun and heat exposure. We discussed that heat makes swelling worse. I advised patient to elevate legs when possible, and to wear compression stockings routinely. Patient has been working with lymphedema clinic with end goal of having her own home pumps. She is hopeful that she will see significant improvement with daily or twice daily treatments. Patient Education/Instructions: I did spend about 15 minutes with patient going over his heart failure packet including dietary guidelines, the signs and symptoms to watch for including shortness of breath, weight gain, lightheadedness/dizziness. I asked patient to call the clinic if develops persistent or worsening shortness of breath or weight gain of more than 3 pounds in 1-7 days. Patient verbalized understanding. Medication changes since last visit:  Gabapentin (prescribed for nerve pain) discontinued - secondary to peripheral edema as side effect  Duloxetine added    Follow up cardiology - 6/24/22  Return to CHF clinic in 6 months. I instructed patient to please call right away with any signs or symptoms of heart failure or other medical conditions that need attention or with any questions at all. Do not wait.        Thank you for the referral,    Jagjit Rivers.  [place], [address] Tracking Only     Intervention Detail: Adherence Monitoring: 3   Total # of Interventions Recommended: 3   Total # of Interventions Accepted: 3   Time Spent (min): 60

## 2022-06-08 NOTE — PROGRESS NOTES
Phone: Corinne           Fax: [phone]                           Outpatient Physical Therapy                                                                            Daily Note    Patient: Meredith Coleman : 1970  [facility] #: [id number]   Referring Physician: Dora Arreola MD    Date: 2022    Diagnosis: I89.0 Lympgedema BLE/BUE  Treatment Diagnosis: BLE/BUE/abdominal lymphedema    Onset Date: 03/15/22  PT Insurance Information: [employer]  Total # of Visits Approved: 18 Per Physician Order  Total # of Visits to Date: 22      Pre-Treatment Pain:  5-6/10  Subjective: Pt has increased edema in bilateral feet, L worse than R. Pt states she isn't able to wear normal shoes    Exercises:  Exercise 1: HEP Pt educated on keeping her BLE/BUE compressed at 30mmHg and elevated throughout the day, performing exercises daily and reducing sugar and sodium in her diet    Manual:  Soft Tissue Mobilizaton: MLD to B LE/UE    Assessment  Assessment: rep was hear this date fitting pt for another leg sleeve. MLD performed to BLE focusing on feet to decrease edema and increase comfort    Patient Education  Patient Education: educated on rep ordering pump  Pt verbalized/demonstrated good understanding:     [x] Yes         [] No, pt required further clarification.        Post Treatment Pain:  5/10      Plan  Plan Frequency: 3x/wk  Plan weeks: 4-6 weks       Goals  (Total # of Visits to Date: 25)      Short Term Goals  Time Frame for Short term goals: 3 weeks  Short term goal 1: Pt willbe educated on her POC and HEP-met  Short term goal 2: Pt will initiate pump protocol to BLE/BUE-met    Long Term Goals  Time Frame for Long term goals : 6 weeks  Long term goal 1: Pt will be safe and independent with her lymphedema management-progressing  Long term goal 2: Pt will demonstrate a 2-3cm decrease in girth measurements in BUE/BLE in order to improve quality of life-progressing  Long term goal 3: Pt will be fitted for an at home pump-progressing  Long term goal 4: Pt will report 50% improvement in symptoms-progressing    Minutes Tracking:  Time In: 5600  Time Out: 1110  Minutes: 76  Timed Code Treatment Minutes: [address] [facility] [address], PT DPT      Date: 6/8/2022

## 2022-06-09 ENCOUNTER — HOSPITAL ENCOUNTER (OUTPATIENT)
Dept: PHYSICAL THERAPY | Age: 52
Setting detail: THERAPIES SERIES
Discharge: HOME | End: 2022-06-09
Payer: COMMERCIAL

## 2022-06-09 PROCEDURE — 97140 MANUAL THERAPY 1/> REGIONS: CPT

## 2022-06-09 PROCEDURE — 97110 THERAPEUTIC EXERCISES: CPT

## 2022-06-09 NOTE — PROGRESS NOTES
Phone: [address]          Fax: [phone]                      Outpatient Physical Therapy                                                             Lymphedema Treatment  Date: 2022  Patient: Mira Kelly  : 1970  [facility] #: [id number]  Referring Physician: Anny Paige MD          Treatment Diagnosis: BLE/BUE/abdominal lymphedema  Onset Date: 03/15/22  Total # of Visits Approved: 18   Total # of Visits to Date: 23  No Show: 0  Canceled Appointment: 2     Subjective  Subjective: Pt states the edema is still present but comes down at times, the wraps have helped some. Pt still unable to wear shoes at this time. Pt reports 6-7/10 pain today. Exercises:  Exercise 1: HEP Pt educated on keeping her BLE/BUE compressed at 30mmHg and elevated throughout the day, performing exercises daily and reducing sugar and sodium in her diet    Manual:  Soft Tissue Mobilizaton: MLD to B LE/UE     RLE Complete Decongestion Therapy  Lymph Drainage Pattern: Lower extremity  Compression Technique: Vaso-pneumatic pump  Force (mmHg): 60 mmHg  Duration: 60 minutes  Patient Position: Reclined  LLE Complete Decongestion Therapy  Lymph Drainage Pattern: Lower extremity  Compression Technique: Vaso-pneumatic pump  Force (mmHg): 60 mmHg  Duration : 60 minutes  Patient Position: Reclined  Other Decongestion Therapy  Force (mmHg): 60 mmHg  Duration : 60 minutes  Patient Position: Reclined  LUE Complete Decongestion Therapy  Lymph Drainage Pattern: Upper extremity  Compression Technique: Vaso-pneumatic pump  Force (mmHg): 60 mmHg  Duration : 60 minutes  Patient Position: Reclined  Other Decongestion Therapy  Force (mmHg): 60 mmHg  Duration : 60 minutes  Patient Position: Reclined      Assessment  Assessment: MLD to B LE/UE followed by pumps at 60 mmHg for 60 min.  Pt reports mild soreness post pumps this visit, states \"I think its sore because my legs are more swollen and painful than normal\". Good skin movement and decrease in edema post pumps. Therapy Prognosis: Fair        Decision Making: Medium Complexity    Patient Education  Patient Education: Continued compression. Pt verbalized/demonstrated good understanding:     [x] Yes         [] No, pt required further clarification.          Goals  Short Term Goals  Time Frame for Short term goals: 3 weeks  Short term goal 1: Pt willbe educated on her POC and HEP-met  Short term goal 2: Pt will initiate pump protocol to BLE/BUE-met    Long Term Goals  Time Frame for Long term goals : 6 weeks  Long term goal 1: Pt will be safe and independent with her lymphedema management-progressing  Long term goal 2: Pt will demonstrate a 2-3cm decrease in girth measurements in BUE/BLE in order to improve quality of life-progressing  Long term goal 3: Pt will be fitted for an at home pump-progressing  Long term goal 4: Pt will report 50% improvement in symptoms-progressing      Patient goals : \"to decrease swelling in entire body\"        Minutes Tracking:  Time In: 0800  Time Out: 8231  Minutes: P.OSea Ivey [address] [place] [address]   Date: 6/9/2022

## 2022-06-13 ENCOUNTER — HOSPITAL ENCOUNTER (OUTPATIENT)
Dept: PHYSICAL THERAPY | Age: 52
Setting detail: THERAPIES SERIES
Discharge: HOME | End: 2022-06-13
Payer: COMMERCIAL

## 2022-06-13 PROCEDURE — 97140 MANUAL THERAPY 1/> REGIONS: CPT

## 2022-06-13 PROCEDURE — 97110 THERAPEUTIC EXERCISES: CPT

## 2022-06-13 NOTE — PROGRESS NOTES
Phone: [address] [facility]          Fax: [phone]                      Outpatient Physical Therapy                                                             Lymphedema Evaluation  Date: 2022  Patient: Daniel Willis  : 1970  CSN #: [id number]  Referring Physician: Kandis Colindres MD     Diagnosis: I89.0 Lympgedema BLE/BUE    Treatment Diagnosis: BLE/BUE/abdominal lymphedema  Onset Date: 03/15/22  PT Insurance Information: [facility]  Total # of Visits Approved: 18   Total # of Visits to Date: 24     Subjective  Subjective: Pt reports her swelling is down dbut her feet are stil very sore and painful         Skin Integumentary:   Edema Rebound: Quick  Stemmer Sign: Positive      Stage of Lymphedema: Stage 2: Spontaneously Irreversible Stage  Description:      Lymphedema Classification:   Type: Secondary Lymphedema  Left: Moderate     Right: Moderate        Exercises:  Exercise 1: HEP Pt educated on keeping her BLE/BUE compressed at 30mmHg and elevated throughout the day, performing exercises daily and reducing sugar and sodium in her diet    Manual:   RLE Complete Decongestion Therapy  Lymph Drainage Pattern: Lower extremity  Compression Technique: Vaso-pneumatic pump  Force (mmHg): 60 mmHg  Duration: 60 minutes  LLE Complete Decongestion Therapy  Lymph Drainage Pattern: Lower extremity  Compression Technique: Vaso-pneumatic pump  Force (mmHg): 60 mmHg  Duration : 60 minutes  Other Decongestion Therapy  Force (mmHg): 60 mmHg  Duration : 60 minutes  Other Decongestion Therapy  Force (mmHg): 60 mmHg  Duration : 60 minutes      Assessment  Assessment: MLD to BLE  and L UE this date with good toleance. Pt's skin was taught with little movement this day around her foot and ankle. Pt then toelrated pumps at 60mmHg.  Pt's pump was shipped and should be in this week  Therapy Prognosis: Fair  Specific Instructions for Next Treatment: 1x/wk aquatics, 2x/week pump protocol     Decision Making: Medium Complexity    Patient Education  Patient Education: educated on factor that increase edema  Pt verbalized/demonstrated good understanding:     [x] Yes         [] No, pt required further clarification.     Goals  Short Term Goals  Time Frame for Short term goals: 3 weeks  Short term goal 1: Pt willbe educated on her POC and HEP-met  Short term goal 2: Pt will initiate pump protocol to BLE/BUE-met    Long Term Goals  Time Frame for Long term goals : 6 weeks  Long term goal 1: Pt will be safe and independent with her lymphedema management-progressing  Long term goal 2: Pt will demonstrate a 2-3cm decrease in girth measurements in BUE/BLE in order to improve quality of life-progressing  Long term goal 3: Pt will be fitted for an at home pump-progressing  Long term goal 4: Pt will report 50% improvement in symptoms-progressing      Patient goals : \"to decrease swelling in entire body\"        Minutes Tracking:  Time In: 1106  Time Out: [address] [facility] [address]  Minutes: 95  Timed Code Treatment Minutes: Sabino [address], PT, DPT   Date: 6/13/2022

## 2022-06-17 ENCOUNTER — OFFICE VISIT (OUTPATIENT)
Dept: PRIMARY CARE CLINIC | Age: 52
End: 2022-06-17
Payer: COMMERCIAL

## 2022-06-17 ENCOUNTER — HOSPITAL ENCOUNTER (OUTPATIENT)
Dept: PHYSICAL THERAPY | Age: 52
Setting detail: THERAPIES SERIES
Discharge: HOME | End: 2022-06-17
Payer: COMMERCIAL

## 2022-06-17 VITALS
WEIGHT: 265.6 LBS | DIASTOLIC BLOOD PRESSURE: 65 MMHG | SYSTOLIC BLOOD PRESSURE: 134 MMHG | HEART RATE: 85 BPM | BODY MASS INDEX: 44.2 KG/M2 | RESPIRATION RATE: 16 BRPM

## 2022-06-17 DIAGNOSIS — I95.1 ORTHOSTATIC HYPOTENSION: ICD-10-CM

## 2022-06-17 DIAGNOSIS — I10 ESSENTIAL HYPERTENSION: Primary | ICD-10-CM

## 2022-06-17 DIAGNOSIS — I89.0 LYMPHEDEMA: ICD-10-CM

## 2022-06-17 DIAGNOSIS — E03.9 HYPOTHYROIDISM, UNSPECIFIED TYPE: ICD-10-CM

## 2022-06-17 PROCEDURE — 3017F COLORECTAL CA SCREEN DOC REV: CPT | Performed by: FAMILY MEDICINE

## 2022-06-17 PROCEDURE — 99214 OFFICE O/P EST MOD 30 MIN: CPT | Performed by: FAMILY MEDICINE

## 2022-06-17 PROCEDURE — G8417 CALC BMI ABV UP PARAM F/U: HCPCS | Performed by: FAMILY MEDICINE

## 2022-06-17 PROCEDURE — 97110 THERAPEUTIC EXERCISES: CPT

## 2022-06-17 PROCEDURE — G8427 DOCREV CUR MEDS BY ELIG CLIN: HCPCS | Performed by: FAMILY MEDICINE

## 2022-06-17 PROCEDURE — 97140 MANUAL THERAPY 1/> REGIONS: CPT

## 2022-06-17 PROCEDURE — 1036F TOBACCO NON-USER: CPT | Performed by: FAMILY MEDICINE

## 2022-06-17 RX ORDER — FUROSEMIDE 20 MG/1
20 TABLET ORAL EVERY OTHER DAY
Qty: 30 TABLET | Refills: 3 | Status: SHIPPED | OUTPATIENT
Start: 2022-06-17

## 2022-06-17 SDOH — ECONOMIC STABILITY: FOOD INSECURITY: WITHIN THE PAST 12 MONTHS, YOU WORRIED THAT YOUR FOOD WOULD RUN OUT BEFORE YOU GOT MONEY TO BUY MORE.: NEVER TRUE

## 2022-06-17 SDOH — ECONOMIC STABILITY: FOOD INSECURITY: WITHIN THE PAST 12 MONTHS, THE FOOD YOU BOUGHT JUST DIDN'T LAST AND YOU DIDN'T HAVE MONEY TO GET MORE.: NEVER TRUE

## 2022-06-17 SDOH — ECONOMIC STABILITY: TRANSPORTATION INSECURITY
IN THE PAST 12 MONTHS, HAS THE LACK OF TRANSPORTATION KEPT YOU FROM MEDICAL APPOINTMENTS OR FROM GETTING MEDICATIONS?: NO

## 2022-06-17 ASSESSMENT — SOCIAL DETERMINANTS OF HEALTH (SDOH): HOW HARD IS IT FOR YOU TO PAY FOR THE VERY BASICS LIKE FOOD, HOUSING, MEDICAL CARE, AND HEATING?: NOT HARD AT ALL

## 2022-06-17 NOTE — PROGRESS NOTES
Hypertension: Patient here for follow-up of elevated blood pressure. She is exercising by walking and is adherent to low salt diet. Blood pressure is well controlled at home. Cardiac symptoms chest pain, fatigue and palpitations. Patient denies none. Cardiovascular risk factors: hypertension and obesity (BMI >= 30 kg/m2). Use of agents associated with hypertension: none. Hypothyroidism: Patient states she does have fatigue. Lymphedema- uses pumps but continues to have swelling of legs      Allergies:  Rocephin [ceftriaxone]    Past Medical History:    Past Medical History:   Diagnosis Date    Anemia, unspecified     Anxiety     Edema     H/O cardiovascular stress test 03/08/2021    [place] treadmill score is 0    H/O echocardiogram 01/26/2021    EF >60% Normal study    History of cardiac cath 04/01/2021    [address] [place]/Dr. Little/Right Radial     History of cardiovascular stress test 03/22/2021    Equivocal study. Small perfusion defect of mild intensity in the anterior and anteroseptal regions during stress and rest imaging, which is most consistent with artifact EF 71% [place] Treadmill score is -2intermediate risk.  History of normal Holter exam 02/22/2021    CAM 6 days 19 hrs Normal sinus. AT 2 episodes Longest/fastest 9 beats at ave 127 bpm p to 153 bpm PAC 0.08% PVC 0.07%    Hypertension     Hypothyroidism     Thyroid disease     Tilt table evaluation 03/22/2021    Negative study Although sensitivity of study certainly is not 377% relatively normal results signifcantly decrease the likelihood of neurocardiogenic source for pt symptoms.  Clinically suspision remains high possible repeat study maybe indicated       Past Surgical History:    Past Surgical History:   Procedure Laterality Date    ANTERIOR CRUCIATE LIGAMENT REPAIR Left     HYSTERECTOMY, VAGINAL      07/2020 robotic, lap supracervical hyst and BS, perineopexy, A&P repair, cysto, Lynx retropubic slin    INFECTED SKIN DEBRIDEMENT Left     Left third finger    INSERTABLE CARDIAC MONITOR  05/20/2021       Social History:   Social History     Tobacco Use    Smoking status: Never Smoker    Smokeless tobacco: Never Used   Substance Use Topics    Alcohol use: Yes     Alcohol/week: 0.0 standard drinks     Comment: rarely       Family History:   Family History   Adopted: Yes         Review of Systems:  Constitutional: negative for fever or chills  Eyes: negative for visual disturbance   ENT: negative for sore throat or nasal congestion  Respiratory: neg for cough or shortness of breath  Cardiovascular: negative for chest pain or palpitations,has bilat leg edema  Gastrointestinal: negative for abd pain, nausea, vomiting, diarrhea or constipation  Genitourinary: negative for dysuria, urgency or frequency  Integument/breast: negative for skin rash or lesions  Neurological: negative for unilateral weakness, numbness or tingling. Skeletal Muscular: no joint pain or swelling      Objective:  Physical Exam:  GEN:   A & O x3, no apparent distress  EYES: No gross abnormalities. ENT:ENT exam normal, no neck nodes or sinus tenderness  NECK: normal, supple, no lymphadenopathy,  no carotid bruits, has bilat pitting edema  PULM: clear to auscultation bilaterally- no wheezes, rales or rhonchi, normal air movement, no respiratory distress  COR: regular rate & rhythm and no gallops  ABD:  soft, non-tender, non-distended, normal bowel sounds, no masses or organomegaly  : deferred  EXT: no joint swelling  NEURO: Motor and sensory grossly intact  SKIN:  No skin lesions or rashes        Labs:  Lab Results   Component Value Date    GLUCOSE 96 04/13/2022    BUN 22 (H) 04/13/2022    CREATININE 0.84 04/13/2022     04/13/2022    K 4.6 04/13/2022    CALCIUM 9.3 04/13/2022     04/13/2022    CO2 27 04/13/2022    LABGLOM >60 04/13/2022       Assessment:  1. Essential hypertension    2. Hypothyroidism, unspecified type    3.  Orthostatic hypotension 4. Lymphedema          Plan:  1. Essential hypertension    2. Hypothyroidism, unspecified type    3. Orthostatic hypotension    4. Lymphedema      · Encouraged 1800 fawn diet. · Goal for blood pressure control is 130/80  · Recommended regular exercise as tolerated, 3-5 times per day  ·     No orders of the defined types were placed in this encounter. Current Outpatient Medications   Medication Sig Dispense Refill    furosemide (LASIX) 20 MG tablet Take 1 tablet by mouth every other day 30 tablet 3    DULoxetine (CYMBALTA) 60 MG extended release capsule TAKE 1 CAPSULE BY MOUTH ONCE DAILY DO NOT CRUSH OR CHEW      EUTHYROX 100 MCG tablet Take 1 tablet by mouth once daily 30 tablet 0    allopurinol (ZYLOPRIM) 100 MG tablet Take 1 tablet by mouth once daily 30 tablet 0    traZODone (DESYREL) 50 MG tablet Take 50 mg by mouth nightly      albuterol sulfate HFA (VENTOLIN HFA) 108 (90 Base) MCG/ACT inhaler Inhale 2 puffs into the lungs 4 times daily as needed for Wheezing 1 each 0    bisoprolol-hydroCHLOROthiazide (ZIAC) 5-6.25 MG per tablet Take 1 tablet by mouth daily 90 tablet 3    dilTIAZem (CARDIZEM CD) 300 MG extended release capsule Take 1 capsule by mouth daily 90 capsule 3    ALPRAZolam (XANAX) 0.25 MG tablet Take 0.25 mg by mouth daily as needed.  buPROPion (WELLBUTRIN XL) 300 MG extended release tablet Take 300 mg by mouth every morning With 150 mg for total of 450 mg      Ubrogepant (UBRELVY) 100 MG TABS Take 100 mg by mouth as needed (migraines)       buPROPion (WELLBUTRIN XL) 150 MG extended release tablet Take 150 mg by mouth daily With 300 mg for total of 450 mg      Turmeric (QC TUMERIC COMPLEX PO) Take 1 capsule by mouth daily  (Patient not taking: Reported on 6/8/2022)       No current facility-administered medications for this visit. No follow-ups on file.       Electronically signed by Aric Ness MD on 6/17/2022 at 8:25 AM

## 2022-06-17 NOTE — PROGRESS NOTES
Phone: [address]          Fax: [phone]                      Outpatient Physical Therapy                                                             Lymphedema Treatment  Date: 2022  Patient: Camilo Coon  : 1970  [facility] #: [id number]  Referring Physician: Anaya Evangelista MD          Treatment Diagnosis: BLE/BUE/abdominal lymphedema  Onset Date: 03/15/22  Total # of Visits Approved: 18   Total # of Visits to Date: 25  No Show: 0  Canceled Appointment: 2     Subjective  Subjective: Pt continues to report sore/painful feet. Pt states she received her pumps a few days ago and has been using them everyday, especially after work.        Lymph Assessment  Skin Integumentary:   Edema Rebound: Quick  Stemmer Sign: Positive    Stage of Lymphedema: Stage 2: Spontaneously Irreversible Stage  Description:      Lymphedema Classification:   Type: Secondary Lymphedema  Left: Moderate     Right: Moderate    Measurements: Area Measured: R LE,L LE,R UE,L UE      Right Measurements Left Measurements   R UE Pre Girth Measurement (cm)  Palm (cm): 20.3  Wrist (cm): 17.3  Mid Forearm (cm): 30.7  Elbow (cm): 30.7  Total Girth (cm): 99  R LE Pre Girth Measurement (cm)  5th Tuberosity (cm): 25.1  Lateral malleolus: 31.3  Calf (cm): 53.7  Mid Knee (cm): 56.1  Thigh (cm): 65.7  Total Girth (cm): 231.9  R LE Post Girth Measurement (cm)  5th Tuberosity (cm): 24.9  Lateral malleolus: 30.9  Calf (cm): 53.2  Mid Knee (cm): 54  Thigh (cm): 64.9  Total Girth (cm): 227.9 L UE Pre Girth Measurement (cm)  Palm (cm): 20.5  Wrist (cm): 17.4  Mid Forearm (cm): 30.7  Elbow (cm): 32  Total Girth (cm): 100.6  L UE Post Girth Measurement (cm)   Palm (cm): 20.5  Wrist (cm): 17.3  Mid Forearm (cm): 30.7  Elbow (cm): 30.9  Total Girth (cm): 99.4  L LE Pre Girth Measurement (cm)  5th Tuberosity (cm): 25.7  Lateral malleolus: 31.1  Calf (cm): 52.4  Mid Knee (cm): 52.5  Thigh (cm): 68  Total Girth (cm): 229.7  L LE Post Girth Measurement (cm)  5th Tuberosity (cm): 25.8  Lateral malleolus: 30.7  Calf (cm): 51.4  Mid Knee (cm): 49.9  Thigh (cm): 67.4  Total Girth (cm): 225.2             Exercises:  Exercise 1: HEP Pt educated on keeping her BLE/BUE compressed at 30mmHg and elevated throughout the day, performing exercises daily and reducing sugar and sodium in her diet    Manual:  Soft Tissue Mobilizaton: MLD to B LE/UE  Other: measurements to B LE/UE     RLE Complete Decongestion Therapy  Lymph Drainage Pattern: Lower extremity  Compression Technique: Vaso-pneumatic pump  Force (mmHg): 60 mmHg  Duration: 60 minutes  Patient Position: Reclined  LLE Complete Decongestion Therapy  Lymph Drainage Pattern: Lower extremity  Compression Technique: Vaso-pneumatic pump  Force (mmHg): 60 mmHg  Duration : 60 minutes  Patient Position: Reclined  Other Decongestion Therapy  Force (mmHg): 60 mmHg  Duration : 60 minutes  Patient Position: Reclined  LUE Complete Decongestion Therapy  Lymph Drainage Pattern: Upper extremity  Compression Technique: Vaso-pneumatic pump  Force (mmHg): 60 mmHg  Duration : 60 minutes  Patient Position: Reclined  Other Decongestion Therapy  Force (mmHg): 60 mmHg  Duration : 60 minutes  Patient Position: Reclined      Assessment  Assessment: MLD to B LE/UE followed by pumps at 60 mmHg for 60 min. Pt with good skin movement post pumps this visit. Measurements taken. Therapy Prognosis: Fair  Specific Instructions for Next Treatment: 1x/wk aquatics, 2x/week pump protocol     Decision Making: Medium Complexity    Patient Education  Patient Education: Compression, skin care and pump protocol at home  Pt verbalized/demonstrated good understanding:     [x] Yes         [] No, pt required further clarification.          Goals  Short Term Goals  Time Frame for Short term goals: 3 weeks  Short term goal 1: Pt willbe educated on her POC and HEP-met  Short term goal 2: Pt will initiate pump protocol to BLE/BUE-met    Long Term Goals  Time Frame for Long term goals : 6 weeks  Long term goal 1: Pt will be safe and independent with her lymphedema management-progressing  Long term goal 2: Pt will demonstrate a 2-3cm decrease in girth measurements in BUE/BLE in order to improve quality of life-progressing  Long term goal 3: Pt will be fitted for an at home pump-met  Long term goal 4: Pt will report 50% improvement in symptoms-progressing      Patient goals : \"to decrease swelling in entire body\"        Minutes Tracking:  Time In: 8513  Time Out: 1055  Minutes: [address], [place]  Date: 6/17/2022

## 2022-06-17 NOTE — PATIENT INSTRUCTIONS
SURVEY:    You may be receiving a survey from [employer] regarding your visit today. You may get this in the mail, through your MyChart, or in your email. Please complete the survey to enable us to provide the highest quality of care to you and your family. If you cannot score us a very good (5 Stars) on any question, please call the office to discuss how we could of made your experience exceptional.    Thank you!     Dr. Carisa Colunga, [facility], [address] [place] [address], [address], [address]    Phone: [phone]  Fax: [phone]    Office Hours:   Sol Lund, F: 8-5

## 2022-06-23 ENCOUNTER — HOSPITAL ENCOUNTER (OUTPATIENT)
Dept: PHYSICAL THERAPY | Age: 52
Setting detail: THERAPIES SERIES
Discharge: HOME | End: 2022-06-23
Payer: COMMERCIAL

## 2022-06-23 PROCEDURE — 97140 MANUAL THERAPY 1/> REGIONS: CPT

## 2022-06-23 NOTE — PROGRESS NOTES
Phone: [address]          Fax: [phone]                      Outpatient Physical Therapy                                                             Lymphedema Treatment  Date: 2022  Patient: Santi Campa  : 1970  [facility] #: [id number]  Referring Physician: Carolina Ni MD          Treatment Diagnosis: BLE/BUE/abdominal lymphedema  Onset Date: 03/15/22  Total # of Visits Approved: 18   Total # of Visits to Date: 26  No Show: 0  Canceled Appointment: 2     Subjective  Subjective: Pt reports sore/painful feet continue. Pt reports compliance with pumps daily, minimal decrease in edema noted. Lymph Assessment  Skin Integumentary:   Edema Rebound: Quick  Stemmer Sign: Positive      Stage of Lymphedema: Stage 2: Spontaneously Irreversible Stage  Description:      Lymphedema Classification:   Type: Secondary Lymphedema  Left: Moderate     Right: Moderate      Exercises:  Exercise 1: HEP Pt educated on keeping her BLE/BUE compressed at 30mmHg and elevated throughout the day, performing exercises daily and reducing sugar and sodium in her diet    Manual:  Soft Tissue Mobilizaton: MLD to B LE/UE  Other: Wrapping B LE     RLE Complete Decongestion Therapy  Lymph Drainage Pattern: Lower extremity  Compression Technique: Multi-layered bandage  LLE Complete Decongestion Therapy  Lymph Drainage Pattern: Lower extremity  Compression Technique: Multi-layered bandage         Assessment  Assessment: Multi-layered compression wraps to B LE this visit at 30 mmHg for decreased edema and improved skin movement. Therapy Prognosis: Fair  Specific Instructions for Next Treatment: 1x/wk aquatics, 2x/week pump protocol     Decision Making: Medium Complexity    Patient Education  Patient Education: Wrapping protocol. Pt verbalized/demonstrated good understanding:     [x] Yes         [] No, pt required further clarification.          Goals  Short Term Goals  Time Frame for Short term goals: 3 weeks  Short term goal 1: Pt willbe educated on her POC and HEP-met  Short term goal 2: Pt will initiate pump protocol to BLE/BUE-met    Long Term Goals  Time Frame for Long term goals : 6 weeks  Long term goal 1: Pt will be safe and independent with her lymphedema management-progressing  Long term goal 2: Pt will demonstrate a 2-3cm decrease in girth measurements in BUE/BLE in order to improve quality of life-progressing  Long term goal 3: Pt will be fitted for an at home pump-met  Long term goal 4: Pt will report 50% improvement in symptoms-progressing      Patient goals : \"to decrease swelling in entire body\"        Minutes Tracking:  Time In: 7511  Time Out: 1004  Minutes: [address], [address]   Date: 6/23/2022

## 2022-06-24 ENCOUNTER — OFFICE VISIT (OUTPATIENT)
Dept: CARDIOLOGY | Age: 52
End: 2022-06-24
Payer: COMMERCIAL

## 2022-06-24 VITALS
BODY MASS INDEX: 43.35 KG/M2 | SYSTOLIC BLOOD PRESSURE: 159 MMHG | DIASTOLIC BLOOD PRESSURE: 91 MMHG | HEIGHT: 65 IN | WEIGHT: 260.2 LBS | RESPIRATION RATE: 18 BRPM | OXYGEN SATURATION: 96 % | HEART RATE: 66 BPM

## 2022-06-24 DIAGNOSIS — R06.02 SOB (SHORTNESS OF BREATH): ICD-10-CM

## 2022-06-24 DIAGNOSIS — R00.2 PALPITATIONS: ICD-10-CM

## 2022-06-24 DIAGNOSIS — R07.2 PRECORDIAL PAIN: ICD-10-CM

## 2022-06-24 DIAGNOSIS — R07.89 ATYPICAL CHEST PAIN: ICD-10-CM

## 2022-06-24 DIAGNOSIS — R60.0 BILATERAL LEG EDEMA: ICD-10-CM

## 2022-06-24 DIAGNOSIS — I10 PRIMARY HYPERTENSION: Primary | ICD-10-CM

## 2022-06-24 DIAGNOSIS — Z98.890 HISTORY OF LOOP RECORDER: ICD-10-CM

## 2022-06-24 PROCEDURE — 93000 ELECTROCARDIOGRAM COMPLETE: CPT | Performed by: FAMILY MEDICINE

## 2022-06-24 PROCEDURE — 99214 OFFICE O/P EST MOD 30 MIN: CPT | Performed by: FAMILY MEDICINE

## 2022-06-24 PROCEDURE — G8427 DOCREV CUR MEDS BY ELIG CLIN: HCPCS | Performed by: FAMILY MEDICINE

## 2022-06-24 PROCEDURE — 1036F TOBACCO NON-USER: CPT | Performed by: FAMILY MEDICINE

## 2022-06-24 PROCEDURE — G8417 CALC BMI ABV UP PARAM F/U: HCPCS | Performed by: FAMILY MEDICINE

## 2022-06-24 PROCEDURE — 3017F COLORECTAL CA SCREEN DOC REV: CPT | Performed by: FAMILY MEDICINE

## 2022-06-24 RX ORDER — BISOPROLOL FUMARATE AND HYDROCHLOROTHIAZIDE 10; 6.25 MG/1; MG/1
1 TABLET ORAL DAILY
Qty: 90 TABLET | Refills: 3 | Status: SHIPPED | OUTPATIENT
Start: 2022-06-24

## 2022-06-24 NOTE — PATIENT INSTRUCTIONS
SURVEY:    You may be receiving a survey from [employer] regarding your visit today. Please complete the survey to enable us to provide the highest quality of care to you and your family. If you cannot score us a very good on any question, please call the office to discuss how we could have made your experience a very good one. Thank you.

## 2022-06-24 NOTE — PROGRESS NOTES
Edmundo Myers am scribing for and in the presence of Salas Aguilar MD, MS, F.A.C.C..      Patient: Raymond Terry  : /[id number] Care Physician: Jessica Faulkner  Today's Date: 2022    REASON FOR VISIT: Hypertension (Hx:HTN,CP,Palps,Loop. She has been doing okay, some SOB, worsening a little bit. Some pitting edema in her feet since Memorial day weekend. CP off and on & palpitations as well. )    Dear Jessica Faulkner MD,    HPI: Ms. Nila Waldrop is a 47 y.o. female presents to the office today for follow up. She previously has been admitted to the hospital with lightheaded and dizziness. She denied any previous heart related history. She is adopted so she was unaware of any family history. She does drink 1-2 cups off coffee daily. Her echocardiogram on 2021 was fairly unremarkable and showed an ejection fraction of >60%. Her CAM monitor was done on 2021 she wore 6 days, 19 hours showed one epos ode of atrial tachycardia with symptoms. She had treadmill stress test done on 3/8/2021 her  Duke treadmill score is 0 which is intermediate risk. Ms. Nila Waldrop recently had Negative Tilt Table test on 3/22/21 and an Equivocal stress test. Ms. Nila Waldrop heart catheterization on 21 was largely normal. LINQ report reviewed from 2021: 1 symptom recorded however no abnormal heart rhythms noted. Battery life is good. Cardiac event monitor worn on 2021 showed frequent premature ventricular contractions 3.2% of the beats, 5 symptoms were reported and associated with the PVCs. Since I last saw Ms. Boone she reports starting on Memorial Day weekend that she started having worsening shortness of breath and pitting edema in her legs. He reports she has had some mild chest pain lasting seconds that typically happens daily. She does have some palpitations at times as well. She denies having any lightheaded/dizziness. No cough, fever or chills. No nausea or vomiting.  No abdominal pain, bleeding problems, bowel issues, problems with medications or any other concerns at this time. Past Medical History:   Diagnosis Date    Anemia, unspecified     Anxiety     Edema     H/O cardiovascular stress test 03/08/2021    [place] treadmill score is 0    H/O echocardiogram 01/26/2021    EF >60% Normal study    History of cardiac cath 04/01/2021    [facility]) Nia/Dr. Little/Right Radial     History of cardiovascular stress test 03/22/2021    Equivocal study. Small perfusion defect of mild intensity in the anterior and anteroseptal regions during stress and rest imaging, which is most consistent with artifact EF 71% Duke Treadmill score is -2intermediate risk.  History of normal Holter exam 02/22/2021    CAM 6 days 19 hrs Normal sinus. AT 2 episodes Longest/fastest 9 beats at ave 127 bpm p to 153 bpm PAC 0.08% PVC 0.07%    Hypertension     Hypothyroidism     Thyroid disease     Tilt table evaluation 03/22/2021    Negative study Although sensitivity of study certainly is not 438% relatively normal results signifcantly decrease the likelihood of neurocardiogenic source for pt symptoms. Clinically suspision remains high possible repeat study maybe indicated       CURRENT ALLERGIES: Rocephin [ceftriaxone] REVIEW OF SYSTEMS: 14 systems were reviewed. Pertinent positives and negatives as above, all else negative. Past Surgical History:   Procedure Laterality Date    ANTERIOR CRUCIATE LIGAMENT REPAIR Left     HYSTERECTOMY, VAGINAL      07/2020 robotic, lap supracervical hyst and BS, perineopexy, A&P repair, cysto, Lynx retropubic slin    INFECTED SKIN DEBRIDEMENT Left     Left third finger    INSERTABLE CARDIAC MONITOR  05/20/2021    Social History:  Social History     Tobacco Use    Smoking status: Never Smoker    Smokeless tobacco: Never Used   Vaping Use    Vaping Use: Never used   Substance Use Topics    Alcohol use:  Yes     Alcohol/week: 0.0 standard drinks     Comment: rarely    Drug use: No        MEDICATIONS:  No current facility-administered medications for this visit. CURRENT INPATIENT MEDICATIONS:  Prior to Admission medications    Medication Sig Start Date End Date Taking? Authorizing Provider   DULoxetine (CYMBALTA) 60 MG extended release capsule TAKE 1 CAPSULE BY MOUTH ONCE DAILY DO NOT CRUSH OR CHEW 4/27/22  Yes Historical Provider, MD   EUTHYROX 100 MCG tablet Take 1 tablet by mouth once daily 5/20/22  Yes Lita Wisdom MD   allopurinol (ZYLOPRIM) 100 MG tablet Take 1 tablet by mouth once daily 2/8/22  Yes Lita Wisdom MD   traZODone (DESYREL) 50 MG tablet Take 50 mg by mouth nightly   Yes Historical Provider, MD   albuterol sulfate HFA (VENTOLIN HFA) 108 (90 Base) MCG/ACT inhaler Inhale 2 puffs into the lungs 4 times daily as needed for Wheezing 11/26/21  Yes Lita Wisdom MD   bisoprolol-hydroCHLOROthiazide [facility]) 5-6.25 MG per tablet Take 1 tablet by mouth daily 11/10/21  Yes Severa Jing, PA-C   dilTIAZem (CARDIZEM CD) 300 MG extended release capsule Take 1 capsule by mouth daily 9/17/21  Yes Lyle Johnson MD   ALPRAZolam [facility]) 0.25 MG tablet Take 0.25 mg by mouth daily as needed. 6/16/21  Yes Historical Provider, MD   buPROPion (WELLBUTRIN XL) 300 MG extended release tablet Take 300 mg by mouth every morning With 150 mg for total of 450 mg 2/24/21  Yes Historical Provider, MD   Ubrogepant (UBRELVY) 100 MG TABS Take 100 mg by mouth as needed (migraines)  3/16/21  Yes Historical Provider, MD   Turmeric (QC TUMERIC COMPLEX PO) Take 1 capsule by mouth daily    Yes Historical Provider, MD   buPROPion (WELLBUTRIN XL) 150 MG extended release tablet Take 150 mg by mouth daily With 300 mg for total of 450 mg 1/21/21  Yes Historical Provider, MD   furosemide (LASIX) 20 MG tablet Take 1 tablet by mouth every other day  Patient not taking: Reported on 6/24/2022 6/17/22   Lita Wisdom MD       FAMILY HISTORY: family history is not on file.  She was adopted. Physical Examination:     BP (!) 159/91 (Site: Right Upper Arm, Position: Sitting, Cuff Size: Large Adult)   Pulse 66   Resp 18   Ht 5' 5\" (1.651 m)   Wt 260 lb 3.2 oz (118 kg)   LMP 07/15/2020 (Approximate)   SpO2 96%   BMI 43.30 kg/m²  Body mass index is 43.3 kg/m². Constitutional: She appeared oriented to person and place. She appears well-developed and well-nourished. In no acute distress. HEENT: Normocephalic and atraumatic. No JVD present. Carotid bruit is not present. No mass and no thyromegaly present. No lymphadenopathy noted. Cardiovascular: Normal rate, regular rhythm, normal heart sounds. Exam reveals no gallop and no friction rubs. 2/6 systolic murmur, 2nd intercostal space on the RIGHT just lateral to the sternum  Pulmonary/Chest: Effort normal and breath sounds normal. No respiratory distress. She has no wheezes, rhonchi or rales. Abdominal: Soft, non-tender. She exhibits no organomegaly, mass or bruit. Extremities: Trace-1+ 1/2 up to the knees bilaterally. No cyanosis or clubbing. 2+ radial and carotid pulses. Distal extremity pulses: 2+ bilaterally. Neurological: Alertness and orientation as per Constitutional exam. No evidence of gross cranial nerve deficit. Coordination appeared normal.   Skin: Skin is warm and dry. There is no rash or diaphoresis. Psychiatric: She has a normal mood and affect.  Her speech is normal and behavior is normal.        MOST RECENT LABS ON RECORD:   Lab Results   Component Value Date    WBC 5.0 04/13/2022    HGB 13.4 04/13/2022    HCT 42.4 04/13/2022     04/13/2022    CHOL 185 04/22/2020    TRIG 79 04/22/2020    HDL 48 04/22/2020    ALT 13 04/13/2022    AST 20 04/13/2022     04/13/2022    K 4.6 04/13/2022     04/13/2022    CREATININE 0.84 04/13/2022    BUN 22 (H) 04/13/2022    CO2 27 04/13/2022    TSH 2.65 12/03/2021    LABA1C 5.6 12/23/2021       ASSESSMENT:  Patient Active Problem List    Diagnosis Date Noted    Recurrent syncope 05/20/2021    PSVT (paroxysmal supraventricular tachycardia) ([facility] [address] 05/20/2021    Abnormal cardiovascular stress test 04/01/2021    Orthostatic hypotension     Diarrhea     Recurrent falls while walking     Dizziness 01/26/2021    Persistent vomiting 01/25/2021    Uncontrollable vomiting 01/25/2021    Intractable nausea and vomiting 01/25/2021    Lab test positive for detection of COVID-19 virus 12/20/2020    Shortness of breath 12/20/2020    Myalgia 12/20/2020    Iron deficiency anemia 02/15/2019    Moderate single current episode of [facility] 03/21/2018    Idiopathic gout 04/20/2016    Anxiety state 04/20/2016    Essential hypertension 05/06/2015    Hypothyroidism 05/06/2015       Diagnosis Orders   1. Primary hypertension     2. Palpitations     3. Precordial pain  EKG 12 lead   4. SOB (shortness of breath)     5. Bilateral leg edema     6. Atypical chest pain     7. History of loop recorder       PLAN:    · Shortness of breath with mild exertion:/Pitting Edema Bilaterally in legs possibly due to uncrontrolled BP. · Beta Blocker: INCREASE to bisoprolol/HCTZ (Ziac) 10/6.25 mg daily. I also discussed the potential side effects of this medication including lightheadedness and dizziness and instructed them to stop the medication of this occurs and call our office if this occurs. · Diuretics: INCREASE to bisoprolol/HCTZ (Ziac) 10/6.25 mg daily.  Additional Testing List: None     · Essential Hypertension: Uncontrolled   · Beta Blocker: INCREASE to bisoprolol/HCTZ (Ziac) 10/6.25 mg daily.  ACE Inibitor/ARB: Not indicated at this time.  Calcium Channel Blocker: Continue Diltizem 300 mg daily.  Diuretics: INCREASE to bisoprolol/HCTZ (Ziac) 10/6.25 mg daily. · Atypical Chest Pain: mild. Grossly normal stress test on 4/1/21.  Suspect that this is non cardiac but could be some kind of an paroxysmal supraventricular tachycardia-Monitoring with LINQ loop recorder via home monitoring. Last download no dangerous arrhythmias were seen and expect these are simply PAC\"s and/or PVC's. · Beta Blocker: INCREASE to bisoprolol/HCTZ (Ziac) 10/6.25 mg daily. · Calcium Channel Blocker: Continue diltiazem CD (Cardizem CD) 300 mg once daily. · Counseling: I advised Ms. Boone to call our office or go to the emergency room if she develops worsening or persistent chest pain or shortness of breath as this could be life threatening.   Recurrent intermittent palpitations: Currently mild and not bothersome to her, Loop recorder implanted  · Beta Blocker: INCREASE to bisoprolol/HCTZ (Ziac) 10/6.25 mg daily. · Calcium Channel Blocker: Continue Diltiazem 300 mg daily       Implantable Loop Recorder:    Indication for Device Placement: Unexplained Syncope   Interrogation Findings: I reviewed the results of their most recent home interrogation from 3/29/2022. Once again, thank you for allowing me to participate in this patients care. Please do not hesitate to contact me if I could be of any further assistance. I told Ms. Boone to call my office if she had any problems, but otherwise told her to Return in about 1 year (around 6/24/2023). However, I would be happy to see her sooner should the need arise. Sincerely,  Elayne Manuel. Sidney ARAGON, MS, F.A.C.C. [facility] Cardiology Specialist    [address]  Phone: [phone], Fax: [phone]     I believe that the risk of significant morbidity and mortality related to the patient's current medical conditions are: Intermediate. The documentation recorded by the scribe, accurately and completely reflects the services I personally performed and the decisions made by me. Liana Cordoba MD, MS, F.A.C.C.  June 24, 2022

## 2022-06-27 ENCOUNTER — HOSPITAL ENCOUNTER (OUTPATIENT)
Dept: PHYSICAL THERAPY | Age: 52
Setting detail: THERAPIES SERIES
Discharge: HOME | End: 2022-06-27
Payer: COMMERCIAL

## 2022-06-27 PROCEDURE — 97140 MANUAL THERAPY 1/> REGIONS: CPT

## 2022-06-27 NOTE — PROGRESS NOTES
Phone: [address]          Fax: [phone]                      Outpatient Physical Therapy                                                             Lymphedema Treatment  Date: 2022  Patient: Ruba Weinberg  : 1970  CSN #: [id number]  Referring Physician: Jaret Walsh MD     Diagnosis: I89.0 Lympgedema BLE/BUE    Treatment Diagnosis: BLE/BUE/abdominal lymphedema  Onset Date: 03/15/22  PT Insurance Information: [facility]  Total # of Visits Approved: 18   Total # of Visits to Date:   No Show: 0  Canceled Appointment: 2     Subjective  Subjective: Pt reports compliance with pumps daily. Pt stated 4-5/10 bilat LE pain. Skin Integumentary:   Edema Rebound: Quick  Stemmer Sign: Positive      Stage of Lymphedema: Stage 2: Spontaneously Irreversible Stage  Description:      Lymphedema Classification:   Type: Secondary Lymphedema  Left: Moderate     Right: Moderate      Exercises:  Exercise 1: HEP Pt educated on keeping her BLE/BUE compressed at 30mmHg and elevated throughout the day, performing exercises daily and reducing sugar and sodium in her diet    Manual:  Soft Tissue Mobilizaton: MLD to B UE  Other: Wrapping B LE     RLE Complete Decongestion Therapy  Compression Technique: Multi-layered bandage  LLE Complete Decongestion Therapy  Lymph Drainage Pattern: Lower extremity  Compression Technique: Multi-layered bandage  RUE Complete Decongestion Therapy  Lymph Drainage Pattern: Upper extremity,Right axillo-inguinal anastamosis  LUE Complete Decongestion Therapy  Lymph Drainage Pattern: Upper extremity      Assessment  Assessment: Multi-layered compression wraps to B LE this visit at 30 mmHg for decreased edema and improved skin movement. Therapy Prognosis: Fair  Specific Instructions for Next Treatment: 1x/wk aquatics, 2x/week pump protocol          Patient Education  Patient Education: Wrapping protocol.   Pt verbalized/demonstrated good understanding:     [x] Yes         [] No, pt required further clarification.          Goals  Short Term Goals  Time Frame for Short term goals: 3 weeks  Short term goal 1: Pt willbe educated on her POC and HEP-met  Short term goal 2: Pt will initiate pump protocol to BLE/BUE-met    Long Term Goals  Time Frame for Long term goals : 6 weeks  Long term goal 1: Pt will be safe and independent with her lymphedema management-progressing  Long term goal 2: Pt will demonstrate a 2-3cm decrease in girth measurements in BUE/BLE in order to improve quality of life-progressing  Long term goal 3: Pt will be fitted for an at home pump-met  Long term goal 4: Pt will report 50% improvement in symptoms-progressing      Patient goals : \"to decrease swelling in entire body\"        Minutes Tracking:  Time In: 0940  Time Out: 56  Minutes: [address], [place],    Date: 6/27/2022

## 2022-07-02 DIAGNOSIS — E03.9 HYPOTHYROIDISM, UNSPECIFIED TYPE: ICD-10-CM

## 2022-07-05 RX ORDER — LEVOTHYROXINE SODIUM 100 UG/1
TABLET ORAL
Qty: 30 TABLET | Refills: 0 | Status: SHIPPED | OUTPATIENT
Start: 2022-07-05 | End: 2022-08-09

## 2022-07-06 ENCOUNTER — HOSPITAL ENCOUNTER (OUTPATIENT)
Dept: PHYSICAL THERAPY | Age: 52
Setting detail: THERAPIES SERIES
Discharge: HOME | End: 2022-07-06
Payer: COMMERCIAL

## 2022-07-07 NOTE — PROGRESS NOTES
[facility]  Inpatient/Observation/Outpatient Rehabilitation    Date: 2022  Patient Name: Aleksandr López       [] Inpatient Acute/Observation       []  Outpatient  : 1970       [x] Pt no showed for scheduled appointment Called pt. In regards to missed appointment, pt. Stated she had forgot about her appointment and apologized. Reminded opt. Of having appointment on 22 at 8am.    [] Pt refused/declined therapy at this time due to:           [] Pt cancelled due to:  [] No Reason Given   [] Sick/ill   [] Other:    Therapist/Assistant will attempt to see this patient, at our earliest opportunity.        Lui Villeda, PTA Date: 2022

## 2022-07-08 ENCOUNTER — HOSPITAL ENCOUNTER (OUTPATIENT)
Dept: PHYSICAL THERAPY | Age: 52
Setting detail: THERAPIES SERIES
Discharge: HOME | End: 2022-07-08
Payer: COMMERCIAL

## 2022-07-08 NOTE — PROGRESS NOTES
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 Therapist/Assistant will attempt to see this patient, at our earliest opportunity.        Mary Muñoz, PTA Date: 2022

## 2022-07-11 ENCOUNTER — APPOINTMENT (OUTPATIENT)
Dept: PHYSICAL THERAPY | Age: 52
End: 2022-07-11
Payer: COMMERCIAL

## 2022-07-11 ENCOUNTER — HOSPITAL ENCOUNTER (OUTPATIENT)
Dept: PHYSICAL THERAPY | Age: 52
Setting detail: THERAPIES SERIES
Discharge: HOME | End: 2022-07-11
Payer: COMMERCIAL

## 2022-07-11 PROCEDURE — 97140 MANUAL THERAPY 1/> REGIONS: CPT

## 2022-07-11 NOTE — PROGRESS NOTES
Phone: [address]          Fax: [phone]                      Outpatient Physical Therapy                                                             Lymphedema Evaluation  Date: 2022  Patient: Ramesh Gusman  : 1970  [facility] #: [id number]  Referring Physician: Kenny Sandra MD     Diagnosis: I89.0 Lympgedema BLE/BUE    Treatment Diagnosis: BLE/BUE/abdominal lymphedema  Onset Date: 03/15/22  PT Insurance Information: [facility]  Total # of Visits Approved: 18   Total # of Visits to Date:   No Show: 1  Canceled Appointment: 3     Subjective  Subjective: Pt reported 3-4/10 bilat LE pain. Stated that that her edema has been better overall.        Skin Integumentary:   Edema Rebound: Quick  Stemmer Sign: Positive    Signs of Constriction (if applicable):        Stage of Lymphedema: Stage 2: Spontaneously Irreversible Stage  Description:      Lymphedema Classification:   Type: Secondary Lymphedema  Left: Moderate     Right: Moderate    Measurements:        Right Measurements Left Measurements   R LE Post Girth Measurement (cm)  5th Tuberosity (cm): 24.3  Calf (cm): 46.9  Mid Knee (cm): 50  Thigh (cm): 65.2  Total Girth (cm): 186.4 L LE Post Girth Measurement (cm)  5th Tuberosity (cm): 25.5  Calf (cm): 47.5  Mid Knee (cm): 49.5  Thigh (cm): 66  Total Girth (cm): 188.5           Exercises:  Exercise 1: HEP Pt educated on keeping her BLE/BUE compressed at 30mmHg and elevated throughout the day, performing exercises daily and reducing sugar and sodium in her diet    Manual:  Soft Tissue Mobilizaton: MLD to B LE  Other: Wrapping B LE     RLE Complete Decongestion Therapy  Scale: Stage 2  Lymph Drainage Pattern: Lower extremity (MLD to RLE.)  Compression Technique: Multi-layered bandage (30 mmHg)  LLE Complete Decongestion Therapy  Scale: Stage 2  Lymph Drainage Pattern: Lower extremity (MLD to LLE.)  Compression Technique: Multi-layered bandage (30 mmHg)         Assessment  Assessment: MLD to bilat LEs. Followed by multi-layered compression wraps to BLEs at 30mmHg. Therapy Prognosis: Fair  Specific Instructions for Next Treatment: 1x/wk aquatics, 2x/week pump protocol    Patient Education  Patient Education: Reviewed skin care and multi-layered dressings. Pt verbalized good understanding. Pt verbalized/demonstrated good understanding:     [x] Yes         [] No, pt required further clarification.          Goals  Short Term Goals  Time Frame for Short term goals: 3 weeks  Short term goal 1: Pt willbe educated on her POC and HEP-met  Short term goal 2: Pt will initiate pump protocol to BLE/BUE-met    Long Term Goals  Time Frame for Long term goals : 6 weeks  Long term goal 1: Pt will be safe and independent with her lymphedema management-progressing  Long term goal 2: Pt will demonstrate a 2-3cm decrease in girth measurements in BUE/BLE in order to improve quality of life-progressing  Long term goal 3: Pt will be fitted for an at home pump-met  Long term goal 4: Pt will report 50% improvement in symptoms-progressing      Patient goals : \"to decrease swelling in entire body\"        Minutes Tracking:  Time In: 0848  Time Out: [facility]  Minutes: Jennifer Dent [facility] [address] [place]  Date: 7/11/2022

## 2022-07-15 ENCOUNTER — HOSPITAL ENCOUNTER (OUTPATIENT)
Dept: PHYSICAL THERAPY | Age: 52
Setting detail: THERAPIES SERIES
Discharge: HOME | End: 2022-07-15
Payer: COMMERCIAL

## 2022-07-15 PROCEDURE — 97140 MANUAL THERAPY 1/> REGIONS: CPT

## 2022-07-15 NOTE — PROGRESS NOTES
Phone: [address]          Fax: [phone]                      Outpatient Physical Therapy                                                             Lymphedema Treatment  Date: 7/15/2022  Patient: Tarah Hayes  : 1970  [facility] #: [id number]  Referring Physician: Juarez Martinez MD          Treatment Diagnosis: BLE/BUE/abdominal lymphedema  Onset Date: 03/15/22  Total # of Visits Approved: 18   Total # of Visits to Date:   No Show: 1  Canceled Appointment: 3     Subjective  Subjective: Pt reported reported improvement but worse after work. Stated 3/10 BLE pain. Skin Integumentary:   Pitting Area 1 Described: BLEs  Pitting Scale Area 1: 1+  Edema Rebound: Quick  Stemmer Sign: Positive    Signs of Constriction (if applicable):        Stage of Lymphedema: Stage 2: Spontaneously Irreversible Stage  Description:      Lymphedema Classification:   Type: Secondary Lymphedema  Left: Moderate     Right: Moderate        Exercises:  Exercise 1: HEP Pt educated on keeping her BLE/BUE compressed at 30mmHg and elevated throughout the day, performing exercises daily and reducing sugar and sodium in her diet    Manual:  Soft Tissue Mobilizaton: MLD to B LE  Other: Wrapping B LE     RLE Complete Decongestion Therapy  Scale: Stage 2  Lymph Drainage Pattern: Lower extremity (MLD to RLE.)  Compression Technique: Multi-layered bandage (30mmHG)  LLE Complete Decongestion Therapy  Scale: Stage 2  Lymph Drainage Pattern: Lower extremity (MLD to LLE.)  Compression Technique: Multi-layered bandage (30mmHg)         Assessment  Assessment: 1+ edema noted to BLEs. MLD to bilat LEs. Followed by multi-layered compression wraps to BLEs at 30mmHg. Therapy Prognosis: Fair  Specific Instructions for Next Treatment: 1x/wk aquatics, 2x/week pump protocol          Patient Education  Patient Education: Reviewed care of multi-layered dressings. Pt verbalized good understanding.   Pt verbalized/demonstrated good understanding:     [x] Yes         [] No, pt required further clarification.          Goals  Short Term Goals  Time Frame for Short term goals: 3 weeks  Short term goal 1: Pt willbe educated on her POC and HEP-met  Short term goal 2: Pt will initiate pump protocol to BLE/BUE-met    Long Term Goals  Time Frame for Long term goals : 6 weeks  Long term goal 1: Pt will be safe and independent with her lymphedema management-progressing  Long term goal 2: Pt will demonstrate a 2-3cm decrease in girth measurements in BUE/BLE in order to improve quality of life-progressing  Long term goal 3: Pt will be fitted for an at home pump-met  Long term goal 4: Pt will report 50% improvement in symptoms-progressing      Patient goals : \"to decrease swelling in entire body\"        Minutes Tracking:  Time In: 7953  Time Out: 9219  Minutes: [address], [place]  Date: 7/15/2022

## 2022-07-22 ENCOUNTER — HOSPITAL ENCOUNTER (OUTPATIENT)
Dept: PHYSICAL THERAPY | Age: 52
Setting detail: THERAPIES SERIES
Discharge: HOME | End: 2022-07-22
Payer: COMMERCIAL

## 2022-07-22 ENCOUNTER — HOSPITAL ENCOUNTER (OUTPATIENT)
Age: 52
Discharge: HOME | End: 2022-07-22
Payer: COMMERCIAL

## 2022-07-22 DIAGNOSIS — I10 ESSENTIAL HYPERTENSION: ICD-10-CM

## 2022-07-22 LAB
ANION GAP SERPL CALCULATED.3IONS-SCNC: 9 MMOL/L (ref 9–17)
BUN BLDV-MCNC: 14 MG/DL (ref 6–20)
BUN/CREAT BLD: 18 (ref 9–20)
CALCIUM SERPL-MCNC: 9.2 MG/DL (ref 8.6–10.4)
CHLORIDE BLD-SCNC: 108 MMOL/L (ref 98–107)
CO2: 29 MMOL/L (ref 20–31)
CREAT SERPL-MCNC: 0.78 MG/DL (ref 0.5–0.9)
GFR AFRICAN AMERICAN: >60 ML/MIN
GFR NON-AFRICAN AMERICAN: >60 ML/MIN
GFR SERPL CREATININE-BSD FRML MDRD: ABNORMAL ML/MIN/{1.73_M2}
GFR SERPL CREATININE-BSD FRML MDRD: ABNORMAL ML/MIN/{1.73_M2}
GLUCOSE BLD-MCNC: 110 MG/DL (ref 70–99)
POTASSIUM SERPL-SCNC: 4.2 MMOL/L (ref 3.7–5.3)
SODIUM BLD-SCNC: 146 MMOL/L (ref 135–144)

## 2022-07-22 PROCEDURE — 36415 COLL VENOUS BLD VENIPUNCTURE: CPT

## 2022-07-22 PROCEDURE — 97140 MANUAL THERAPY 1/> REGIONS: CPT

## 2022-07-22 PROCEDURE — 80048 BASIC METABOLIC PNL TOTAL CA: CPT

## 2022-07-22 NOTE — PROGRESS NOTES
didn't bring her wraps.)  LLE Complete Decongestion Therapy  Scale: Stage 2  Lymph Drainage Pattern: Lower extremity (MLD to LLE.)  Compression Technique:  (Held this date d/t pt not bringing wraps.)  Skin Integumentary  Skin Integrity: Intact      Assessment  Assessment: 1+ edema noted to BLEs. MLD to bilat LEs. Completed measurements. Held multi-layered wraps d/t pt leaving wraps at home. Pt continues to self-wrap her LEs and use pumps. Plan for 1 month follow-up. Therapy Prognosis: Fair  Specific Instructions for Next Treatment: 1x/wk aquatics, 2x/week pump protocol          Patient Education  Patient Education: Reviewed care of multi-layered dressings, POC, and on skin care. Pt verbalized good understanding. Pt verbalized/demonstrated good understanding:     [x] Yes         [] No, pt required further clarification.          Goals  Short Term Goals  Time Frame for Short term goals: 3 weeks  Short term goal 1: Pt willbe educated on her POC and HEP-met  Short term goal 2: Pt will initiate pump protocol to BLE/BUE-met    Long Term Goals  Time Frame for Long term goals : 6 weeks  Long term goal 1: Pt will be safe and independent with her lymphedema management-progressing  Long term goal 2: Pt will demonstrate a 2-3cm decrease in girth measurements in BUE/BLE in order to improve quality of life-progressing  Long term goal 3: Pt will be fitted for an at home pump-met  Long term goal 4: Pt will report 50% improvement in symptoms-progressing      Patient goals : \"to decrease swelling in entire body\"        Minutes Tracking:  Time In: 4587  Time Out: 0827  Minutes: Miguel A [address] [place] [address]  Date: 7/22/2022

## 2022-07-25 ENCOUNTER — TELEPHONE (OUTPATIENT)
Dept: CARDIOLOGY | Age: 52
End: 2022-07-25

## 2022-07-25 NOTE — TELEPHONE ENCOUNTER
Ms. Dale He calls in stating her psychiatrist wants to put her on a medication called modafinil - its a type of stimulant, but he wants her to call and make sure this is okay with you first. Please advise. Thank you.

## 2022-07-25 NOTE — TELEPHONE ENCOUNTER
Please let patient know that I do not have any experience with that medication but I did review the medication and potential side effects and at this point do not have any objections. Thank.

## 2022-07-28 ENCOUNTER — HOSPITAL ENCOUNTER (OUTPATIENT)
Dept: LAB | Age: 52
Setting detail: SPECIMEN
Discharge: HOME | End: 2022-07-28
Payer: COMMERCIAL

## 2022-07-28 ENCOUNTER — OFFICE VISIT (OUTPATIENT)
Dept: PRIMARY CARE CLINIC | Age: 52
End: 2022-07-28
Payer: COMMERCIAL

## 2022-07-28 VITALS
RESPIRATION RATE: 18 BRPM | OXYGEN SATURATION: 97 % | BODY MASS INDEX: 42.17 KG/M2 | HEART RATE: 67 BPM | WEIGHT: 253.4 LBS | DIASTOLIC BLOOD PRESSURE: 87 MMHG | SYSTOLIC BLOOD PRESSURE: 133 MMHG | TEMPERATURE: 98 F

## 2022-07-28 DIAGNOSIS — J02.9 SORE THROAT: ICD-10-CM

## 2022-07-28 DIAGNOSIS — J20.9 ACUTE BRONCHITIS, UNSPECIFIED ORGANISM: ICD-10-CM

## 2022-07-28 DIAGNOSIS — J20.9 ACUTE BRONCHITIS, UNSPECIFIED ORGANISM: Primary | ICD-10-CM

## 2022-07-28 PROCEDURE — 3017F COLORECTAL CA SCREEN DOC REV: CPT | Performed by: FAMILY MEDICINE

## 2022-07-28 PROCEDURE — G8427 DOCREV CUR MEDS BY ELIG CLIN: HCPCS | Performed by: FAMILY MEDICINE

## 2022-07-28 PROCEDURE — 1036F TOBACCO NON-USER: CPT | Performed by: FAMILY MEDICINE

## 2022-07-28 PROCEDURE — G8417 CALC BMI ABV UP PARAM F/U: HCPCS | Performed by: FAMILY MEDICINE

## 2022-07-28 PROCEDURE — 99213 OFFICE O/P EST LOW 20 MIN: CPT | Performed by: FAMILY MEDICINE

## 2022-07-28 PROCEDURE — U0005 INFEC AGEN DETEC AMPLI PROBE: HCPCS

## 2022-07-28 PROCEDURE — C9803 HOPD COVID-19 SPEC COLLECT: HCPCS

## 2022-07-28 PROCEDURE — U0003 INFECTIOUS AGENT DETECTION BY NUCLEIC ACID (DNA OR RNA); SEVERE ACUTE RESPIRATORY SYNDROME CORONAVIRUS 2 (SARS-COV-2) (CORONAVIRUS DISEASE [COVID-19]), AMPLIFIED PROBE TECHNIQUE, MAKING USE OF HIGH THROUGHPUT TECHNOLOGIES AS DESCRIBED BY CMS-2020-01-R: HCPCS

## 2022-07-28 RX ORDER — MODAFINIL 200 MG/1
TABLET ORAL
COMMUNITY
Start: 2022-07-26

## 2022-07-28 RX ORDER — AZITHROMYCIN 250 MG/1
250 TABLET, FILM COATED ORAL SEE ADMIN INSTRUCTIONS
Qty: 6 TABLET | Refills: 0 | Status: SHIPPED | OUTPATIENT
Start: 2022-07-28 | End: 2022-08-02

## 2022-07-28 NOTE — PROGRESS NOTES
Patient is here with complaints of cough, fever of 102.5, sore throat and tightness in her chest. Her symptoms began 1-2 days ago. She did test negative for Covid this morning. She has taken Ibuprofen and Tylenol for the fever for treatment. CURRENT ALLERGIES: Rocephin [ceftriaxone]    SOCIAL HISTORY:   Social History     Tobacco Use    Smoking status: Never    Smokeless tobacco: Never   Vaping Use    Vaping Use: Never used   Substance Use Topics    Alcohol use: Yes     Alcohol/week: 0.0 standard drinks     Comment: rarely    Drug use: No       She has a current medication list which includes the following prescription(s): azithromycin, modafinil, euthyrox, bisoprolol-hydrochlorothiazide, furosemide, duloxetine, allopurinol, trazodone, albuterol sulfate hfa, diltiazem, alprazolam, bupropion, ubrelvy, turmeric, and bupropion. Review of Systems:  Constitutional: positive for fever, chills, neg for headache  Eyes: negative for visual disturbance   ENT: positive  for sore throat , positive for nasal congestion, neg for ear pain  Respiratory: positive for cough, positive for shortness of breath neg for for sputum   Cardiovascular: negative for chest pain,pnd or palpitations  Gastrointestinal: negative for abd pain, nausea, vomiting, diarrhea or constipation  Integument/breast: negative for skin rash or lesions  Neurological: negative for unilateral weakness, numbness or tingling. No joint pain,bodyache     Objective:  /87 (Site: Right Upper Arm, Position: Sitting)   Pulse 67   Temp 98 °F (36.7 °C)   Resp 18   Wt 253 lb 6.4 oz (114.9 kg)   LMP 07/15/2020 (Approximate)   SpO2 97%   BMI 42.17 kg/m²    GEN:  She is alert and oriented.  no distress  EAR:   RIGHT ear: Canal: normal and Tympanic membrane: normal landmarks and mobility    LEFT ear: Canal: normal and Tympanic membrane: diminished mobility, dull, and bulging  NOSE:  boggy mucosa with purulent mucus drainage  PHARYNX:  erythematous without enlarged tonsils or exudate  NECK:  normal, supple, no lymphadenopathy  CVS:   Regular rate and rhythm, no murmur  PULM:  clear to auscultation bilaterally and bilateral expiratory wheezing with scattered rhonchi  ABD:   BS's positive, abd is soft and nonfocal.   EXT:    no edema    Assessment:  1. Acute bronchitis, unspecified organism    2.  Sore throat      Plan:  Encouraged increased oral fluids  Medications: Z-pack as directed  Medications-Tessalon pearls 100 mg tid prn  Medications-Rescue inhalor q 4 hr prn   Covid pcr  May use OTC meds:    Tylenol 500 mg po q6 hrs PRN fever   Follow up PRN if symptoms do not resolve    Electronically signed by Stephenie Harris MD on 7/28/2022 at 1:08 PM

## 2022-07-29 ENCOUNTER — TELEPHONE (OUTPATIENT)
Dept: PRIMARY CARE CLINIC | Age: 52
End: 2022-07-29

## 2022-07-29 LAB
SARS-COV-2: ABNORMAL
SARS-COV-2: DETECTED
SOURCE: ABNORMAL

## 2022-07-29 NOTE — TELEPHONE ENCOUNTER
Patient tested positive for Covid this morning at work. Can you order Paxlovid for her at [facility] please?

## 2022-08-09 DIAGNOSIS — E03.9 HYPOTHYROIDISM, UNSPECIFIED TYPE: ICD-10-CM

## 2022-08-09 RX ORDER — LEVOTHYROXINE SODIUM 100 UG/1
TABLET ORAL
Qty: 90 TABLET | Refills: 0 | Status: SHIPPED | OUTPATIENT
Start: 2022-08-09

## 2022-09-02 ENCOUNTER — HOSPITAL ENCOUNTER (OUTPATIENT)
Dept: PHYSICAL THERAPY | Age: 52
Setting detail: THERAPIES SERIES
Discharge: HOME | End: 2022-09-02
Payer: COMMERCIAL

## 2022-09-02 PROCEDURE — 97140 MANUAL THERAPY 1/> REGIONS: CPT

## 2022-09-02 NOTE — PROGRESS NOTES
Phone: Corinne           Fax: [phone]                           Outpatient Physical Therapy                                                                            Daily Note    Patient: Stefania Godfrey : 1970  CSN #: [id number]   Referring Physician: Nhung Jefferson MD    Date: 2022       Treatment Diagnosis: BLE/BUE/abdominal lymphedema    Onset Date: 03/15/22  Total # of Visits Approved: 18 Per Physician Order  Total # of Visits to Date: 32  No Show: 1  Canceled Appointment: 3      Pre-Treatment Pain:  0/10  Subjective: Pt. denies pain upon arrival, does reported noted increased swelling in B LEs due to heat. Compliant with compression pump use x2 daily. Does report compression stockings seem to tight with the swellingbut is wearing a lower grade compression. Exercises:  Exercise 1: HEP Pt educated on keeping her BLE/BUE compressed at 30mmHg and elevated throughout the day, performing exercises daily and reducing sugar and sodium in her diet    Manual:  Soft Tissue Mobilizaton: MLD to B LE    Modalities:       Assessment  Assessment: 1+ edema noted to BLEs. MLD to bilat LEs. Completed measurements. Held multi-layered wraps d/t pt leaving wraps at home. Pt continues to self-wrap her LEs and use pumps. Pt. has appointment with therapy for orthotics on . WIll follow up with PT in regards to follow up appointment with pt. Activity Tolerance  Activity Tolerance: Patient tolerated treatment well    Patient Education  Patient Education: Reviewed care of multi-layered dressings, POC, and on skin care. Pt verbalized good understanding. Pt verbalized/demonstrated good understanding:     [x] Yes         [] No, pt required further clarification.        Post Treatment Pain:  0/10      Plan  Plan Frequency: 3x/wk  Plan weeks: 4-6 weks       Goals  (Total # of Visits to Date: 32)      Short Term Goals  Time Frame for Short term goals: 3 weeks  Short term goal 1: Pt willbe educated on her POC and HEP-met  Short term goal 2: Pt will initiate pump protocol to BLE/BUE-met    Long Term Goals  Time Frame for Long term goals : 6 weeks  Long term goal 1: Pt will be safe and independent with her lymphedema management-progressing  Long term goal 2: Pt will demonstrate a 2-3cm decrease in girth measurements in BUE/BLE in order to improve quality of life-progressing  Long term goal 3: Pt will be fitted for an at home pump-met  Long term goal 4: Pt will report 50% improvement in symptoms-progressing    Minutes Tracking:  Time In: 1981  Time Out: 36  Minutes: [address], [place]     Date: 9/2/2022

## 2022-09-16 ENCOUNTER — HOSPITAL ENCOUNTER (OUTPATIENT)
Dept: PHYSICAL THERAPY | Age: 52
Setting detail: THERAPIES SERIES
Discharge: HOME | End: 2022-09-16
Payer: COMMERCIAL

## 2022-09-16 PROCEDURE — L3020 FOOT LONGITUD/METATARSAL SUP: HCPCS

## 2022-09-16 PROCEDURE — 97110 THERAPEUTIC EXERCISES: CPT

## 2022-09-16 NOTE — PLAN OF CARE
Phone: [phone]        Fax: [phone]  [facility]  Physical Therapy  Orthotic Fitting Plan of Care  Date: 2022    Patient Name: Latesha Pagan          : 1970  (47 y.o.)  [facility] #: [id number]    Referring Physician: Karthik Forbes DPM     Diagnosis: Plantar fascial fibromatosis, primary OA of R ankle and foot, primary OA of L ankle and foot    Reason for Referral:  Patient reports heel, and arch pain for the past month. She also reports anterior joint pain. She reports she is on her feet about 12 hours per day which increases her symptoms of pain averages about 5/10. Objective Assessment:  Patient with weakness of R ankle measuring 3+/5 on the R and 5/5 on the L. Patient with tenderness to palpation of L plantar fascia, exquisite tenderness of R plantar fascia and achilles. She would benefit from custom orthotics for arch support and for a heel lift to decrease pain. Treatment:  [x]  Fitting for custom orthotics  [x]  Gait and foot analysis  [x]   Other: HEP given for R ankle strengthening and bilateral heel cord flexibility. Instructed Patient:   [x]  Proper fitting and follow up visit. [] Other:        Treatment Goals:   []  Met     []  Not Met              2022   [x]  Patient will be fitted for custom orthotics to be issued at a later date. Treatment Plan:   [x]  Assessment for and fitting of custom orthotics. Rehab Potential: []  Poor   []  Fair  [x]  Good   []  Excellent     If there are any questions regarding plan of care, please do not hesitate to contact the center. Thank you for your referral.      Therapists Signature: Jose Manuel Rivera PT, PT, DPT    Date: 2022        To be completed by the referring physicians   By signing below, I agree to the above treatment plan.       Physicians Signature:                        Date: 2022

## 2022-09-27 PROCEDURE — 93298 REM INTERROG DEV EVAL SCRMS: CPT | Performed by: FAMILY MEDICINE

## 2022-09-30 ENCOUNTER — HOSPITAL ENCOUNTER (OUTPATIENT)
Age: 52
Discharge: HOME | End: 2022-09-30
Payer: COMMERCIAL

## 2022-09-30 ENCOUNTER — OFFICE VISIT (OUTPATIENT)
Dept: PRIMARY CARE CLINIC | Age: 52
End: 2022-09-30
Payer: COMMERCIAL

## 2022-09-30 VITALS
HEIGHT: 65 IN | HEART RATE: 52 BPM | BODY MASS INDEX: 43.32 KG/M2 | SYSTOLIC BLOOD PRESSURE: 134 MMHG | WEIGHT: 260 LBS | RESPIRATION RATE: 18 BRPM | DIASTOLIC BLOOD PRESSURE: 82 MMHG

## 2022-09-30 DIAGNOSIS — E03.9 HYPOTHYROIDISM, UNSPECIFIED TYPE: ICD-10-CM

## 2022-09-30 DIAGNOSIS — I10 ESSENTIAL HYPERTENSION: Primary | ICD-10-CM

## 2022-09-30 DIAGNOSIS — H60.313 ACUTE DIFFUSE OTITIS EXTERNA OF BOTH EARS: ICD-10-CM

## 2022-09-30 PROBLEM — K90.0 CELIAC DISEASE: Status: ACTIVE | Noted: 2022-09-30

## 2022-09-30 LAB — TSH SERPL DL<=0.05 MIU/L-ACNC: 2.67 UIU/ML (ref 0.3–5)

## 2022-09-30 PROCEDURE — 4130F TOPICAL PREP RX AOE: CPT | Performed by: FAMILY MEDICINE

## 2022-09-30 PROCEDURE — G8417 CALC BMI ABV UP PARAM F/U: HCPCS | Performed by: FAMILY MEDICINE

## 2022-09-30 PROCEDURE — 36415 COLL VENOUS BLD VENIPUNCTURE: CPT

## 2022-09-30 PROCEDURE — 3017F COLORECTAL CA SCREEN DOC REV: CPT | Performed by: FAMILY MEDICINE

## 2022-09-30 PROCEDURE — 84443 ASSAY THYROID STIM HORMONE: CPT

## 2022-09-30 PROCEDURE — G8427 DOCREV CUR MEDS BY ELIG CLIN: HCPCS | Performed by: FAMILY MEDICINE

## 2022-09-30 PROCEDURE — 99214 OFFICE O/P EST MOD 30 MIN: CPT | Performed by: FAMILY MEDICINE

## 2022-09-30 PROCEDURE — 1036F TOBACCO NON-USER: CPT | Performed by: FAMILY MEDICINE

## 2022-09-30 NOTE — PROGRESS NOTES
Hypertension: Patient here for follow-up of elevated blood pressure. She is exercising and is adherent to low salt diet. Blood pressure is not well monitored at home. Cardiac symptoms none. Patient denies chest pain, chest pressure/discomfort, and palpitations. Cardiovascular risk factors: hypertension and obesity (BMI >= 30 kg/m2). Use of agents associated with hypertension: none. Depression: Patient here for a follow up of depression. She complains of  none . She denies current suicidal and homicidal plan or intent. Previous treatment includes Wellbutrin and Cymbalta . She complains of the following side effects from the treatment: none. Patient states her left ear is painful, this started earlier this week. She states she also was stung by wasps and had a bad reaction. This was last Thursday. Allergies:  Rocephin [ceftriaxone] and Bee venom    Past Medical History:    Past Medical History:   Diagnosis Date    Anemia, unspecified     Anxiety     Celiac disease     Edema     H/O cardiovascular stress test 03/08/2021    [place] treadmill score is 0    H/O echocardiogram 01/26/2021    EF >60% Normal study    History of cardiac cath 04/01/2021    [facility]) Nia/Dr. Little/Right Radial     History of cardiovascular stress test 03/22/2021    Equivocal study. Small perfusion defect of mild intensity in the anterior and anteroseptal regions during stress and rest imaging, which is most consistent with artifact EF 71% [place] Treadmill score is -2intermediate risk. History of normal Holter exam 02/22/2021    CAM 6 days 19 hrs Normal sinus. AT 2 episodes Longest/fastest 9 beats at ave 127 bpm p to 153 bpm PAC 0.08% PVC 0.07%    Hypertension     Hypothyroidism     Thyroid disease     Tilt table evaluation 03/22/2021    Negative study Although sensitivity of study certainly is not 490% relatively normal results signifcantly decrease the likelihood of neurocardiogenic source for pt symptoms. Clinically suspision remains high possible repeat study maybe indicated       Past Surgical History:    Past Surgical History:   Procedure Laterality Date    ANTERIOR CRUCIATE LIGAMENT REPAIR Left     HYSTERECTOMY, VAGINAL      07/2020 robotic, lap supracervical hyst and BS, perineopexy, A&P repair, cysto, Lynx retropubic slin    INFECTED SKIN DEBRIDEMENT Left     Left third finger    INSERTABLE CARDIAC MONITOR  05/20/2021       Social History:   Social History     Tobacco Use    Smoking status: Never    Smokeless tobacco: Never   Substance Use Topics    Alcohol use: Yes     Alcohol/week: 0.0 standard drinks     Comment: rarely       Family History:   Family History   Adopted: Yes         Review of Systems:  Constitutional: negative for fevers or chills  Eyes: negative for visual disturbance   ENT: negative for sore throat or nasal congestion,has ear pain  Respiratory: no cough or shortness of breath  Cardiovascular: negative for chest pain or palpitations  Gastrointestinal: negative for abd pain, nausea, vomiting, diarrhea or constipation  Genitourinary: negative for dysuria, urgency or frequency  Integument/breast: negative for skin rash or lesions  Neurological: negative for unilateral weakness, numbness or tingling. Skeletal Muscular: no joint pain  Psych- depression well controlled     Medication List            Accurate as of September 30, 2022  9:02 AM. If you have any questions, ask your nurse or doctor.                 START taking these medications      neomycin-polymyxin-hydrocortisone 3.5-67874-9 otic solution  Commonly known as: CORTISPORIN  Place 4 drops into both ears 3 times daily for 10 days Instill into both Ear  Started by: Brayan Park MD            CONTINUE taking these medications      albuterol sulfate  (90 Base) MCG/ACT inhaler  Commonly known as: Ventolin HFA  Inhale 2 puffs into the lungs 4 times daily as needed for Wheezing     allopurinol 100 MG tablet  Commonly known as: ZYLOPRIM  Take 1 tablet by mouth once daily     ALPRAZolam 0.25 MG tablet  Commonly known as: XANAX     bisoprolol-hydroCHLOROthiazide 10-6.25 MG per tablet  Commonly known as: ZIAC  Take 1 tablet by mouth daily     * buPROPion 150 MG extended release tablet  Commonly known as: WELLBUTRIN XL     * buPROPion 300 MG extended release tablet  Commonly known as: WELLBUTRIN XL     dilTIAZem 300 MG extended release capsule  Commonly known as: CARDIZEM CD  Take 1 capsule by mouth daily     DULoxetine 60 MG extended release capsule  Commonly known as: CYMBALTA     Euthyrox 100 MCG tablet  Generic drug: levothyroxine  Take 1 tablet by mouth once daily     furosemide 20 MG tablet  Commonly known as: Lasix  Take 1 tablet by mouth every other day     modafinil 200 MG tablet  Commonly known as: PROVIGIL     QC TUMERIC COMPLEX PO     traZODone 50 MG tablet  Commonly known as: DESYREL     Ubrelvy 100 MG Tabs  Generic drug: Ubrogepant           * This list has 2 medication(s) that are the same as other medications prescribed for you. Read the directions carefully, and ask your doctor or other care provider to review them with you. Where to Get Your Medications        These medications were sent to [address] [place] [address]      Phone: [phone]   neomycin-polymyxin-hydrocortisone 3.5-08009-6 otic solution         Objective:  Physical Exam:  VitalsBP 134/82 (Site: Left Upper Arm, Position: Sitting, Cuff Size: Large Adult)   Resp 18   Ht 5' 5\" (1.651 m)   Wt 260 lb (117.9 kg)   LMP 07/15/2020 (Approximate)   BMI 43.27 kg/m²   GEN:   A & O x3, no apparent distress  EYES: No gross abnormalities.   ENT:both tm normal,has otitis externa of both ears  NECK: normal, supple, no lymphadenopathy,  no carotid bruits  PULM: clear to auscultation bilaterally- no wheezes, rales or rhonchi, normal air movement, no respiratory distress  COR: no murmurs, no gallops, and bradycardia  ABD:  soft, non-tender, non-distended, normal bowel sounds, no masses or organomegaly  : deferred  EXT: normal strength, tone, and muscle mass  NEURO: Motor and sensory grossly intact  SKIN:  No skin lesions or rashes  Psych- mood stable,no suicidal thoughts    Labs:  Lab Results   Component Value Date    BUN 14 07/22/2022    CREATININE 0.78 07/22/2022     (H) 07/22/2022    K 4.2 07/22/2022    CALCIUM 9.2 07/22/2022     (H) 07/22/2022    CO2 29 07/22/2022    LABGLOM >60 07/22/2022    CHOL 185 04/22/2020    HDL 48 04/22/2020    TRIG 79 04/22/2020    ALT 13 04/13/2022    AST 20 04/13/2022       Assessment:  1. Essential hypertension    2. Hypothyroidism, unspecified type    3. Acute diffuse otitis externa of both ears      Patient Active Problem List   Diagnosis Code    Essential hypertension I10    Hypothyroidism E03.9    Idiopathic gout M10.00    Anxiety state F41.1    Moderate single current episode of major depressive disorder (HCC) F32.1    Iron deficiency anemia D50.9    Lab test positive for detection of COVID-19 virus U07.1    Shortness of breath R06.02    Myalgia M79.10    Persistent vomiting R11.15    Uncontrollable vomiting R11.10    Intractable nausea and vomiting R11.2    Dizziness R42    Orthostatic hypotension I95.1    Diarrhea R19.7    Recurrent falls while walking R29.6    Abnormal cardiovascular stress test R94.39    Recurrent syncope R55    PSVT (paroxysmal supraventricular tachycardia) ([facility]) I47.1    Celiac disease K90.0     Plan:  1. Essential hypertension    2. Hypothyroidism, unspecified type    3. Acute diffuse otitis externa of both ears        Continue current medications  flu  vaccine at work  Encouraged low sodium, low cholesterol, weight loss diet.     Goal for blood pressure controll is 120/80  Recommended regular exercise as tolerated, 3-5 times per day  Labs: tsh  Follow up in 3 months      Orders Placed Martin Marti MD on 9/30/2022 at 9:02 AM

## 2022-10-03 ENCOUNTER — NURSE ONLY (OUTPATIENT)
Dept: CARDIOLOGY | Age: 52
End: 2022-10-03
Payer: COMMERCIAL

## 2022-10-03 DIAGNOSIS — Z45.09 ENCOUNTER FOR LOOP RECORDER CHECK: ICD-10-CM

## 2022-10-03 DIAGNOSIS — R55 SYNCOPE, UNSPECIFIED SYNCOPE TYPE: Primary | ICD-10-CM

## 2022-10-03 PROCEDURE — 93298 REM INTERROG DEV EVAL SCRMS: CPT | Performed by: FAMILY MEDICINE

## 2022-10-10 ENCOUNTER — HOSPITAL ENCOUNTER (OUTPATIENT)
Dept: PHYSICAL THERAPY | Age: 52
Setting detail: THERAPIES SERIES
Discharge: HOME | End: 2022-10-10
Payer: COMMERCIAL

## 2022-10-10 PROCEDURE — 97760 ORTHOTIC MGMT&TRAING 1ST ENC: CPT

## 2022-10-10 NOTE — DISCHARGE SUMMARY
Phone: [phone]        Fax: [phone]                     Outpatient Physical Therapy           Orthotic Discharge                    Date: 10/10/2022             ACCT #: [de-identified]                          Patient: Marcelo Lucas  : 1970        [x]   Patient received custom foot orthotics this date with proper fitting noted. Discussed appropriate wearing schedule and care for orthotics with good pt understanding noted. We will now discharge PT.      Rosendo Zhao, PT, DPT     Date: 10/10/2022, 11:36 AM

## 2022-11-03 ENCOUNTER — OFFICE VISIT (OUTPATIENT)
Dept: PRIMARY CARE CLINIC | Age: 52
End: 2022-11-03
Payer: COMMERCIAL

## 2022-11-03 VITALS
OXYGEN SATURATION: 98 % | HEART RATE: 65 BPM | HEIGHT: 65 IN | WEIGHT: 250 LBS | DIASTOLIC BLOOD PRESSURE: 82 MMHG | SYSTOLIC BLOOD PRESSURE: 120 MMHG | BODY MASS INDEX: 41.65 KG/M2

## 2022-11-03 DIAGNOSIS — H66.005 RECURRENT ACUTE SUPPURATIVE OTITIS MEDIA WITHOUT SPONTANEOUS RUPTURE OF LEFT TYMPANIC MEMBRANE: Primary | ICD-10-CM

## 2022-11-03 PROCEDURE — G8484 FLU IMMUNIZE NO ADMIN: HCPCS | Performed by: STUDENT IN AN ORGANIZED HEALTH CARE EDUCATION/TRAINING PROGRAM

## 2022-11-03 PROCEDURE — 99213 OFFICE O/P EST LOW 20 MIN: CPT | Performed by: STUDENT IN AN ORGANIZED HEALTH CARE EDUCATION/TRAINING PROGRAM

## 2022-11-03 PROCEDURE — 3074F SYST BP LT 130 MM HG: CPT | Performed by: STUDENT IN AN ORGANIZED HEALTH CARE EDUCATION/TRAINING PROGRAM

## 2022-11-03 PROCEDURE — 3017F COLORECTAL CA SCREEN DOC REV: CPT | Performed by: STUDENT IN AN ORGANIZED HEALTH CARE EDUCATION/TRAINING PROGRAM

## 2022-11-03 PROCEDURE — 3078F DIAST BP <80 MM HG: CPT | Performed by: STUDENT IN AN ORGANIZED HEALTH CARE EDUCATION/TRAINING PROGRAM

## 2022-11-03 PROCEDURE — G8427 DOCREV CUR MEDS BY ELIG CLIN: HCPCS | Performed by: STUDENT IN AN ORGANIZED HEALTH CARE EDUCATION/TRAINING PROGRAM

## 2022-11-03 PROCEDURE — 1036F TOBACCO NON-USER: CPT | Performed by: STUDENT IN AN ORGANIZED HEALTH CARE EDUCATION/TRAINING PROGRAM

## 2022-11-03 PROCEDURE — G8417 CALC BMI ABV UP PARAM F/U: HCPCS | Performed by: STUDENT IN AN ORGANIZED HEALTH CARE EDUCATION/TRAINING PROGRAM

## 2022-11-03 RX ORDER — DOXYCYCLINE HYCLATE 100 MG
100 TABLET ORAL 2 TIMES DAILY
Qty: 14 TABLET | Refills: 0 | Status: SHIPPED | OUTPATIENT
Start: 2022-11-03 | End: 2022-11-10

## 2022-11-03 NOTE — PROGRESS NOTES
[address], [facility] [address], [facility], [address]  is a 47 y.o. female with  has a past medical history of Anemia, unspecified, Anxiety, Celiac disease, Edema, H/O cardiovascular stress test, H/O echocardiogram, History of cardiac cath, History of cardiovascular stress test, History of normal Holter exam, Hypertension, Hypothyroidism, Thyroid disease, and Tilt table evaluation. Presented to the office today for:  Chief Complaint   Patient presents with    Otalgia     Left ear       Assessment/Plan   1. Recurrent acute suppurative otitis media without spontaneous rupture of left tympanic membrane  -     doxycycline hyclate (VIBRA-TABS) 100 MG tablet; Take 1 tablet by mouth 2 times daily for 7 days, Disp-14 tablet, R-0Normal    Return if symptoms worsen or fail to improve. Symptomatic therapy suggested: push fluids, rest, gargle warm salt water, use vaporizer or mist prn and apply heat to sinuses prn. Nasal saline sprays PRN. Tylenol PRN for pain/fevers, Cepacol PRN for sore throat. Doxycycline started today. May consider additional w/u and management if needed, including CXR/advanced chest imaging, labs. If there are any worsening or concerning signs or symptoms, patient will report to the ED and/or contact EMS-[id number] for immediate evaluation. Teach back method was used. All patient questions answered. Pt voiced understanding. All patient questions answered. Pt voiced understanding. There are no discontinued medications. Patient received counseling on the following healthy behaviors: nutrition, exercise and medication adherence. I encouraged and discussed lifestyle modifications including diet and exercise and the patient was agreeable to making positive/beneficial changes to both to help improve their overall health. Discussed use, benefit, and side effects of prescribed medications. Barriers to medication compliance addressed.  Patient given educational materials: see patient instructions. HM - HM items completed today as per orders. Outstanding HM items though not limited to immunizations were discussed with the patient today, including risks, benefits and alternatives. The patient will discuss these during the next appointment per their preference. If there are any worsening or concerning signs or symptoms, patient will report to the ED and/or contact EMS-[id number] for immediate evaluation. Teach back method was used. Subjective:    HPI  Chief Complaint   Patient presents with    Otalgia     Left ear     Otalgia (Left ear) for 2 days  Nature of Onset and Mechanism -  sudden onset  Location - left inner ear pain  Characteristics/Radiation/Quality - sharp and burning, balance is off since that time. Patient states she took benadryl but hasn't had any relief. She has not tried Tylenol/Ibuprofen. Denies any fever, congestion or sore throat. No sinus pain, congestion. No swimming noted. No discharge. No drainage noted. Health Maintenance -   Alcohol/Substance use History - None/Minimal    Tobacco Use      Smoking status: Never      Smokeless tobacco: Never    Family History   Adopted: Yes       PHQ Scores 3/11/2022 11/26/2021 11/4/2020 5/20/2020 3/21/2018   PHQ2 Score 0 0 0 2 3   PHQ9 Score 0 0 0 2 16     Interpretation of Total Score Depression Severity: 1-4 = Minimal depression, 5-9 = Mild depression, 10-14 = Moderate depression, 15-19 = Moderately severe depression, 20-27 = Severe depression    Review of Systems  Constitutional: Negative for activity change, appetite change, chills, diaphoresis, fatigue, fever and unexpected weight change. HENT: Negative for sinus pressure, sinus pain, sore throat and trouble swallowing. Left ear pain. Respiratory: Negative for cough, shortness of breath and wheezing. Cardiovascular: Negative for chest pain, palpitations and leg swelling.    Gastrointestinal: Negative for abdominal pain, diarrhea, nausea and vomiting. Endocrine: Negative for cold intolerance, polydipsia, polyphagia and polyuria. Genitourinary: Negative for difficulty urinating, flank pain and frequency. Musculoskeletal: Negative for gait problem and joint swelling. Negative for back pain, neck pain and neck stiffness. Skin: Negative for color change and wound. Negative for pallor and rash. Allergic/Immunologic: Negative for environmental allergies and food allergies. Neurological: Negative for light-headedness, numbness and headaches. Psychiatric/Behavioral: Negative for sleep disturbance. Negative for confusion and suicidal ideas. Objective:    /82   Pulse 65   Ht 5' 5\" (1.651 m)   Wt 250 lb (113.4 kg)   LMP 07/15/2020 (Approximate)   SpO2 98%   BMI 41.60 kg/m²    BP Readings from Last 3 Encounters:   11/03/22 120/82   09/30/22 134/82   07/28/22 133/87     Physical Exam  Constitutional: Patient is oriented to person, place, and time. Patient appears well-developed and well-nourished. No distress. HENT: Head: Normocephalic and atraumatic. TM/Ear canal WNL, Left ear otitis media with some discharge noted, bulging present. Eyes: Pupils are equal, round, and reactive to light. Conjunctivae are normal. Right eye exhibits no discharge. Left eye exhibits no discharge. Cardiovascular: Normal rate, regular rhythm and normal heart sounds. Pulmonary/Chest: Effort normal and breath sounds normal. No respiratory distress. Patient has no wheezes. Abdominal: Soft. Bowel sounds are normal. Patient exhibits no distension. There is no tenderness. Musculoskeletal:  Patient exhibits no edema and tenderness. Patient exhibits no deformity. Neurological: Patient is alert and oriented to person, place, and time. Skin: Skin is warm and dry. Patient is not diaphoretic. Psychiatric: Patient's speech is normal and behavior is normal. Thought content normal.   Vitals reviewed.       Lab Results   Component Value Date    WBC 5.0 04/13/2022    HGB 13.4 04/13/2022    HCT 42.4 04/13/2022     04/13/2022    CHOL 185 04/22/2020    TRIG 79 04/22/2020    HDL 48 04/22/2020    ALT 13 04/13/2022    AST 20 04/13/2022     (H) 07/22/2022    K 4.2 07/22/2022     (H) 07/22/2022    CREATININE 0.78 07/22/2022    BUN 14 07/22/2022    CO2 29 07/22/2022    TSH 2.67 09/30/2022    LABA1C 5.6 12/23/2021     Lab Results   Component Value Date    CALCIUM 9.2 07/22/2022     Lab Results   Component Value Date    LDLCALC 121 04/22/2020       Please note that this chart was generated using voice recognition Dragon dictation software. Although every effort was made to ensure the accuracy of this automated transcription, some errors in transcription may have occurred.     Electronically signed by Dr. Preet Eckert MD on 11/3/2022 at 12:01 PM

## 2022-11-19 DIAGNOSIS — E03.9 HYPOTHYROIDISM, UNSPECIFIED TYPE: ICD-10-CM

## 2022-11-21 RX ORDER — LEVOTHYROXINE SODIUM 0.1 MG/1
TABLET ORAL
Qty: 90 TABLET | Refills: 0 | Status: SHIPPED | OUTPATIENT
Start: 2022-11-21

## 2022-11-21 RX ORDER — DILTIAZEM HYDROCHLORIDE 300 MG/1
CAPSULE, COATED, EXTENDED RELEASE ORAL
Qty: 90 CAPSULE | Refills: 3 | Status: SHIPPED | OUTPATIENT
Start: 2022-11-21

## 2022-11-23 ENCOUNTER — HOSPITAL ENCOUNTER (EMERGENCY)
Age: 52
Discharge: HOME | End: 2022-11-23
Attending: EMERGENCY MEDICINE
Payer: COMMERCIAL

## 2022-11-23 VITALS
DIASTOLIC BLOOD PRESSURE: 97 MMHG | RESPIRATION RATE: 16 BRPM | WEIGHT: 250 LBS | HEIGHT: 65 IN | TEMPERATURE: 98.3 F | OXYGEN SATURATION: 99 % | BODY MASS INDEX: 41.65 KG/M2 | SYSTOLIC BLOOD PRESSURE: 172 MMHG | HEART RATE: 62 BPM

## 2022-11-23 DIAGNOSIS — G43.909 MIGRAINE WITHOUT STATUS MIGRAINOSUS, NOT INTRACTABLE, UNSPECIFIED MIGRAINE TYPE: Primary | ICD-10-CM

## 2022-11-23 PROCEDURE — 6360000002 HC RX W HCPCS: Performed by: EMERGENCY MEDICINE

## 2022-11-23 PROCEDURE — 99284 EMERGENCY DEPT VISIT MOD MDM: CPT

## 2022-11-23 PROCEDURE — 96372 THER/PROPH/DIAG INJ SC/IM: CPT

## 2022-11-23 PROCEDURE — 6370000000 HC RX 637 (ALT 250 FOR IP): Performed by: EMERGENCY MEDICINE

## 2022-11-23 RX ORDER — DIPHENHYDRAMINE HCL 50 MG
50 CAPSULE ORAL ONCE
Status: COMPLETED | OUTPATIENT
Start: 2022-11-23 | End: 2022-11-23

## 2022-11-23 RX ORDER — KETOROLAC TROMETHAMINE 30 MG/ML
30 INJECTION, SOLUTION INTRAMUSCULAR; INTRAVENOUS ONCE
Status: COMPLETED | OUTPATIENT
Start: 2022-11-23 | End: 2022-11-23

## 2022-11-23 RX ORDER — METOCLOPRAMIDE 10 MG/1
10 TABLET ORAL ONCE
Status: COMPLETED | OUTPATIENT
Start: 2022-11-23 | End: 2022-11-23

## 2022-11-23 RX ADMIN — METOCLOPRAMIDE 10 MG: 10 TABLET ORAL at 19:47

## 2022-11-23 RX ADMIN — DIPHENHYDRAMINE HYDROCHLORIDE 50 MG: 50 CAPSULE ORAL at 19:47

## 2022-11-23 RX ADMIN — KETOROLAC TROMETHAMINE 30 MG: 30 INJECTION, SOLUTION INTRAMUSCULAR at 19:47

## 2022-11-23 ASSESSMENT — ENCOUNTER SYMPTOMS
SHORTNESS OF BREATH: 0
EYE REDNESS: 0
DIARRHEA: 0
COLOR CHANGE: 0
PHOTOPHOBIA: 1
BACK PAIN: 0
COUGH: 0
VOMITING: 1
NAUSEA: 1
RHINORRHEA: 0

## 2022-11-23 ASSESSMENT — PAIN SCALES - GENERAL
PAINLEVEL_OUTOF10: 9
PAINLEVEL_OUTOF10: 9

## 2022-11-23 ASSESSMENT — PAIN DESCRIPTION - LOCATION
LOCATION: HEAD
LOCATION: HEAD

## 2022-11-23 ASSESSMENT — PAIN - FUNCTIONAL ASSESSMENT: PAIN_FUNCTIONAL_ASSESSMENT: 0-10

## 2022-11-24 NOTE — DISCHARGE INSTRUCTIONS
Call and follow-up with your neurologist.  Return to the emergency department for new, worsening or worrisome symptoms.

## 2022-11-24 NOTE — ED PROVIDER NOTES
[facility] ED  EMERGENCY DEPARTMENT ENCOUNTER      Pt Name: Loly Love  MRN: [id number]  [place] 1970  Date of evaluation: 11/23/2022  Provider: Evan Noguera DO    CHIEF COMPLAINT       Chief Complaint   Patient presents with    Headache     States hx of migraines for past couple years and has had one that won't go away since last saturday         HISTORY OF PRESENT ILLNESS   (Location/Symptom, Timing/Onset, Context/Setting, Quality, Duration, Modifying Factors, Severity)  Note limiting factors. Loly Love is a 47 y.o. female who presents to the emergency department Annia Locke is a 56-year-old female who presents with a migraine for about 4 days. The patient states that she has been having migraines since 2020 when she had COVID-19. She sees a neurologist in [place]. She has trialed multiple medications in the past and is currently on Ubrelvy as needed. She took 1 this morning. The patient states the pain started in the back of the left side of her head and has moved to her left temple. She also reports photophobia, phonophobia, nausea and vomiting. Patient states this is the same as her previous migraines without new features. Her neurologist told her to come here for medications to take the edge off. Review of systems is otherwise negative for an acute process. The patient drove herself here. HPI    Nursing Notes were reviewed. REVIEW OF SYSTEMS    (2-9 systems for level 4, 10 or more for level 5)     Review of Systems   Constitutional:  Negative for chills and fever. HENT:  Negative for congestion and rhinorrhea. Eyes:  Positive for photophobia. Negative for redness and visual disturbance. Respiratory:  Negative for cough and shortness of breath. Cardiovascular:  Negative for chest pain and leg swelling. Gastrointestinal:  Positive for nausea and vomiting. Negative for diarrhea. Genitourinary:  Negative for dysuria and hematuria.    Musculoskeletal: Negative for back pain and neck pain. Skin:  Negative for color change and wound. Neurological:  Positive for headaches. Negative for weakness. Psychiatric/Behavioral:  Negative for agitation and confusion. Except as noted above the remainder of the review of systems was reviewed and negative. PAST MEDICAL HISTORY     Past Medical History:   Diagnosis Date    Anemia, unspecified     Anxiety     Celiac disease     Edema     H/O cardiovascular stress test 03/08/2021    [place] treadmill score is 0    H/O echocardiogram 01/26/2021    EF >60% Normal study    History of cardiac cath 04/01/2021    [facility]) Nia/Dr. Little/Right Radial     History of cardiovascular stress test 03/22/2021    Equivocal study. Small perfusion defect of mild intensity in the anterior and anteroseptal regions during stress and rest imaging, which is most consistent with artifact EF 71% [place] Treadmill score is -2intermediate risk. History of normal Holter exam 02/22/2021    CAM 6 days 19 hrs Normal sinus. AT 2 episodes Longest/fastest 9 beats at ave 127 bpm p to 153 bpm PAC 0.08% PVC 0.07%    Hypertension     Hypothyroidism     Thyroid disease     Tilt table evaluation 03/22/2021    Negative study Although sensitivity of study certainly is not 753% relatively normal results signifcantly decrease the likelihood of neurocardiogenic source for pt symptoms.  Clinically suspision remains high possible repeat study maybe indicated         SURGICAL HISTORY       Past Surgical History:   Procedure Laterality Date    ANTERIOR CRUCIATE LIGAMENT REPAIR Left     HYSTERECTOMY, VAGINAL      07/2020 robotic, lap supracervical hyst and BS, perineopexy, A&P repair, cysto, Lynx retropubic slin    INFECTED SKIN DEBRIDEMENT Left     Left third finger    INSERTABLE CARDIAC MONITOR  05/20/2021         CURRENT MEDICATIONS       Discharge Medication List as of 11/23/2022  7:43 PM        CONTINUE these medications which have NOT CHANGED    Details levothyroxine (SYNTHROID) 100 MCG tablet Take 1 tablet by mouth once daily, Disp-90 tablet, R-0Normal      dilTIAZem (CARDIZEM CD) 300 MG extended release capsule Take 1 capsule by mouth once daily, Disp-90 capsule, R-3Normal      modafinil (PROVIGIL) 200 MG tablet TAKE 1 TABLET BY MOUTH ONCE DAILY AS NEEDED FOR FATIGUE FOR 30 DAYS TAKE 1 HOUR PRIOR TO THE START OF WORK SHIFTHistorical Med      bisoprolol-hydroCHLOROthiazide (ZIAC) 10-6.25 MG per tablet Take 1 tablet by mouth daily, Disp-90 tablet, R-3Normal      furosemide (LASIX) 20 MG tablet Take 1 tablet by mouth every other day, Disp-30 tablet, R-3Normal      DULoxetine (CYMBALTA) 60 MG extended release capsule TAKE 1 CAPSULE BY MOUTH ONCE DAILY DO NOT CRUSH OR CHEWHistorical Med      allopurinol (ZYLOPRIM) 100 MG tablet Take 1 tablet by mouth once daily, Disp-30 tablet, R-0Normal      traZODone (DESYREL) 50 MG tablet Take 50 mg by mouth nightlyHistorical Med      albuterol sulfate HFA (VENTOLIN HFA) 108 (90 Base) MCG/ACT inhaler Inhale 2 puffs into the lungs 4 times daily as needed for Wheezing, Disp-1 each, R-0Normal      ALPRAZolam (XANAX) 0.25 MG tablet Take 0.25 mg by mouth daily as needed. Historical Med      !! buPROPion (WELLBUTRIN XL) 300 MG extended release tablet Take 300 mg by mouth every morning With 150 mg for total of 450 mgHistorical Med      Ubrogepant (UBRELVY) 100 MG TABS Take 100 mg by mouth as needed (migraines) Historical Med      Turmeric (QC TUMERIC COMPLEX PO) Take 1 capsule by mouth daily Historical Med      !! buPROPion (WELLBUTRIN XL) 150 MG extended release tablet Take 150 mg by mouth daily With 300 mg for total of 450 mgHistorical Med       !! - Potential duplicate medications found. Please discuss with provider.           ALLERGIES     Rocephin [ceftriaxone] and Bee venom    FAMILY HISTORY       Family History   Adopted: Yes          SOCIAL HISTORY       Social History     Socioeconomic History    Marital status: Single     Spouse name: None    Number of children: None    Years of education: None    Highest education level: None   Occupational History    Occupation: nurse aid   Tobacco Use    Smoking status: Never    Smokeless tobacco: Never   Vaping Use    Vaping Use: Never used   Substance and Sexual Activity    Alcohol use: Yes     Alcohol/week: 0.0 standard drinks     Comment: rarely    Drug use: No    Sexual activity: Not Currently     Social Determinants of Health     Financial Resource Strain: Low Risk     Difficulty of Paying Living Expenses: Not hard at all   Food Insecurity: No Food Insecurity    Worried About Running Out of Food in the Last Year: Never true    Ran Out of Food in the Last Year: Never true   Transportation Needs: No Transportation Needs    Lack of Transportation (Medical): No    Lack of Transportation (Non-Medical): No       SCREENINGS        Kirk Coma Scale  Eye Opening: Spontaneous  Best Verbal Response: Oriented  Best Motor Response: Obeys commands  Kirk Coma Scale Score: 15               PHYSICAL EXAM    (up to 7 for level 4, 8 or more for level 5)     ED Triage Vitals [11/23/22 1905]   BP Temp Temp Source Heart Rate Resp SpO2 Height Weight   (!) 172/97 98.3 °F (36.8 °C) Oral 62 16 99 % 5' 5\" (1.651 m) 250 lb (113.4 kg)       Physical Exam  Vitals and nursing note reviewed. Constitutional:       General: She is not in acute distress. Appearance: She is not toxic-appearing. Comments: Patient is sitting in bed with the lights off comfortably upon exam.  She answers questions appropriately in full sentences. Eyes:      Extraocular Movements: Extraocular movements intact. Pupils: Pupils are equal, round, and reactive to light. Cardiovascular:      Rate and Rhythm: Normal rate and regular rhythm. Pulmonary:      Effort: Pulmonary effort is normal.      Breath sounds: Normal breath sounds. Abdominal:      General: Bowel sounds are normal.      Palpations: Abdomen is soft. Musculoskeletal:      Cervical back: Normal range of motion and neck supple. Skin:     General: Skin is warm and dry. Capillary Refill: Capillary refill takes less than 2 seconds. Neurological:      General: No focal deficit present. Mental Status: She is alert. Psychiatric:         Mood and Affect: Mood normal.       DIAGNOSTIC RESULTS     EKG: All EKG's are interpreted by the Emergency Department Physician who either signs or Co-signs this chart in the absence of a cardiologist.        RADIOLOGY:   Non-plain film images such as CT, Ultrasound and MRI are read by the radiologist. Plain radiographic images are visualized and preliminarily interpreted by the emergency physician with the below findings:        Interpretation per the Radiologist below, if available at the time of this note:    No orders to display         ED BEDSIDE ULTRASOUND:   Performed by ED Physician - none    LABS:  Labs Reviewed - No data to display    All other labs were within normal range or not returned as of this dictation.     EMERGENCY DEPARTMENT COURSE and DIFFERENTIAL DIAGNOSIS/MDM:   Vitals:    Vitals:    11/23/22 1905   BP: (!) 172/97   Pulse: 62   Resp: 16   Temp: 98.3 °F (36.8 °C)   TempSrc: Oral   SpO2: 99%   Weight: 250 lb (113.4 kg)   Height: 5' 5\" (1.651 m)           MDM  Number of Diagnoses or Management Options  Migraine without status migrainosus, not intractable, unspecified migraine type: new and does not require workup     Amount and/or Complexity of Data Reviewed  Tests in the medicine section of CPT®: ordered  Decide to obtain previous medical records or to obtain history from someone other than the patient: yes  Review and summarize past medical records: yes  Independent visualization of images, tracings, or specimens: yes    Risk of Complications, Morbidity, and/or Mortality  Presenting problems: minimal  Diagnostic procedures: minimal  Management options: minimal    Critical Care  Total time providing critical care: < 30 minutes    Patient Progress  Patient progress: improved        REASSESSMENT     ED Course as of 11/23/22 1959 Wed Nov 23, 2022   Rufino Sin is a 50-year-old female who presents with a migraine for about 4 days. The patient states that she has been having migraines since 2020 when she had COVID-19. She sees a neurologist in [place]. She has trialed multiple medications in the past and is currently on Ubrelvy as needed. She took 1 this morning. The patient states the pain started in the back of the left side of her head and has moved to her left temple. She also reports photophobia, phonophobia, nausea and vomiting. Patient states this is the same as her previous migraines without new features. Her neurologist told her to come here for medications to take the edge off. Review of systems is otherwise negative for an acute process. The patient drove herself here. Upon arrival the patient is hypertensive. Physical exam is grossly unremarkable. Options were discussed with the patient since she drove herself here and she is agreeable to a migraine cocktail which will be given half here and the other half she will take when she gets home. IM Toradol will be given here. P.o. Reglan and Benadryl are provided for the patient to take when she gets home. The patient is counseled to follow-up with her neurologist.  She is agreeable with plan and all questions were answered. Strict return precautions are given. [AB]      ED Course User Index  [AB] Gwen Castaneda DO         CRITICAL CARE TIME   Total Critical Care time was 0 minutes, excluding separately reportable procedures. There was a high probability of clinically significant/life threatening deterioration in the patient's condition which required my urgent intervention. CONSULTS:  None    PROCEDURES:  Unless otherwise noted below, none     Procedures        FINAL IMPRESSION      1.  Migraine without status migrainosus, not intractable, unspecified migraine type          DISPOSITION/PLAN   DISPOSITION Decision To Discharge 11/23/2022 07:26:52 PM      PATIENT REFERRED TO:  Juan Pablo Bruce MD  [address]  [phone]    Call       DISCHARGE MEDICATIONS:  Discharge Medication List as of 11/23/2022  7:43 PM        Controlled Substances Monitoring:     RX Monitoring 11/26/2021   Attestation -   Periodic Controlled Substance Monitoring No signs of potential drug abuse or diversion identified.        (Please note that portions of this note were completed with a voice recognition program.  Efforts were made to edit the dictations but occasionally words are mis-transcribed.)    Mary Rossi DO (electronically signed)  Attending Emergency Physician           Mary Rossi DO  11/23/22 1959

## 2022-11-29 ENCOUNTER — OFFICE VISIT (OUTPATIENT)
Dept: PRIMARY CARE CLINIC | Age: 52
End: 2022-11-29
Payer: COMMERCIAL

## 2022-11-29 VITALS
WEIGHT: 251 LBS | HEART RATE: 70 BPM | BODY MASS INDEX: 41.77 KG/M2 | OXYGEN SATURATION: 97 % | SYSTOLIC BLOOD PRESSURE: 118 MMHG | RESPIRATION RATE: 16 BRPM | DIASTOLIC BLOOD PRESSURE: 80 MMHG

## 2022-11-29 DIAGNOSIS — I10 ESSENTIAL HYPERTENSION: Primary | ICD-10-CM

## 2022-11-29 DIAGNOSIS — E03.9 HYPOTHYROIDISM, UNSPECIFIED TYPE: ICD-10-CM

## 2022-11-29 DIAGNOSIS — I89.0 LYMPHEDEMA: ICD-10-CM

## 2022-11-29 DIAGNOSIS — G43.109 CHRONIC MIGRAINE WITH AURA: ICD-10-CM

## 2022-11-29 PROCEDURE — G8484 FLU IMMUNIZE NO ADMIN: HCPCS | Performed by: FAMILY MEDICINE

## 2022-11-29 PROCEDURE — 1036F TOBACCO NON-USER: CPT | Performed by: FAMILY MEDICINE

## 2022-11-29 PROCEDURE — G8417 CALC BMI ABV UP PARAM F/U: HCPCS | Performed by: FAMILY MEDICINE

## 2022-11-29 PROCEDURE — G8427 DOCREV CUR MEDS BY ELIG CLIN: HCPCS | Performed by: FAMILY MEDICINE

## 2022-11-29 PROCEDURE — 3078F DIAST BP <80 MM HG: CPT | Performed by: FAMILY MEDICINE

## 2022-11-29 PROCEDURE — 3017F COLORECTAL CA SCREEN DOC REV: CPT | Performed by: FAMILY MEDICINE

## 2022-11-29 PROCEDURE — 3074F SYST BP LT 130 MM HG: CPT | Performed by: FAMILY MEDICINE

## 2022-11-29 PROCEDURE — 99214 OFFICE O/P EST MOD 30 MIN: CPT | Performed by: FAMILY MEDICINE

## 2022-11-29 RX ORDER — RIMEGEPANT SULFATE 75 MG/75MG
TABLET, ORALLY DISINTEGRATING ORAL
COMMUNITY
Start: 2022-11-29

## 2022-11-29 NOTE — PATIENT INSTRUCTIONS
SURVEY:    You may be receiving a survey from [employer] regarding your visit today. You may get this in the mail, through your MyChart, or in your email. Please complete the survey to enable us to provide the highest quality of care to you and your family. If you cannot score us a very good (5 Stars) on any question, please call the office to discuss how we could of made your experience exceptional.    Thank you!     Dr. Frederic Bonilla, LPN  [facility], [address] [place] [address]    Phone: [phone]  Fax: [phone]    Office Hours:   Marcel Romo, [address], F: 8-5

## 2022-11-29 NOTE — PROGRESS NOTES
Patient is here with complaints of migraines. She states that she has been getting them almost daily, and was in the emergency room on Wednesday. She was put on Nurtec a few weeks ago, and the medication is not helping. She also states that her balance has been off, and on Saturday she also had double vision. Htn- she has been compliant with meds and bp well controlled  Hypothyroidism- she has been taking meds and has no issues      CURRENT ALLERGIES: Rocephin [ceftriaxone] and Bee venom    PAST MEDICAL HISTORY:   Past Medical History:   Diagnosis Date    Anemia, unspecified     Anxiety     Celiac disease     Edema     H/O cardiovascular stress test 03/08/2021    [place] treadmill score is 0    H/O echocardiogram 01/26/2021    EF >60% Normal study    History of cardiac cath 04/01/2021    [facility]) Nia/Dr. Little/Right Radial     History of cardiovascular stress test 03/22/2021    Equivocal study. Small perfusion defect of mild intensity in the anterior and anteroseptal regions during stress and rest imaging, which is most consistent with artifact EF 71% Duke Treadmill score is -2intermediate risk. History of normal Holter exam 02/22/2021    CAM 6 days 19 hrs Normal sinus. AT 2 episodes Longest/fastest 9 beats at ave 127 bpm p to 153 bpm PAC 0.08% PVC 0.07%    Hypertension     Hypothyroidism     Thyroid disease     Tilt table evaluation 03/22/2021    Negative study Although sensitivity of study certainly is not 987% relatively normal results signifcantly decrease the likelihood of neurocardiogenic source for pt symptoms.  Clinically suspision remains high possible repeat study maybe indicated       SURGICAL HISTORY:   Past Surgical History:   Procedure Laterality Date    ANTERIOR CRUCIATE LIGAMENT REPAIR Left     HYSTERECTOMY, VAGINAL      07/2020 robotic, lap supracervical hyst and BS, perineopexy, A&P repair, cysto, Lynx retropubic slin    INFECTED SKIN DEBRIDEMENT Left     Left third finger    INSERTABLE CARDIAC MONITOR  05/20/2021       FAMILY HISTORY:   Family History   Adopted: Yes       SOCIAL HISTORY:   Social History     Tobacco Use    Smoking status: Never    Smokeless tobacco: Never   Vaping Use    Vaping Use: Never used   Substance Use Topics    Alcohol use: Yes     Alcohol/week: 0.0 standard drinks     Comment: rarely    Drug use: No     Prior to Admission medications    Medication Sig Start Date End Date Taking? Authorizing Provider   NURTEC 75 MG TBDP  11/29/22   Historical Provider, MD   levothyroxine (SYNTHROID) 100 MCG tablet Take 1 tablet by mouth once daily 11/21/22   Jose Alejandro Barber MD   dilTIAZem (CARDIZEM CD) 300 MG extended release capsule Take 1 capsule by mouth once daily 11/21/22   Luba Tinoco MD   modafinil (PROVIGIL) 200 MG tablet TAKE 1 TABLET BY MOUTH ONCE DAILY AS NEEDED FOR FATIGUE FOR 30 DAYS TAKE 1 HOUR PRIOR TO THE START OF WORK SHIFT 7/26/22   Historical Provider, MD   bisoprolol-hydroCHLOROthiazide [facility]) 10-6.25 MG per tablet Take 1 tablet by mouth daily 6/24/22   Luba Tinoco MD   furosemide (LASIX) 20 MG tablet Take 1 tablet by mouth every other day 6/17/22   Jose Alejandro Barber MD   DULoxetine (CYMBALTA) 60 MG extended release capsule TAKE 1 CAPSULE BY MOUTH ONCE DAILY DO NOT CRUSH OR CHEW 4/27/22   Historical Provider, MD   allopurinol (ZYLOPRIM) 100 MG tablet Take 1 tablet by mouth once daily 2/8/22   Jose Alejandro Barber MD   traZODone (DESYREL) 50 MG tablet Take 50 mg by mouth nightly    Historical Provider, MD   albuterol sulfate HFA (VENTOLIN HFA) 108 (90 Base) MCG/ACT inhaler Inhale 2 puffs into the lungs 4 times daily as needed for Wheezing 11/26/21   Jose Alejandro Barber MD   ALPRAZolam Janith Blue) 0.25 MG tablet Take 0.25 mg by mouth daily as needed.  6/16/21   Historical Provider, MD   buPROPion (WELLBUTRIN XL) 300 MG extended release tablet Take 300 mg by mouth every morning With 150 mg for total of 450 mg 2/24/21   Historical Provider, MD   Turmeric (QC TUMERIC COMPLEX PO) Take 1 capsule by mouth daily     Historical Provider, MD   buPROPion (WELLBUTRIN XL) 150 MG extended release tablet Take 150 mg by mouth daily With 300 mg for total of 450 mg 1/21/21   Historical Provider, MD       Review of Systems:  Constitutional: negative for fevers or chills,has frequent migraines  Eyes: negative for visual disturbance   ENT: negative for sore throat or nasal congestion  Respiratory: negative for shortness of breath or cough  Cardiovascular: negative for chest pain ,palpitations,pnd,syncope  Gastrointestinal: negative for abd pain, nausea, vomiting, diarrhea , constipation,hemetemesis,armand,blood in stool  Genitourinary: negative for dysuria, urgency ,frequency,hematuria  Integument/breast: negative for skin rash or lesions  Neurological: negative for unilateral weakness, numbness or tingling. Skeletal Muscular: no joint pain,jont swelling,back pain    Subjective:  Vitals:    11/29/22 0926   BP: 118/80   Pulse: 70   Resp: 16   SpO2: 97%         Exam:  GEN:   A & O x3, no apparent distress  EYES: No gross abnormalities. and PERRL  ENT:ENT exam normal, no neck nodes or sinus tenderness  NECK: normal, supple, no lymphadenopathy,  no carotid bruits  PULM: clear to auscultation bilaterally- no wheezes, rales or rhonchi, normal air movement, no respiratory distress  COR: regular rate & rhythm, no murmurs, and no gallops  ABD:  soft, non-tender, non-distended, normal bowel sounds, no masses or organomegaly  : deferred  EXT: Extremities: + 2 pedal pulses, has bilat edema ,no calf tenderness, and warm to touch. Normal nails without lesions  NEURO: Motor and sensory grossly intact  SKIN:  No skin lesions or rashes      Assessment:  1. Essential hypertension    2. Hypothyroidism, unspecified type    3. Lymphedema    4. Chronic migraine with aura        Plan:    ICD-10-CM    1. Essential hypertension -well controlled with cardizem,lasix I10       2.  Hypothyroidism, unspecified type - stable with levothyroxine E03.9       3. Lymphedema - continue pumps,lasix I89.0       4. Chronic migraine with aura - not improving with NURTEC. Neurology referal for second opinion Feli Odonnell MD, NeurologyNia          Orders Placed This Encounter   Procedures    Mayra Alvarado MD, Neurology, Nia     Referral Priority:   Routine     Referral Type:   Eval and Treat     Referral Reason:   Specialty Services Required     Referred to Provider:   Robinette Rubinstein, MD     Requested Specialty:   Neurology     Number of Visits Requested:   1     Return in about 3 months (around 2/28/2023) for hypertension. No orders of the defined types were placed in this encounter.     Medication directions and side effects discussed      Electronically signed by Rosaura Harden MD on 11/29/2022 at 3:41 PM         Rosaura Harden MD, MD

## 2022-12-06 ENCOUNTER — TELEPHONE (OUTPATIENT)
Dept: PHARMACY | Age: 52
End: 2022-12-06

## 2022-12-06 NOTE — TELEPHONE ENCOUNTER
Called to remind of HF visit. Patient does not want to come back. She has Lymphedema. Cancelled appt. Discharged from clinic.     Radha Gallardo, PharmD 12/6/2022 1:04 PM

## 2022-12-19 ENCOUNTER — TELEPHONE (OUTPATIENT)
Dept: OBGYN | Age: 52
End: 2022-12-19

## 2022-12-19 NOTE — TELEPHONE ENCOUNTER
Patient calls with concerns of menopausal symptoms, c/o hot flashes , headache, fatigue, angel, poor sleep. Patient had labs 1 year ago and was found to be menopausal. Patient asking about medication to relieve symptoms.

## 2023-01-05 ENCOUNTER — NURSE ONLY (OUTPATIENT)
Dept: CARDIOLOGY | Age: 53
End: 2023-01-05

## 2023-01-05 DIAGNOSIS — Z45.09 ENCOUNTER FOR LOOP RECORDER CHECK: ICD-10-CM

## 2023-01-05 DIAGNOSIS — R55 SYNCOPE, UNSPECIFIED SYNCOPE TYPE: Primary | ICD-10-CM

## 2023-01-13 ENCOUNTER — TELEMEDICINE (OUTPATIENT)
Dept: CARDIOLOGY | Age: 53
End: 2023-01-13
Payer: COMMERCIAL

## 2023-01-13 DIAGNOSIS — R00.2 PALPITATIONS: ICD-10-CM

## 2023-01-13 DIAGNOSIS — R06.02 SOB (SHORTNESS OF BREATH): ICD-10-CM

## 2023-01-13 DIAGNOSIS — Z98.890 HISTORY OF LOOP RECORDER: ICD-10-CM

## 2023-01-13 DIAGNOSIS — R07.89 OTHER CHEST PAIN: ICD-10-CM

## 2023-01-13 DIAGNOSIS — I48.0 PAF (PAROXYSMAL ATRIAL FIBRILLATION) (HCC): Primary | ICD-10-CM

## 2023-01-13 DIAGNOSIS — I10 PRIMARY HYPERTENSION: ICD-10-CM

## 2023-01-13 PROCEDURE — 93005 ELECTROCARDIOGRAM TRACING: CPT | Performed by: FAMILY MEDICINE

## 2023-01-13 NOTE — PROGRESS NOTES
Rolo Foley am scribing for and in the presence of Fina Lynch MD, MS, F.A.C.C..    Patient: Efren Sahni  : /[id number] Care Physician: Roxy Almazan  Today's Date: 2023    REASON FOR VISIT: Hypertension (Hx:HTN,CP,PAF. 2-3 week follow up. Afib? She has been feeling so - so. Been fleeing more Palpitations and having SOB (worsening) Chest Pain, comes and goes - lasting minutes now. Lightheaded/dizziness when standing up or bending over. )    Dear Roxy Almazan MD,    HPI: Ms. Joann Wright is a 47 y.o. female presents to the office today for follow up. She previously has been admitted to the hospital with lightheaded and dizziness. She denied any previous heart related history. She is adopted so she was unaware of any family history. She does drink 1-2 cups off coffee daily. Her echocardiogram on 2021 was fairly unremarkable and showed an ejection fraction of >60%. Her CAM monitor was done on 2021 she wore 6 days, 19 hours showed one epos ode of atrial tachycardia with symptoms. She had treadmill stress test done on 3/8/2021 her  Duke treadmill score is 0 which is intermediate risk. Ms. Joann Wright recently had Negative Tilt Table test on 3/22/21 and an Equivocal stress test. Ms. Joann Wright heart catheterization on 21 was largely normal. LINQ report reviewed from 2021: 1 symptom recorded however no abnormal heart rhythms noted. Battery life is good. Cardiac event monitor worn on 2021 showed frequent premature ventricular contractions 3.2% of the beats, 5 symptoms were reported and associated with the PVCs. Since I last saw Ms. Boone she reports she has been feeling more palpitations and has been having worsening shortness of breath. She does have chest pain that comes and goes, lasting for minutes at a time now. She does complain of lightheaded/dizziness when she stands up or bends over. No cough, fever or chills. No nausea or vomiting.  No abdominal pain, bleeding problems, bowel issues, problems with medications or any other concerns at this time. Past Medical History:   Diagnosis Date    Anemia, unspecified     Anxiety     Celiac disease     Edema     H/O cardiovascular stress test 03/08/2021    [place] treadmill score is 0    H/O echocardiogram 01/26/2021    EF >60% Normal study    History of cardiac cath 04/01/2021    [facility]) Nia/Dr. Little/Right Radial     History of cardiovascular stress test 03/22/2021    Equivocal study. Small perfusion defect of mild intensity in the anterior and anteroseptal regions during stress and rest imaging, which is most consistent with artifact EF 71% Duke Treadmill score is -2intermediate risk. History of normal Holter exam 02/22/2021    CAM 6 days 19 hrs Normal sinus. AT 2 episodes Longest/fastest 9 beats at ave 127 bpm p to 153 bpm PAC 0.08% PVC 0.07%    Hypertension     Hypothyroidism     Thyroid disease     Tilt table evaluation 03/22/2021    Negative study Although sensitivity of study certainly is not 725% relatively normal results signifcantly decrease the likelihood of neurocardiogenic source for pt symptoms. Clinically suspision remains high possible repeat study maybe indicated     CURRENT ALLERGIES: Rocephin [ceftriaxone] and Bee venom REVIEW OF SYSTEMS: 14 systems were reviewed. Pertinent positives and negatives as above, all else negative. Past Surgical History:   Procedure Laterality Date    ANTERIOR CRUCIATE LIGAMENT REPAIR Left     HYSTERECTOMY, VAGINAL      07/2020 robotic, lap supracervical hyst and BS, perineopexy, A&P repair, cysto, Lynx retropubic slin    INFECTED SKIN DEBRIDEMENT Left     Left third finger    INSERTABLE CARDIAC MONITOR  05/20/2021    Social History:  Social History     Tobacco Use    Smoking status: Never    Smokeless tobacco: Never   Vaping Use    Vaping Use: Never used   Substance Use Topics    Alcohol use:  Yes     Alcohol/week: 0.0 standard drinks     Comment: rarely Drug use: No        MEDICATIONS:  No current facility-administered medications for this visit. CURRENT INPATIENT MEDICATIONS:  Prior to Admission medications    Medication Sig Start Date End Date Taking? Authorizing Provider   NURTEC 75 MG TBDP  11/29/22  Yes Historical Provider, MD   levothyroxine (SYNTHROID) 100 MCG tablet Take 1 tablet by mouth once daily 11/21/22  Yes Sonam Loya MD   dilTIAZem (CARDIZEM CD) 300 MG extended release capsule Take 1 capsule by mouth once daily 11/21/22  Yes Samule Nyhan, MD   bisoprolol-hydroCHLOROthiazide [facility]) 10-6.25 MG per tablet Take 1 tablet by mouth daily 6/24/22  Yes Samule Nyhan, MD   furosemide (LASIX) 20 MG tablet Take 1 tablet by mouth every other day 6/17/22  Yes Sonam Loya MD   DULoxetine (CYMBALTA) 60 MG extended release capsule TAKE 1 CAPSULE BY MOUTH ONCE DAILY DO NOT CRUSH OR CHEW 4/27/22  Yes Historical Provider, MD   allopurinol (ZYLOPRIM) 100 MG tablet Take 1 tablet by mouth once daily 2/8/22  Yes Sonam Loya MD   traZODone (DESYREL) 50 MG tablet Take 50 mg by mouth nightly   Yes Historical Provider, MD   albuterol sulfate HFA (VENTOLIN HFA) 108 (90 Base) MCG/ACT inhaler Inhale 2 puffs into the lungs 4 times daily as needed for Wheezing 11/26/21  Yes Sonam Loya MD   ALPRAZolam (XANAX) 0.25 MG tablet Take 0.25 mg by mouth daily as needed. 6/16/21  Yes Historical Provider, MD   buPROPion (WELLBUTRIN XL) 300 MG extended release tablet Take 300 mg by mouth every morning With 150 mg for total of 450 mg 2/24/21  Yes Historical Provider, MD   buPROPion (WELLBUTRIN XL) 150 MG extended release tablet Take 150 mg by mouth daily With 300 mg for total of 450 mg 1/21/21  Yes Historical Provider, MD       FAMILY HISTORY: family history is not on file. She was adopted.      [ INSTRUCTIONS:  \"[x]\" Indicates a positive item  \"[]\" Indicates a negative item   Vital Signs: (As obtained by patient/caregiver or practitioner observation)  Patient-Reported Vitals 1/13/2023   Patient-Reported Weight 251.4 lbs   Patient-Reported Height 5'5   Patient-Reported Systolic 335   Patient-Reported Diastolic 77   Patient-Reported Pulse 64   Patient-Reported SpO2 98        Constitutional: [x] Appears well-developed and well-nourished [x] No apparent distress      [] Abnormal-   Mental status  [x] Alert and awake  [x] Oriented to person/place/time [x]Able to follow commands      Eyes:  EOM    [x]  Normal  [] Abnormal-  Sclera  [x]  Normal  [] Abnormal -         Discharge [x]  None visible  [] Abnormal -    HENT:   [x] Normocephalic, atraumatic.   [] Abnormal   [] Mouth/Throat: Mucous membranes are moist.     External Ears [x] Normal  [] Abnormal-     Neck: [x] No visualized mass     Pulmonary/Chest: [x] Respiratory effort normal.  [x] No visualized signs of difficulty breathing or respiratory distress        [] Abnormal-     Neurological:        [x] No Facial Asymmetry (Cranial nerve 7 motor function) (limited exam to video visit)          [] No gaze palsy        [] Abnormal-         Skin:        [x] No significant exanthematous lesions or discoloration noted on facial skin         [] Abnormal-            Psychiatric:       [x] Normal Affect [] No Hallucinations        [] Abnormal-     Other pertinent observable physical exam findings: None      MOST RECENT LABS ON RECORD:   Lab Results   Component Value Date    WBC 5.0 04/13/2022    HGB 13.4 04/13/2022    HCT 42.4 04/13/2022     04/13/2022    CHOL 185 04/22/2020    TRIG 79 04/22/2020    HDL 48 04/22/2020    ALT 13 04/13/2022    AST 20 04/13/2022     (H) 07/22/2022    K 4.2 07/22/2022     (H) 07/22/2022    CREATININE 0.78 07/22/2022    BUN 14 07/22/2022    CO2 29 07/22/2022    TSH 2.67 09/30/2022    LABA1C 5.6 12/23/2021       ASSESSMENT:  Patient Active Problem List    Diagnosis Date Noted    Recurrent syncope 05/20/2021    PSVT (paroxysmal supraventricular tachycardia) (HCC) 05/20/2021    Abnormal cardiovascular stress test 04/01/2021    Orthostatic hypotension     Diarrhea     Recurrent falls while walking     Recurrent acute suppurative otitis media without spontaneous rupture of left tympanic membrane 11/03/2022    Celiac disease 09/30/2022    Dizziness 01/26/2021    Persistent vomiting 01/25/2021    Uncontrollable vomiting 01/25/2021    Intractable nausea and vomiting 01/25/2021    Lab test positive for detection of COVID-19 virus 12/20/2020    Shortness of breath 12/20/2020    Myalgia 12/20/2020    Iron deficiency anemia 02/15/2019    Moderate single current episode of major depressive disorder ([facility] 03/21/2018    Idiopathic gout 04/20/2016    Anxiety state 04/20/2016    Essential hypertension 05/06/2015    Hypothyroidism 05/06/2015       Diagnosis Orders   1. PAF (paroxysmal atrial fibrillation) ([facility]        2. Primary hypertension        3. SOB (shortness of breath)        4. Other chest pain        5. Palpitations        6. History of loop recorder          PLAN:    Paroxysmal Atrial Fibrillation: Noted on Loop recorder. December 18th @ 1:00 AM lasting 3 hrs and 20 mins. We discussed at length potential increased risk of stroke associated with atrial fibrillation as well as potential symptoms of  lightheadedness and dizziness or even syncope. Will monitor with her loop recorder. Beta Blocker: Continue bisoprolol/HCTZ (Ziac) 10/6.25 mg daily. Calcium Channel Blocker: Continue diltiazem CD (Cardizem CD) 300 mg once daily. Anti-Arrhythmic: Not indicated.   [id number] Score for Atrial Fibrillation Stroke Risk   Risk   Factors  Component Value   C CHF Yes 0   H HTN Yes 1   A2 Age >= 75 No,  (55 y.o.) 0   D DM No 0   S2 Prior Stroke/TIA No 0   V Vascular Disease No 0   A Age 74-69 No,  (55 y.o.) 0   Sc Sex female 1    [id number]  Score  2   Score last updated 2/01/26 48:68 PM EST  Click here for a link to the UpToDate guideline \"Atrial Fibrillation: Anticoagulation therapy to prevent embolization    Disclaimer: Risk Score calculation is dependent on accuracy of patient problem list and past encounter diagnosis. Stroke Risk: CHADS2-VASc Score: 2/9 (2.2% stroke risk)  Anticoagulation: Not indicated at this time. Additional Testing List: None    Shortness of breath with mild exertion: Currently appears stable. Beta Blocker: Continue bisoprolol/HCTZ (Ziac) 10/6.25 mg daily. Diuretics: Continue bisoprolol/HCTZ (Ziac) 10/6.25 mg daily. Diuretics: Continue furosemide (Lasix) 20 mg every other day     Essential Hypertension: Controlled   Beta Blocker: Continue bisoprolol/HCTZ (Ziac) 10/6.25 mg daily. ACE Inibitor/ARB: Not indicated at this time. Calcium Channel Blocker: Continue diltiazem CD (Cardizem CD) 300 mg once daily. Diuretics: Continue bisoprolol/HCTZ (Ziac) 10/6.25 mg daily. Diuretics: Continue furosemide (Lasix) 20 mg every other day    Atypical Chest Pain: mild. Grossly normal stress test on 4/1/21. Suspect that this is non cardiac but could be some kind of an paroxysmal supraventricular tachycardia-Monitoring with ADPQ loop recorder via home monitoring. Last download no dangerous arrhythmias were seen and expect these are simply PAC\"s and/or PVC's. Beta Blocker: Continue bisoprolol/HCTZ (Ziac) 10/6.25 mg daily. Calcium Channel Blocker: Continue diltiazem CD (Cardizem CD) 300 mg once daily. Counseling: I advised Ms. Boone to call our office or go to the emergency room if she develops worsening or persistent chest pain or shortness of breath as this could be life threatening. Recurrent intermittent palpitations: Currently mild and not bothersome to her, Loop recorder implanted  Beta Blocker: Continue bisoprolol/HCTZ (Ziac) 10/6.25 mg daily. Calcium Channel Blocker: Continue diltiazem CD (Cardizem CD) 300 mg once daily.     Implantable Loop Recorder:   Indication for Device Placement: Unexplained Syncope  Interrogation Findings: I reviewed the results of their most recent home interrogation from 1/5/2023. Atrial fibrillation noted on December 18th at 1:00 AM lasting 3 hrs and 20 mins. Once again, thank you for allowing me to participate in this patients care. Please do not hesitate to contact me if I could be of any further assistance. I told Ms. Boone to call my office if she had any problems, but otherwise told her to No follow-ups on file. However, I would be happy to see her sooner should the need arise. Sincerely,  Carey Solis. Sidney ARAGON, MS, F.A.C.C. [facility] Cardiology Specialist    [address] [place] [address]  Phone: [phone], Fax: [phone]     I believe that the risk of significant morbidity and mortality related to the patient's current medical conditions are: Intermediate. The documentation recorded by the scribe, accurately and completely reflects the services I personally performed and the decisions made by me. Prosper Diaz MD, MS, F.A.C.C. January 13, 2023      Fritz Disla is a 47 y.o. female being evaluated by a Virtual Visit (video visit) encounter to address concerns as mentioned above. A caregiver was present when appropriate. Due to this being a TeleHealth encounter (During IUCOD-19 public health emergency), evaluation of the following organ systems was limited: Vitals/Constitutional/EENT/Resp/CV/GI//MS/Neuro/Skin/Heme-Lymph-Imm. Pursuant to the emergency declaration under the [address] [place] [address], [address] [place] [address] authority and the [employer] and [employer] Act, this Virtual Visit was conducted with patient's (and/or legal guardian's) consent, to reduce the patient's risk of exposure to COVID-19 and provide necessary medical care. The patient (and/or legal guardian) has also been advised to contact this office for worsening conditions or problems, and seek emergency medical treatment and/or call 911 if deemed necessary.     Services were provided through a video synchronous discussion virtually to substitute for in-person visit. Ms. Nila Padgett was located in the patients hospital room in [facility] [place], [place] performed the visit from my office in [facility] [address] in [facility] [address] [place] [address] on 1/13/2023 at 1:29 PM    An electronic signature was used to authenticate this note.

## 2023-01-13 NOTE — PATIENT INSTRUCTIONS
SURVEY:    You may be receiving a survey from [employer] regarding your visit today. Please complete the survey to enable us to provide the highest quality of care to you and your family. If you cannot score us a very good on any question, please call the office to discuss how we could have made your experience a very good one. Thank you.

## 2023-01-16 ENCOUNTER — HOSPITAL ENCOUNTER (EMERGENCY)
Age: 53
Discharge: HOME | End: 2023-01-16
Attending: EMERGENCY MEDICINE
Payer: COMMERCIAL

## 2023-01-16 ENCOUNTER — APPOINTMENT (OUTPATIENT)
Dept: GENERAL RADIOLOGY | Age: 53
End: 2023-01-16
Payer: COMMERCIAL

## 2023-01-16 VITALS
HEART RATE: 84 BPM | OXYGEN SATURATION: 98 % | RESPIRATION RATE: 17 BRPM | SYSTOLIC BLOOD PRESSURE: 154 MMHG | DIASTOLIC BLOOD PRESSURE: 95 MMHG | TEMPERATURE: 97.2 F

## 2023-01-16 DIAGNOSIS — S91.302A AVULSION OF SKIN OF LEFT FOOT, INITIAL ENCOUNTER: Primary | ICD-10-CM

## 2023-01-16 PROCEDURE — 90471 IMMUNIZATION ADMIN: CPT | Performed by: EMERGENCY MEDICINE

## 2023-01-16 PROCEDURE — 90471 IMMUNIZATION ADMIN: CPT

## 2023-01-16 PROCEDURE — 90715 TDAP VACCINE 7 YRS/> IM: CPT | Performed by: EMERGENCY MEDICINE

## 2023-01-16 PROCEDURE — 6370000000 HC RX 637 (ALT 250 FOR IP): Performed by: EMERGENCY MEDICINE

## 2023-01-16 PROCEDURE — 73630 X-RAY EXAM OF FOOT: CPT

## 2023-01-16 PROCEDURE — 6360000002 HC RX W HCPCS: Performed by: EMERGENCY MEDICINE

## 2023-01-16 PROCEDURE — 99284 EMERGENCY DEPT VISIT MOD MDM: CPT

## 2023-01-16 RX ORDER — BACITRACIN ZINC 500 [USP'U]/G
OINTMENT TOPICAL 2 TIMES DAILY
Status: DISCONTINUED | OUTPATIENT
Start: 2023-01-16 | End: 2023-01-16 | Stop reason: HOSPADM

## 2023-01-16 RX ADMIN — BACITRACIN ZINC: 500 OINTMENT TOPICAL at 03:16

## 2023-01-16 RX ADMIN — TETANUS TOXOID, REDUCED DIPHTHERIA TOXOID AND ACELLULAR PERTUSSIS VACCINE, ADSORBED 0.5 ML: 5; 2.5; 8; 8; 2.5 SUSPENSION INTRAMUSCULAR at 02:56

## 2023-01-16 ASSESSMENT — LIFESTYLE VARIABLES
HOW MANY STANDARD DRINKS CONTAINING ALCOHOL DO YOU HAVE ON A TYPICAL DAY: PATIENT DOES NOT DRINK
HOW OFTEN DO YOU HAVE A DRINK CONTAINING ALCOHOL: NEVER

## 2023-01-16 NOTE — ED PROVIDER NOTES
[address]      Pt Name: Hiwot Renee  MRN: [id number]  [place] 1970  Date of evaluation: 1/16/2023  Provider: Nannette Cantrell MD    CHIEF COMPLAINT       Chief Complaint   Patient presents with    Foot Laceration     Left foot, states stepped barefoot on metal fencing PTA, states had to pull fencing out of foot         HISTORY OF PRESENT ILLNESS      Hiwot Renee is a 47 y.o. female who presents to the emergency department for evaluation of a wound to the left foot. States that she was in her yard attempting to get her dogs inside when her foot slipped out of her flip-flop and she stepped on a piece of metal from a fence which she then had removed from her foot. Denies any changes in strength or sensation. Bleeding is controlled. No other wounds. Unknown last Tdap date. No other complaints. Not anticoagulated. Not a diabetic. PAST MEDICAL HISTORY     Past Medical History:   Diagnosis Date    Anemia, unspecified     Anxiety     Celiac disease     Edema     H/O cardiovascular stress test 03/08/2021    [place] treadmill score is 0    H/O echocardiogram 01/26/2021    EF >60% Normal study    History of cardiac cath 04/01/2021    [employer] [facility]/Dr. Little/Right Radial     History of cardiovascular stress test 03/22/2021    Equivocal study. Small perfusion defect of mild intensity in the anterior and anteroseptal regions during stress and rest imaging, which is most consistent with artifact EF 71% Duke Treadmill score is -2intermediate risk. History of normal Holter exam 02/22/2021    CAM 6 days 19 hrs Normal sinus.  AT 2 episodes Longest/fastest 9 beats at ave 127 bpm p to 153 bpm PAC 0.08% PVC 0.07%    Hypertension     Hypothyroidism     Thyroid disease     Tilt table evaluation 03/22/2021    Negative study Although sensitivity of study certainly is not 127% relatively normal results signifcantly decrease the likelihood of neurocardiogenic source for pt symptoms. Clinically suspision remains high possible repeat study maybe indicated         SURGICAL HISTORY       Past Surgical History:   Procedure Laterality Date    ANTERIOR CRUCIATE LIGAMENT REPAIR Left     HYSTERECTOMY, VAGINAL      07/2020 robotic, lap supracervical hyst and BS, perineopexy, A&P repair, cysto, Lynx retropubic slin    INFECTED SKIN DEBRIDEMENT Left     Left third finger    INSERTABLE CARDIAC MONITOR  05/20/2021         CURRENT MEDICATIONS       Previous Medications    ALBUTEROL SULFATE HFA (VENTOLIN HFA) 108 (90 BASE) MCG/ACT INHALER    Inhale 2 puffs into the lungs 4 times daily as needed for Wheezing    ALLOPURINOL (ZYLOPRIM) 100 MG TABLET    Take 1 tablet by mouth once daily    ALPRAZOLAM (XANAX) 0.25 MG TABLET    Take 0.25 mg by mouth daily as needed. BISOPROLOL-HYDROCHLOROTHIAZIDE (ZIAC) 10-6.25 MG PER TABLET    Take 1 tablet by mouth daily    BUPROPION (WELLBUTRIN XL) 150 MG EXTENDED RELEASE TABLET    Take 150 mg by mouth daily With 300 mg for total of 450 mg    BUPROPION (WELLBUTRIN XL) 300 MG EXTENDED RELEASE TABLET    Take 300 mg by mouth every morning With 150 mg for total of 450 mg    DILTIAZEM (CARDIZEM CD) 300 MG EXTENDED RELEASE CAPSULE    Take 1 capsule by mouth once daily    DULOXETINE (CYMBALTA) 60 MG EXTENDED RELEASE CAPSULE    TAKE 1 CAPSULE BY MOUTH ONCE DAILY DO NOT CRUSH OR CHEW    FUROSEMIDE (LASIX) 20 MG TABLET    Take 1 tablet by mouth every other day    LEVOTHYROXINE (SYNTHROID) 100 MCG TABLET    Take 1 tablet by mouth once daily    NURTEC 75 MG TBDP        TRAZODONE (DESYREL) 50 MG TABLET    Take 50 mg by mouth nightly       ALLERGIES       Rocephin [ceftriaxone] and Bee venom    FAMILY HISTORY       Family History   Adopted: Yes        SOCIAL HISTORY       Social History     Tobacco Use    Smoking status: Never    Smokeless tobacco: Never   Vaping Use    Vaping Use: Never used   Substance Use Topics    Alcohol use:  Yes     Alcohol/week: 0.0 standard drinks Comment: rarely    Drug use: No         PHYSICAL EXAM       ED Triage Vitals [01/16/23 0233]   BP Temp Temp Source Heart Rate Resp SpO2 Height Weight   (!) 154/95 97.2 °F (36.2 °C) Tympanic 84 17 98 % -- --       Physical Exam  Vitals and nursing note reviewed. Constitutional:       General: She is not in acute distress. Appearance: She is not ill-appearing or diaphoretic. HENT:      Head: Normocephalic and atraumatic. Cardiovascular:      Rate and Rhythm: Normal rate and regular rhythm. Pulses:           Dorsalis pedis pulses are 2+ on the left side. Pulmonary:      Effort: Pulmonary effort is normal.   Musculoskeletal:        Feet:    Feet:      Comments: Avulsion / laceration. No active bleeding. No visible FB. Normal flexion, all digits of left foot. Normal strength/sensation to foot/toes. Skin:     General: Skin is warm and dry. Neurological:      Mental Status: She is alert. DIAGNOSTIC RESULTS     RADIOLOGY:     XR FOOT LEFT (MIN 3 VIEWS)   ED Interpretation   No evident foreign body. No fracture. EMERGENCY DEPARTMENT COURSE and DIFFERENTIAL DIAGNOSIS/MDM:     Patient presented to the ED for evaluation of an avulsion to the plantar aspect of the left foot. On exam, there is an avulsion/laceration involving primarily callused tissue with a discrete, palpable callus in the distal portion of the wound. This makes it impossible to perform primary repair. However, I feel the wound will heal well by secondary intention. Soaked in iodine solution and then cleansed. Bandaged with bacitracin. Plan at this time is for discharge and outpatient reevaluation. Discussed wound management and return precautions with the patient. As the piece of metal did not penetrate her shoe she does not require empiric antibiotics at this time. Tdap was updated during this visit today. X-ray, per my interpretation, shows no evident foreign body or fracture.   Radiologist interpretation is pending. Patient amenable to plan. FINAL IMPRESSION      1.  Avulsion of skin of left foot, initial encounter          DISPOSITION/PLAN     DISPOSITION Decision To Discharge 01/16/2023 03:08:12 AM      PATIENT REFERRED TO:  [address], [place]  [address]  [phone]    Schedule an appointment as soon as possible for a visit in 2 days          (Please note that portions of this note were completed with a voice recognition program.  Efforts were made to edit the dictations but occasionally words are mis-transcribed.)    Igor Vela MD (electronically signed)  Attending Emergency Physician            Igor Vela MD  01/16/23 0071

## 2023-01-16 NOTE — DISCHARGE INSTRUCTIONS
I am unable to repair your wound by suturing. This wound will need to heal naturally. I recommend that he change the bandage twice daily and anytime it becomes wet or soiled. Apply bacitracin ointment at each bandage change. Return to the ED for pus draining from the wound, increasing pain or swelling to the foot, changes in strength or sensation or any other concerns. You may continue to walk on the foot.

## 2023-02-01 ENCOUNTER — OFFICE VISIT (OUTPATIENT)
Dept: OBGYN | Age: 53
End: 2023-02-01
Payer: COMMERCIAL

## 2023-02-01 VITALS
BODY MASS INDEX: 43.32 KG/M2 | HEIGHT: 65 IN | WEIGHT: 260 LBS | SYSTOLIC BLOOD PRESSURE: 136 MMHG | DIASTOLIC BLOOD PRESSURE: 84 MMHG

## 2023-02-01 DIAGNOSIS — N95.1 MENOPAUSAL SYMPTOMS: Primary | ICD-10-CM

## 2023-02-01 PROCEDURE — G8427 DOCREV CUR MEDS BY ELIG CLIN: HCPCS

## 2023-02-01 PROCEDURE — 99213 OFFICE O/P EST LOW 20 MIN: CPT

## 2023-02-01 PROCEDURE — 1036F TOBACCO NON-USER: CPT

## 2023-02-01 PROCEDURE — 3017F COLORECTAL CA SCREEN DOC REV: CPT

## 2023-02-01 PROCEDURE — 3078F DIAST BP <80 MM HG: CPT

## 2023-02-01 PROCEDURE — G8417 CALC BMI ABV UP PARAM F/U: HCPCS

## 2023-02-01 PROCEDURE — 3074F SYST BP LT 130 MM HG: CPT

## 2023-02-01 PROCEDURE — G8484 FLU IMMUNIZE NO ADMIN: HCPCS

## 2023-02-01 SDOH — ECONOMIC STABILITY: FOOD INSECURITY: WITHIN THE PAST 12 MONTHS, YOU WORRIED THAT YOUR FOOD WOULD RUN OUT BEFORE YOU GOT MONEY TO BUY MORE.: NEVER TRUE

## 2023-02-01 SDOH — ECONOMIC STABILITY: FOOD INSECURITY: WITHIN THE PAST 12 MONTHS, THE FOOD YOU BOUGHT JUST DIDN'T LAST AND YOU DIDN'T HAVE MONEY TO GET MORE.: NEVER TRUE

## 2023-02-01 SDOH — ECONOMIC STABILITY: HOUSING INSECURITY
IN THE LAST 12 MONTHS, WAS THERE A TIME WHEN YOU DID NOT HAVE A STEADY PLACE TO SLEEP OR SLEPT IN A SHELTER (INCLUDING NOW)?: NO

## 2023-02-01 SDOH — ECONOMIC STABILITY: INCOME INSECURITY: HOW HARD IS IT FOR YOU TO PAY FOR THE VERY BASICS LIKE FOOD, HOUSING, MEDICAL CARE, AND HEATING?: NOT HARD AT ALL

## 2023-02-06 ENCOUNTER — OFFICE VISIT (OUTPATIENT)
Dept: NEUROLOGY | Age: 53
End: 2023-02-06
Payer: COMMERCIAL

## 2023-02-06 VITALS
DIASTOLIC BLOOD PRESSURE: 73 MMHG | SYSTOLIC BLOOD PRESSURE: 137 MMHG | BODY MASS INDEX: 42.63 KG/M2 | TEMPERATURE: 96.9 F | HEART RATE: 61 BPM | WEIGHT: 255.9 LBS | HEIGHT: 65 IN | RESPIRATION RATE: 18 BRPM

## 2023-02-06 DIAGNOSIS — G43.119 INTRACTABLE MIGRAINE WITH AURA WITHOUT STATUS MIGRAINOSUS: Primary | ICD-10-CM

## 2023-02-06 DIAGNOSIS — G60.9 IDIOPATHIC SMALL FIBER PERIPHERAL NEUROPATHY: ICD-10-CM

## 2023-02-06 PROCEDURE — 3078F DIAST BP <80 MM HG: CPT | Performed by: NEUROMUSCULOSKELETAL MEDICINE, SPORTS MEDICINE

## 2023-02-06 PROCEDURE — G8484 FLU IMMUNIZE NO ADMIN: HCPCS | Performed by: NEUROMUSCULOSKELETAL MEDICINE, SPORTS MEDICINE

## 2023-02-06 PROCEDURE — G8417 CALC BMI ABV UP PARAM F/U: HCPCS | Performed by: NEUROMUSCULOSKELETAL MEDICINE, SPORTS MEDICINE

## 2023-02-06 PROCEDURE — 99214 OFFICE O/P EST MOD 30 MIN: CPT | Performed by: NEUROMUSCULOSKELETAL MEDICINE, SPORTS MEDICINE

## 2023-02-06 PROCEDURE — 99203 OFFICE O/P NEW LOW 30 MIN: CPT | Performed by: NEUROMUSCULOSKELETAL MEDICINE, SPORTS MEDICINE

## 2023-02-06 PROCEDURE — G8427 DOCREV CUR MEDS BY ELIG CLIN: HCPCS | Performed by: NEUROMUSCULOSKELETAL MEDICINE, SPORTS MEDICINE

## 2023-02-06 PROCEDURE — 3075F SYST BP GE 130 - 139MM HG: CPT | Performed by: NEUROMUSCULOSKELETAL MEDICINE, SPORTS MEDICINE

## 2023-02-06 PROCEDURE — 1036F TOBACCO NON-USER: CPT | Performed by: NEUROMUSCULOSKELETAL MEDICINE, SPORTS MEDICINE

## 2023-02-06 PROCEDURE — 3017F COLORECTAL CA SCREEN DOC REV: CPT | Performed by: NEUROMUSCULOSKELETAL MEDICINE, SPORTS MEDICINE

## 2023-02-06 RX ORDER — NORTRIPTYLINE HYDROCHLORIDE 25 MG/1
25 CAPSULE ORAL NIGHTLY
Qty: 30 CAPSULE | Refills: 1 | Status: SHIPPED | OUTPATIENT
Start: 2023-02-06 | End: 2023-03-08

## 2023-02-06 NOTE — PROGRESS NOTES
NEUROLOGY CONSULT    Patient Name:  Laverne Wyman  :   1970  Clinic Visit Date: 2023    I saw Ms. Laverne Wyman  in the neurology clinic today for migraine headaches and a diagnosis of small fiber neuropathy. 23-year-old right-handed lady history of depression, hypothyroidism, diagnosis of POTS and small fiber neuropathy without any clear etiology, celiac disease, seen in the office today with a history of persistent recurrent headaches ever after extensive work-up, ever since she contracted COVID in . [place] She had seen a neurologist until recently at the [place] [address] who diagnosed her with migraine headaches and small fiber neuropathy after extensive testing. She was worked-up with an MRI and MRV of the brain which were unremarkable. She has been on a number of medications for the headaches over the past couple of years including [place] without any improvement. Headaches are often accompanied by visual aura of blurred vision in the left eye and occasionally in both eyes followed by a moderate to severe throbbing or sharp pain on the left occiput or occipital region lasting several hours severity approximately 6/10. Headache frequency is approximately 6-8 times a month. Regarding symptoms of neuropathy in the feet and hands, which also began after she had contracted COVID, symptoms are persistent, manifesting as a sensation of burning and tingling and pain in both  feet. She has tried gabapentin and Cymbalta without any improvement. No neck or lower back pain or radicular symptoms. No facial numbness slurred speech, double vision, loss of vision, vertigo or weakness in the extremities. She complains of poor balance on and off and has fallen on a few occasions. REVIEW OF SYSTEMS    Constitutional Weight changes: present, change in appetite: absent Fatigue: present; Fevers : absent, Any recent hospitalizations:  absent   HEENT Ears: normal,  Visual disturbance: absent   Respiratory Shortness of breath: absent, choking:  absent, Cough: absent, Snoring : absent   Cardiovascular Chest pain: absent, Leg swelling :present, palpitations : present, fainting : absent   GI Constipation: absent, Diarrhea: absent, Swallowing change: absent    Urinary frequency: absent, Urinary urgency: absent, Urinary incontinence: absent   Musculoskeletal Neck pain: absent, Back pain: absent, Stiffness: present, Muscle pain: present, Joint pain: present, restless leg : absent   Dermatological Hair loss: absent, Skin changes: absent   Neurological Confusion: absent, Trouble concentrating: present, Seizures: absent;  Memory loss: absent, balance problem: present, Dizziness: present, vertigo: absent, Weakness: absent, Numbness present, Tremor: absent, Spasm: absent, involuntary movement: absent, Speech difficulty: absent, Headache: present, Light sensitivity: present   Psychiatric Anxiety: absent, Depression  absent, drug abuse: absent, Hallucination: absent, mood disorder: absent, Suicidal ideations absent   Hematologic Abnormal bleeding: absent, Anemia: absent, Lymph gland changes: absent Clotting disorder: absent     Past Medical History:   Diagnosis Date    Anemia, unspecified     Anxiety     Celiac disease     Edema     H/O cardiovascular stress test 03/08/2021    [place] treadmill score is 0    H/O echocardiogram 01/26/2021    EF >60% Normal study    History of cardiac cath 04/01/2021    [facility]) [facility]ia/Dr. Little/Right Radial     History of cardiovascular stress test 03/22/2021    Equivocal study. Small perfusion defect of mild intensity in the anterior and anteroseptal regions during stress and rest imaging, which is most consistent with artifact EF 71% Duke Treadmill score is -2intermediate risk. History of normal Holter exam 02/22/2021    CAM 6 days 19 hrs Normal sinus.  AT 2 episodes Longest/fastest 9 beats at ave 127 bpm p to 153 bpm PAC 0.08% PVC 0.07%    Hypertension Hypothyroidism     Thyroid disease     Tilt table evaluation 03/22/2021    Negative study Although sensitivity of study certainly is not 245% relatively normal results signifcantly decrease the likelihood of neurocardiogenic source for pt symptoms. Clinically suspision remains high possible repeat study maybe indicated       Past Surgical History:   Procedure Laterality Date    ANTERIOR CRUCIATE LIGAMENT REPAIR Left     HYSTERECTOMY, VAGINAL      07/2020 robotic, lap supracervical hyst and BS, perineopexy, A&P repair, cysto, Lynx retropubic slin    INFECTED SKIN DEBRIDEMENT Left     Left third finger    INSERTABLE CARDIAC MONITOR  05/20/2021       Social History     Socioeconomic History    Marital status: Single     Spouse name: Not on file    Number of children: Not on file    Years of education: Not on file    Highest education level: Not on file   Occupational History    Occupation: nurse aid   Tobacco Use    Smoking status: Never    Smokeless tobacco: Never   Vaping Use    Vaping Use: Never used   Substance and Sexual Activity    Alcohol use: Yes     Alcohol/week: 0.0 standard drinks     Comment: rarely    Drug use: No    Sexual activity: Not Currently   Other Topics Concern    Not on file   Social History Narrative    Not on file     Social Determinants of Health     Financial Resource Strain: Low Risk     Difficulty of Paying Living Expenses: Not hard at all   Food Insecurity: No Food Insecurity    Worried About [address] in the Last Year: Never true    [address] in the Last Year: Never true   Transportation Needs: No Transportation Needs    Lack of Transportation (Medical): No    Lack of Transportation (Non-Medical):  No   Physical Activity: Not on file   Stress: Not on file   Social Connections: Not on file   Intimate Partner Violence: Not on file   Housing Stability: Not on file       Family History   Adopted: Yes       Current Outpatient Medications   Medication Sig Dispense Refill nortriptyline (PAMELOR) 25 MG capsule Take 1 capsule by mouth nightly 30 capsule 1    NURTEC 75 MG TBDP       levothyroxine (SYNTHROID) 100 MCG tablet Take 1 tablet by mouth once daily 90 tablet 0    dilTIAZem (CARDIZEM CD) 300 MG extended release capsule Take 1 capsule by mouth once daily 90 capsule 3    bisoprolol-hydroCHLOROthiazide (ZIAC) 10-6.25 MG per tablet Take 1 tablet by mouth daily 90 tablet 3    furosemide (LASIX) 20 MG tablet Take 1 tablet by mouth every other day 30 tablet 3    allopurinol (ZYLOPRIM) 100 MG tablet Take 1 tablet by mouth once daily 30 tablet 0    traZODone (DESYREL) 50 MG tablet Take 50 mg by mouth nightly      albuterol sulfate HFA (VENTOLIN HFA) 108 (90 Base) MCG/ACT inhaler Inhale 2 puffs into the lungs 4 times daily as needed for Wheezing 1 each 0    ALPRAZolam (XANAX) 0.25 MG tablet Take 0.25 mg by mouth daily as needed. buPROPion (WELLBUTRIN XL) 300 MG extended release tablet Take 300 mg by mouth every morning With 150 mg for total of 450 mg      buPROPion (WELLBUTRIN XL) 150 MG extended release tablet Take 150 mg by mouth daily With 300 mg for total of 450 mg       No current facility-administered medications for this visit. DATA:  Lab Results   Component Value Date    WBC 5.0 04/13/2022    HGB 13.4 04/13/2022     04/13/2022    CHOL 185 04/22/2020    TRIG 79 04/22/2020    HDL 48 04/22/2020    ALT 13 04/13/2022    AST 20 04/13/2022     (H) 07/22/2022    K 4.2 07/22/2022     (H) 07/22/2022    CREATININE 0.78 07/22/2022    BUN 14 07/22/2022    CO2 29 07/22/2022    TSH 2.67 09/30/2022    LABA1C 5.6 12/23/2021       /73 (Site: Right Lower Arm, Position: Sitting, Cuff Size: Medium Adult)   Pulse 61   Temp 96.9 °F (36.1 °C) (Temporal)   Resp 18   Ht 5' 5\" (1.651 m)   Wt 255 lb 14.4 oz (116.1 kg)   LMP 07/15/2020 (Approximate)   BMI 42.58 kg/m²     NEUROLOGICAL EXAMINATION:     MENTAL STATUS: Alert and oriented x 3.     CRANIAL NERVES: Pupils are equal and reactive. EOMS are complete. No nystagmus or any other abnormal eye movements. Facial sensation is normal.  No facial weakness. Hearing is normal palate and tongue movements are normal.  Shoulder shrug is symmetrical    MOTOR EXAMINATION: Muscle tone is normal . No focal muscle wasting in the upper or lower extremities. Strength is 5/5 in both upper and lower limbs. No abnormal limb movements. SENSORY EXAMINATION: Normal.  No sensory level. Position sensation is intact. Vibration is slightly impaired in both the feet. STRETCH REFLEXES: 2+ and symmetrical in both the upper and lower limbs. GAIT:.  Normal.      Romberg is negative. IMPRESSION:    1. Chronic migraine with aura. 2.  Diagnosis of small fiber neuropathy     PLAN:      1. Nortriptyline 25 mg/day, which will help headaches as well as symptoms of neuropathy. 2.  Follow-up in 2 months. NOTE: This neurology evaluation is part of outpatient coverage at [facility]  1-2 days per week. Patients requiring frequent evaluations or uncomfortable with potential 3-4 day turnaround on questions or calls  may be better served by a neurologist in the area full time. [facility] at [place]. [facility] is available for outpatient visits and procedures including EMG/NCS. Non-[facility] neurologists also practice in [facility] (Dr. Shira Black) and [facility] (Thompson Tirado).        Xavier Plascencia MD   2/6/2023  9:45 AM

## 2023-02-06 NOTE — PATIENT INSTRUCTIONS
SURVEY:    You may be receiving a survey from [employer] regarding your visit today. Please complete the survey to enable us to provide the highest quality of care to you and your family. If you cannot score us a very good on any question, please call the office to discuss how we could have made your experience a very good one. Thank you.

## 2023-02-11 NOTE — PROGRESS NOTES
DATE OF VISIT:  2/10/23    PATIENT NAME:  Oxana Boone     YOB: 1970    REASON FOR VISIT:    Chief Complaint   Patient presents with    Insomnia     Patient would like to discuss HRT. She complains of insomnia, hot flashes, weight gain and irritability. She states that symptoms have worsened greatly over the last few months. HISTORY OF PRESENT ILLNESS:  Patient presents to discuss menopausal symptoms. She reports insomnia, hot flashes, weight gain, irritability. She has had labs before that showed that she is menopausal but she has hx cardiac disease that make her poor candidate for estrogen. She was offered progesterone supp in the past - had considered Effexor but she was on Celexa at that time. Noted no improvement in symptoms with Celexa. We discussed possible Effexor trial pending safety with combining with her Wellbutrin dose or progesterone supp. Patient's last menstrual period was 07/15/2020 (approximate). Vitals:    02/01/23 1111   BP: 136/84   Weight: 260 lb (117.9 kg)   Height: 5' 5\" (1.651 m)     Body mass index is 43.27 kg/m².   Allergies   Allergen Reactions    Rocephin [Ceftriaxone] Anaphylaxis    Bee Venom Swelling     Current Outpatient Medications   Medication Sig Dispense Refill    NURTEC 75 MG TBDP       levothyroxine (SYNTHROID) 100 MCG tablet Take 1 tablet by mouth once daily 90 tablet 0    dilTIAZem (CARDIZEM CD) 300 MG extended release capsule Take 1 capsule by mouth once daily 90 capsule 3    bisoprolol-hydroCHLOROthiazide (ZIAC) 10-6.25 MG per tablet Take 1 tablet by mouth daily 90 tablet 3    furosemide (LASIX) 20 MG tablet Take 1 tablet by mouth every other day 30 tablet 3    allopurinol (ZYLOPRIM) 100 MG tablet Take 1 tablet by mouth once daily 30 tablet 0    traZODone (DESYREL) 50 MG tablet Take 50 mg by mouth nightly      albuterol sulfate HFA (VENTOLIN HFA) 108 (90 Base) MCG/ACT inhaler Inhale 2 puffs into the lungs 4 times daily as needed for Wheezing 1 each 0    ALPRAZolam (XANAX) 0.25 MG tablet Take 0.25 mg by mouth daily as needed. buPROPion (WELLBUTRIN XL) 300 MG extended release tablet Take 300 mg by mouth every morning With 150 mg for total of 450 mg      buPROPion (WELLBUTRIN XL) 150 MG extended release tablet Take 150 mg by mouth daily With 300 mg for total of 450 mg      nortriptyline (PAMELOR) 25 MG capsule Take 1 capsule by mouth nightly 30 capsule 1     No current facility-administered medications for this visit. Social History     Socioeconomic History    Marital status: Single     Spouse name: None    Number of children: None    Years of education: None    Highest education level: None   Occupational History    Occupation: nurse aid   Tobacco Use    Smoking status: Never    Smokeless tobacco: Never   Vaping Use    Vaping Use: Never used   Substance and Sexual Activity    Alcohol use: Yes     Alcohol/week: 0.0 standard drinks     Comment: rarely    Drug use: No    Sexual activity: Not Currently     Social Determinants of Health     Financial Resource Strain: Low Risk     Difficulty of Paying Living Expenses: Not hard at all   Food Insecurity: No Food Insecurity    Worried About Running Out of Food in the Last Year: Never true    Ran Out of Food in the Last Year: Never true   Transportation Needs: No Transportation Needs    Lack of Transportation (Medical): No    Lack of Transportation (Non-Medical): No       REVIEW OF SYSTEMS:  Review of Systems   Constitutional:  Positive for unexpected weight change (weight gain). Negative for chills, fatigue and fever. Genitourinary:  Negative for dysuria, frequency, pelvic pain, vaginal bleeding and vaginal discharge. Hot flashes   Psychiatric/Behavioral:  Positive for sleep disturbance.          Irritability     PHYSICAL EXAM:  /84   Ht 5' 5\" (1.651 m)   Wt 260 lb (117.9 kg)   LMP 07/15/2020 (Approximate)   BMI 43.27 kg/m²   Physical Exam  Constitutional:       Appearance: Normal appearance. HENT:      Head: Normocephalic and atraumatic. Mouth/Throat:      Mouth: Mucous membranes are moist.   Eyes:      Extraocular Movements: Extraocular movements intact. Musculoskeletal:         General: Normal range of motion. Cervical back: Normal range of motion. Neurological:      General: No focal deficit present. Mental Status: She is alert and oriented to person, place, and time. Skin:     General: Skin is warm and dry. Psychiatric:         Mood and Affect: Mood normal.         Behavior: Behavior normal.         Thought Content: Thought content normal.     The patient, Columba Rand is a 47 y.o. female, was seen with a total time spent of 20 minutes for the visit on this date of service by the E/M provider. The time component had both face to face and non face to face time spent in determining the total time component. Counseling and education regarding her diagnosis listed below and her options regarding those diagnoses were also included in determining her time component. The patient had her preventative health maintenance recommendations and follow-up reviewed with her at the completion of her visit. ,e    ASSESSMENT:  1. Menopausal symptoms        PLAN:  No orders of the defined types were placed in this encounter. Return if symptoms worsen or fail to improve.        Electronically signed by Jhon Romo PA-C on 02/10/23

## 2023-02-21 RX ORDER — FUROSEMIDE 20 MG/1
20 TABLET ORAL EVERY OTHER DAY
Qty: 30 TABLET | Refills: 0 | Status: SHIPPED | OUTPATIENT
Start: 2023-02-21

## 2023-03-03 ENCOUNTER — OFFICE VISIT (OUTPATIENT)
Dept: PRIMARY CARE CLINIC | Age: 53
End: 2023-03-03
Payer: COMMERCIAL

## 2023-03-03 VITALS
OXYGEN SATURATION: 98 % | DIASTOLIC BLOOD PRESSURE: 82 MMHG | RESPIRATION RATE: 16 BRPM | SYSTOLIC BLOOD PRESSURE: 126 MMHG | BODY MASS INDEX: 43.83 KG/M2 | HEART RATE: 61 BPM | WEIGHT: 263.4 LBS

## 2023-03-03 DIAGNOSIS — I10 ESSENTIAL HYPERTENSION: Primary | ICD-10-CM

## 2023-03-03 DIAGNOSIS — E66.01 MORBID OBESITY (HCC): ICD-10-CM

## 2023-03-03 DIAGNOSIS — E03.9 HYPOTHYROIDISM, UNSPECIFIED TYPE: ICD-10-CM

## 2023-03-03 DIAGNOSIS — D48.5 NEOPLASM OF UNCERTAIN BEHAVIOR OF SKIN: ICD-10-CM

## 2023-03-03 DIAGNOSIS — G43.109 CHRONIC MIGRAINE WITH AURA: ICD-10-CM

## 2023-03-03 DIAGNOSIS — M13.0 POLYARTHRITIS: ICD-10-CM

## 2023-03-03 PROCEDURE — G8417 CALC BMI ABV UP PARAM F/U: HCPCS | Performed by: FAMILY MEDICINE

## 2023-03-03 PROCEDURE — 3017F COLORECTAL CA SCREEN DOC REV: CPT | Performed by: FAMILY MEDICINE

## 2023-03-03 PROCEDURE — 3074F SYST BP LT 130 MM HG: CPT | Performed by: FAMILY MEDICINE

## 2023-03-03 PROCEDURE — G8484 FLU IMMUNIZE NO ADMIN: HCPCS | Performed by: FAMILY MEDICINE

## 2023-03-03 PROCEDURE — 1036F TOBACCO NON-USER: CPT | Performed by: FAMILY MEDICINE

## 2023-03-03 PROCEDURE — G8427 DOCREV CUR MEDS BY ELIG CLIN: HCPCS | Performed by: FAMILY MEDICINE

## 2023-03-03 PROCEDURE — 99214 OFFICE O/P EST MOD 30 MIN: CPT | Performed by: FAMILY MEDICINE

## 2023-03-03 PROCEDURE — 3079F DIAST BP 80-89 MM HG: CPT | Performed by: FAMILY MEDICINE

## 2023-03-03 RX ORDER — PREDNISONE 1 MG/1
5 TABLET ORAL DAILY
Qty: 30 TABLET | Refills: 2 | Status: SHIPPED | OUTPATIENT
Start: 2023-03-03 | End: 2023-03-13

## 2023-03-03 RX ORDER — SEMAGLUTIDE 0.25 MG/.5ML
0.25 INJECTION, SOLUTION SUBCUTANEOUS
Qty: 2 ML | Refills: 0 | Status: SHIPPED | OUTPATIENT
Start: 2023-03-03

## 2023-03-03 ASSESSMENT — PATIENT HEALTH QUESTIONNAIRE - PHQ9
5. POOR APPETITE OR OVEREATING: 0
SUM OF ALL RESPONSES TO PHQ QUESTIONS 1-9: 10
SUM OF ALL RESPONSES TO PHQ QUESTIONS 1-9: 10
6. FEELING BAD ABOUT YOURSELF - OR THAT YOU ARE A FAILURE OR HAVE LET YOURSELF OR YOUR FAMILY DOWN: 0
7. TROUBLE CONCENTRATING ON THINGS, SUCH AS READING THE NEWSPAPER OR WATCHING TELEVISION: 1
SUM OF ALL RESPONSES TO PHQ9 QUESTIONS 1 & 2: 0
4. FEELING TIRED OR HAVING LITTLE ENERGY: 3
3. TROUBLE FALLING OR STAYING ASLEEP: 3
SUM OF ALL RESPONSES TO PHQ QUESTIONS 1-9: 10
10. IF YOU CHECKED OFF ANY PROBLEMS, HOW DIFFICULT HAVE THESE PROBLEMS MADE IT FOR YOU TO DO YOUR WORK, TAKE CARE OF THINGS AT HOME, OR GET ALONG WITH OTHER PEOPLE: 0
1. LITTLE INTEREST OR PLEASURE IN DOING THINGS: 0
SUM OF ALL RESPONSES TO PHQ QUESTIONS 1-9: 10
2. FEELING DOWN, DEPRESSED OR HOPELESS: 0
9. THOUGHTS THAT YOU WOULD BE BETTER OFF DEAD, OR OF HURTING YOURSELF: 0
8. MOVING OR SPEAKING SO SLOWLY THAT OTHER PEOPLE COULD HAVE NOTICED. OR THE OPPOSITE, BEING SO FIGETY OR RESTLESS THAT YOU HAVE BEEN MOVING AROUND A LOT MORE THAN USUAL: 3

## 2023-03-03 NOTE — PROGRESS NOTES
Hypertension: Patient here for follow-up of elevated blood pressure. She is exercising and is adherent to low salt diet. Blood pressure is well controlled at home. Cardiac symptoms chest pain, fatigue, and palpitations. Patient denies none. Cardiovascular risk factors: hypertension and obesity (BMI >= 30 kg/m2). Use of agents associated with hypertension: none. Hypothyroidism: Patient states that she has constant fatigue. Migraine: She states since going on the Nortriptyline they are improving. She states they occur about once a week. Allergies:  Rocephin [ceftriaxone] and Bee venom    Past Medical History:    Past Medical History:   Diagnosis Date    Anemia, unspecified     Anxiety     Celiac disease     Edema     H/O cardiovascular stress test 03/08/2021    [place] treadmill score is 0    H/O echocardiogram 01/26/2021    EF >60% Normal study    History of cardiac cath 04/01/2021    [facility]) Nia/Dr. Little/Right Radial     History of cardiovascular stress test 03/22/2021    Equivocal study. Small perfusion defect of mild intensity in the anterior and anteroseptal regions during stress and rest imaging, which is most consistent with artifact EF 71% Duke Treadmill score is -2intermediate risk. History of normal Holter exam 02/22/2021    CAM 6 days 19 hrs Normal sinus. AT 2 episodes Longest/fastest 9 beats at ave 127 bpm p to 153 bpm PAC 0.08% PVC 0.07%    Hypertension     Hypothyroidism     Thyroid disease     Tilt table evaluation 03/22/2021    Negative study Although sensitivity of study certainly is not 073% relatively normal results signifcantly decrease the likelihood of neurocardiogenic source for pt symptoms.  Clinically suspision remains high possible repeat study maybe indicated       Past Surgical History:    Past Surgical History:   Procedure Laterality Date    ANTERIOR CRUCIATE LIGAMENT REPAIR Left     HYSTERECTOMY, VAGINAL      07/2020 robotic, lap supracervical hyst and BS, perineopexy, A&P repair, cysto, Lynx retropubic slin    INFECTED SKIN DEBRIDEMENT Left     Left third finger    INSERTABLE CARDIAC MONITOR  05/20/2021       Social History:   Social History     Tobacco Use    Smoking status: Never    Smokeless tobacco: Never   Substance Use Topics    Alcohol use: Yes     Alcohol/week: 0.0 standard drinks     Comment: rarely       Family History:   Family History   Adopted: Yes         Review of Systems:  Constitutional: negative for fevers or chills  Eyes: negative for visual disturbance   ENT: negative for sore throat or nasal congestion  Respiratory: No cough or shortness of breath  Cardiovascular: negative for chest pain or palpitations  Gastrointestinal: negative for abd pain, nausea, vomiting, diarrhea or constipation  Genitourinary: negative for dysuria, urgency or frequency  Integument/breast: negative for skin rash or lesions  Neurological: negative for unilateral weakness, numbness or tingling. Skeletal Muscular: Has multiple joint pain and muscle pain     Medication List            Accurate as of March 3, 2023 11:48 AM. If you have any questions, ask your nurse or doctor.                 START taking these medications      predniSONE 5 MG tablet  Commonly known as: DELTASONE  Take 1 tablet by mouth daily for 10 days     Wegovy 0.25 MG/0.5ML Soaj SC injection  Generic drug: Semaglutide-Weight Management  Inject 0.25 mg into the skin every 7 days            CONTINUE taking these medications      albuterol sulfate  (90 Base) MCG/ACT inhaler  Commonly known as: Ventolin HFA  Inhale 2 puffs into the lungs 4 times daily as needed for Wheezing     allopurinol 100 MG tablet  Commonly known as: ZYLOPRIM  Take 1 tablet by mouth once daily     ALPRAZolam 0.25 MG tablet  Commonly known as: XANAX     bisoprolol-hydroCHLOROthiazide 10-6.25 MG per tablet  Commonly known as: ZIAC  Take 1 tablet by mouth daily     * buPROPion 150 MG extended release tablet  Commonly known as: WELLBUTRIN XL     * buPROPion 300 MG extended release tablet  Commonly known as: WELLBUTRIN XL     dilTIAZem 300 MG extended release capsule  Commonly known as: CARDIZEM CD  Take 1 capsule by mouth once daily     furosemide 20 MG tablet  Commonly known as: LASIX  TAKE 1 TABLET BY MOUTH EVERY OTHER DAY     levothyroxine 100 MCG tablet  Commonly known as: SYNTHROID  Take 1 tablet by mouth once daily     nortriptyline 25 MG capsule  Commonly known as: Pamelor  Take 1 capsule by mouth nightly           * This list has 2 medication(s) that are the same as other medications prescribed for you. Read the directions carefully, and ask your doctor or other care provider to review them with you. STOP taking these medications      traZODone 50 MG tablet  Commonly known as: DESYREL               Where to Get Your Medications        [facility] [address] [facility] [address] [place] [address]      Phone: [phone]   predniSONE 5 MG tablet  Wegovy 0.25 MG/0.5ML Soaj SC injection         Objective:  Physical Exam:  VitalsBP 126/82 (Site: Left Upper Arm, Position: Sitting)   Pulse 61   Resp 16   Wt 263 lb 6.4 oz (119.5 kg)   LMP 07/15/2020 (Approximate)   SpO2 98%   BMI 43.83 kg/m²   GEN:   A & O x3, no apparent distress  EYES: No gross abnormalities.   ENT:ENT exam normal, no neck nodes or sinus tenderness  NECK: normal, supple, no lymphadenopathy,  no carotid bruits  PULM: clear to auscultation bilaterally- no wheezes, rales or rhonchi, normal air movement, no respiratory distress  COR: regular rate & rhythm, no murmurs, and no gallops  ABD:  soft, non-tender, non-distended, normal bowel sounds, no masses or organomegaly  : deferred  EXT: has minimal edema,  NEURO: Motor and sensory grossly intact  SKIN:  Has rough skin lesion over the right lower leg suspicious for squamous cell carcinoma    Labs:  Lab Results   Component Value Date    BUN 14 07/22/2022    CREATININE 0.78 07/22/2022     (H) 07/22/2022    K 4.2 07/22/2022    CALCIUM 9.2 07/22/2022     (H) 07/22/2022    CO2 29 07/22/2022    LABGLOM >60 07/22/2022    CHOL 185 04/22/2020    HDL 48 04/22/2020    TRIG 79 04/22/2020    ALT 13 04/13/2022    AST 20 04/13/2022       Assessment:  1. Essential hypertension    2. Hypothyroidism, unspecified type    3. Chronic migraine with aura    4. Polyarthritis    5. Morbid obesity ([address]    6. Neoplasm of uncertain behavior of skin      Patient Active Problem List   Diagnosis Code    Essential hypertension I10    Hypothyroidism E03.9    Idiopathic gout M10.00    Anxiety state F41.1    Moderate single current episode of major depressive disorder ([facility]) F32.1    Iron deficiency anemia D50.9    Lab test positive for detection of COVID-19 virus U07.1    Shortness of breath R06.02    Myalgia M79.10    Persistent vomiting R11.15    Uncontrollable vomiting R11.10    Intractable nausea and vomiting R11.2    Dizziness R42    Orthostatic hypotension I95.1    Diarrhea R19.7    Recurrent falls while walking R29.6    Abnormal cardiovascular stress test R94.39    Recurrent syncope R55    PSVT (paroxysmal supraventricular tachycardia) ([facility]) I47.1    Celiac disease K90.0    Recurrent acute suppurative otitis media without spontaneous rupture of left tympanic membrane H66.005     Plan:  1. Essential hypertension    2. Hypothyroidism, unspecified type    3. Chronic migraine with aura    4. Polyarthritis    5. Morbid obesity ([address]    6. Neoplasm of uncertain behavior of skin        Continue current medications  Discontinue trazodone states she is on Pamelor  Add prednisone 5 mg once a day for her multiple joint pains and myalgia  Start Wegovy for weight loss  Encouraged low sodium, low cholesterol, weight loss diet.     Goal for blood pressure controll is 130/80  Recommended regular exercise as tolerated, 3-5 times per day  Excision of skin lesion for biopsy   follow up in 3 months      No orders of the defined types were placed in this encounter. Current Outpatient Medications   Medication Sig Dispense Refill    predniSONE (DELTASONE) 5 MG tablet Take 1 tablet by mouth daily for 10 days 30 tablet 2    Semaglutide-Weight Management (WEGOVY) 0.25 MG/0.5ML SOAJ SC injection Inject 0.25 mg into the skin every 7 days 2 mL 0    furosemide (LASIX) 20 MG tablet TAKE 1 TABLET BY MOUTH EVERY OTHER DAY 30 tablet 0    nortriptyline (PAMELOR) 25 MG capsule Take 1 capsule by mouth nightly 30 capsule 1    levothyroxine (SYNTHROID) 100 MCG tablet Take 1 tablet by mouth once daily 90 tablet 0    dilTIAZem (CARDIZEM CD) 300 MG extended release capsule Take 1 capsule by mouth once daily 90 capsule 3    bisoprolol-hydroCHLOROthiazide (ZIAC) 10-6.25 MG per tablet Take 1 tablet by mouth daily 90 tablet 3    allopurinol (ZYLOPRIM) 100 MG tablet Take 1 tablet by mouth once daily 30 tablet 0    albuterol sulfate HFA (VENTOLIN HFA) 108 (90 Base) MCG/ACT inhaler Inhale 2 puffs into the lungs 4 times daily as needed for Wheezing 1 each 0    ALPRAZolam (XANAX) 0.25 MG tablet Take 0.25 mg by mouth daily as needed. buPROPion (WELLBUTRIN XL) 300 MG extended release tablet Take 300 mg by mouth every morning With 150 mg for total of 450 mg      buPROPion (WELLBUTRIN XL) 150 MG extended release tablet Take 150 mg by mouth daily With 300 mg for total of 450 mg       No current facility-administered medications for this visit. No follow-ups on file.       Electronically signed by Ayo Reyes MD on 3/3/2023 at 11:48 AM

## 2023-03-03 NOTE — PATIENT INSTRUCTIONS
SURVEY:    You may be receiving a survey from [employer] regarding your visit today. You may get this in the mail, through your MyChart, or in your email. Please complete the survey to enable us to provide the highest quality of care to you and your family. If you cannot score us a very good (5 Stars) on any question, please call the office to discuss how we could of made your experience exceptional.    Thank you!     Dr. Rowena Roque, LPN  LISSA Skelton, [address] [place] [address]    Phone: [phone]  Fax: [phone]    Office Hours:   Veto Payton, [address], F: 8-5

## 2023-03-06 ENCOUNTER — PROCEDURE VISIT (OUTPATIENT)
Dept: PRIMARY CARE CLINIC | Age: 53
End: 2023-03-06

## 2023-03-06 ENCOUNTER — HOSPITAL ENCOUNTER (OUTPATIENT)
Age: 53
Setting detail: SPECIMEN
Discharge: HOME | End: 2023-03-06

## 2023-03-06 DIAGNOSIS — L98.9 SKIN LESION OF LEFT ARM: ICD-10-CM

## 2023-03-06 DIAGNOSIS — L98.9 SKIN LESION OF RIGHT LOWER EXTREMITY: ICD-10-CM

## 2023-03-06 DIAGNOSIS — L57.0 ACTINIC KERATOSES: Primary | ICD-10-CM

## 2023-03-06 NOTE — PROGRESS NOTES
EXCISIONAL BIOPSY PROCEDURE NOTE    PRE-OP DIAGNOSIS:  rule-out Squamous Cell Carcinoma    PROCEDURE:  Skin Lesion Excision(s)        Lesion    Location:  lt  arm and    rt leg   Color:  pink    Diameter:  1 . 0.5 by 0.6 cm  2. 0.8 by 0.5 cm   Border and Symmetry:  irregular surface, with irregular borders. Inflammation:  present   Adhesion:   adherent to adjacent tissues         INDICATIONS:  Lorrie Bearden is a 47 y.o. female who presents for minor skin surgery for changing and concerning skin lesion(s). The patient understands all risks, benefits, indications, potential complications, and alternatives, and freely consents for the procedure. The patient also understands the option of performing no surgery, the risk for scarring, and the technique of the procedure. ANESTHESIA: Local    TECHNIQUE:  After informed consent was obtained, and after the skin was prepped and draped. Areas were cleansed with betadyne and alcohol. 4 cc of 1% lidocaine with epinephrine was used for anesthetic. It was injected around and underneath the site and good analgesic affect was obtained. A scalpel was used to excise an elliptical area of skin approximately 1.  0.8 cm by 0.7 cm   2. 1 cm by 0.6 cm. Lesions were excised completely with margin of normal skin. The wounds wereclosed with 4-0 Nylon using simple interrupted stitches. A sterile dressing applied. The specimen were sent for pathologic examination. A dressing was applied and wound care instructions were provided. She tolerated the procedure well and without complications. The patient will be alert for any signs of cutaneous infection and will follow up as instructed. EBL: Minimal    Condition: Stable    Complications:  None. Plan:  1. Instructed to keep the wound dry and covered for 24-48h and clean thereafter. 2. Warning signs of infection were reviewed. 3. Recommended that the patient use OTC acetaminophen as needed for pain.    4. Return for suture removal in 3 weeks.

## 2023-03-06 NOTE — PATIENT INSTRUCTIONS
SURVEY:    You may be receiving a survey from [employer] regarding your visit today. You may get this in the mail, through your MyChart, or in your email. Please complete the survey to enable us to provide the highest quality of care to you and your family. If you cannot score us a very good (5 Stars) on any question, please call the office to discuss how we could of made your experience exceptional.    Thank you!     Dr. Jalen Dobbs, LPVINOD Burrell, RN   Cindy Chiu, [address] [place] [address]    Phone: [phone]  Fax: [phone]    Office Hours:   Celia Resendiz, [address] [facility], F: 8-5

## 2023-03-08 LAB — DERMATOLOGY PATHOLOGY REPORT: NORMAL

## 2023-03-17 ENCOUNTER — TELEPHONE (OUTPATIENT)
Dept: PRIMARY CARE CLINIC | Age: 53
End: 2023-03-17

## 2023-03-17 NOTE — TELEPHONE ENCOUNTER
Patient informed that insurance has denied [place] and Eustaquio Jeans- denial reason states she needs to try and fail 90 days of therapy with Metformin first. Patient is agreebale to trying Metformin.  Script sent to [facility] per her request.

## 2023-03-20 ENCOUNTER — OFFICE VISIT (OUTPATIENT)
Dept: PRIMARY CARE CLINIC | Age: 53
End: 2023-03-20

## 2023-03-20 VITALS
WEIGHT: 263 LBS | BODY MASS INDEX: 43.82 KG/M2 | HEART RATE: 70 BPM | DIASTOLIC BLOOD PRESSURE: 84 MMHG | SYSTOLIC BLOOD PRESSURE: 128 MMHG | OXYGEN SATURATION: 95 % | TEMPERATURE: 97.4 F | RESPIRATION RATE: 18 BRPM | HEIGHT: 65 IN

## 2023-03-20 DIAGNOSIS — S81.801D OPEN WOUND OF RIGHT LOWER EXTREMITY, SUBSEQUENT ENCOUNTER: Primary | ICD-10-CM

## 2023-03-20 PROCEDURE — 99999 PR OFFICE/OUTPT VISIT,PROCEDURE ONLY: CPT | Performed by: FAMILY MEDICINE

## 2023-03-20 RX ORDER — TRAZODONE HYDROCHLORIDE 50 MG/1
TABLET ORAL
COMMUNITY
Start: 2023-03-05

## 2023-03-20 RX ORDER — SULFAMETHOXAZOLE AND TRIMETHOPRIM 800; 160 MG/1; MG/1
1 TABLET ORAL 2 TIMES DAILY
Qty: 20 TABLET | Refills: 0 | Status: SHIPPED | OUTPATIENT
Start: 2023-03-20 | End: 2023-03-30

## 2023-03-20 RX ORDER — DULOXETIN HYDROCHLORIDE 60 MG/1
CAPSULE, DELAYED RELEASE ORAL
COMMUNITY
Start: 2023-03-16

## 2023-03-20 RX ORDER — PROGESTERONE 100 MG/1
CAPSULE ORAL
COMMUNITY
Start: 2023-03-15

## 2023-03-20 NOTE — PATIENT INSTRUCTIONS
SURVEY:    You may be receiving a survey from [employer] regarding your visit today. You may get this in the mail, through your MyChart, or in your email. Please complete the survey to enable us to provide the highest quality of care to you and your family. If you cannot score us a very good (5 Stars) on any question, please call the office to discuss how we could of made your experience exceptional.    Thank you!     FANI Madsen, LISSA Ren, [address] [place] [address]    Phone: [phone]  Fax: [phone]    Office Hours:   Norman Mayes, [address] [facility], F: 8-5

## 2023-03-20 NOTE — PROGRESS NOTES
Wound Check and Suture / Staple Removal     Patient here for suture removal of left wrist and concerns of infection to right leg incision. Patient reports increased redness and swelling to incision area on lower right leg. Patient reports increased redness and tenderness x 1 day.  Denies pain

## 2023-03-28 PROCEDURE — 93298 REM INTERROG DEV EVAL SCRMS: CPT | Performed by: FAMILY MEDICINE

## 2023-03-29 DIAGNOSIS — E03.9 HYPOTHYROIDISM, UNSPECIFIED TYPE: ICD-10-CM

## 2023-03-29 RX ORDER — LEVOTHYROXINE SODIUM 0.1 MG/1
TABLET ORAL
Qty: 90 TABLET | Refills: 0 | Status: SHIPPED | OUTPATIENT
Start: 2023-03-29

## 2023-04-03 ENCOUNTER — OFFICE VISIT (OUTPATIENT)
Dept: NEUROLOGY | Age: 53
End: 2023-04-03
Payer: COMMERCIAL

## 2023-04-03 VITALS
DIASTOLIC BLOOD PRESSURE: 67 MMHG | HEART RATE: 70 BPM | SYSTOLIC BLOOD PRESSURE: 123 MMHG | HEIGHT: 65 IN | TEMPERATURE: 96.2 F | RESPIRATION RATE: 16 BRPM | WEIGHT: 251.7 LBS | BODY MASS INDEX: 41.94 KG/M2

## 2023-04-03 DIAGNOSIS — G60.9 IDIOPATHIC SMALL FIBER PERIPHERAL NEUROPATHY: ICD-10-CM

## 2023-04-03 DIAGNOSIS — G43.119 INTRACTABLE MIGRAINE WITH AURA WITHOUT STATUS MIGRAINOSUS: Primary | ICD-10-CM

## 2023-04-03 PROCEDURE — 99213 OFFICE O/P EST LOW 20 MIN: CPT | Performed by: NEUROMUSCULOSKELETAL MEDICINE, SPORTS MEDICINE

## 2023-04-03 PROCEDURE — 3078F DIAST BP <80 MM HG: CPT | Performed by: NEUROMUSCULOSKELETAL MEDICINE, SPORTS MEDICINE

## 2023-04-03 PROCEDURE — G8417 CALC BMI ABV UP PARAM F/U: HCPCS | Performed by: NEUROMUSCULOSKELETAL MEDICINE, SPORTS MEDICINE

## 2023-04-03 PROCEDURE — 3074F SYST BP LT 130 MM HG: CPT | Performed by: NEUROMUSCULOSKELETAL MEDICINE, SPORTS MEDICINE

## 2023-04-03 PROCEDURE — G8427 DOCREV CUR MEDS BY ELIG CLIN: HCPCS | Performed by: NEUROMUSCULOSKELETAL MEDICINE, SPORTS MEDICINE

## 2023-04-03 PROCEDURE — 1036F TOBACCO NON-USER: CPT | Performed by: NEUROMUSCULOSKELETAL MEDICINE, SPORTS MEDICINE

## 2023-04-03 PROCEDURE — 3017F COLORECTAL CA SCREEN DOC REV: CPT | Performed by: NEUROMUSCULOSKELETAL MEDICINE, SPORTS MEDICINE

## 2023-04-03 RX ORDER — NORTRIPTYLINE HYDROCHLORIDE 25 MG/1
50 CAPSULE ORAL NIGHTLY
Qty: 60 CAPSULE | Refills: 5 | Status: SHIPPED | OUTPATIENT
Start: 2023-04-03 | End: 2023-05-03

## 2023-04-03 RX ORDER — RIZATRIPTAN BENZOATE 10 MG/1
10 TABLET ORAL
Qty: 9 TABLET | Refills: 5 | Status: SHIPPED | OUTPATIENT
Start: 2023-04-03 | End: 2023-04-03

## 2023-04-03 NOTE — PROGRESS NOTES
disorder: absent      Past Medical History:   Diagnosis Date    Anemia, unspecified     Anxiety     Celiac disease     Edema     H/O cardiovascular stress test 03/08/2021    [place] treadmill score is 0    H/O echocardiogram 01/26/2021    EF >60% Normal study    History of cardiac cath 04/01/2021    [facility]) Nia/Dr. Little/Right Radial     History of cardiovascular stress test 03/22/2021    Equivocal study. Small perfusion defect of mild intensity in the anterior and anteroseptal regions during stress and rest imaging, which is most consistent with artifact EF 71% Duke Treadmill score is -2intermediate risk. History of normal Holter exam 02/22/2021    CAM 6 days 19 hrs Normal sinus. AT 2 episodes Longest/fastest 9 beats at ave 127 bpm p to 153 bpm PAC 0.08% PVC 0.07%    Hypertension     Hypothyroidism     Thyroid disease     Tilt table evaluation 03/22/2021    Negative study Although sensitivity of study certainly is not 110% relatively normal results signifcantly decrease the likelihood of neurocardiogenic source for pt symptoms. Clinically suspision remains high possible repeat study maybe indicated       Past Surgical History:   Procedure Laterality Date    ANTERIOR CRUCIATE LIGAMENT REPAIR Left     HYSTERECTOMY, VAGINAL      07/2020 robotic, lap supracervical hyst and BS, perineopexy, A&P repair, cysto, Lynx retropubic slin    INFECTED SKIN DEBRIDEMENT Left     Left third finger    INSERTABLE CARDIAC MONITOR  05/20/2021       Social History     Socioeconomic History    Marital status: Single     Spouse name: Not on file    Number of children: Not on file    Years of education: Not on file    Highest education level: Not on file   Occupational History    Occupation: nurse aid   Tobacco Use    Smoking status: Never    Smokeless tobacco: Never   Vaping Use    Vaping Use: Never used   Substance and Sexual Activity    Alcohol use: Yes     Alcohol/week: 0.0 standard drinks     Comment: rarely    Drug use:  No

## 2023-04-03 NOTE — PATIENT INSTRUCTIONS
SURVEY:    You may be receiving a survey from [employer] regarding your visit today. Please complete the survey to enable us to provide the highest quality of care to you and your family. If you cannot score us a very good on any question, please call the office to discuss how we could have made your experience a very good one. Thank you.

## 2023-04-06 ENCOUNTER — NURSE ONLY (OUTPATIENT)
Dept: CARDIOLOGY | Age: 53
End: 2023-04-06
Payer: COMMERCIAL

## 2023-04-06 DIAGNOSIS — Z45.09 ENCOUNTER FOR LOOP RECORDER CHECK: ICD-10-CM

## 2023-04-06 DIAGNOSIS — R55 SYNCOPE, UNSPECIFIED SYNCOPE TYPE: Primary | ICD-10-CM

## 2023-04-26 NOTE — PROGRESS NOTES
Phone: Corinne           Fax: [phone]                           Outpatient Physical Therapy                                                                            Daily Note    Patient: Luis Miguel Arana : 1970  CSN #: [id number]   Referring Practitioner:  Dr. Shahzad King    Referral Date : 03/15/22     Date: 3/18/2022    Diagnosis: I89.0 Lympgedema BLE/BUE  Treatment Diagnosis: BLE/BUE/abdominal lymphedema    Onset Date: 03/15/22  PT Insurance Information: Beryl  Total # of Visits Approved: 18 Per Physician Order  Total # of Visits to Date: 2  No Show: 0  Canceled Appointment: 0      Pre-Treatment Pain:  0/10  Subjective: Pt denies pain today, states after her working shift B LE and UE are swollen and painful, she wears compresson socks all day. Exercises:  Exercise 1: HEP Pt educated on keeping her BLE/BUE compressed at 30mmHg and elevated throughout the day, performing exercises daily and reducing sugar and sodium in her diet    Manual:  Soft Tissue Mobalization: MLD to B LE/UE    Modalities:          Assessment  Assessment: MLD to B LE/UE followed by pumps to B LE and L UE at 45 mmHg for 60 min. Pt denies pain with and post pumps, noted decrease in B feet and L wrist edema after pumps. Compression given for B UE. Activity Tolerance       Patient Education  Compression to B UE and LE during day. Pt verbalized/demonstrated good understanding:     [x] Yes         [] No, pt required further clarification.        Post Treatment Pain:  0/10      Plan  Times per week: 2x/wk  Plan weeks: 4-6 weks      Goals  (Total # of Visits to Date: 2)      Short term goals  Time Frame for Short term goals: 3 weeks  Short term goal 1: Pt willbe educated on her POC and HEP-met  Short term goal 2: Pt will initiate pump protocol to BLE/BUE-met    Long term goals  Time Frame for Long term goals : 6 weeks  Long term goal 1: Pt will be safe and independent with her lymphedema management  Long term goal 2: Pt will demonstrate a 2-3cm decrease in girth measurements in BUE/BLE in order to improve quality of life  Long term goal 3: Pt will be fitted for an at home pump  Long term goal 4: Pt will report 50% improvement in symptoms    Minutes Tracking:  Time In: 0802  Time Out: [address]  Minutes: [address], [place]     Date: 3/18/2022 yes yes

## 2023-06-09 ENCOUNTER — OFFICE VISIT (OUTPATIENT)
Dept: PRIMARY CARE CLINIC | Age: 53
End: 2023-06-09
Payer: COMMERCIAL

## 2023-06-09 ENCOUNTER — HOSPITAL ENCOUNTER (OUTPATIENT)
Age: 53
Discharge: HOME | End: 2023-06-09
Payer: COMMERCIAL

## 2023-06-09 VITALS
RESPIRATION RATE: 18 BRPM | HEART RATE: 97 BPM | BODY MASS INDEX: 42.65 KG/M2 | WEIGHT: 256 LBS | OXYGEN SATURATION: 97 % | TEMPERATURE: 96 F | HEIGHT: 65 IN | DIASTOLIC BLOOD PRESSURE: 80 MMHG | SYSTOLIC BLOOD PRESSURE: 122 MMHG

## 2023-06-09 DIAGNOSIS — Z13.220 LIPID SCREENING: ICD-10-CM

## 2023-06-09 DIAGNOSIS — E03.9 HYPOTHYROIDISM, UNSPECIFIED TYPE: ICD-10-CM

## 2023-06-09 DIAGNOSIS — F32.1 MODERATE SINGLE CURRENT EPISODE OF MAJOR DEPRESSIVE DISORDER (HCC): ICD-10-CM

## 2023-06-09 DIAGNOSIS — I10 ESSENTIAL HYPERTENSION: Primary | ICD-10-CM

## 2023-06-09 DIAGNOSIS — M10.9 GOUT, UNSPECIFIED CAUSE, UNSPECIFIED CHRONICITY, UNSPECIFIED SITE: ICD-10-CM

## 2023-06-09 DIAGNOSIS — I89.0 LYMPHEDEMA: ICD-10-CM

## 2023-06-09 DIAGNOSIS — D50.9 IRON DEFICIENCY ANEMIA, UNSPECIFIED IRON DEFICIENCY ANEMIA TYPE: ICD-10-CM

## 2023-06-09 DIAGNOSIS — E66.01 MORBID OBESITY (HCC): ICD-10-CM

## 2023-06-09 DIAGNOSIS — I10 ESSENTIAL HYPERTENSION: ICD-10-CM

## 2023-06-09 LAB
ALBUMIN SERPL-MCNC: 4.5 G/DL (ref 3.5–5.2)
ALBUMIN/GLOB SERPL: 1.6 {RATIO} (ref 1–2.5)
ALP SERPL-CCNC: 74 U/L (ref 35–104)
ALT SERPL-CCNC: 94 U/L (ref 5–33)
ANION GAP SERPL CALCULATED.3IONS-SCNC: 12 MMOL/L (ref 9–17)
AST SERPL-CCNC: 78 U/L
BILIRUB SERPL-MCNC: 0.5 MG/DL (ref 0.3–1.2)
BUN SERPL-MCNC: 8 MG/DL (ref 6–20)
BUN/CREAT SERPL: 9 (ref 9–20)
CALCIUM SERPL-MCNC: 9.5 MG/DL (ref 8.6–10.4)
CHLORIDE SERPL-SCNC: 106 MMOL/L (ref 98–107)
CHOLEST SERPL-MCNC: 224 MG/DL
CHOLESTEROL/HDL RATIO: 5.7
CO2 SERPL-SCNC: 27 MMOL/L (ref 20–31)
CREAT SERPL-MCNC: 0.89 MG/DL (ref 0.5–0.9)
ERYTHROCYTE [DISTWIDTH] IN BLOOD BY AUTOMATED COUNT: 13.4 % (ref 11.8–14.4)
GFR SERPL CREATININE-BSD FRML MDRD: >60 ML/MIN/1.73M2
GLUCOSE SERPL-MCNC: 111 MG/DL (ref 70–99)
HCT VFR BLD AUTO: 43.7 % (ref 36.3–47.1)
HDLC SERPL-MCNC: 39 MG/DL
HGB BLD-MCNC: 14.4 G/DL (ref 11.9–15.1)
IRON SATN MFR SERPL: 23 % (ref 20–55)
IRON SERPL-MCNC: 77 UG/DL (ref 37–145)
LDLC SERPL CALC-MCNC: 119 MG/DL (ref 0–130)
MCH RBC QN AUTO: 32.4 PG (ref 25.2–33.5)
MCHC RBC AUTO-ENTMCNC: 33 G/DL (ref 28.4–34.8)
MCV RBC AUTO: 98.2 FL (ref 82.6–102.9)
NRBC AUTOMATED: 0 PER 100 WBC
PLATELET # BLD AUTO: 276 K/UL (ref 138–453)
PMV BLD AUTO: 9.7 FL (ref 8.1–13.5)
POTASSIUM SERPL-SCNC: 4.2 MMOL/L (ref 3.7–5.3)
PROT SERPL-MCNC: 7.3 G/DL (ref 6.4–8.3)
RBC # BLD AUTO: 4.45 M/UL (ref 3.95–5.11)
SODIUM SERPL-SCNC: 145 MMOL/L (ref 135–144)
TIBC SERPL-MCNC: 329 UG/DL (ref 250–450)
TRIGL SERPL-MCNC: 328 MG/DL
TSH SERPL-MCNC: 1.7 UIU/ML (ref 0.3–5)
UNSATURATED IRON BINDING CAPACITY: 252 UG/DL (ref 112–347)
URATE SERPL-MCNC: 7 MG/DL (ref 2.4–5.7)
WBC OTHER # BLD: 4.4 K/UL (ref 3.5–11.3)

## 2023-06-09 PROCEDURE — 3074F SYST BP LT 130 MM HG: CPT | Performed by: NURSE PRACTITIONER

## 2023-06-09 PROCEDURE — 80061 LIPID PANEL: CPT

## 2023-06-09 PROCEDURE — 36415 COLL VENOUS BLD VENIPUNCTURE: CPT

## 2023-06-09 PROCEDURE — G8417 CALC BMI ABV UP PARAM F/U: HCPCS | Performed by: NURSE PRACTITIONER

## 2023-06-09 PROCEDURE — 84550 ASSAY OF BLOOD/URIC ACID: CPT

## 2023-06-09 PROCEDURE — 99214 OFFICE O/P EST MOD 30 MIN: CPT | Performed by: NURSE PRACTITIONER

## 2023-06-09 PROCEDURE — 83540 ASSAY OF IRON: CPT

## 2023-06-09 PROCEDURE — 1036F TOBACCO NON-USER: CPT | Performed by: NURSE PRACTITIONER

## 2023-06-09 PROCEDURE — 83550 IRON BINDING TEST: CPT

## 2023-06-09 PROCEDURE — 3079F DIAST BP 80-89 MM HG: CPT | Performed by: NURSE PRACTITIONER

## 2023-06-09 PROCEDURE — 85027 COMPLETE CBC AUTOMATED: CPT

## 2023-06-09 PROCEDURE — 80053 COMPREHEN METABOLIC PANEL: CPT

## 2023-06-09 PROCEDURE — 3017F COLORECTAL CA SCREEN DOC REV: CPT | Performed by: NURSE PRACTITIONER

## 2023-06-09 PROCEDURE — G8428 CUR MEDS NOT DOCUMENT: HCPCS | Performed by: NURSE PRACTITIONER

## 2023-06-09 PROCEDURE — 84443 ASSAY THYROID STIM HORMONE: CPT

## 2023-06-09 RX ORDER — FUROSEMIDE 20 MG/1
20 TABLET ORAL EVERY OTHER DAY
Qty: 45 TABLET | Refills: 3 | Status: SHIPPED | OUTPATIENT
Start: 2023-06-09

## 2023-06-09 ASSESSMENT — ENCOUNTER SYMPTOMS: SHORTNESS OF BREATH: 0

## 2023-06-09 NOTE — PATIENT INSTRUCTIONS
SURVEY:    You may be receiving a survey from [employer] regarding your visit today. Please complete the survey to enable us to provide the highest quality of care to you and your family. If you cannot score us a very good on any question, please call the office to discuss how we could of made your experience a very good one. Thank you.

## 2023-06-09 NOTE — PROGRESS NOTES
and symptoms and when to seek care at the emergency department and/or call 911     Completed Refills   Requested Prescriptions      No prescriptions requested or ordered in this encounter       Orders Placed This Encounter   Procedures    Uric Acid     Standing Status:   Future     Standing Expiration Date:   6/9/2024    Iron and TIBC     Standing Status:   Future     Standing Expiration Date:   6/9/2024    Lipid Panel     Standing Status:   Future     Standing Expiration Date:   6/8/2024    Comprehensive Metabolic Panel     Standing Status:   Future     Standing Expiration Date:   6/8/2024    CBC     Standing Status:   Future     Standing Expiration Date:   6/8/2024    TSH With Reflex Ft4     Standing Status:   Future     Standing Expiration Date:   6/9/2024        No results found for this visit on 06/09/23. Return in about 3 months (around 9/9/2023), or if symptoms worsen or fail to improve, for 3 month f/u .     Electronically signed by BRADLEY Villa CNP on 06/09/23 at 12:15 PM.

## 2023-06-12 PROBLEM — E78.2 MODERATE MIXED HYPERLIPIDEMIA NOT REQUIRING STATIN THERAPY: Status: ACTIVE | Noted: 2023-06-12

## 2023-06-16 ENCOUNTER — HOSPITAL ENCOUNTER (OUTPATIENT)
Dept: ULTRASOUND IMAGING | Age: 53
Discharge: HOME | End: 2023-06-18
Payer: COMMERCIAL

## 2023-06-16 DIAGNOSIS — K76.0 HEPATIC STEATOSIS: ICD-10-CM

## 2023-06-16 DIAGNOSIS — R16.0 HEPATOMEGALY: ICD-10-CM

## 2023-06-16 DIAGNOSIS — R74.8 ELEVATED LIVER ENZYMES: ICD-10-CM

## 2023-06-16 PROCEDURE — 76705 ECHO EXAM OF ABDOMEN: CPT

## 2023-06-18 PROBLEM — R16.0 HEPATOMEGALY: Status: ACTIVE | Noted: 2023-06-18

## 2023-06-18 PROBLEM — K76.0 HEPATIC STEATOSIS: Status: ACTIVE | Noted: 2023-06-18

## 2023-06-26 ENCOUNTER — HOSPITAL ENCOUNTER (OUTPATIENT)
Age: 53
Discharge: HOME | End: 2023-06-26
Payer: COMMERCIAL

## 2023-06-26 LAB
A1AT SERPL-MCNC: 123 MG/DL (ref 90–200)
C DIFF GDH + TOXINS A+B STL QL IA.RAPID: NEGATIVE
CERULOPLASMIN SERPL-MCNC: 21 MG/DL (ref 16–45)
FERRITIN SERPL-MCNC: 53 NG/ML (ref 13–150)
IGA SERPL-MCNC: 163 MG/DL (ref 70–400)
IGG SERPL-MCNC: 832 MG/DL (ref 700–1600)
IGM SERPL-MCNC: 69 MG/DL (ref 40–230)
IRON SATN MFR SERPL: 40 % (ref 20–55)
IRON SERPL-MCNC: 126 UG/DL (ref 37–145)
SPECIMEN DESCRIPTION: NORMAL
T4 FREE SERPL-MCNC: 1.5 NG/DL (ref 0.9–1.7)
TIBC SERPL-MCNC: 314 UG/DL (ref 250–450)
UNSATURATED IRON BINDING CAPACITY: 188 UG/DL (ref 112–347)

## 2023-06-26 PROCEDURE — 87324 CLOSTRIDIUM AG IA: CPT

## 2023-06-26 PROCEDURE — 86225 DNA ANTIBODY NATIVE: CPT

## 2023-06-26 PROCEDURE — 83516 IMMUNOASSAY NONANTIBODY: CPT

## 2023-06-26 PROCEDURE — 86376 MICROSOMAL ANTIBODY EACH: CPT

## 2023-06-26 PROCEDURE — 36415 COLL VENOUS BLD VENIPUNCTURE: CPT

## 2023-06-26 PROCEDURE — 83540 ASSAY OF IRON: CPT

## 2023-06-26 PROCEDURE — 83520 IMMUNOASSAY QUANT NOS NONAB: CPT

## 2023-06-26 PROCEDURE — 87449 NOS EACH ORGANISM AG IA: CPT

## 2023-06-26 PROCEDURE — 86038 ANTINUCLEAR ANTIBODIES: CPT

## 2023-06-26 PROCEDURE — 84439 ASSAY OF FREE THYROXINE: CPT

## 2023-06-26 PROCEDURE — 83550 IRON BINDING TEST: CPT

## 2023-06-26 PROCEDURE — 82728 ASSAY OF FERRITIN: CPT

## 2023-06-26 PROCEDURE — 87329 GIARDIA AG IA: CPT

## 2023-06-26 PROCEDURE — 87328 CRYPTOSPORIDIUM AG IA: CPT

## 2023-06-26 PROCEDURE — 87506 IADNA-DNA/RNA PROBE TQ 6-11: CPT

## 2023-06-26 PROCEDURE — 82390 ASSAY OF CERULOPLASMIN: CPT

## 2023-06-26 PROCEDURE — 87338 HPYLORI STOOL AG IA: CPT

## 2023-06-26 PROCEDURE — 83993 ASSAY FOR CALPROTECTIN FECAL: CPT

## 2023-06-26 PROCEDURE — 82103 ALPHA-1-ANTITRYPSIN TOTAL: CPT

## 2023-06-26 PROCEDURE — 82784 ASSAY IGA/IGD/IGG/IGM EACH: CPT

## 2023-06-27 LAB
C PARVUM AG STL QL IA: NEGATIVE
CAMPYLOBACTER DNA SPEC NAA+PROBE: NORMAL
ETEC ELTA+ESTB GENES STL QL NAA+PROBE: NORMAL
G LAMBLIA AG STL QL IA: NEGATIVE
MICROORGANISM/AGENT SPEC: NEGATIVE
P SHIGELLOIDES DNA STL QL NAA+PROBE: NORMAL
SALMONELLA DNA SPEC QL NAA+PROBE: NORMAL
SHIGA TOXIN STX GENE SPEC NAA+PROBE: NORMAL
SHIGELLA DNA SPEC QL NAA+PROBE: NORMAL
SOURCE: NORMAL
SPECIMEN DESCRIPTION: NORMAL
V CHOL+PARA RFBL+TRKH+TNAA STL QL NAA+PR: NORMAL
Y ENTERO RECN STL QL NAA+PROBE: NORMAL

## 2023-06-28 ENCOUNTER — NURSE ONLY (OUTPATIENT)
Dept: CARDIOLOGY | Age: 53
End: 2023-06-28

## 2023-06-28 DIAGNOSIS — Z45.09 ENCOUNTER FOR LOOP RECORDER CHECK: ICD-10-CM

## 2023-06-28 DIAGNOSIS — R55 SYNCOPE, UNSPECIFIED SYNCOPE TYPE: Primary | ICD-10-CM

## 2023-06-28 LAB
CALPROTECTIN, FECAL: 67 UG/G
SMOOTH MUSCLE ANTIBODY: 6 UNITS (ref 0–19)

## 2023-06-29 LAB
ANA SER QL IA: NEGATIVE
DSDNA IGG SER QL IA: 2.6 IU/ML
FECAL PANCREATIC ELASTASE-1: >800 UG/G
GLIADIN IGA SER IA-ACNC: 4 U/ML
GLIADIN IGG SER IA-ACNC: 17 U/ML
LKM AB TITR SER IF: NORMAL {TITER}
MITOCHONDRIA M2 IGG SER-ACNC: 1 U/ML (ref 0–4)
NUCLEAR IGG SER IA-RTO: 0.1 U/ML
TISSUE TRANSGLUTAMINASE ANTIBODY IGG: 0.9 U/ML
TTG IGA SER IA-ACNC: 2.1 U/ML

## 2023-07-14 ENCOUNTER — OFFICE VISIT (OUTPATIENT)
Dept: PRIMARY CARE CLINIC | Age: 53
End: 2023-07-14
Payer: COMMERCIAL

## 2023-07-14 VITALS
RESPIRATION RATE: 18 BRPM | OXYGEN SATURATION: 97 % | SYSTOLIC BLOOD PRESSURE: 132 MMHG | HEART RATE: 84 BPM | BODY MASS INDEX: 42.63 KG/M2 | DIASTOLIC BLOOD PRESSURE: 86 MMHG | WEIGHT: 256.2 LBS

## 2023-07-14 DIAGNOSIS — I10 ESSENTIAL HYPERTENSION: ICD-10-CM

## 2023-07-14 DIAGNOSIS — K76.0 HEPATIC STEATOSIS: ICD-10-CM

## 2023-07-14 DIAGNOSIS — E66.01 CLASS 3 SEVERE OBESITY WITH BODY MASS INDEX (BMI) OF 40.0 TO 44.9 IN ADULT, UNSPECIFIED OBESITY TYPE, UNSPECIFIED WHETHER SERIOUS COMORBIDITY PRESENT (HCC): Primary | ICD-10-CM

## 2023-07-14 PROBLEM — E66.813 CLASS 3 SEVERE OBESITY WITH BODY MASS INDEX (BMI) OF 40.0 TO 44.9 IN ADULT: Status: ACTIVE | Noted: 2023-07-14

## 2023-07-14 PROCEDURE — G8427 DOCREV CUR MEDS BY ELIG CLIN: HCPCS | Performed by: STUDENT IN AN ORGANIZED HEALTH CARE EDUCATION/TRAINING PROGRAM

## 2023-07-14 PROCEDURE — 3079F DIAST BP 80-89 MM HG: CPT | Performed by: STUDENT IN AN ORGANIZED HEALTH CARE EDUCATION/TRAINING PROGRAM

## 2023-07-14 PROCEDURE — 99214 OFFICE O/P EST MOD 30 MIN: CPT | Performed by: STUDENT IN AN ORGANIZED HEALTH CARE EDUCATION/TRAINING PROGRAM

## 2023-07-14 PROCEDURE — G8417 CALC BMI ABV UP PARAM F/U: HCPCS | Performed by: STUDENT IN AN ORGANIZED HEALTH CARE EDUCATION/TRAINING PROGRAM

## 2023-07-14 PROCEDURE — 3017F COLORECTAL CA SCREEN DOC REV: CPT | Performed by: STUDENT IN AN ORGANIZED HEALTH CARE EDUCATION/TRAINING PROGRAM

## 2023-07-14 PROCEDURE — 3075F SYST BP GE 130 - 139MM HG: CPT | Performed by: STUDENT IN AN ORGANIZED HEALTH CARE EDUCATION/TRAINING PROGRAM

## 2023-07-14 PROCEDURE — 1036F TOBACCO NON-USER: CPT | Performed by: STUDENT IN AN ORGANIZED HEALTH CARE EDUCATION/TRAINING PROGRAM

## 2023-07-14 RX ORDER — SEMAGLUTIDE 0.25 MG/.5ML
0.25 INJECTION, SOLUTION SUBCUTANEOUS
Qty: 4 ML | Refills: 1 | Status: SHIPPED | OUTPATIENT
Start: 2023-07-14

## 2023-07-14 NOTE — PATIENT INSTRUCTIONS
SURVEY:    You may be receiving a survey from [employer] regarding your visit today. Please complete the survey to enable us to provide the highest quality of care to you and your family. If you cannot score us a very good on any question, please call the office to discuss how we could have made your experience a very good one. Thank you.

## 2023-07-14 NOTE — PROGRESS NOTES
[address], [place] [address] [place], [place], [address] is a 47 y.o. female with  has a past medical history of Anemia, unspecified, Anxiety, Celiac disease, Edema, H/O cardiovascular stress test, H/O echocardiogram, History of cardiac cath, History of cardiovascular stress test, History of normal Holter exam, Hypertension, Hypothyroidism, Thyroid disease, and Tilt table evaluation. Presented to the office today for:  Chief Complaint   Patient presents with    Weight Management     Assessment/Plan   1. Class 3 severe obesity with body mass index (BMI) of 40.0 to 44.9 in adult, unspecified obesity type, unspecified whether serious comorbidity present ([address])  -     Semaglutide-Weight Management (WEGOVY) 0.25 MG/0.5ML SOAJ SC injection; Inject 0.25 mg into the skin every 7 days, Disp-4 mL, R-1Normal  2. Essential hypertension  -     Semaglutide-Weight Management (WEGOVY) 0.25 MG/0.5ML SOAJ SC injection; Inject 0.25 mg into the skin every 7 days, Disp-4 mL, R-1Normal  3. Hepatic steatosis  -     Semaglutide-Weight Management (WEGOVY) 0.25 MG/0.5ML SOAJ SC injection; Inject 0.25 mg into the skin every 7 days, Disp-4 mL, R-1Normal    Return in about 1 month (around 8/14/2023) for F/U 1 Month/Wt. Diet- Low calories diet, Continue with 13-1400kcal/day to be started  Exercise - 150+ minutes of moderate-high intensity exercise on a regular basis  Continue w/ metformin at 500mg BID, Wellbutrin. The insurance had denied other medications in the past.  Discussed RBA of various medications/alternatives today. Depending on coverage, plan for Adipex  Journal/dietary log to be brought to the next appointment. F/U with GI per prior plans    Last PDMP Pb as Reviewed:  Review User Review Instant Review Result   Denizjenaro Cha 7/14/2023  8:56 AM Reviewed PDMP [1]         All patient questions answered. Pt voiced understanding.    There are no discontinued

## 2023-08-04 DIAGNOSIS — E03.9 HYPOTHYROIDISM, UNSPECIFIED TYPE: ICD-10-CM

## 2023-08-04 DIAGNOSIS — E66.01 CLASS 3 SEVERE OBESITY WITH BODY MASS INDEX (BMI) OF 40.0 TO 44.9 IN ADULT, UNSPECIFIED OBESITY TYPE, UNSPECIFIED WHETHER SERIOUS COMORBIDITY PRESENT (HCC): Primary | ICD-10-CM

## 2023-08-04 RX ORDER — LEVOTHYROXINE SODIUM 0.1 MG/1
100 TABLET ORAL DAILY
Qty: 90 TABLET | Refills: 0 | Status: SHIPPED | OUTPATIENT
Start: 2023-08-04

## 2023-08-04 RX ORDER — PHENTERMINE HYDROCHLORIDE 37.5 MG/1
37.5 CAPSULE ORAL EVERY MORNING
Qty: 30 CAPSULE | Refills: 0 | Status: SHIPPED | OUTPATIENT
Start: 2023-08-04 | End: 2023-09-03

## 2023-08-14 ENCOUNTER — OFFICE VISIT (OUTPATIENT)
Dept: PRIMARY CARE CLINIC | Age: 53
End: 2023-08-14
Payer: COMMERCIAL

## 2023-08-14 VITALS
SYSTOLIC BLOOD PRESSURE: 130 MMHG | TEMPERATURE: 97.7 F | DIASTOLIC BLOOD PRESSURE: 80 MMHG | HEIGHT: 65 IN | HEART RATE: 73 BPM | BODY MASS INDEX: 41.82 KG/M2 | WEIGHT: 251 LBS | OXYGEN SATURATION: 98 % | RESPIRATION RATE: 18 BRPM

## 2023-08-14 DIAGNOSIS — S20.462A INSECT BITE OF LEFT BACK WALL OF THORAX, INITIAL ENCOUNTER: ICD-10-CM
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
## 2023-08-14 DIAGNOSIS — I89.0 LYMPHEDEMA: Primary | ICD-10-CM

## 2023-08-14 PROCEDURE — G8427 DOCREV CUR MEDS BY ELIG CLIN: HCPCS | Performed by: NURSE PRACTITIONER

## 2023-08-14 PROCEDURE — 1036F TOBACCO NON-USER: CPT | Performed by: NURSE PRACTITIONER

## 2023-08-14 PROCEDURE — 3078F DIAST BP <80 MM HG: CPT | Performed by: NURSE PRACTITIONER

## 2023-08-14 PROCEDURE — 99214 OFFICE O/P EST MOD 30 MIN: CPT | Performed by: NURSE PRACTITIONER

## 2023-08-14 PROCEDURE — G8417 CALC BMI ABV UP PARAM F/U: HCPCS | Performed by: NURSE PRACTITIONER

## 2023-08-14 PROCEDURE — 3017F COLORECTAL CA SCREEN DOC REV: CPT | Performed by: NURSE PRACTITIONER

## 2023-08-14 PROCEDURE — 3074F SYST BP LT 130 MM HG: CPT | Performed by: NURSE PRACTITIONER

## 2023-08-14 RX ORDER — DOXYCYCLINE HYCLATE 100 MG
100 TABLET ORAL 2 TIMES DAILY
Qty: 14 TABLET | Refills: 0 | Status: SHIPPED | OUTPATIENT
Start: 2023-08-14 | End: 2023-08-21

## 2023-08-14 NOTE — PROGRESS NOTES
Physical Exam  Constitutional:       General: She is not in acute distress. Appearance: Normal appearance. She is obese. She is not ill-appearing or toxic-appearing. Cardiovascular:      Rate and Rhythm: Normal rate and regular rhythm. Heart sounds: Normal heart sounds. No murmur heard. Pulmonary:      Effort: Pulmonary effort is normal. No respiratory distress. Breath sounds: Normal breath sounds. No stridor. No wheezing, rhonchi or rales. Musculoskeletal:      Comments: Non-pitting BLE with dilated tortuous veins    Skin:            Comments: Two separate erythematous papules with pustular appearance located on back as noted in diagram. +surrounding erythema    Neurological:      Mental Status: She is alert. Psychiatric:         Mood and Affect: Mood normal.         Behavior: Behavior normal.         Thought Content:  Thought content normal.         Judgment: Judgment normal.       Data:     Lab Results   Component Value Date/Time     06/09/2023 12:37 PM    K 4.2 06/09/2023 12:37 PM     06/09/2023 12:37 PM    CO2 27 06/09/2023 12:37 PM    BUN 8 06/09/2023 12:37 PM    CREATININE 0.89 06/09/2023 12:37 PM    GLUCOSE 111 06/09/2023 12:37 PM    GLUCOSE 96 02/17/2021 10:05 AM    PROT 7.3 06/09/2023 12:37 PM    LABALBU 4.5 06/09/2023 12:37 PM    BILITOT 0.5 06/09/2023 12:37 PM    ALKPHOS 74 06/09/2023 12:37 PM    AST 78 06/09/2023 12:37 PM    ALT 94 06/09/2023 12:37 PM     Lab Results   Component Value Date/Time    WBC 4.4 06/09/2023 12:37 PM    RBC 4.45 06/09/2023 12:37 PM    RBC 4.56 01/20/2021 10:16 AM    HGB 14.4 06/09/2023 12:37 PM    HCT 43.7 06/09/2023 12:37 PM    MCV 98.2 06/09/2023 12:37 PM    MCH 32.4 06/09/2023 12:37 PM    MCHC 33.0 06/09/2023 12:37 PM    RDW 13.4 06/09/2023 12:37 PM     06/09/2023 12:37 PM    MPV 9.7 06/09/2023 12:37 PM     Lab Results   Component Value Date/Time    TSH 1.70 06/09/2023 12:37 PM     Lab Results   Component Value Date/Time    CHOL 224

## 2023-08-14 NOTE — PATIENT INSTRUCTIONS
SURVEY:    You may be receiving a survey from [employer] regarding your visit today. Please complete the survey to enable us to provide the highest quality of care to you and your family. If you cannot score us a very good on any question, please call the office to discuss how we could of made your experience a very good one. Thank you.

## 2023-08-24 ENCOUNTER — OFFICE VISIT (OUTPATIENT)
Dept: PRIMARY CARE CLINIC | Age: 53
End: 2023-08-24
Payer: COMMERCIAL

## 2023-08-24 VITALS
HEIGHT: 65 IN | DIASTOLIC BLOOD PRESSURE: 80 MMHG | RESPIRATION RATE: 18 BRPM | SYSTOLIC BLOOD PRESSURE: 116 MMHG | OXYGEN SATURATION: 97 % | HEART RATE: 70 BPM | WEIGHT: 247.8 LBS | BODY MASS INDEX: 41.29 KG/M2

## 2023-08-24 DIAGNOSIS — E66.01 CLASS 3 SEVERE OBESITY WITH BODY MASS INDEX (BMI) OF 40.0 TO 44.9 IN ADULT, UNSPECIFIED OBESITY TYPE, UNSPECIFIED WHETHER SERIOUS COMORBIDITY PRESENT (HCC): Primary | ICD-10-CM

## 2023-08-24 DIAGNOSIS — I10 ESSENTIAL HYPERTENSION: ICD-10-CM

## 2023-08-24 PROCEDURE — 99214 OFFICE O/P EST MOD 30 MIN: CPT | Performed by: STUDENT IN AN ORGANIZED HEALTH CARE EDUCATION/TRAINING PROGRAM

## 2023-08-24 PROCEDURE — G8417 CALC BMI ABV UP PARAM F/U: HCPCS | Performed by: STUDENT IN AN ORGANIZED HEALTH CARE EDUCATION/TRAINING PROGRAM

## 2023-08-24 PROCEDURE — 3079F DIAST BP 80-89 MM HG: CPT | Performed by: STUDENT IN AN ORGANIZED HEALTH CARE EDUCATION/TRAINING PROGRAM

## 2023-08-24 PROCEDURE — 3074F SYST BP LT 130 MM HG: CPT | Performed by: STUDENT IN AN ORGANIZED HEALTH CARE EDUCATION/TRAINING PROGRAM

## 2023-08-24 PROCEDURE — 1036F TOBACCO NON-USER: CPT | Performed by: STUDENT IN AN ORGANIZED HEALTH CARE EDUCATION/TRAINING PROGRAM

## 2023-08-24 PROCEDURE — G8427 DOCREV CUR MEDS BY ELIG CLIN: HCPCS | Performed by: STUDENT IN AN ORGANIZED HEALTH CARE EDUCATION/TRAINING PROGRAM

## 2023-08-24 PROCEDURE — 3017F COLORECTAL CA SCREEN DOC REV: CPT | Performed by: STUDENT IN AN ORGANIZED HEALTH CARE EDUCATION/TRAINING PROGRAM

## 2023-08-24 RX ORDER — PHENTERMINE HYDROCHLORIDE 37.5 MG/1
37.5 CAPSULE ORAL EVERY MORNING
Qty: 30 CAPSULE | Refills: 0 | Status: SHIPPED | OUTPATIENT
Start: 2023-08-24 | End: 2023-09-23

## 2023-09-08 ENCOUNTER — HOSPITAL ENCOUNTER (OUTPATIENT)
Age: 53
Discharge: HOME | End: 2023-09-08
Payer: COMMERCIAL

## 2023-09-08 ENCOUNTER — OFFICE VISIT (OUTPATIENT)
Dept: PRIMARY CARE CLINIC | Age: 53
End: 2023-09-08
Payer: COMMERCIAL

## 2023-09-08 VITALS
HEART RATE: 64 BPM | DIASTOLIC BLOOD PRESSURE: 80 MMHG | SYSTOLIC BLOOD PRESSURE: 124 MMHG | BODY MASS INDEX: 41.32 KG/M2 | HEIGHT: 65 IN | WEIGHT: 248 LBS | TEMPERATURE: 97.9 F | RESPIRATION RATE: 18 BRPM | OXYGEN SATURATION: 98 %

## 2023-09-08 DIAGNOSIS — Z12.31 BREAST CANCER SCREENING BY MAMMOGRAM: ICD-10-CM

## 2023-09-08 DIAGNOSIS — E78.2 MODERATE MIXED HYPERLIPIDEMIA NOT REQUIRING STATIN THERAPY: ICD-10-CM

## 2023-09-08 DIAGNOSIS — M25.50 POLYARTHRALGIA: ICD-10-CM

## 2023-09-08 DIAGNOSIS — E03.9 HYPOTHYROIDISM, UNSPECIFIED TYPE: ICD-10-CM

## 2023-09-08 DIAGNOSIS — I10 ESSENTIAL HYPERTENSION: ICD-10-CM

## 2023-09-08 DIAGNOSIS — R73.9 HYPERGLYCEMIA: ICD-10-CM

## 2023-09-08 DIAGNOSIS — K76.0 HEPATIC STEATOSIS: ICD-10-CM

## 2023-09-08 DIAGNOSIS — I10 ESSENTIAL HYPERTENSION: Primary | ICD-10-CM

## 2023-09-08 DIAGNOSIS — M10.9 GOUT, UNSPECIFIED CAUSE, UNSPECIFIED CHRONICITY, UNSPECIFIED SITE: ICD-10-CM

## 2023-09-08 DIAGNOSIS — Z12.11 COLON CANCER SCREENING: ICD-10-CM

## 2023-09-08 LAB
ALBUMIN SERPL-MCNC: 4 G/DL (ref 3.5–5.2)
ALBUMIN/GLOB SERPL: 1.4 {RATIO} (ref 1–2.5)
ALP SERPL-CCNC: 64 U/L (ref 35–104)
ALT SERPL-CCNC: 62 U/L (ref 5–33)
ANION GAP SERPL CALCULATED.3IONS-SCNC: 8 MMOL/L (ref 9–17)
AST SERPL-CCNC: 48 U/L
BILIRUB SERPL-MCNC: 0.4 MG/DL (ref 0.3–1.2)
BUN SERPL-MCNC: 17 MG/DL (ref 6–20)
BUN/CREAT SERPL: 15 (ref 9–20)
CALCIUM SERPL-MCNC: 9.6 MG/DL (ref 8.6–10.4)
CHLORIDE SERPL-SCNC: 101 MMOL/L (ref 98–107)
CHOLEST SERPL-MCNC: 193 MG/DL
CHOLESTEROL/HDL RATIO: 4.9
CO2 SERPL-SCNC: 31 MMOL/L (ref 20–31)
CREAT SERPL-MCNC: 1.1 MG/DL (ref 0.5–0.9)
CRP SERPL HS-MCNC: 9.1 MG/L (ref 0–5)
ERYTHROCYTE [DISTWIDTH] IN BLOOD BY AUTOMATED COUNT: 12.3 % (ref 11.8–14.4)
ERYTHROCYTE [SEDIMENTATION RATE] IN BLOOD BY PHOTOMETRIC METHOD: 6 MM/HR (ref 0–30)
EST. AVERAGE GLUCOSE BLD GHB EST-MCNC: 120 MG/DL
GFR SERPL CREATININE-BSD FRML MDRD: >60 ML/MIN/1.73M2
GLUCOSE SERPL-MCNC: 109 MG/DL (ref 70–99)
HBA1C MFR BLD: 5.8 % (ref 4–6)
HCT VFR BLD AUTO: 42.9 % (ref 36.3–47.1)
HDLC SERPL-MCNC: 39 MG/DL
HGB BLD-MCNC: 13.8 G/DL (ref 11.9–15.1)
LDLC SERPL CALC-MCNC: 105 MG/DL (ref 0–130)
MCH RBC QN AUTO: 31.7 PG (ref 25.2–33.5)
MCHC RBC AUTO-ENTMCNC: 32.2 G/DL (ref 28.4–34.8)
MCV RBC AUTO: 98.4 FL (ref 82.6–102.9)
NRBC BLD-RTO: 0 PER 100 WBC
PLATELET # BLD AUTO: 291 K/UL (ref 138–453)
PMV BLD AUTO: 9.4 FL (ref 8.1–13.5)
POTASSIUM SERPL-SCNC: 4.3 MMOL/L (ref 3.7–5.3)
PROT SERPL-MCNC: 6.9 G/DL (ref 6.4–8.3)
RBC # BLD AUTO: 4.36 M/UL (ref 3.95–5.11)
RHEUMATOID FACT SER NEPH-ACNC: <10 IU/ML
SODIUM SERPL-SCNC: 140 MMOL/L (ref 135–144)
TRIGL SERPL-MCNC: 247 MG/DL
TSH SERPL DL<=0.05 MIU/L-ACNC: 1.18 UIU/ML (ref 0.3–5)
URATE SERPL-MCNC: 9 MG/DL (ref 2.4–5.7)
WBC OTHER # BLD: 4.6 K/UL (ref 3.5–11.3)

## 2023-09-08 PROCEDURE — 86140 C-REACTIVE PROTEIN: CPT

## 2023-09-08 PROCEDURE — 85652 RBC SED RATE AUTOMATED: CPT

## 2023-09-08 PROCEDURE — 84550 ASSAY OF BLOOD/URIC ACID: CPT

## 2023-09-08 PROCEDURE — 84443 ASSAY THYROID STIM HORMONE: CPT

## 2023-09-08 PROCEDURE — 3078F DIAST BP <80 MM HG: CPT | Performed by: NURSE PRACTITIONER

## 2023-09-08 PROCEDURE — 99214 OFFICE O/P EST MOD 30 MIN: CPT | Performed by: NURSE PRACTITIONER

## 2023-09-08 PROCEDURE — 86038 ANTINUCLEAR ANTIBODIES: CPT

## 2023-09-08 PROCEDURE — 85027 COMPLETE CBC AUTOMATED: CPT

## 2023-09-08 PROCEDURE — 86431 RHEUMATOID FACTOR QUANT: CPT

## 2023-09-08 PROCEDURE — 80061 LIPID PANEL: CPT

## 2023-09-08 PROCEDURE — 36415 COLL VENOUS BLD VENIPUNCTURE: CPT

## 2023-09-08 PROCEDURE — 86225 DNA ANTIBODY NATIVE: CPT

## 2023-09-08 PROCEDURE — 3017F COLORECTAL CA SCREEN DOC REV: CPT | Performed by: NURSE PRACTITIONER

## 2023-09-08 PROCEDURE — G8427 DOCREV CUR MEDS BY ELIG CLIN: HCPCS | Performed by: NURSE PRACTITIONER

## 2023-09-08 PROCEDURE — 80053 COMPREHEN METABOLIC PANEL: CPT

## 2023-09-08 PROCEDURE — 3074F SYST BP LT 130 MM HG: CPT | Performed by: NURSE PRACTITIONER

## 2023-09-08 PROCEDURE — 83036 HEMOGLOBIN GLYCOSYLATED A1C: CPT

## 2023-09-08 PROCEDURE — G8417 CALC BMI ABV UP PARAM F/U: HCPCS | Performed by: NURSE PRACTITIONER

## 2023-09-08 PROCEDURE — 1036F TOBACCO NON-USER: CPT | Performed by: NURSE PRACTITIONER

## 2023-09-08 ASSESSMENT — ENCOUNTER SYMPTOMS: SHORTNESS OF BREATH: 0

## 2023-09-08 NOTE — PATIENT INSTRUCTIONS
SURVEY:    You may be receiving a survey from [employer] regarding your visit today. Please complete the survey to enable us to provide the highest quality of care to you and your family. If you cannot score us a very good on any question, please call the office to discuss how we could of made your experience a very good one. Thank you.       Dr. Stoney Gowers APRN-CNP  Check-In Medical Assistant: Ángel Padilla Assistant: Rose Mary Wesley rooming Medical Assistant/s: Aimee Richards rooming Medical Assistant: Prem Cordero Assistant: [id number] [place] [address] Road Assistant: Jori Foot  Triage Medical Assistant: Argelia Mc

## 2023-09-08 NOTE — PROGRESS NOTES
Name: Kamari Salcedo  : 1970         Chief Complaint:     Chief Complaint   Patient presents with    Hypertension     Current medication regimen includes bisoprolol-hydrochlorothiazide 10-6.25 mg daily, diltiazem 300 mg daily, Lasix 20 mg every other day. She is monitoring her blood pressure at home. At-home blood pressure is averaging 120/80. She is folowing a well balanced diet. She is following a low-sodium diet. For physical activity, she notes rides bike and walks. She denies chest pain, shortness of breath, headache, vision changes, palpitations, lightheadedness, or dizziness. Hypothyroidism     Current treatment includes levothyroxine 100mcg QD. History of Present Illness:      Kamari Salcedo is a 47 y.o.  female who presents with Hypertension (Current medication regimen includes bisoprolol-hydrochlorothiazide 10-6.25 mg daily, diltiazem 300 mg daily, Lasix 20 mg every other day. She is monitoring her blood pressure at home. At-home blood pressure is averaging 120/80. She is folowing a well balanced diet. She is following a low-sodium diet. For physical activity, she notes rides bike and walks. She denies chest pain, shortness of breath, headache, vision changes, palpitations, lightheadedness, or dizziness.) and Hypothyroidism (Current treatment includes levothyroxine 100mcg QD. )      HPI    Hypertension:  Current medication regimen includes bisoprolol-hydrochlorothiazide 10-6.25 mg daily, diltiazem 300 mg daily, Lasix 20 mg every other day. She is monitoring her blood pressure at home. At-home blood pressure is averaging 120/80. She admits a well balanced diet. She admits following a low-sodium diet. For physical activity, she notes walking and riding bike. She denies chest pain, shortness of breath, headache, vision changes, palpitations, lightheadedness, or dizziness. Obesity:  Following with Dr. Suresh Alfaro for weight management.  Patient is taking adipex and

## 2023-09-11 DIAGNOSIS — E66.01 CLASS 3 SEVERE OBESITY WITH BODY MASS INDEX (BMI) OF 40.0 TO 44.9 IN ADULT, UNSPECIFIED OBESITY TYPE, UNSPECIFIED WHETHER SERIOUS COMORBIDITY PRESENT (HCC): ICD-10-CM

## 2023-09-11 RX ORDER — PHENTERMINE HYDROCHLORIDE 37.5 MG/1
37.5 CAPSULE ORAL EVERY MORNING
Qty: 30 CAPSULE | Refills: 0 | Status: SHIPPED | OUTPATIENT
Start: 2023-09-11 | End: 2023-10-11

## 2023-09-12 LAB
ANA SER QL IA: NEGATIVE
DSDNA IGG SER QL IA: 1.6 IU/ML
NUCLEAR IGG SER IA-RTO: 0.2 U/ML

## 2023-09-13 ENCOUNTER — OFFICE VISIT (OUTPATIENT)
Dept: VASCULAR SURGERY | Age: 53
End: 2023-09-13
Payer: COMMERCIAL

## 2023-09-13 VITALS
HEART RATE: 72 BPM | BODY MASS INDEX: 43.14 KG/M2 | DIASTOLIC BLOOD PRESSURE: 49 MMHG | HEIGHT: 64 IN | SYSTOLIC BLOOD PRESSURE: 127 MMHG | RESPIRATION RATE: 16 BRPM | TEMPERATURE: 96.5 F | WEIGHT: 252.7 LBS

## 2023-09-13 DIAGNOSIS — I83.813 VARICOSE VEINS OF BILATERAL LOWER EXTREMITIES WITH PAIN: ICD-10-CM

## 2023-09-13 DIAGNOSIS — I89.0 LYMPHEDEMA: Primary | ICD-10-CM

## 2023-09-13 PROCEDURE — 3078F DIAST BP <80 MM HG: CPT | Performed by: INTERNAL MEDICINE

## 2023-09-13 PROCEDURE — G8417 CALC BMI ABV UP PARAM F/U: HCPCS | Performed by: INTERNAL MEDICINE

## 2023-09-13 PROCEDURE — 1036F TOBACCO NON-USER: CPT | Performed by: INTERNAL MEDICINE

## 2023-09-13 PROCEDURE — 3074F SYST BP LT 130 MM HG: CPT | Performed by: INTERNAL MEDICINE

## 2023-09-13 PROCEDURE — 99205 OFFICE O/P NEW HI 60 MIN: CPT | Performed by: INTERNAL MEDICINE

## 2023-09-13 PROCEDURE — G8427 DOCREV CUR MEDS BY ELIG CLIN: HCPCS | Performed by: INTERNAL MEDICINE

## 2023-09-13 PROCEDURE — 3017F COLORECTAL CA SCREEN DOC REV: CPT | Performed by: INTERNAL MEDICINE

## 2023-09-13 RX ORDER — MONTELUKAST SODIUM 4 MG/1
TABLET, CHEWABLE ORAL
COMMUNITY
Start: 2023-08-31

## 2023-09-13 RX ORDER — OMEPRAZOLE 20 MG/1
20 CAPSULE, DELAYED RELEASE ORAL DAILY PRN
COMMUNITY
Start: 2023-08-29

## 2023-09-13 NOTE — PROGRESS NOTES
Saray Gross was seen on 9/13/2023 for   Chief Complaint   Patient presents with    New Patient     New patient-here for leg pain and swelling. Patient states it has gotten worse over the last few months. Swelling still happens even while wearing compression. Annette Fears                             REVIEW OF SYSTEMS    Constitutional Weight: absent, A, Fatigue: present Fever: absent   HEENT Ears: normal,Visual disturbance: absent Sore throat: absent,    Respiratory Shortness of breath: absent, Cough: absent;, Snoring: absent   Cardivascular Chest pain: absent,  Leg pain: present,Leg swelling:present, Non-healing wound:absent    GI Diarrhea: absent, Abdominal Pain: absent    Urinary frequency: absent, Urinary incontinence: absent   Musculoskeletal Neck pain: absent, Back pain: absent, Restless Leg:absent     Dermatological Hair loss: absent, Skin changes: absent   Neurological  Sudden Loss of Vision in one eye:absent, Slurred Speech:absent, Weakness on one side of body: absent,Headache: absent   Psychiatric Anxiety: absent, Depression: absent   Hematologic Abnormal bleeding: absent,
true \"ropey\" vv.    Pulses Right - Femoral can't feel  Popliteal: can't feel  Posterior tibial:    absent  Dorsalis pedis:  2+  Radial 2+     Pulses Left -Femoral can't feel  Popliteal: can't feel  Posterior tibial:    absent  Dorsalis pedis:  2+  Radial 2+      Assessment:  1. Lymphedema    2. Varicose veins of bilateral lower extremities with pain       She has findings consistent with lymphedema, mostly on the left, with response to therapy  Prominent veins with sx that sound like reflux, without ropey vv or skin changes  Plan of care:  Left leg reflux study  Rtc 3 wk  If neg, continue compression only  60 min chart review and pt encounter  Follow up and evaluate.        Electronically signed by Dee Beltran MD on 9/13/23 at 1:40 PM EDT

## 2023-09-13 NOTE — PATIENT INSTRUCTIONS
SURVEY:    You may be receiving a survey from [employer] regarding your visit today. Please complete the survey to enable us to provide the highest quality of care to you and your family. If you cannot score us a very good on any question, please call the office to discuss how we could have made your experience a very good one. Thank you.

## 2023-09-15 ENCOUNTER — OFFICE VISIT (OUTPATIENT)
Dept: CARDIOLOGY | Age: 53
End: 2023-09-15

## 2023-09-15 VITALS
WEIGHT: 253.2 LBS | SYSTOLIC BLOOD PRESSURE: 127 MMHG | RESPIRATION RATE: 18 BRPM | DIASTOLIC BLOOD PRESSURE: 75 MMHG | HEART RATE: 72 BPM | BODY MASS INDEX: 43.23 KG/M2 | HEIGHT: 64 IN

## 2023-09-15 DIAGNOSIS — Z87.898 HISTORY OF CHEST PAIN: ICD-10-CM

## 2023-09-15 DIAGNOSIS — I10 PRIMARY HYPERTENSION: ICD-10-CM

## 2023-09-15 DIAGNOSIS — E66.01 MORBID OBESITY (HCC): ICD-10-CM

## 2023-09-15 DIAGNOSIS — R42 LIGHTHEADED: ICD-10-CM

## 2023-09-15 DIAGNOSIS — Z95.818 IMPLANTABLE LOOP RECORDER PRESENT: ICD-10-CM

## 2023-09-15 DIAGNOSIS — I48.0 PAF (PAROXYSMAL ATRIAL FIBRILLATION) (HCC): Primary | ICD-10-CM

## 2023-09-15 DIAGNOSIS — R06.02 SOB (SHORTNESS OF BREATH): ICD-10-CM

## 2023-09-15 DIAGNOSIS — R42 DIZZINESS: ICD-10-CM

## 2023-09-15 DIAGNOSIS — G47.33 OSA (OBSTRUCTIVE SLEEP APNEA): ICD-10-CM

## 2023-09-15 DIAGNOSIS — R55 SYNCOPE, UNSPECIFIED SYNCOPE TYPE: ICD-10-CM

## 2023-09-15 DIAGNOSIS — R00.2 PALPITATIONS: ICD-10-CM

## 2023-09-15 NOTE — PATIENT INSTRUCTIONS
SURVEY:    You may be receiving a survey from [employer] regarding your visit today. Please complete the survey to enable us to provide the highest quality of care to you and your family. If you cannot score us a very good on any question, please call the office to discuss how we could have made your experience a very good one. Thank you.

## 2023-09-15 NOTE — PROGRESS NOTES
supracervical hyst and BS, perineopexy, A&P repair, cysto, Lynx retropubic slin    INFECTED SKIN DEBRIDEMENT Left     Left third finger    INSERTABLE CARDIAC MONITOR  05/20/2021    Social History:  Social History     Tobacco Use    Smoking status: Never    Smokeless tobacco: Never   Vaping Use    Vaping Use: Never used   Substance Use Topics    Alcohol use: Yes     Alcohol/week: 0.0 standard drinks of alcohol     Comment: rarely    Drug use: No        MEDICATIONS:  No current facility-administered medications for this visit. CURRENT INPATIENT MEDICATIONS:  Prior to Admission medications    Medication Sig Start Date End Date Taking? Authorizing Provider   apixaban (ELIQUIS) 5 MG TABS tablet Take 1 tablet by mouth 2 times daily 9/15/23  Yes Audrey Disla PA-C   apixaban (ELIQUIS) 5 MG TABS tablet Take 1 tablet by mouth 2 times daily 2 sample boxes given. LOT # [id number] EXP: AUG 2025 9/15/23  Yes Cristhian Pritchett PA-C   omeprazole (PRILOSEC) 20 MG delayed release capsule Take 1 capsule by mouth daily as needed 8/29/23  Yes Historical Provider, MD   colestipol (COLESTID) 1 g tablet TAKE 2 TABLETS BY MOUTH TWICE DAILY WITH A FULL GLASS OF WATER. 8/31/23  Yes Historical Provider, MD   phentermine (ADIPEX-P) 37.5 MG capsule Take 1 capsule by mouth every morning for 30 days.  Max Daily Amount: 37.5 mg 9/11/23 10/11/23 Yes Olga Fairbanks MD   metFORMIN (GLUCOPHAGE) 500 MG tablet Take 1 tablet by mouth 2 times daily (with meals) 8/24/23  Yes Olga Fairbanks MD   levothyroxine (SYNTHROID) 100 MCG tablet Take 1 tablet by mouth daily 8/4/23  Yes BRADLEY Kaplan - CNP   furosemide (LASIX) 20 MG tablet Take 1 tablet by mouth every other day 6/9/23  Yes BRADLEY Kaplan CNP   nortriptyline (PAMELOR) 25 MG capsule Take 2 capsules by mouth nightly 4/3/23 9/15/23 Yes Elina Cruz MD   rizatriptan (MAXALT) 10 MG tablet Take 1 tablet by mouth once as needed for Migraine May repeat in 2 hours if needed 4/3/23

## 2023-09-18 RX ORDER — BISOPROLOL FUMARATE AND HYDROCHLOROTHIAZIDE 10; 6.25 MG/1; MG/1
1 TABLET ORAL DAILY
Qty: 90 TABLET | Refills: 3 | OUTPATIENT
Start: 2023-09-18

## 2023-09-18 RX ORDER — BISOPROLOL FUMARATE AND HYDROCHLOROTHIAZIDE 10; 6.25 MG/1; MG/1
1 TABLET ORAL DAILY
Qty: 30 TABLET | Refills: 0 | Status: SHIPPED | OUTPATIENT
Start: 2023-09-18

## 2023-09-20 ENCOUNTER — TELEPHONE (OUTPATIENT)
Dept: CARDIOLOGY | Age: 53
End: 2023-09-20

## 2023-09-20 NOTE — TELEPHONE ENCOUNTER
Spoke with [place], no transmission sent yet. But pt informed me that she was at work so it will not transmit until she is home by her machine and can do it manually. She gets off of work this evening and will send one then. Will keep a look out for it.

## 2023-09-20 NOTE — TELEPHONE ENCOUNTER
Ms. Cummings Nicole called in to report that she had an episode of chest pain this morning that lasted about a minute and felt sharp/stabbing in nature. It did radiate up into her head as well. She is going to send a transmission on her loop to see if it recorded anything significant - I let her know that we will keep a look out for this. She has had no further pain since this episode earlier and is now at work.

## 2023-09-25 ENCOUNTER — HOSPITAL ENCOUNTER (OUTPATIENT)
Dept: VASCULAR LAB | Age: 53
Discharge: HOME | End: 2023-09-27
Attending: INTERNAL MEDICINE
Payer: COMMERCIAL

## 2023-09-25 ENCOUNTER — HOSPITAL ENCOUNTER (OUTPATIENT)
Age: 53
Discharge: HOME | End: 2023-09-25
Payer: COMMERCIAL

## 2023-09-25 ENCOUNTER — HOSPITAL ENCOUNTER (OUTPATIENT)
Dept: SLEEP CENTER | Age: 53
Discharge: HOME | End: 2023-09-25
Payer: COMMERCIAL

## 2023-09-25 ENCOUNTER — TELEPHONE (OUTPATIENT)
Dept: CARDIOLOGY | Age: 53
End: 2023-09-25

## 2023-09-25 DIAGNOSIS — R42 DIZZINESS: ICD-10-CM

## 2023-09-25 DIAGNOSIS — Z95.818 IMPLANTABLE LOOP RECORDER PRESENT: ICD-10-CM

## 2023-09-25 DIAGNOSIS — Z87.898 HISTORY OF CHEST PAIN: ICD-10-CM

## 2023-09-25 DIAGNOSIS — I10 PRIMARY HYPERTENSION: ICD-10-CM

## 2023-09-25 DIAGNOSIS — I48.0 PAF (PAROXYSMAL ATRIAL FIBRILLATION) (HCC): ICD-10-CM

## 2023-09-25 DIAGNOSIS — R42 LIGHTHEADED: ICD-10-CM

## 2023-09-25 DIAGNOSIS — I10 ESSENTIAL HYPERTENSION: ICD-10-CM

## 2023-09-25 DIAGNOSIS — I83.813 VARICOSE VEINS OF BILATERAL LOWER EXTREMITIES WITH PAIN: ICD-10-CM

## 2023-09-25 LAB
ERYTHROCYTE [DISTWIDTH] IN BLOOD BY AUTOMATED COUNT: 13.2 % (ref 11.8–14.4)
HCT VFR BLD AUTO: 42.9 % (ref 36.3–47.1)
HGB BLD-MCNC: 13.8 G/DL (ref 11.9–15.1)
MCH RBC QN AUTO: 32.5 PG (ref 25.2–33.5)
MCHC RBC AUTO-ENTMCNC: 32.2 G/DL (ref 28.4–34.8)
MCV RBC AUTO: 100.9 FL (ref 82.6–102.9)
NRBC BLD-RTO: 0 PER 100 WBC
PLATELET # BLD AUTO: 293 K/UL (ref 138–453)
PMV BLD AUTO: 9.8 FL (ref 8.1–13.5)
RBC # BLD AUTO: 4.25 M/UL (ref 3.95–5.11)
VAS LEFT GSV ANKLE DIAM: 2.5 MM
VAS LEFT GSV AT KNEE DIAM: 3.2 MM
VAS LEFT GSV BK MID DIAM: 3 MM
VAS LEFT GSV JUNC DIAM: 8.5 MM
VAS LEFT GSV THIGH MID DIAM: 5 MM
VAS LEFT GSV THIGH PROX DIAM: 7.8 MM
WBC OTHER # BLD: 6 K/UL (ref 3.5–11.3)

## 2023-09-25 PROCEDURE — 85027 COMPLETE CBC AUTOMATED: CPT

## 2023-09-25 PROCEDURE — 95806 SLEEP STUDY UNATT&RESP EFFT: CPT

## 2023-09-25 PROCEDURE — 36415 COLL VENOUS BLD VENIPUNCTURE: CPT

## 2023-09-25 PROCEDURE — 93971 EXTREMITY STUDY: CPT | Performed by: SURGERY

## 2023-09-25 PROCEDURE — 93971 EXTREMITY STUDY: CPT

## 2023-09-25 RX ORDER — NORTRIPTYLINE HYDROCHLORIDE 25 MG/1
50 CAPSULE ORAL NIGHTLY
Qty: 60 CAPSULE | Refills: 5 | Status: SHIPPED | OUTPATIENT
Start: 2023-09-25 | End: 2023-10-31

## 2023-09-25 ASSESSMENT — SLEEP AND FATIGUE QUESTIONNAIRES
HOW LIKELY ARE YOU TO NOD OFF OR FALL ASLEEP WHILE SITTING AND TALKING TO SOMEONE: 0
HOW MANY NAPS DO YOU TAKE PER WEEK: 3
HOW LIKELY ARE YOU TO NOD OFF OR FALL ASLEEP WHILE WATCHING TV: 3
WHAT TIME DO YOU USUALLY GO TO BED: 68400
HOW LIKELY ARE YOU TO NOD OFF OR FALL ASLEEP WHILE SITTING AND READING: 3
NORMAL AMOUNT OF SLEEP PER NIGHT: 4.5
HOW OFTEN DO YOU SNORE: 3-4 TIMES A WEEK
HOW LIKELY ARE YOU TO NOD OFF OR FALL ASLEEP WHILE SITTING INACTIVE IN A PUBLIC PLACE: 0
NUMBER OF TIMES YOU WAKE PER NIGHT: 5
DO YOU HAVE HIGH BLOOD PRESSURE: YES
HOW LIKELY ARE YOU TO NOD OFF OR FALL ASLEEP WHILE LYING DOWN TO REST IN THE AFTERNOON WHEN CIRCUMSTANCES PERMIT: 3
HOW LIKELY ARE YOU TO NOD OFF OR FALL ASLEEP WHEN YOU ARE A PASSENGER IN A CAR FOR AN HOUR WITHOUT A BREAK: 1
HAVE YOU EVER NODDED OFF OR FALLEN ASLEEP WHILE DRIVING: NO
ESS TOTAL SCORE: 13
USUAL AMOUNT OF TIME TO FALL ASLEEP (MIN): 30
HOW LIKELY ARE YOU TO NOD OFF OR FALL ASLEEP WHILE SITTING QUIETLY AFTER LUNCH WITHOUT ALCOHOL: 3
DO YOU SNORE: YES
DOES YOUR SNORING BOTHER OTHERS: NO
ARE YOU TIRED AFTER SLEEPING: ALMOST DAILY
ARE YOU TIRED DURING WAKE TIME: ALMOST DAILY
HOW LIKELY ARE YOU TO NOD OFF OR FALL ASLEEP IN A CAR, WHILE STOPPED FOR A FEW MINUTES IN TRAFFIC: 0
WHAT TIME DO YOU USUALLY WAKE UP: 6300
SNORING VOLUME: VERY LOUD

## 2023-09-25 NOTE — TELEPHONE ENCOUNTER
----- Message from Adelia Andino PA-C sent at 9/25/2023  4:16 PM EDT -----  Please notify patient that their lab results are normal.   Please continue current treatment and follow up.

## 2023-09-26 ENCOUNTER — NURSE ONLY (OUTPATIENT)
Dept: CARDIOLOGY | Age: 53
End: 2023-09-26
Payer: COMMERCIAL

## 2023-09-26 DIAGNOSIS — Z95.818 IMPLANTABLE LOOP RECORDER PRESENT: Primary | ICD-10-CM

## 2023-09-26 DIAGNOSIS — R55 SYNCOPE, UNSPECIFIED SYNCOPE TYPE: ICD-10-CM

## 2023-09-26 PROCEDURE — 93298 REM INTERROG DEV EVAL SCRMS: CPT | Performed by: FAMILY MEDICINE

## 2023-09-29 LAB — STATUS: NORMAL

## 2023-10-03 ENCOUNTER — TELEPHONE (OUTPATIENT)
Dept: CARDIOLOGY | Age: 53
End: 2023-10-03

## 2023-10-03 NOTE — TELEPHONE ENCOUNTER
----- Message from Jimmy Moreno PA-C sent at 10/2/2023 10:10 PM EDT -----  Please notify patient that their sleep study was unremarkable, no sleep apnea. Please continue current treatment and follow up.

## 2023-10-04 ENCOUNTER — OFFICE VISIT (OUTPATIENT)
Dept: VASCULAR SURGERY | Age: 53
End: 2023-10-04

## 2023-10-04 VITALS
WEIGHT: 244.7 LBS | HEART RATE: 63 BPM | RESPIRATION RATE: 16 BRPM | TEMPERATURE: 96.5 F | SYSTOLIC BLOOD PRESSURE: 109 MMHG | DIASTOLIC BLOOD PRESSURE: 46 MMHG | BODY MASS INDEX: 41.77 KG/M2 | HEIGHT: 64 IN

## 2023-10-04 DIAGNOSIS — I89.0 LYMPHEDEMA: Primary | ICD-10-CM

## 2023-10-04 NOTE — PROGRESS NOTES
Katja Martin was seen on 10/4/2023 for   Chief Complaint   Patient presents with    Follow-up     Patient here for follow up lymphedema bilateral legs. Completed reflux study 9/25/23. Reports no changes since last visit. Deya Fournier                             REVIEW OF SYSTEMS    Constitutional Weight: absent, A, Fatigue: absent Fever: absent   HEENT Ears: normal,Visual disturbance: absent Sore throat: absent,    Respiratory Shortness of breath: absent, Cough: absent;, Snoring: absent   Cardivascular Chest pain: absent,  Leg pain: present,Leg swelling:present, Non-healing wound:absent    GI Diarrhea: absent, Abdominal Pain: absent    Urinary frequency: absent, Urinary incontinence: absent   Musculoskeletal Neck pain: absent, Back pain: absent, Restless Leg:absent     Dermatological Hair loss: absent, Skin changes: absent   Neurological  Sudden Loss of Vision in one eye:absent, Slurred Speech:absent, Weakness on one side of body: absent,Headache: present   Psychiatric Anxiety: present, Depression: absent   Hematologic Abnormal bleeding: absent,

## 2023-10-04 NOTE — PATIENT INSTRUCTIONS
SURVEY:    You may be receiving a survey from [employer] regarding your visit today. Please complete the survey to enable us to provide the highest quality of care to you and your family. If you cannot score us a very good on any question, please call the office to discuss how we could have made your experience a very good one. Thank you.

## 2023-10-04 NOTE — PROGRESS NOTES
Brittanie Maharaj was seen on 10/4/2023 for   Chief Complaint   Patient presents with    Follow-up     Patient here for follow up lymphedema bilateral legs. Completed reflux study 9/25/23. Reports no changes since last visit. ..  9/13/23 1st [facility] visit. Referred by Patricia Galvan CNP. 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 Dr Jony Jose diagnosed lymphedema. Went to PT and has done well with massage and pumps. Followed a Covid infx. No OUS travel. No cancer dx. In her 19's noticed visible veins, not bulging. Legs hurt with standing. Can interfere with sleep. Avoids prolonged standing. Wears compression (for years). Does not recall venous duplex other than 8/28/20 which was done for DVT and was negative. No hx of DVT. Adopted. Left leg worse than right. Swelling usually better overnight, but not always. One child. No problems with veins during pregnancy. AST, ALT elevated with other liver tests ok  Creat 0.89  Echo ok 1/26/21  UPDATE 10/4/23  On 9/25/23 had US showing prox lat thigh vv 3.1 mm > 4 s reflux. This does not seem to be enough to contribute to leg swelling      Social History     Socioeconomic History    Marital status: Single     Spouse name: Not on file    Number of children: Not on file    Years of education: Not on file    Highest education level: Not on file   Occupational History    Occupation: nurse aid   Tobacco Use    Smoking status: Never    Smokeless tobacco: Never   Vaping Use    Vaping Use: Never used   Substance and Sexual Activity    Alcohol use:  Yes     Alcohol/week: 0.0 standard drinks of alcohol     Comment: rarely    Drug use: No    Sexual activity: Not Currently   Other Topics Concern    Not on file   Social History Narrative    Not on file     Social Determinants of Health     Financial Resource Strain: Low Risk  (6/17/2022)    Overall Financial Resource Strain (CARDIA)     Difficulty of Paying Living Expenses: Not hard at all   Food Insecurity: No Food Insecurity

## 2023-10-05 ENCOUNTER — OFFICE VISIT (OUTPATIENT)
Dept: NEUROLOGY | Age: 53
End: 2023-10-05
Payer: COMMERCIAL

## 2023-10-05 VITALS
BODY MASS INDEX: 41.78 KG/M2 | WEIGHT: 244.71 LBS | HEIGHT: 64 IN | TEMPERATURE: 96.2 F | RESPIRATION RATE: 16 BRPM | DIASTOLIC BLOOD PRESSURE: 67 MMHG | SYSTOLIC BLOOD PRESSURE: 103 MMHG | HEART RATE: 60 BPM

## 2023-10-05 DIAGNOSIS — G43.119 INTRACTABLE MIGRAINE WITH AURA WITHOUT STATUS MIGRAINOSUS: Primary | ICD-10-CM

## 2023-10-05 DIAGNOSIS — G60.9 IDIOPATHIC SMALL FIBER PERIPHERAL NEUROPATHY: ICD-10-CM

## 2023-10-05 PROCEDURE — 3017F COLORECTAL CA SCREEN DOC REV: CPT | Performed by: NEUROMUSCULOSKELETAL MEDICINE, SPORTS MEDICINE

## 2023-10-05 PROCEDURE — 1036F TOBACCO NON-USER: CPT | Performed by: NEUROMUSCULOSKELETAL MEDICINE, SPORTS MEDICINE

## 2023-10-05 PROCEDURE — G8427 DOCREV CUR MEDS BY ELIG CLIN: HCPCS | Performed by: NEUROMUSCULOSKELETAL MEDICINE, SPORTS MEDICINE

## 2023-10-05 PROCEDURE — G8417 CALC BMI ABV UP PARAM F/U: HCPCS | Performed by: NEUROMUSCULOSKELETAL MEDICINE, SPORTS MEDICINE

## 2023-10-05 PROCEDURE — G8484 FLU IMMUNIZE NO ADMIN: HCPCS | Performed by: NEUROMUSCULOSKELETAL MEDICINE, SPORTS MEDICINE

## 2023-10-05 PROCEDURE — 99213 OFFICE O/P EST LOW 20 MIN: CPT | Performed by: NEUROMUSCULOSKELETAL MEDICINE, SPORTS MEDICINE

## 2023-10-05 PROCEDURE — 3074F SYST BP LT 130 MM HG: CPT | Performed by: NEUROMUSCULOSKELETAL MEDICINE, SPORTS MEDICINE

## 2023-10-05 PROCEDURE — 3078F DIAST BP <80 MM HG: CPT | Performed by: NEUROMUSCULOSKELETAL MEDICINE, SPORTS MEDICINE

## 2023-10-05 NOTE — PROGRESS NOTES
MENTAL STATUS: Alert and oriented x 3. CRANIAL NERVES: II-XII are within normal limits. MOTOR EXAMINATION: Muscle tone is normal . Strength is 5/5 in both upper and lower limbs. No abnormal limb movements. SENSORY EXAMINATION: Normal.  No sensory level. Position sensation is intact. Vibration is slightly impaired in both the feet. STRETCH REFLEXES: Symmetrical in both the upper and lower limbs. GAIT:.  Normal.       IMPRESSION:     1. Chronic migraine with aura. 2.  Diagnosis of small fiber neuropathy      PLAN:      1. Continue nortriptyline 25 mg/day  2. Follow-up in 6 months. NOTE: This neurology evaluation is part of outpatient coverage at [facility]  1-2 days per week. Patients requiring frequent evaluations or uncomfortable with potential 3-4 day turnaround on questions or calls  may be better served by a neurologist in the area full time. [facility] at [facility]. Susan is available for outpatient visits and procedures including EMG/NCS. Non-[facility] neurologists also practice in [place] (Dr. Maria Luisa Valencia) and [facility] (Thompson Cisneros).        Giselle Dobbs MD   10/5/2023  8:37 AM

## 2023-10-05 NOTE — PATIENT INSTRUCTIONS
SURVEY:    You may be receiving a survey from [employer] regarding your visit today. Please complete the survey to enable us to provide the highest quality of care to you and your family. If you cannot score us a very good on any question, please call the office to discuss how we could have made your experience a very good one. Thank you.

## 2023-10-06 ENCOUNTER — OFFICE VISIT (OUTPATIENT)
Dept: PRIMARY CARE CLINIC | Age: 53
End: 2023-10-06
Payer: COMMERCIAL

## 2023-10-06 VITALS
SYSTOLIC BLOOD PRESSURE: 126 MMHG | WEIGHT: 242 LBS | DIASTOLIC BLOOD PRESSURE: 68 MMHG | HEIGHT: 64 IN | OXYGEN SATURATION: 98 % | HEART RATE: 73 BPM | BODY MASS INDEX: 41.32 KG/M2

## 2023-10-06 DIAGNOSIS — E66.01 CLASS 3 SEVERE OBESITY WITH BODY MASS INDEX (BMI) OF 40.0 TO 44.9 IN ADULT, UNSPECIFIED OBESITY TYPE, UNSPECIFIED WHETHER SERIOUS COMORBIDITY PRESENT (HCC): ICD-10-CM

## 2023-10-06 PROCEDURE — G8484 FLU IMMUNIZE NO ADMIN: HCPCS | Performed by: STUDENT IN AN ORGANIZED HEALTH CARE EDUCATION/TRAINING PROGRAM

## 2023-10-06 PROCEDURE — 99214 OFFICE O/P EST MOD 30 MIN: CPT | Performed by: STUDENT IN AN ORGANIZED HEALTH CARE EDUCATION/TRAINING PROGRAM

## 2023-10-06 PROCEDURE — G8417 CALC BMI ABV UP PARAM F/U: HCPCS | Performed by: STUDENT IN AN ORGANIZED HEALTH CARE EDUCATION/TRAINING PROGRAM

## 2023-10-06 PROCEDURE — 3078F DIAST BP <80 MM HG: CPT | Performed by: STUDENT IN AN ORGANIZED HEALTH CARE EDUCATION/TRAINING PROGRAM

## 2023-10-06 PROCEDURE — 3017F COLORECTAL CA SCREEN DOC REV: CPT | Performed by: STUDENT IN AN ORGANIZED HEALTH CARE EDUCATION/TRAINING PROGRAM

## 2023-10-06 PROCEDURE — 1036F TOBACCO NON-USER: CPT | Performed by: STUDENT IN AN ORGANIZED HEALTH CARE EDUCATION/TRAINING PROGRAM

## 2023-10-06 PROCEDURE — G8427 DOCREV CUR MEDS BY ELIG CLIN: HCPCS | Performed by: STUDENT IN AN ORGANIZED HEALTH CARE EDUCATION/TRAINING PROGRAM

## 2023-10-06 PROCEDURE — 3074F SYST BP LT 130 MM HG: CPT | Performed by: STUDENT IN AN ORGANIZED HEALTH CARE EDUCATION/TRAINING PROGRAM

## 2023-10-06 RX ORDER — PHENTERMINE HYDROCHLORIDE 37.5 MG/1
37.5 CAPSULE ORAL EVERY MORNING
Qty: 30 CAPSULE | Refills: 0 | Status: SHIPPED | OUTPATIENT
Start: 2023-10-06 | End: 2023-11-05

## 2023-10-06 NOTE — PROGRESS NOTES
positive/beneficial changes to both to help improve their overall health. Discussed use, benefit, and side effects of prescribed medications. Barriers to medication compliance addressed. Patient given educational materials: see patient instructions. HM - HM items completed today as per orders. Outstanding HM items though not limited to immunizations were discussed with the patient today, including risks, benefits and alternatives. The patient will discuss these during the next appointment per their preference. If there are any worsening or concerning signs or symptoms, patient will report to the ED and/or contact [phone] for immediate evaluation. Teach back method was used. Subjective:    HPI  Chief Complaint   Patient presents with    Weight Management     Weight management  Patient is here for 1 month follow up of adipex, voices no concerns or complaints at this time. No side-effects from the Adipex. still counting calories , averaging 10-12,000 steps a day or 6 miles biking at this time. Health Maintenance -   Alcohol/Substance use History - None/Minimal    Tobacco Use      Smoking status: Never      Smokeless tobacco: Never    Family History   Adopted: Yes           3/3/2023     9:12 AM 3/11/2022     2:41 PM 11/26/2021     8:15 AM 11/4/2020    10:58 AM 5/20/2020     4:04 PM 3/21/2018     3:12 PM   PHQ Scores   PHQ2 Score 0 0 0 0 2 3   PHQ9 Score 10 0 0 0 2 16     Interpretation of Total Score Depression Severity: 1-4 = Minimal depression, 5-9 = Mild depression, 10-14 = Moderate depression, 15-19 = Moderately severe depression, 20-27 = Severe depression    Review of Systems  Constitutional: Negative for activity change, appetite change, chills, diaphoresis, fatigue, fever and unexpected weight change. HENT: Negative for sinus pressure, sinus pain, sore throat and trouble swallowing. Respiratory: Negative for cough, shortness of breath and wheezing.     Cardiovascular: Negative for chest pain,

## 2023-10-08 LAB — NONINV COLON CA DNA+OCC BLD SCRN STL QL: NORMAL

## 2023-10-12 DIAGNOSIS — E66.01 CLASS 3 SEVERE OBESITY WITH BODY MASS INDEX (BMI) OF 40.0 TO 44.9 IN ADULT, UNSPECIFIED OBESITY TYPE, UNSPECIFIED WHETHER SERIOUS COMORBIDITY PRESENT (HCC): ICD-10-CM

## 2023-10-12 RX ORDER — BISOPROLOL FUMARATE AND HYDROCHLOROTHIAZIDE 10; 6.25 MG/1; MG/1
1 TABLET ORAL DAILY
Qty: 90 TABLET | Refills: 3 | Status: SHIPPED | OUTPATIENT
Start: 2023-10-12

## 2023-10-13 RX ORDER — PHENTERMINE HYDROCHLORIDE 37.5 MG/1
37.5 CAPSULE ORAL EVERY MORNING
Qty: 30 CAPSULE | Refills: 0 | Status: SHIPPED | OUTPATIENT
Start: 2023-10-13 | End: 2023-11-12

## 2023-10-31 ENCOUNTER — OFFICE VISIT (OUTPATIENT)
Dept: CARDIOLOGY | Age: 53
End: 2023-10-31
Payer: COMMERCIAL

## 2023-10-31 VITALS
RESPIRATION RATE: 18 BRPM | WEIGHT: 229 LBS | HEIGHT: 64 IN | SYSTOLIC BLOOD PRESSURE: 117 MMHG | BODY MASS INDEX: 39.09 KG/M2 | HEART RATE: 68 BPM | OXYGEN SATURATION: 99 % | DIASTOLIC BLOOD PRESSURE: 72 MMHG

## 2023-10-31 DIAGNOSIS — R00.2 PALPITATIONS: ICD-10-CM

## 2023-10-31 DIAGNOSIS — Z45.09 ENCOUNTER FOR LOOP RECORDER CHECK: ICD-10-CM

## 2023-10-31 DIAGNOSIS — Z79.01 CHRONIC ANTICOAGULATION: ICD-10-CM

## 2023-10-31 DIAGNOSIS — E66.9 OBESITY (BMI 30-39.9): ICD-10-CM

## 2023-10-31 DIAGNOSIS — R06.02 SOB (SHORTNESS OF BREATH): ICD-10-CM

## 2023-10-31 DIAGNOSIS — Z87.898 HISTORY OF CHEST PAIN: ICD-10-CM

## 2023-10-31 DIAGNOSIS — I10 PRIMARY HYPERTENSION: ICD-10-CM

## 2023-10-31 DIAGNOSIS — R55 SYNCOPE, UNSPECIFIED SYNCOPE TYPE: ICD-10-CM

## 2023-10-31 DIAGNOSIS — I48.0 PAF (PAROXYSMAL ATRIAL FIBRILLATION) (HCC): Primary | ICD-10-CM

## 2023-10-31 PROCEDURE — 93285 PRGRMG DEV EVAL SCRMS IP: CPT | Performed by: FAMILY MEDICINE

## 2023-10-31 NOTE — PROGRESS NOTES
Patient: Jax Boothe  : /[id number] Care Physician: Amelia Valladares  OTCQC'V Date: 2023    REASON FOR VISIT: Atrial Fibrillation (Hx:HTN,CP,PAF. C/o: Palpitations same. Denies: CP, SOB, Lightheaded/ dizziness. )    IYarely am scribing for and in the presence of Bradly Fontanez PA-C. Dear Amelia Valladares, APRN - CNP,    HPI: Ms. Clarence Askew is a 47 y.o. female presents to the office today for follow up. She previously has been admitted to the hospital with lightheaded and dizziness. She denied any previous heart related history. She is adopted so she was unaware of any family history. She does drink 1-2 cups off coffee daily. Her echocardiogram on 2021 was fairly unremarkable and showed an ejection fraction of >60%. Her CAM monitor was done on 2021 she wore 6 days, 19 hours showed one epos ode of atrial tachycardia with symptoms. She had treadmill stress test done on 3/8/2021 her  [facility] treadmill score is 0 which is intermediate risk. Ms. Clarence Askew recently had Negative Tilt Table test on 3/22/21 and an Equivocal stress test. Ms. Clarence Askew heart catheterization on 21 was largely normal. LINQ report reviewed from 2021: 1 symptom recorded however no abnormal heart rhythms noted. Battery life is good. Cardiac event monitor worn on 2021 showed frequent premature ventricular contractions 3.2% of the beats, 5 symptoms were reported and associated with the PVCs. Loop recorder showed several episodes, she had one episode of atrial fibrillation in  and August lasting 1 hour and between 2-3 hours. Recent sleep stuyd 2023 showed no sleep apnea. Ms. Clarence Askew is here today for a follow up. She has palpitations frequently, 5 to 10 times a day and it is moderately bothersome. Other days she denies any palpitations at all. She was evaluated for PVD, testing was negative. No chest pain or pressure, no shortness of breath.  Denies any

## 2023-11-06 ENCOUNTER — OFFICE VISIT (OUTPATIENT)
Dept: PRIMARY CARE CLINIC | Age: 53
End: 2023-11-06
Payer: COMMERCIAL

## 2023-11-06 VITALS
HEART RATE: 76 BPM | DIASTOLIC BLOOD PRESSURE: 76 MMHG | HEIGHT: 64 IN | BODY MASS INDEX: 38.93 KG/M2 | WEIGHT: 228 LBS | SYSTOLIC BLOOD PRESSURE: 120 MMHG | OXYGEN SATURATION: 97 %

## 2023-11-06 DIAGNOSIS — E66.01 CLASS 3 SEVERE OBESITY WITH BODY MASS INDEX (BMI) OF 40.0 TO 44.9 IN ADULT, UNSPECIFIED OBESITY TYPE, UNSPECIFIED WHETHER SERIOUS COMORBIDITY PRESENT (HCC): Primary | ICD-10-CM

## 2023-11-06 DIAGNOSIS — Z02.89 ENCOUNTER FOR PHYSICAL EXAMINATION RELATED TO EMPLOYMENT: Primary | ICD-10-CM

## 2023-11-06 PROCEDURE — 3078F DIAST BP <80 MM HG: CPT | Performed by: STUDENT IN AN ORGANIZED HEALTH CARE EDUCATION/TRAINING PROGRAM

## 2023-11-06 PROCEDURE — 99214 OFFICE O/P EST MOD 30 MIN: CPT | Performed by: STUDENT IN AN ORGANIZED HEALTH CARE EDUCATION/TRAINING PROGRAM

## 2023-11-06 PROCEDURE — 3074F SYST BP LT 130 MM HG: CPT | Performed by: STUDENT IN AN ORGANIZED HEALTH CARE EDUCATION/TRAINING PROGRAM

## 2023-11-06 RX ORDER — PHENTERMINE HYDROCHLORIDE 37.5 MG/1
37.5 CAPSULE ORAL EVERY MORNING
Qty: 30 CAPSULE | Refills: 0 | Status: SHIPPED | OUTPATIENT
Start: 2023-11-06 | End: 2023-12-06

## 2023-11-06 RX ORDER — PHENTERMINE AND TOPIRAMATE 15; 92 MG/1; MG/1
1 CAPSULE, EXTENDED RELEASE ORAL DAILY
Qty: 30 CAPSULE | Refills: 2 | Status: SHIPPED | OUTPATIENT
Start: 2023-11-06 | End: 2023-12-06

## 2023-11-06 RX ORDER — FUROSEMIDE 20 MG/1
20 TABLET ORAL DAILY
Qty: 90 TABLET | Refills: 0 | Status: SHIPPED | OUTPATIENT
Start: 2023-11-06

## 2023-11-06 NOTE — PROGRESS NOTES
[address], [address] [place] [address], [address] is a 47 y.o. female with  has a past medical history of Anemia, unspecified, Anxiety, Celiac disease, Edema, H/O cardiovascular stress test, H/O echocardiogram, History of cardiac cath, History of cardiovascular stress test, History of normal Holter exam, Hypertension, Hypothyroidism, Thyroid disease, and Tilt table evaluation. Presented to the office today for:  Chief Complaint   Patient presents with    Weight Management       Assessment/Plan   1. Class 3 severe obesity with body mass index (BMI) of 40.0 to 44.9 in adult, unspecified obesity type, unspecified whether serious comorbidity present (HCC)  -     phentermine (ADIPEX-P) 37.5 MG capsule; Take 1 capsule by mouth every morning for 30 days. Max Daily Amount: 37.5 mg, Disp-30 capsule, R-0Normal  -     Phentermine-Topiramate (QSYMIA) 15-92 MG CP24; Take 1 each by mouth daily for 30 days. Not to be taken with Adipex/Pending insurance coverage of this. Max Daily Amount: 1 each, Disp-30 capsule, R-2Normal  Return in about 1 month (around 12/6/2023) for F/U Wt. Diet - continue to monitor calories at this time, 9577-4763 kcals/day  Exercise - 150+ minutes per week, walking/cycling. She is planning to go to a new Gym (With her friend)  Continue w/ Metformin 1000mg BID, Lasix at the current dose. Last PDMP Kale Suggs as Reviewed:  Review User Review Instant Review Result   Sherrimitra Shishade 11/6/2023  3:04 PM Reviewed PDMP [1]         All patient questions answered. Pt voiced understanding. Medications Discontinued During This Encounter   Medication Reason    furosemide (LASIX) 20 MG tablet REORDER    phentermine (ADIPEX-P) 37.5 MG capsule REORDER       Patient received counseling on the following healthy behaviors: nutrition, exercise and medication adherence.  I encouraged and discussed lifestyle modifications including diet and exercise and the patient I personally performed the service described in the documentation recorded by the scribe in my presence, and it accurately and completely records my words and actions.

## 2023-11-13 RX ORDER — DILTIAZEM HYDROCHLORIDE 300 MG/1
300 CAPSULE, COATED, EXTENDED RELEASE ORAL DAILY
Qty: 90 CAPSULE | Refills: 3 | Status: SHIPPED | OUTPATIENT
Start: 2023-11-13

## 2023-11-13 NOTE — TELEPHONE ENCOUNTER
Hello, please clarify dose, thank you.  Electronically signed by Kenny Coronado MD on 11/13/2023 at 11:01 AM

## 2023-11-14 DIAGNOSIS — E03.9 HYPOTHYROIDISM, UNSPECIFIED TYPE: ICD-10-CM

## 2023-11-14 RX ORDER — TRAZODONE HYDROCHLORIDE 50 MG/1
TABLET ORAL
Qty: 270 TABLET | Refills: 3 | Status: SHIPPED | OUTPATIENT
Start: 2023-11-14 | End: 2023-11-15 | Stop reason: SDUPTHER

## 2023-11-14 RX ORDER — TRAZODONE HYDROCHLORIDE 50 MG/1
150 TABLET ORAL NIGHTLY
Qty: 90 TABLET | Refills: 0 | OUTPATIENT
Start: 2023-11-14

## 2023-11-14 RX ORDER — LEVOTHYROXINE SODIUM 0.1 MG/1
100 TABLET ORAL DAILY
Qty: 90 TABLET | Refills: 3 | Status: SHIPPED | OUTPATIENT
Start: 2023-11-14

## 2023-11-14 RX ORDER — LEVOTHYROXINE SODIUM 0.1 MG/1
100 TABLET ORAL DAILY
Qty: 30 TABLET | Refills: 0 | OUTPATIENT
Start: 2023-11-14

## 2023-11-14 NOTE — TELEPHONE ENCOUNTER
Pt has new mail pharm - [facility]- ph [phone]. Needs Levothyroxine and trazadone sent there please.

## 2023-11-15 RX ORDER — LEVOTHYROXINE SODIUM 0.1 MG/1
100 TABLET ORAL DAILY
Qty: 30 TABLET | Refills: 0 | Status: SHIPPED | OUTPATIENT
Start: 2023-11-15

## 2023-11-15 RX ORDER — TRAZODONE HYDROCHLORIDE 50 MG/1
TABLET ORAL
Qty: 270 TABLET | Refills: 3 | Status: SHIPPED | OUTPATIENT
Start: 2023-11-15

## 2023-11-15 RX ORDER — TRAZODONE HYDROCHLORIDE 50 MG/1
150 TABLET ORAL NIGHTLY
Qty: 90 TABLET | Refills: 0 | Status: SHIPPED | OUTPATIENT
Start: 2023-11-15

## 2023-11-22 ENCOUNTER — NURSE ONLY (OUTPATIENT)
Dept: CARDIOLOGY | Age: 53
End: 2023-11-22
Payer: COMMERCIAL

## 2023-11-22 VITALS — HEART RATE: 65 BPM | DIASTOLIC BLOOD PRESSURE: 69 MMHG | SYSTOLIC BLOOD PRESSURE: 105 MMHG

## 2023-11-22 DIAGNOSIS — Z01.810 PREOP CARDIOVASCULAR EXAM: Primary | ICD-10-CM

## 2023-11-22 PROCEDURE — 93000 ELECTROCARDIOGRAM COMPLETE: CPT | Performed by: INTERNAL MEDICINE

## 2023-11-22 NOTE — PATIENT INSTRUCTIONS
SURVEY:    You may be receiving a survey from [employer] regarding your visit today. Please complete the survey to enable us to provide the highest quality of care to you and your family. If you cannot score us a very good on any question, please call the office to discuss how we could have made your experience a very good one. Thank you.

## 2023-11-22 NOTE — PROGRESS NOTES
Trang Murillo came in for a nurse visit to have EKG completed. EKG was normal and she was cleared for surgery.

## 2023-11-30 LAB
ANABASINE URINE: <2 NG/ML
COTININE: <5 NG/ML
NICOTINE URINE: <2 NG/ML

## 2023-12-01 ENCOUNTER — OFFICE VISIT (OUTPATIENT)
Dept: PRIMARY CARE CLINIC | Age: 53
End: 2023-12-01
Payer: COMMERCIAL

## 2023-12-01 VITALS
BODY MASS INDEX: 37.9 KG/M2 | SYSTOLIC BLOOD PRESSURE: 132 MMHG | OXYGEN SATURATION: 100 % | DIASTOLIC BLOOD PRESSURE: 72 MMHG | WEIGHT: 222 LBS | HEIGHT: 64 IN | HEART RATE: 79 BPM

## 2023-12-01 DIAGNOSIS — E66.01 CLASS 3 SEVERE OBESITY WITH BODY MASS INDEX (BMI) OF 40.0 TO 44.9 IN ADULT, UNSPECIFIED OBESITY TYPE, UNSPECIFIED WHETHER SERIOUS COMORBIDITY PRESENT (HCC): Primary | ICD-10-CM

## 2023-12-01 DIAGNOSIS — I10 ESSENTIAL HYPERTENSION: ICD-10-CM

## 2023-12-01 LAB
BASOPHILS ABSOLUTE: 0 /ΜL
BASOPHILS RELATIVE PERCENT: 1 %
EOSINOPHILS ABSOLUTE: 0.1 /ΜL
EOSINOPHILS RELATIVE PERCENT: 2.5 %
HCT VFR BLD CALC: 44.2 % (ref 36–46)
HEMOGLOBIN: 14.5 G/DL (ref 12–16)
LYMPHOCYTES ABSOLUTE: 1.2 /ΜL
LYMPHOCYTES RELATIVE PERCENT: 28.2 %
MCH RBC QN AUTO: 31.1 PG
MCHC RBC AUTO-ENTMCNC: 32.7 G/DL
MCV RBC AUTO: 95.1 FL
MONOCYTES ABSOLUTE: 0.2 /ΜL
MONOCYTES RELATIVE PERCENT: 5.5 %
NEUTROPHILS ABSOLUTE: 2.8 /ΜL
NEUTROPHILS RELATIVE PERCENT: 62.8 %
PLATELET # BLD: 257 K/ΜL
PMV BLD AUTO: 8.5 FL
RBC # BLD: 4.65 10^6/ΜL
T4 FREE: 1.35
TSH SERPL DL<=0.05 MIU/L-ACNC: 0.38 UIU/ML
WBC # BLD: 4.4 10^3/ML

## 2023-12-01 PROCEDURE — 3075F SYST BP GE 130 - 139MM HG: CPT | Performed by: STUDENT IN AN ORGANIZED HEALTH CARE EDUCATION/TRAINING PROGRAM

## 2023-12-01 PROCEDURE — 3078F DIAST BP <80 MM HG: CPT | Performed by: STUDENT IN AN ORGANIZED HEALTH CARE EDUCATION/TRAINING PROGRAM

## 2023-12-01 PROCEDURE — 99214 OFFICE O/P EST MOD 30 MIN: CPT | Performed by: STUDENT IN AN ORGANIZED HEALTH CARE EDUCATION/TRAINING PROGRAM

## 2023-12-01 RX ORDER — ESTRADIOL 0.1 MG/G
CREAM VAGINAL
COMMUNITY
Start: 2023-10-13

## 2023-12-01 RX ORDER — PHENTERMINE HYDROCHLORIDE 37.5 MG/1
37.5 CAPSULE ORAL EVERY MORNING
Qty: 30 CAPSULE | Refills: 0 | Status: SHIPPED | OUTPATIENT
Start: 2023-12-01 | End: 2023-12-31

## 2023-12-01 NOTE — PROGRESS NOTES
[address], [address] [place] [address], [address] is a 47 y.o. female with  has a past medical history of Anemia, unspecified, Anxiety, Celiac disease, Edema, H/O cardiovascular stress test, H/O echocardiogram, History of cardiac cath, History of cardiovascular stress test, History of normal Holter exam, Hypertension, Hypothyroidism, Thyroid disease, and Tilt table evaluation. Presented to the office today for:  Chief Complaint   Patient presents with    Weight Management       Assessment/Plan   1. Class 3 severe obesity with body mass index (BMI) of 40.0 to 44.9 in adult, unspecified obesity type, unspecified whether serious comorbidity present (HCC)  -     phentermine (ADIPEX-P) 37.5 MG capsule; Take 1 capsule by mouth every morning for 30 days. Fill when due, 10% weight loss over 3 months, 247-->222lbs Max Daily Amount: 37.5 mg, Disp-30 capsule, R-0Normal  2. Essential hypertension  Return in about 1 month (around 1/1/2024) for F/u Wt/Loss. Diet - continue to monitor calories, decrease carbs, 4095-4234 kcals/day  Exercise - 150+ minutes per week, walking/cycling. She is planning to go to a new Gym (With her friend)  Continue w/ Metformin 1000mg BID, Lasix at the current dose. On Adipex, well tolerated, Month of 09/10/11  247/222 lbs = 10% of weight loss. 34 lbs of wt loss since July 2023. Continue w/ BP meds at the current doses. All patient questions answered. Pt voiced understanding. Medications Discontinued During This Encounter   Medication Reason    levothyroxine (SYNTHROID) 100 MCG tablet ERROR    metFORMIN (GLUCOPHAGE) 1000 MG tablet ERROR    metFORMIN (GLUCOPHAGE) 500 MG tablet ERROR    traZODone (DESYREL) 50 MG tablet ERROR    phentermine (ADIPEX-P) 37.5 MG capsule REORDER       Patient received counseling on the following healthy behaviors: nutrition, exercise and medication adherence.  I encouraged and discussed lifestyle

## 2023-12-04 ENCOUNTER — TELEPHONE (OUTPATIENT)
Dept: PRIMARY CARE CLINIC | Age: 53
End: 2023-12-04

## 2023-12-12 ENCOUNTER — OFFICE VISIT (OUTPATIENT)
Dept: PRIMARY CARE CLINIC | Age: 53
End: 2023-12-12
Payer: COMMERCIAL

## 2023-12-12 VITALS
DIASTOLIC BLOOD PRESSURE: 70 MMHG | OXYGEN SATURATION: 96 % | TEMPERATURE: 97 F | HEART RATE: 69 BPM | BODY MASS INDEX: 37.08 KG/M2 | SYSTOLIC BLOOD PRESSURE: 90 MMHG | WEIGHT: 216 LBS

## 2023-12-12 DIAGNOSIS — Z00.00 WELLNESS EXAMINATION: Primary | ICD-10-CM

## 2023-12-12 DIAGNOSIS — M25.562 CHRONIC PAIN OF BOTH KNEES: ICD-10-CM

## 2023-12-12 DIAGNOSIS — M25.561 CHRONIC PAIN OF BOTH KNEES: ICD-10-CM

## 2023-12-12 DIAGNOSIS — G89.29 CHRONIC PAIN OF BOTH KNEES: ICD-10-CM

## 2023-12-12 PROCEDURE — 3078F DIAST BP <80 MM HG: CPT | Performed by: NURSE PRACTITIONER

## 2023-12-12 PROCEDURE — 99396 PREV VISIT EST AGE 40-64: CPT | Performed by: NURSE PRACTITIONER

## 2023-12-12 PROCEDURE — 3074F SYST BP LT 130 MM HG: CPT | Performed by: NURSE PRACTITIONER

## 2023-12-12 ASSESSMENT — ENCOUNTER SYMPTOMS
CONSTIPATION: 0
DIARRHEA: 0
RHINORRHEA: 0
SHORTNESS OF BREATH: 0

## 2023-12-12 NOTE — PATIENT INSTRUCTIONS
SURVEY:    You may be receiving a survey from [employer] regarding your visit today. Please complete the survey to enable us to provide the highest quality of care to you and your family. If you cannot score us a very good on any question, please call the office to discuss how we could have made your experience a very good one. Thank you.

## 2023-12-12 NOTE — PROGRESS NOTES
Date/Time    WBC 6.0 09/25/2023 10:47 AM    RBC 4.25 09/25/2023 10:47 AM    RBC 4.56 01/20/2021 10:16 AM    HGB 13.8 09/25/2023 10:47 AM    HCT 42.9 09/25/2023 10:47 AM    .9 09/25/2023 10:47 AM    MCH 32.5 09/25/2023 10:47 AM    MCHC 32.2 09/25/2023 10:47 AM    RDW 13.2 09/25/2023 10:47 AM     09/25/2023 10:47 AM    MPV 9.8 09/25/2023 10:47 AM     Lab Results   Component Value Date/Time    TSH 1.18 09/08/2023 09:57 AM     Lab Results   Component Value Date/Time    CHOL 193 09/08/2023 09:56 AM    HDL 39 09/08/2023 09:56 AM    LABA1C 5.8 09/08/2023 09:57 AM       Assessment/Plan:      Diagnosis Orders   1. Wellness examination        2. Chronic pain of both knees  External Referral To Orthopedic Surgery          Wellness:   - Counseled on healthy diet and routine exercise   - Encouraged routine eye and dental exams   - Mammogram order active, encouraged completion   - Request recent pap smear from Dr. Marlon Monroe [facility])   - Reviewed labs 9/2023  - Encouraged shingles and covid booster vaccinations  - UTD flu and tetanus vaccine     Bilateral Chronic Knee Pain:   - Referral to ELIAZAR Kramer, orthopedics, placed     -- Reviewed emergent signs and symptoms and when to seek care at the emergency department and/or call 911     Completed Refills   Requested Prescriptions     Signed Prescriptions Disp Refills    rizatriptan (MAXALT) 10 MG tablet 9 tablet 2     Sig: Take 1 tablet by mouth once as needed for Migraine May repeat in 2 hours if needed       Orders Placed This Encounter   Procedures    External Referral To Orthopedic Surgery     Referral Priority:   Routine     Referral Type:   Eval and Treat     Referral Reason:   Specialty Services Required     Referred to Provider:   Gregory Rogers MD     Requested Specialty:   Orthopedic Surgery     Number of Visits Requested:   1        No results found for this visit on 12/12/23.     Return in about 3 months (around 3/12/2024), or if symptoms worsen

## 2023-12-13 RX ORDER — RIZATRIPTAN BENZOATE 10 MG/1
10 TABLET ORAL
Qty: 9 TABLET | Refills: 2 | Status: SHIPPED | OUTPATIENT
Start: 2023-12-13 | End: 2023-12-13

## 2023-12-29 ENCOUNTER — NURSE ONLY (OUTPATIENT)
Dept: CARDIOLOGY | Age: 53
End: 2023-12-29

## 2023-12-29 DIAGNOSIS — R55 SYNCOPE, UNSPECIFIED SYNCOPE TYPE: ICD-10-CM

## 2023-12-29 DIAGNOSIS — Z45.09 ENCOUNTER FOR LOOP RECORDER CHECK: Primary | ICD-10-CM

## 2024-01-04 ENCOUNTER — TELEPHONE (OUTPATIENT)
Dept: CARDIOLOGY | Age: 54
End: 2024-01-04

## 2024-01-04 NOTE — TELEPHONE ENCOUNTER
Per Molly pt needs to come in for repeat ECG for cardiac clearance. I left pt a vm to call and get that scheduled with us.

## 2024-01-05 ENCOUNTER — NURSE ONLY (OUTPATIENT)
Dept: CARDIOLOGY | Age: 54
End: 2024-01-05
Payer: COMMERCIAL

## 2024-01-05 VITALS
RESPIRATION RATE: 18 BRPM | HEIGHT: 64 IN | HEART RATE: 73 BPM | DIASTOLIC BLOOD PRESSURE: 68 MMHG | BODY MASS INDEX: 37.08 KG/M2 | SYSTOLIC BLOOD PRESSURE: 123 MMHG | OXYGEN SATURATION: 99 %

## 2024-01-05 DIAGNOSIS — I10 PRIMARY HYPERTENSION: ICD-10-CM

## 2024-01-05 DIAGNOSIS — Z79.01 CHRONIC ANTICOAGULATION: ICD-10-CM

## 2024-01-05 DIAGNOSIS — R00.2 PALPITATIONS: ICD-10-CM

## 2024-01-05 DIAGNOSIS — Z01.810 PREOP CARDIOVASCULAR EXAM: ICD-10-CM

## 2024-01-05 DIAGNOSIS — R55 SYNCOPE, UNSPECIFIED SYNCOPE TYPE: ICD-10-CM

## 2024-01-05 DIAGNOSIS — I48.0 PAF (PAROXYSMAL ATRIAL FIBRILLATION) (HCC): ICD-10-CM

## 2024-01-05 DIAGNOSIS — Z45.09 ENCOUNTER FOR LOOP RECORDER CHECK: Primary | ICD-10-CM

## 2024-01-05 PROCEDURE — 93000 ELECTROCARDIOGRAM COMPLETE: CPT | Performed by: INTERNAL MEDICINE

## 2024-01-05 NOTE — PROGRESS NOTES
Ms. Boone came in for just a nurse visit EKG for cardiac clearance. She is having a bladder prolapse with mesh on 1/12/24 with Dr. Liz Magallanes in [place]. She reports she is doing well with no issues. EKG done today in the office 1/5/24 was normal sinus rhythm, normal ECG.

## 2024-01-08 ENCOUNTER — TELEPHONE (OUTPATIENT)
Dept: PRIMARY CARE CLINIC | Age: 54
End: 2024-01-08

## 2024-01-08 DIAGNOSIS — E66.01 CLASS 3 SEVERE OBESITY WITH BODY MASS INDEX (BMI) OF 40.0 TO 44.9 IN ADULT, UNSPECIFIED OBESITY TYPE, UNSPECIFIED WHETHER SERIOUS COMORBIDITY PRESENT (HCC): Primary | ICD-10-CM

## 2024-01-08 DIAGNOSIS — K76.0 HEPATIC STEATOSIS: ICD-10-CM

## 2024-01-08 DIAGNOSIS — R73.9 HYPERGLYCEMIA: ICD-10-CM

## 2024-01-08 DIAGNOSIS — I10 ESSENTIAL HYPERTENSION: ICD-10-CM

## 2024-01-08 DIAGNOSIS — E66.01 MORBID OBESITY (HCC): ICD-10-CM

## 2024-01-08 RX ORDER — PHENTERMINE HYDROCHLORIDE 37.5 MG/1
37.5 CAPSULE ORAL EVERY MORNING
Qty: 30 CAPSULE | Refills: 0 | Status: SHIPPED | OUTPATIENT
Start: 2024-01-08 | End: 2024-02-07

## 2024-01-08 NOTE — TELEPHONE ENCOUNTER
Patient called in stating she missed her appt on 01/04/24 for her wt/ loss appt. Got pt scheduled on soonest appt she can make. Pt stated only has 4 Apix pills left and wanting to know if could send in RX pended.

## 2024-02-13 RX ORDER — FUROSEMIDE 20 MG/1
20 TABLET ORAL DAILY
Qty: 90 TABLET | Refills: 0 | Status: SHIPPED | OUTPATIENT
Start: 2024-02-13

## 2024-02-26 ENCOUNTER — OFFICE VISIT (OUTPATIENT)
Dept: NEUROLOGY | Age: 54
End: 2024-02-26
Payer: COMMERCIAL

## 2024-02-26 VITALS
RESPIRATION RATE: 20 BRPM | SYSTOLIC BLOOD PRESSURE: 130 MMHG | HEART RATE: 60 BPM | BODY MASS INDEX: 38.07 KG/M2 | HEIGHT: 64 IN | DIASTOLIC BLOOD PRESSURE: 75 MMHG | WEIGHT: 223 LBS | TEMPERATURE: 97.8 F

## 2024-02-26 DIAGNOSIS — G60.9 IDIOPATHIC SMALL FIBER PERIPHERAL NEUROPATHY: ICD-10-CM

## 2024-02-26 DIAGNOSIS — G43.119 INTRACTABLE MIGRAINE WITH AURA WITHOUT STATUS MIGRAINOSUS: Primary | ICD-10-CM

## 2024-02-26 PROCEDURE — 3078F DIAST BP <80 MM HG: CPT | Performed by: NEUROMUSCULOSKELETAL MEDICINE, SPORTS MEDICINE

## 2024-02-26 PROCEDURE — 99213 OFFICE O/P EST LOW 20 MIN: CPT | Performed by: NEUROMUSCULOSKELETAL MEDICINE, SPORTS MEDICINE

## 2024-02-26 PROCEDURE — 3075F SYST BP GE 130 - 139MM HG: CPT | Performed by: NEUROMUSCULOSKELETAL MEDICINE, SPORTS MEDICINE

## 2024-02-26 RX ORDER — RIZATRIPTAN BENZOATE 10 MG/1
10 TABLET ORAL
Qty: 9 TABLET | Refills: 2 | Status: SHIPPED | OUTPATIENT
Start: 2024-02-26 | End: 2024-02-26

## 2024-02-26 NOTE — PROGRESS NOTES
(ZIAC) 10-6.25 MG per tablet Take 1 tablet by mouth once daily 90 tablet 3    nortriptyline (PAMELOR) 25 MG capsule TAKE 2 CAPSULES BY MOUTH NIGHTLY 60 capsule 5    omeprazole (PRILOSEC) 20 MG delayed release capsule Take 1 capsule by mouth daily as needed      colestipol (COLESTID) 1 g tablet TAKE 2 TABLETS BY MOUTH TWICE DAILY WITH A FULL GLASS OF WATER.      progesterone (PROMETRIUM) 100 MG CAPS capsule TAKE 1 CAPSULE BY MOUTH AT BEDTIME      albuterol sulfate HFA (VENTOLIN HFA) 108 (90 Base) MCG/ACT inhaler Inhale 2 puffs into the lungs 4 times daily as needed for Wheezing 1 each 0    ALPRAZolam (XANAX) 0.25 MG tablet Take 1 tablet by mouth daily as needed.      buPROPion (WELLBUTRIN XL) 300 MG extended release tablet Take 1 tablet by mouth every morning With 150 mg for total of 450 mg      buPROPion (WELLBUTRIN XL) 150 MG extended release tablet Take 1 tablet by mouth daily With 300 mg for total of 450 mg       No current facility-administered medications for this visit.      DATA:  Lab Results   Component Value Date    WBC 4.4 12/01/2023    HGB 14.5 12/01/2023     12/01/2023    CHOL 193 09/08/2023    TRIG 247 (H) 09/08/2023    HDL 39 (L) 09/08/2023    ALT 62 (H) 09/08/2023    AST 48 (H) 09/08/2023     09/08/2023    K 4.3 09/08/2023     09/08/2023    CREATININE 1.1 (H) 09/08/2023    BUN 17 09/08/2023    CO2 31 09/08/2023    TSH 0.38 12/01/2023    LABA1C 5.8 09/08/2023       /75 (Site: Left Upper Arm, Position: Sitting, Cuff Size: Large Adult)   Pulse 60   Temp 97.8 °F (36.6 °C) (Tympanic)   Resp 20   Ht 1.626 m (5' 4\")   Wt 101.2 kg (223 lb)   LMP 07/15/2020 (Approximate)   BMI 38.28 kg/m²     NEUROLOGICAL EXAMINATION:      MENTAL STATUS: Alert and oriented x 3.     CRANIAL NERVES: II-XII are within normal limits.     MOTOR EXAMINATION: Muscle tone is normal . Strength is 5/5 in both upper and lower limbs.  No abnormal limb movements.      SENSORY EXAMINATION: Normal.  No sensory

## 2024-02-26 NOTE — PATIENT INSTRUCTIONS
SURVEY:    Thank you for allowing us to care for you today.    You may be receiving a survey from [facility] regarding your visit today- electronically or via mail.      Please help us by completing the survey as this will provide the needed feedback to ensure we are providing the very best care for you and your family.    If you cannot score us a very good on any question, please call the office to discuss how we could have made your experience a very good one.    Thank you.       STAFF:    Donna Evans Rhonda Rose      CLINICAL STAFF:    Carol Diana CMA

## 2024-03-07 RX ORDER — NORTRIPTYLINE HYDROCHLORIDE 25 MG/1
50 CAPSULE ORAL NIGHTLY
Qty: 60 CAPSULE | Refills: 2 | Status: SHIPPED | OUTPATIENT
Start: 2024-03-07 | End: 2024-04-06

## 2024-03-24 NOTE — PATIENT INSTRUCTIONS
----- Message from Cirilo Irving SURVEY:    You may be receiving a survey from [employer] regarding your visit today. Please complete the survey to enable us to provide the highest quality of care to you and your family. If you cannot score us a very good on any question, please call the office to discuss how we could of made your experience a very good one. Thank you.

## 2024-03-29 ENCOUNTER — NURSE ONLY (OUTPATIENT)
Dept: CARDIOLOGY | Age: 54
End: 2024-03-29

## 2024-03-29 DIAGNOSIS — R55 SYNCOPE, UNSPECIFIED SYNCOPE TYPE: ICD-10-CM

## 2024-03-29 DIAGNOSIS — Z45.09 ENCOUNTER FOR LOOP RECORDER CHECK: Primary | ICD-10-CM

## 2024-04-30 ENCOUNTER — HOSPITAL ENCOUNTER (OUTPATIENT)
Age: 54
Discharge: HOME | End: 2024-04-30
Payer: COMMERCIAL

## 2024-04-30 ENCOUNTER — OFFICE VISIT (OUTPATIENT)
Dept: CARDIOLOGY | Age: 54
End: 2024-04-30
Payer: COMMERCIAL

## 2024-04-30 VITALS
HEART RATE: 65 BPM | RESPIRATION RATE: 18 BRPM | WEIGHT: 215 LBS | OXYGEN SATURATION: 98 % | SYSTOLIC BLOOD PRESSURE: 115 MMHG | HEIGHT: 65 IN | DIASTOLIC BLOOD PRESSURE: 62 MMHG | BODY MASS INDEX: 35.82 KG/M2

## 2024-04-30 DIAGNOSIS — Z45.09 ENCOUNTER FOR LOOP RECORDER CHECK: ICD-10-CM

## 2024-04-30 DIAGNOSIS — E66.9 CLASS 2 OBESITY WITH BODY MASS INDEX (BMI) OF 35.0 TO 35.9 IN ADULT, UNSPECIFIED OBESITY TYPE, UNSPECIFIED WHETHER SERIOUS COMORBIDITY PRESENT: ICD-10-CM

## 2024-04-30 DIAGNOSIS — R00.2 HEART PALPITATIONS: ICD-10-CM

## 2024-04-30 DIAGNOSIS — I48.0 PAF (PAROXYSMAL ATRIAL FIBRILLATION) (HCC): ICD-10-CM

## 2024-04-30 DIAGNOSIS — Z79.01 CHRONIC ANTICOAGULATION: ICD-10-CM

## 2024-04-30 DIAGNOSIS — I48.0 PAF (PAROXYSMAL ATRIAL FIBRILLATION) (HCC): Primary | ICD-10-CM

## 2024-04-30 DIAGNOSIS — R42 LIGHTHEADED: ICD-10-CM

## 2024-04-30 DIAGNOSIS — I10 ESSENTIAL HYPERTENSION: ICD-10-CM

## 2024-04-30 LAB
ANION GAP SERPL CALCULATED.3IONS-SCNC: 11 MMOL/L (ref 9–17)
BUN SERPL-MCNC: 25 MG/DL (ref 6–20)
BUN/CREAT SERPL: 21 (ref 9–20)
CALCIUM SERPL-MCNC: 9.5 MG/DL (ref 8.6–10.4)
CHLORIDE SERPL-SCNC: 99 MMOL/L (ref 98–107)
CO2 SERPL-SCNC: 30 MMOL/L (ref 20–31)
CREAT SERPL-MCNC: 1.2 MG/DL (ref 0.5–0.9)
ERYTHROCYTE [DISTWIDTH] IN BLOOD BY AUTOMATED COUNT: 12.5 % (ref 11.8–14.4)
GFR SERPL CREATININE-BSD FRML MDRD: 54 ML/MIN/1.73M2
GLUCOSE SERPL-MCNC: 89 MG/DL (ref 70–99)
HCT VFR BLD AUTO: 42.1 % (ref 36.3–47.1)
HGB BLD-MCNC: 14 G/DL (ref 11.9–15.1)
MCH RBC QN AUTO: 33 PG (ref 25.2–33.5)
MCHC RBC AUTO-ENTMCNC: 33.3 G/DL (ref 28.4–34.8)
MCV RBC AUTO: 99.3 FL (ref 82.6–102.9)
NRBC BLD-RTO: 0 PER 100 WBC
PLATELET # BLD AUTO: 299 K/UL (ref 138–453)
PMV BLD AUTO: 10 FL (ref 8.1–13.5)
POTASSIUM SERPL-SCNC: 4.5 MMOL/L (ref 3.7–5.3)
RBC # BLD AUTO: 4.24 M/UL (ref 3.95–5.11)
SODIUM SERPL-SCNC: 140 MMOL/L (ref 135–144)
WBC OTHER # BLD: 5.6 K/UL (ref 3.5–11.3)

## 2024-04-30 PROCEDURE — 99214 OFFICE O/P EST MOD 30 MIN: CPT | Performed by: FAMILY MEDICINE

## 2024-04-30 PROCEDURE — 3078F DIAST BP <80 MM HG: CPT | Performed by: FAMILY MEDICINE

## 2024-04-30 PROCEDURE — 3074F SYST BP LT 130 MM HG: CPT | Performed by: FAMILY MEDICINE

## 2024-04-30 PROCEDURE — 85027 COMPLETE CBC AUTOMATED: CPT

## 2024-04-30 PROCEDURE — 80048 BASIC METABOLIC PNL TOTAL CA: CPT

## 2024-04-30 PROCEDURE — 36415 COLL VENOUS BLD VENIPUNCTURE: CPT

## 2024-04-30 RX ORDER — BISOPROLOL FUMARATE 10 MG/1
10 TABLET, FILM COATED ORAL DAILY
Qty: 90 TABLET | Refills: 3 | Status: SHIPPED | OUTPATIENT
Start: 2024-04-30

## 2024-05-01 NOTE — PROGRESS NOTES
major bleeding or predisposition to bleeding     {Major bleedin}   Labile  INR  Unstable/high INRs, time in therapeutic range <60%   {Labile INR:557199017}   Age >65   {Age:894611667}   Medication usage predisposing to bleeding   Aspirin, clopidogrel, NSAIDs   {Predisposing medications:960633096}   Alcohol use   >7 drinks/week   {Alcohol:585575518}       HAS-BLED Score:    {HAS-BLED Score:252879165}    
      Nonpharmacologic counseling: Because of her condition, I reminded her to try and keep herself well-hydrated and to take extra time when moving from laying to sitting, sitting to standing and standing to walking. I also explained to her to help improve her symptoms she should include 3 g sodium diet, 1 or 2 L of sports drinks daily, knee-high compressions stockings.     Essential Hypertension: Controlled   Beta Blocker: STOP bisoprolol/HCTZ (Ziac) and START bisoprolol 10 mg daily.                                      Calcium Channel Blocker: Continue diltiazem CD (Cardizem CD) 300 mg once daily.  Diuretics: Continue furosemide (Lasix) 20 mg daily for now however this may also being contributing to her lightheadedness and dizziness. We will see what her blood work shows and if needed we can stop this medication as well.     Implantable Loop Recorder:   Indication for Device Placement: Unexplained Syncope    Obesity: Body mass index is 35.78 kg/m².   I also briefly discussed both diet and exercise strategies for her to continue to loses weight and she was very receptive to this.    Once again, thank you for allowing me to participate in this patients care. Please do not hesitate to contact me if I could be of any further assistance.     FOLLOW UP:   I told Ms. Boone to call my office if she had any problems, but otherwise told her to Return in about 1 year (around 4/30/2025). However, I would be happy to see her sooner should the need arise.     Sincerely,  Reji Little MD, MS, [facility] Cardiology Specialist    [address] [place], [place] [address]  Phone: [phone], Fax: [phone]     I believe that the risk of significant morbidity and mortality related to the patient's current medical conditions are: Intermediate. At least 30 minutes were spent during prep work, discussion and exam of the patient, and follow up documentation and all of their questions were answered.    The

## 2024-05-16 RX ORDER — FUROSEMIDE 20 MG/1
20 TABLET ORAL DAILY
Qty: 90 TABLET | Refills: 0 | Status: SHIPPED | OUTPATIENT
Start: 2024-05-16

## 2024-06-06 RX ORDER — NORTRIPTYLINE HYDROCHLORIDE 25 MG/1
50 CAPSULE ORAL NIGHTLY
Qty: 180 CAPSULE | Refills: 1 | Status: SHIPPED | OUTPATIENT
Start: 2024-06-06 | End: 2024-07-06

## 2024-06-11 ENCOUNTER — TELEPHONE (OUTPATIENT)
Dept: PRIMARY CARE CLINIC | Age: 54
End: 2024-06-11

## 2024-06-11 DIAGNOSIS — K90.0 CELIAC DISEASE: Primary | ICD-10-CM

## 2024-06-11 NOTE — TELEPHONE ENCOUNTER
Patient currently sees GI in [place] and wants to transfer to [place]. Ongoing issues with Celiac.

## 2024-06-11 NOTE — TELEPHONE ENCOUNTER
----- Message from Ryder Bermudez sent at 6/11/2024  1:22 PM EDT -----  Regarding: ECC Message to Provider  ECC Message to Provider    Relationship to Patient: Self     Additional Information Patient wanted to ask for referral for Dr. Jenkins  --------------------------------------------------------------------------------------------------------------------------    Call Back Information: OK to leave message on voicemail  Preferred Call Back Number: Phone [phone]

## 2024-06-28 ENCOUNTER — NURSE ONLY (OUTPATIENT)
Dept: CARDIOLOGY | Age: 54
End: 2024-06-28

## 2024-06-28 DIAGNOSIS — R00.2 PALPITATIONS: ICD-10-CM

## 2024-06-28 DIAGNOSIS — Z45.09 ENCOUNTER FOR LOOP RECORDER CHECK: Primary | ICD-10-CM

## 2024-08-02 ENCOUNTER — HOSPITAL ENCOUNTER (EMERGENCY)
Age: 54
Discharge: ANOTHER HOSPITAL | End: 2024-08-02
Attending: EMERGENCY MEDICINE
Payer: COMMERCIAL

## 2024-08-02 ENCOUNTER — APPOINTMENT (OUTPATIENT)
Dept: CT IMAGING | Age: 54
End: 2024-08-02
Payer: COMMERCIAL

## 2024-08-02 VITALS
RESPIRATION RATE: 15 BRPM | DIASTOLIC BLOOD PRESSURE: 83 MMHG | HEART RATE: 61 BPM | SYSTOLIC BLOOD PRESSURE: 121 MMHG | OXYGEN SATURATION: 97 % | TEMPERATURE: 97.6 F

## 2024-08-02 DIAGNOSIS — N17.9 ACUTE KIDNEY INJURY (HCC): Primary | ICD-10-CM

## 2024-08-02 DIAGNOSIS — E86.0 DEHYDRATION: ICD-10-CM

## 2024-08-02 LAB
ALBUMIN SERPL-MCNC: 4.3 G/DL (ref 3.5–5.2)
ALBUMIN/GLOB SERPL: 1.6 {RATIO} (ref 1–2.5)
ALP SERPL-CCNC: 50 U/L (ref 35–104)
ALT SERPL-CCNC: 24 U/L (ref 5–33)
AMORPH SED URNS QL MICRO: ABNORMAL
ANION GAP SERPL CALCULATED.3IONS-SCNC: 15 MMOL/L (ref 9–17)
ANION GAP SERPL CALCULATED.3IONS-SCNC: 16 MMOL/L (ref 9–17)
AST SERPL-CCNC: 26 U/L
BACTERIA URNS QL MICRO: ABNORMAL
BASOPHILS # BLD: 0.06 K/UL (ref 0–0.2)
BASOPHILS NFR BLD: 1 % (ref 0–2)
BILIRUB SERPL-MCNC: 1.1 MG/DL (ref 0.3–1.2)
BILIRUB UR QL STRIP: ABNORMAL
BUN SERPL-MCNC: 49 MG/DL (ref 6–20)
BUN SERPL-MCNC: 51 MG/DL (ref 6–20)
BUN/CREAT SERPL: 7 (ref 9–20)
BUN/CREAT SERPL: 8 (ref 9–20)
CALCIUM SERPL-MCNC: 8.6 MG/DL (ref 8.6–10.4)
CALCIUM SERPL-MCNC: 9.9 MG/DL (ref 8.6–10.4)
CHLORIDE SERPL-SCNC: 93 MMOL/L (ref 98–107)
CHLORIDE SERPL-SCNC: 99 MMOL/L (ref 98–107)
CLARITY UR: ABNORMAL
CO2 SERPL-SCNC: 25 MMOL/L (ref 20–31)
CO2 SERPL-SCNC: 28 MMOL/L (ref 20–31)
COLOR UR: YELLOW
CREAT SERPL-MCNC: 6.2 MG/DL (ref 0.5–0.9)
CREAT SERPL-MCNC: 7.1 MG/DL (ref 0.5–0.9)
EOSINOPHIL # BLD: 0.11 K/UL (ref 0–0.44)
EOSINOPHILS RELATIVE PERCENT: 2 % (ref 1–4)
EPI CELLS #/AREA URNS HPF: ABNORMAL /HPF (ref 0–25)
ERYTHROCYTE [DISTWIDTH] IN BLOOD BY AUTOMATED COUNT: 12.1 % (ref 11.8–14.4)
GFR, ESTIMATED: 6 ML/MIN/1.73M2
GFR, ESTIMATED: 8 ML/MIN/1.73M2
GLUCOSE SERPL-MCNC: 85 MG/DL (ref 70–99)
GLUCOSE SERPL-MCNC: 94 MG/DL (ref 70–99)
GLUCOSE UR STRIP-MCNC: NEGATIVE MG/DL
HCT VFR BLD AUTO: 41 % (ref 36.3–47.1)
HGB BLD-MCNC: 14.4 G/DL (ref 11.9–15.1)
HGB UR QL STRIP.AUTO: ABNORMAL
IMM GRANULOCYTES # BLD AUTO: <0.03 K/UL (ref 0–0.3)
IMM GRANULOCYTES NFR BLD: 0 %
KETONES UR STRIP-MCNC: ABNORMAL MG/DL
LACTATE BLDV-SCNC: 1.3 MMOL/L (ref 0.5–2.2)
LEUKOCYTE ESTERASE UR QL STRIP: ABNORMAL
LIPASE SERPL-CCNC: 34 U/L (ref 13–60)
LYMPHOCYTES NFR BLD: 1.84 K/UL (ref 1.1–3.7)
LYMPHOCYTES RELATIVE PERCENT: 33 % (ref 24–43)
MCH RBC QN AUTO: 33 PG (ref 25.2–33.5)
MCHC RBC AUTO-ENTMCNC: 35.1 G/DL (ref 28.4–34.8)
MCV RBC AUTO: 94 FL (ref 82.6–102.9)
MONOCYTES NFR BLD: 0.42 K/UL (ref 0.1–1.2)
MONOCYTES NFR BLD: 8 % (ref 3–12)
NEUTROPHILS NFR BLD: 56 % (ref 36–65)
NEUTS SEG NFR BLD: 3.12 K/UL (ref 1.5–8.1)
NITRITE UR QL STRIP: NEGATIVE
NRBC BLD-RTO: 0 PER 100 WBC
PH UR STRIP: 5.5 [PH] (ref 5–9)
PLATELET # BLD AUTO: 258 K/UL (ref 138–453)
PMV BLD AUTO: 10.4 FL (ref 8.1–13.5)
POTASSIUM SERPL-SCNC: 3.6 MMOL/L (ref 3.7–5.3)
POTASSIUM SERPL-SCNC: 3.9 MMOL/L (ref 3.7–5.3)
PROT SERPL-MCNC: 7 G/DL (ref 6.4–8.3)
PROT UR STRIP-MCNC: ABNORMAL MG/DL
RBC # BLD AUTO: 4.36 M/UL (ref 3.95–5.11)
RBC #/AREA URNS HPF: ABNORMAL /HPF (ref 0–2)
SODIUM SERPL-SCNC: 137 MMOL/L (ref 135–144)
SODIUM SERPL-SCNC: 139 MMOL/L (ref 135–144)
SP GR UR STRIP: >1.03 (ref 1.01–1.02)
UROBILINOGEN UR STRIP-ACNC: NORMAL EU/DL (ref 0–1)
WBC #/AREA URNS HPF: ABNORMAL /HPF (ref 0–5)
WBC OTHER # BLD: 5.6 K/UL (ref 3.5–11.3)

## 2024-08-02 PROCEDURE — 85025 COMPLETE CBC W/AUTO DIFF WBC: CPT

## 2024-08-02 PROCEDURE — 6360000002 HC RX W HCPCS: Performed by: EMERGENCY MEDICINE

## 2024-08-02 PROCEDURE — 80048 BASIC METABOLIC PNL TOTAL CA: CPT

## 2024-08-02 PROCEDURE — 99285 EMERGENCY DEPT VISIT HI MDM: CPT

## 2024-08-02 PROCEDURE — 96361 HYDRATE IV INFUSION ADD-ON: CPT

## 2024-08-02 PROCEDURE — 96374 THER/PROPH/DIAG INJ IV PUSH: CPT

## 2024-08-02 PROCEDURE — 36415 COLL VENOUS BLD VENIPUNCTURE: CPT

## 2024-08-02 PROCEDURE — 83605 ASSAY OF LACTIC ACID: CPT

## 2024-08-02 PROCEDURE — 83690 ASSAY OF LIPASE: CPT

## 2024-08-02 PROCEDURE — 2580000003 HC RX 258: Performed by: EMERGENCY MEDICINE

## 2024-08-02 PROCEDURE — 80053 COMPREHEN METABOLIC PANEL: CPT

## 2024-08-02 PROCEDURE — 81001 URINALYSIS AUTO W/SCOPE: CPT

## 2024-08-02 PROCEDURE — 74176 CT ABD & PELVIS W/O CONTRAST: CPT

## 2024-08-02 RX ORDER — 0.9 % SODIUM CHLORIDE 0.9 %
1000 INTRAVENOUS SOLUTION INTRAVENOUS ONCE
Status: COMPLETED | OUTPATIENT
Start: 2024-08-02 | End: 2024-08-02

## 2024-08-02 RX ORDER — SODIUM CHLORIDE 9 MG/ML
INJECTION, SOLUTION INTRAVENOUS CONTINUOUS
Status: DISCONTINUED | OUTPATIENT
Start: 2024-08-02 | End: 2024-08-03 | Stop reason: HOSPADM

## 2024-08-02 RX ORDER — ONDANSETRON 2 MG/ML
4 INJECTION INTRAMUSCULAR; INTRAVENOUS ONCE
Status: COMPLETED | OUTPATIENT
Start: 2024-08-02 | End: 2024-08-02

## 2024-08-02 RX ADMIN — ONDANSETRON 4 MG: 2 INJECTION INTRAMUSCULAR; INTRAVENOUS at 13:36

## 2024-08-02 RX ADMIN — SODIUM CHLORIDE: 9 INJECTION, SOLUTION INTRAVENOUS at 16:31

## 2024-08-02 RX ADMIN — SODIUM CHLORIDE 1000 ML: 9 INJECTION, SOLUTION INTRAVENOUS at 15:23

## 2024-08-02 RX ADMIN — SODIUM CHLORIDE 1000 ML: 9 INJECTION, SOLUTION INTRAVENOUS at 13:36

## 2024-08-02 ASSESSMENT — ENCOUNTER SYMPTOMS
ABDOMINAL DISTENTION: 0
NAUSEA: 1
SORE THROAT: 0
DIARRHEA: 0
ABDOMINAL PAIN: 1
SHORTNESS OF BREATH: 0
BACK PAIN: 0
VOMITING: 1

## 2024-08-02 ASSESSMENT — PAIN - FUNCTIONAL ASSESSMENT: PAIN_FUNCTIONAL_ASSESSMENT: NONE - DENIES PAIN

## 2024-08-02 NOTE — ED NOTES
DEVIN called with hospitalist from [facility], transferred call to Dr Muhammad

## 2024-08-02 NOTE — ED NOTES
JOSE called with nephrology from [facility]'s on the line, connected call to Dr Muhammad   Statement Selected

## 2024-08-02 NOTE — ED PROVIDER NOTES
ANTERIOR CRUCIATE LIGAMENT REPAIR Left     HYSTERECTOMY, VAGINAL      07/2020 robotic, lap supracervical hyst and BS, perineopexy, A&P repair, cysto, Lynx retropubic slin    INFECTED SKIN DEBRIDEMENT Left     Left third finger    INSERTABLE CARDIAC MONITOR  05/20/2021    OTHER SURGICAL HISTORY  01/2024    Uterus issues-biopy taken         CURRENT MEDICATIONS       Previous Medications    ALPRAZOLAM (XANAX) 0.25 MG TABLET    Take 1 tablet by mouth daily as needed.    APIXABAN (ELIQUIS) 5 MG TABS TABLET    Take 1 tablet by mouth 2 times daily    BISOPROLOL (ZEBETA) 10 MG TABLET    Take 1 tablet by mouth daily    COLESTIPOL (COLESTID) 1 G TABLET    TAKE 2 TABLETS BY MOUTH TWICE DAILY WITH A FULL GLASS OF WATER.    DILTIAZEM (CARDIZEM CD) 300 MG EXTENDED RELEASE CAPSULE    TAKE 1 CAPSULE BY MOUTH DAILY    FUROSEMIDE (LASIX) 20 MG TABLET    TAKE 1 TABLET BY MOUTH DAILY    LEVOTHYROXINE (SYNTHROID) 100 MCG TABLET    Take 1 tablet by mouth daily    METFORMIN (GLUCOPHAGE) 1000 MG TABLET    TAKE 1 TABLET BY MOUTH 2 TIMES DAILY (WITH MEALS)    NORTRIPTYLINE (PAMELOR) 25 MG CAPSULE    TAKE 2 CAPSULES BY MOUTH NIGHTLY    OMEPRAZOLE (PRILOSEC) 20 MG DELAYED RELEASE CAPSULE    Take 1 capsule by mouth daily as needed    PROGESTERONE (PROMETRIUM) 100 MG CAPS CAPSULE    TAKE 1 CAPSULE BY MOUTH AT BEDTIME    RIZATRIPTAN (MAXALT) 10 MG TABLET    Take 1 tablet by mouth once as needed for Migraine May repeat in 2 hours if needed    TRAZODONE (DESYREL) 50 MG TABLET    TAKE 3 TABLETS BY MOUTH AT BEDTIME AS NEEDED FOR INSOMNIA       ALLERGIES     Rocephin [ceftriaxone] and Bee venom    FAMILY HISTORY       Family History   Adopted: Yes          SOCIAL HISTORY       Social History     Socioeconomic History    Marital status: Single     Spouse name: None    Number of children: None    Years of education: None    Highest education level: None   Occupational History    Occupation: nurse aid   Tobacco Use    Smoking status: Never       Musculoskeletal:         General: Normal range of motion.   Skin:     General: Skin is warm and dry.      Capillary Refill: Capillary refill takes less than 2 seconds.   Neurological:      General: No focal deficit present.      Mental Status: She is alert and oriented to person, place, and time.   Psychiatric:         Mood and Affect: Mood normal.         DIAGNOSTIC RESULTS     EKG: All EKG's are interpreted by the Emergency Department Physician who either signs or Co-signs this chart in the absence of a cardiologist.        RADIOLOGY:   Non-plain film images such as CT, Ultrasound and MRI are read by the radiologist. Plain radiographic images are visualized and preliminarily interpreted by the emergency physician with the below findings:        Interpretation per the Radiologist below, if available at the time of this note:    CT ABDOMEN PELVIS WO CONTRAST Additional Contrast? None   Final Result   1. No acute intra-abdominal or pelvic process.   2. Normal appendix.   3. No obstructive uropathy.               ED BEDSIDE ULTRASOUND:   Performed by ED Physician - none    LABS:  Labs Reviewed   CBC WITH AUTO DIFFERENTIAL - Abnormal; Notable for the following components:       Result Value    MCHC 35.1 (*)     All other components within normal limits   COMPREHENSIVE METABOLIC PANEL - Abnormal; Notable for the following components:    Chloride 93 (*)     BUN 51 (*)     Creatinine 7.1 (*)     Est, Glom Filt Rate 6 (*)     BUN/Creatinine Ratio 7 (*)     All other components within normal limits   URINALYSIS WITH MICROSCOPIC - Abnormal; Notable for the following components:    Turbidity UA SLIGHTLY CLOUDY (*)     Bilirubin, Urine SMALL (*)     Ketones, Urine TRACE (*)     Specific Gravity, UA >1.030 (*)     Urine Hgb TRACE (*)     Protein, UA 1+ (*)     Leukocyte Esterase, Urine SMALL (*)     Bacteria, UA 1+ (*)     Amorphous, UA 1+ (*)     All other components within normal limits   LACTIC ACID   LIPASE       All

## 2024-08-03 ENCOUNTER — HOSPITAL ENCOUNTER (INPATIENT)
Age: 54
LOS: 3 days | Discharge: HOME | DRG: 684 | End: 2024-08-06
Attending: INTERNAL MEDICINE | Admitting: INTERNAL MEDICINE
Payer: COMMERCIAL

## 2024-08-03 DIAGNOSIS — K90.0 CELIAC DISEASE: ICD-10-CM

## 2024-08-03 PROBLEM — E83.42 HYPOMAGNESEMIA: Status: ACTIVE | Noted: 2024-08-02

## 2024-08-03 PROBLEM — E87.6 HYPOKALEMIA: Status: ACTIVE | Noted: 2024-08-03

## 2024-08-03 PROBLEM — N17.9 AKI (ACUTE KIDNEY INJURY) (HCC): Status: ACTIVE | Noted: 2024-08-03

## 2024-08-03 LAB
ALBUMIN SERPL-MCNC: 3.5 G/DL (ref 3.5–5.2)
ALP SERPL-CCNC: 39 U/L (ref 35–104)
ALT SERPL-CCNC: 17 U/L (ref 5–33)
ANION GAP SERPL CALCULATED.3IONS-SCNC: 14 MMOL/L (ref 9–17)
ANION GAP SERPL CALCULATED.3IONS-SCNC: 15 MMOL/L (ref 9–17)
AST SERPL-CCNC: 20 U/L
BASOPHILS # BLD: 0.05 K/UL (ref 0–0.2)
BASOPHILS NFR BLD: 1 % (ref 0–2)
BILIRUB SERPL-MCNC: 1 MG/DL (ref 0.3–1.2)
BILIRUB UR QL STRIP: NEGATIVE
BUN SERPL-MCNC: 37 MG/DL (ref 6–20)
BUN SERPL-MCNC: 46 MG/DL (ref 6–20)
BUN/CREAT SERPL: 10 (ref 9–20)
BUN/CREAT SERPL: 12 (ref 9–20)
CALCIUM SERPL-MCNC: 8.9 MG/DL (ref 8.6–10.4)
CALCIUM SERPL-MCNC: 9 MG/DL (ref 8.6–10.4)
CHLORIDE SERPL-SCNC: 102 MMOL/L (ref 98–107)
CHLORIDE SERPL-SCNC: 97 MMOL/L (ref 98–107)
CLARITY UR: CLEAR
CO2 SERPL-SCNC: 24 MMOL/L (ref 20–31)
CO2 SERPL-SCNC: 25 MMOL/L (ref 20–31)
COLOR UR: YELLOW
CREAT SERPL-MCNC: 3.1 MG/DL (ref 0.5–0.9)
CREAT SERPL-MCNC: 4.6 MG/DL (ref 0.5–0.9)
CREAT UR-MCNC: 119 MG/DL (ref 28–217)
CREAT UR-MCNC: 119 MG/DL (ref 28–217)
EOSINOPHIL # BLD: 0.11 K/UL (ref 0–0.44)
EOSINOPHILS RELATIVE PERCENT: 2 % (ref 1–4)
EPI CELLS #/AREA URNS HPF: ABNORMAL /HPF (ref 0–5)
ERYTHROCYTE [DISTWIDTH] IN BLOOD BY AUTOMATED COUNT: 12.4 % (ref 11.8–14.4)
GFR, ESTIMATED: 11 ML/MIN/1.73M2
GFR, ESTIMATED: 17 ML/MIN/1.73M2
GLUCOSE SERPL-MCNC: 74 MG/DL (ref 70–99)
GLUCOSE SERPL-MCNC: 88 MG/DL (ref 70–99)
GLUCOSE UR STRIP-MCNC: NEGATIVE MG/DL
HCT VFR BLD AUTO: 37.3 % (ref 36.3–47.1)
HGB BLD-MCNC: 12.7 G/DL (ref 11.9–15.1)
HGB UR QL STRIP.AUTO: ABNORMAL
IMM GRANULOCYTES # BLD AUTO: 0.02 K/UL (ref 0–0.3)
IMM GRANULOCYTES NFR BLD: 0 %
KETONES UR STRIP-MCNC: NEGATIVE MG/DL
LEUKOCYTE ESTERASE UR QL STRIP: NEGATIVE
LYMPHOCYTES NFR BLD: 1.71 K/UL (ref 1.1–3.7)
LYMPHOCYTES RELATIVE PERCENT: 35 % (ref 24–43)
MAGNESIUM SERPL-MCNC: 1.4 MG/DL (ref 1.6–2.6)
MCH RBC QN AUTO: 33.5 PG (ref 25.2–33.5)
MCHC RBC AUTO-ENTMCNC: 34 G/DL (ref 28.4–34.8)
MCV RBC AUTO: 98.4 FL (ref 82.6–102.9)
MONOCYTES NFR BLD: 0.42 K/UL (ref 0.1–1.2)
MONOCYTES NFR BLD: 9 % (ref 3–12)
NEUTROPHILS NFR BLD: 53 % (ref 36–65)
NEUTS SEG NFR BLD: 2.63 K/UL (ref 1.5–8.1)
NITRITE UR QL STRIP: NEGATIVE
NRBC BLD-RTO: 0 PER 100 WBC
OSMOLALITY UR: 356 MOSM/KG (ref 80–1300)
PH UR STRIP: 5.5 [PH] (ref 5–8)
PLATELET # BLD AUTO: 222 K/UL (ref 138–453)
PMV BLD AUTO: 10.3 FL (ref 8.1–13.5)
POTASSIUM SERPL-SCNC: 3.5 MMOL/L (ref 3.7–5.3)
POTASSIUM SERPL-SCNC: 3.9 MMOL/L (ref 3.7–5.3)
PROT SERPL-MCNC: 5.5 G/DL (ref 6.4–8.3)
PROT UR STRIP-MCNC: NEGATIVE MG/DL
RBC # BLD AUTO: 3.79 M/UL (ref 3.95–5.11)
RBC #/AREA URNS HPF: ABNORMAL /HPF (ref 0–2)
SODIUM SERPL-SCNC: 136 MMOL/L (ref 135–144)
SODIUM SERPL-SCNC: 141 MMOL/L (ref 135–144)
SODIUM UR-SCNC: 97 MMOL/L
SP GR UR STRIP: 1.02 (ref 1–1.03)
TOTAL PROTEIN, URINE: 19 MG/DL
TOTAL PROTEIN, URINE: 19 MG/DL
URINE TOTAL PROTEIN CREATININE RATIO: 0.16
UROBILINOGEN UR STRIP-ACNC: NORMAL EU/DL (ref 0–1)
UUN UR-MCNC: 268 MG/DL (ref 800–1666)
WBC #/AREA URNS HPF: ABNORMAL /HPF (ref 0–5)
WBC OTHER # BLD: 4.9 K/UL (ref 3.5–11.3)

## 2024-08-03 PROCEDURE — 6370000000 HC RX 637 (ALT 250 FOR IP)

## 2024-08-03 PROCEDURE — 83935 ASSAY OF URINE OSMOLALITY: CPT

## 2024-08-03 PROCEDURE — 82784 ASSAY IGA/IGD/IGG/IGM EACH: CPT

## 2024-08-03 PROCEDURE — 1200000000 HC SEMI PRIVATE

## 2024-08-03 PROCEDURE — 84156 ASSAY OF PROTEIN URINE: CPT

## 2024-08-03 PROCEDURE — 84300 ASSAY OF URINE SODIUM: CPT

## 2024-08-03 PROCEDURE — 6370000000 HC RX 637 (ALT 250 FOR IP): Performed by: INTERNAL MEDICINE

## 2024-08-03 PROCEDURE — 6360000002 HC RX W HCPCS: Performed by: INTERNAL MEDICINE

## 2024-08-03 PROCEDURE — 51798 US URINE CAPACITY MEASURE: CPT

## 2024-08-03 PROCEDURE — 82570 ASSAY OF URINE CREATININE: CPT

## 2024-08-03 PROCEDURE — 80053 COMPREHEN METABOLIC PANEL: CPT

## 2024-08-03 PROCEDURE — 83516 IMMUNOASSAY NONANTIBODY: CPT

## 2024-08-03 PROCEDURE — 99222 1ST HOSP IP/OBS MODERATE 55: CPT | Performed by: INTERNAL MEDICINE

## 2024-08-03 PROCEDURE — 80048 BASIC METABOLIC PNL TOTAL CA: CPT

## 2024-08-03 PROCEDURE — 81001 URINALYSIS AUTO W/SCOPE: CPT

## 2024-08-03 PROCEDURE — 36415 COLL VENOUS BLD VENIPUNCTURE: CPT

## 2024-08-03 PROCEDURE — 85025 COMPLETE CBC W/AUTO DIFF WBC: CPT

## 2024-08-03 PROCEDURE — 2580000003 HC RX 258

## 2024-08-03 PROCEDURE — 83735 ASSAY OF MAGNESIUM: CPT

## 2024-08-03 PROCEDURE — 84540 ASSAY OF URINE/UREA-N: CPT

## 2024-08-03 RX ORDER — HEPARIN SODIUM 5000 [USP'U]/ML
5000 INJECTION, SOLUTION INTRAVENOUS; SUBCUTANEOUS EVERY 8 HOURS SCHEDULED
Status: DISCONTINUED | OUTPATIENT
Start: 2024-08-03 | End: 2024-08-03

## 2024-08-03 RX ORDER — POTASSIUM CHLORIDE 20 MEQ/1
40 TABLET, EXTENDED RELEASE ORAL ONCE
Status: COMPLETED | OUTPATIENT
Start: 2024-08-03 | End: 2024-08-03

## 2024-08-03 RX ORDER — MAGNESIUM SULFATE IN WATER 40 MG/ML
2000 INJECTION, SOLUTION INTRAVENOUS ONCE
Status: COMPLETED | OUTPATIENT
Start: 2024-08-03 | End: 2024-08-03

## 2024-08-03 RX ORDER — METOPROLOL SUCCINATE 50 MG/1
50 TABLET, EXTENDED RELEASE ORAL DAILY
Status: DISCONTINUED | OUTPATIENT
Start: 2024-08-03 | End: 2024-08-06 | Stop reason: HOSPADM

## 2024-08-03 RX ORDER — ONDANSETRON 2 MG/ML
4 INJECTION INTRAMUSCULAR; INTRAVENOUS EVERY 6 HOURS PRN
Status: DISCONTINUED | OUTPATIENT
Start: 2024-08-03 | End: 2024-08-06 | Stop reason: HOSPADM

## 2024-08-03 RX ORDER — PANTOPRAZOLE SODIUM 40 MG/1
40 TABLET, DELAYED RELEASE ORAL
Status: DISCONTINUED | OUTPATIENT
Start: 2024-08-03 | End: 2024-08-06 | Stop reason: HOSPADM

## 2024-08-03 RX ORDER — ONDANSETRON 4 MG/1
4 TABLET, ORALLY DISINTEGRATING ORAL EVERY 8 HOURS PRN
Status: DISCONTINUED | OUTPATIENT
Start: 2024-08-03 | End: 2024-08-06 | Stop reason: HOSPADM

## 2024-08-03 RX ORDER — ACETAMINOPHEN 650 MG/1
650 SUPPOSITORY RECTAL EVERY 6 HOURS PRN
Status: DISCONTINUED | OUTPATIENT
Start: 2024-08-03 | End: 2024-08-06 | Stop reason: HOSPADM

## 2024-08-03 RX ORDER — SODIUM CHLORIDE 9 MG/ML
INJECTION, SOLUTION INTRAVENOUS CONTINUOUS
Status: DISCONTINUED | OUTPATIENT
Start: 2024-08-03 | End: 2024-08-05

## 2024-08-03 RX ORDER — SODIUM CHLORIDE 0.9 % (FLUSH) 0.9 %
5-40 SYRINGE (ML) INJECTION PRN
Status: DISCONTINUED | OUTPATIENT
Start: 2024-08-03 | End: 2024-08-06 | Stop reason: HOSPADM

## 2024-08-03 RX ORDER — ACETAMINOPHEN 325 MG/1
650 TABLET ORAL EVERY 6 HOURS PRN
Status: DISCONTINUED | OUTPATIENT
Start: 2024-08-03 | End: 2024-08-06 | Stop reason: HOSPADM

## 2024-08-03 RX ORDER — SODIUM CHLORIDE 0.9 % (FLUSH) 0.9 %
5-40 SYRINGE (ML) INJECTION EVERY 12 HOURS SCHEDULED
Status: DISCONTINUED | OUTPATIENT
Start: 2024-08-03 | End: 2024-08-06 | Stop reason: HOSPADM

## 2024-08-03 RX ORDER — ALPRAZOLAM 0.25 MG/1
0.25 TABLET ORAL DAILY PRN
Status: DISCONTINUED | OUTPATIENT
Start: 2024-08-03 | End: 2024-08-06 | Stop reason: HOSPADM

## 2024-08-03 RX ORDER — SODIUM CHLORIDE 9 MG/ML
INJECTION, SOLUTION INTRAVENOUS PRN
Status: DISCONTINUED | OUTPATIENT
Start: 2024-08-03 | End: 2024-08-06 | Stop reason: HOSPADM

## 2024-08-03 RX ORDER — LEVOTHYROXINE SODIUM 0.1 MG/1
100 TABLET ORAL DAILY
Status: DISCONTINUED | OUTPATIENT
Start: 2024-08-03 | End: 2024-08-06 | Stop reason: HOSPADM

## 2024-08-03 RX ADMIN — ALPRAZOLAM 0.25 MG: 0.25 TABLET ORAL at 22:52

## 2024-08-03 RX ADMIN — LEVOTHYROXINE SODIUM 100 MCG: 100 TABLET ORAL at 10:37

## 2024-08-03 RX ADMIN — METOPROLOL SUCCINATE 50 MG: 50 TABLET, EXTENDED RELEASE ORAL at 10:37

## 2024-08-03 RX ADMIN — SODIUM CHLORIDE: 9 INJECTION, SOLUTION INTRAVENOUS at 17:02

## 2024-08-03 RX ADMIN — MAGNESIUM SULFATE HEPTAHYDRATE 2000 MG: 40 INJECTION, SOLUTION INTRAVENOUS at 10:48

## 2024-08-03 RX ADMIN — ACETAMINOPHEN 650 MG: 325 TABLET ORAL at 20:22

## 2024-08-03 RX ADMIN — PANTOPRAZOLE SODIUM 40 MG: 40 TABLET, DELAYED RELEASE ORAL at 10:44

## 2024-08-03 RX ADMIN — DILTIAZEM HYDROCHLORIDE 300 MG: 180 CAPSULE, COATED, EXTENDED RELEASE ORAL at 10:37

## 2024-08-03 RX ADMIN — POTASSIUM CHLORIDE 40 MEQ: 1500 TABLET, EXTENDED RELEASE ORAL at 10:37

## 2024-08-03 RX ADMIN — APIXABAN 5 MG: 5 TABLET, FILM COATED ORAL at 20:22

## 2024-08-03 RX ADMIN — APIXABAN 5 MG: 5 TABLET, FILM COATED ORAL at 10:37

## 2024-08-03 RX ADMIN — SODIUM CHLORIDE: 9 INJECTION, SOLUTION INTRAVENOUS at 07:35

## 2024-08-03 RX ADMIN — SODIUM CHLORIDE: 9 INJECTION, SOLUTION INTRAVENOUS at 22:54

## 2024-08-03 ASSESSMENT — PAIN SCALES - GENERAL
PAINLEVEL_OUTOF10: 0
PAINLEVEL_OUTOF10: 0
PAINLEVEL_OUTOF10: 4

## 2024-08-03 ASSESSMENT — PAIN DESCRIPTION - ORIENTATION: ORIENTATION: MID

## 2024-08-03 ASSESSMENT — PAIN DESCRIPTION - DESCRIPTORS: DESCRIPTORS: POUNDING

## 2024-08-03 ASSESSMENT — PAIN DESCRIPTION - LOCATION: LOCATION: HEAD

## 2024-08-03 NOTE — CARE COORDINATION
Case Management Assessment  Initial Evaluation    Date/Time of Evaluation: 8/3/2024 11:18 AM  Assessment Completed by: Anita Zamora RN    If patient is discharged prior to next notation, then this note serves as note for discharge by case management.    Patient Name: Andressa Boone                   YOB: 1970  Diagnosis: Acute kidney injury (HCC) [N17.9]  GERI (acute kidney injury) (HCC) [N17.9]                   Date / Time: 8/3/2024 12:05 AM    Patient Admission Status: Inpatient   Readmission Risk (Low < 19, Mod (19-27), High > 27): Readmission Risk Score: 11.7    Current PCP: Criss Montelongo APRN - CNP  PCP verified by CM? Yes    Chart Reviewed: Yes      History Provided by: Patient  Patient Orientation: Alert and Oriented    Patient Cognition: Alert    Hospitalization in the last 30 days (Readmission):  No    If yes, Readmission Assessment in CM Navigator will be completed.    Advance Directives:      Code Status: Full Code   Patient's Primary Decision Maker is: Legal Next of Kin      Discharge Planning:    Patient lives with: Alone Type of Home: House  Primary Care Giver: Self  Patient Support Systems include: Children, Friends/Neighbors   Current Financial resources: Other (Comment)  Current community resources:    Current services prior to admission: None            Current DME:              Type of Home Care services:  None    ADLS  Prior functional level: Independent in ADLs/IADLs  Current functional level: Independent in ADLs/IADLs    PT AM-PAC:   /24  OT AM-PAC:   /24    Family can provide assistance at DC: Yes  Would you like Case Management to discuss the discharge plan with any other family members/significant others, and if so, who? Yes (son present)  Plans to Return to Present Housing: Yes  Other Identified Issues/Barriers to RETURNING to current housing: na   Potential Assistance needed at discharge: N/A            Potential DME:    Patient expects to discharge to: House  Plan

## 2024-08-03 NOTE — PLAN OF CARE
Problem: Discharge Planning  Goal: Discharge to home or other facility with appropriate resources  Outcome: Progressing  Flowsheets  Taken 8/3/2024 0545  Discharge to home or other facility with appropriate resources:   Identify barriers to discharge with patient and caregiver   Arrange for needed discharge resources and transportation as appropriate   Identify discharge learning needs (meds, wound care, etc)   Refer to discharge planning if patient needs post-hospital services based on physician order or complex needs related to functional status, cognitive ability or social support system   Arrange for interpreters to assist at discharge as needed  Taken 8/3/2024 0020  Discharge to home or other facility with appropriate resources:   Identify barriers to discharge with patient and caregiver   Arrange for needed discharge resources and transportation as appropriate   Identify discharge learning needs (meds, wound care, etc)   Refer to discharge planning if patient needs post-hospital services based on physician order or complex needs related to functional status, cognitive ability or social support system   Arrange for interpreters to assist at discharge as needed     Problem: Pain  Goal: Verbalizes/displays adequate comfort level or baseline comfort level  Flowsheets (Taken 8/3/2024 0545)  Verbalizes/displays adequate comfort level or baseline comfort level:   Encourage patient to monitor pain and request assistance   Assess pain using appropriate pain scale   Administer analgesics based on type and severity of pain and evaluate response   Implement non-pharmacological measures as appropriate and evaluate response   Consider cultural and social influences on pain and pain management   Notify Licensed Independent Practitioner if interventions unsuccessful or patient reports new pain

## 2024-08-03 NOTE — H&P
[facility]  Office: [phone]  Vikram Borrero DO, Enoch Rivers DO, Roberto Bernal DO, Leonel De Los Santos DO, Marla Hernández MD, Leesa Pablo MD, Heath Ratliff MD, Josee Jones MD,  Cheng Pool MD, Tomasa Villalobos MD, Afsaneh Dan MD,  Gela White DO, Ana Rosa Orozco MD, Nasim Machuca MD, Giles Borrero DO, Joann Anthony MD,  Fei Urena DO, Marisa Vela MD, Harriet Macias MD, Kristina Angel MD, Whit Kaye MD,  Florencio Brito MD, Linden Perkins MD, Ubaldo Diop MD, Linda Rocha MD, Claudio Nino MD, Roldan Rahman MD, Ho Cartagena DO, Clinton Luevano DO, Di Martinez MD,  Cj Alcaraz MD, Shirley Waterhouse, CNP,  Milvia Holguin CNP, Pepe Locke, CNP,  Bibiana Mccallum, DNP, Amelie Randhawa, CNP, Tabitha Aaron, CNP, Janet Collins, CNP, Marlin Rios, CNP, Kiana Gordon, PA-C, Emily King PA-C, Nadja Chung, CNP, Missy Terrell, CNP, Kevon Singh, CNP, Flor Tatum, CNP, Samantha Rodriguez, CNP, Ellie Villavicencio, CNS, Delisa Bonds, CNP, Raven Villa CNP, Tracy Schwab, CNP         [facility]   IN-PATIENT SERVICE   [facility]    HISTORY AND PHYSICAL EXAMINATION            Date:   8/3/2024  Patient name:  Andressa Boone  Date of admission:  8/3/2024 12:05 AM  MRN:   [id number]  Account:  [id number]  YOB: 1970  PCP:    Criss Montelongo APRN - CNP  Room:   2010/2010-02  Code Status:    Full Code    Chief Complaint:     Nausea, vomiting, loss of appetite    History Obtained From:     patient    History of Present Illness:     Andressa Boone is a 54 y.o. Non- / non  female who presents with No chief complaint on file.   and is admitted to the hospital for the management of GERI (acute kidney injury) (HCC).    This is a 54-year-old female with history of celiac disease that reports over the last 2 to 3 weeks she has had increased abdominal pain with nausea, vomiting and loss of appetite.  She has had poor

## 2024-08-03 NOTE — PLAN OF CARE
Problem: Metabolic/Fluid and Electrolytes - Adult  Goal: Electrolytes maintained within normal limits  Outcome: Progressing   Pt's creatinine at 4.6 today improved from 7.  Continues on IV fluids and will recheck 8/4.  Potassium and Magnesium replacement given.  Celiac panel in progress.  Renal ultrasound ordered but testing not available until 8/5.  VSS.  No c/o pain.  Has tolerated full liquid celiac diet. Plan for EGD on 8/5.

## 2024-08-03 NOTE — PROGRESS NOTES
Pt admitted to 2010-2, vitals, assessment, and database complete. Writer discussed with patient regarding the plan of care including labs, iv fluids, urine sample, and current diet. Patient instructed on call light and bed controls. Patient's home medications were reviewed and per patient, medications are correct in computer.    Oriented - self; Oriented - place; Oriented - time

## 2024-08-03 NOTE — CONSULTS
GASTROENTEROLOGY CONSULT       REASON FOR CONSULT:  Abdominal pain, h/o celiac disease    REQUESTING PHYSICIAN:  Roberto Bernal DO    HISTORY OF PRESENT ILLNESS:    The patient is a 54 y.o. female who presents with complaints of abdominal pain, nausea, and vomiting, ongoing for ~2 weeks.  Has celiac disease and follows a gluten-free diet, but believed that symptoms were secondary to a \"flare\", but symptoms persisted longer than what is normal for her.  She cooks for herself and is careful not to contaminate her food.  Has followed with GI in the past, though not recently.  She tells me that she was seeing [facility], though our office records do not show her as a past/current patient.  It is likely the GI group in [place], [place].  She is uncertain when she last had an EGD or colonoscopy, though believes that it has been several years.  Has been attempting to establish with another GI provider, but has not been successful.  Is taking a PPI at home.  Denies changes in bowel habits, including melena or hematochezia.        MEDICAL HISTORY:   Past Medical History:   Diagnosis Date    Anemia, unspecified     Anxiety     Atrial fibrillation (HCC)     Celiac disease     Edema     H/O cardiovascular stress test 03/08/2021    [place] treadmill score is 0    H/O echocardiogram 01/26/2021    EF >60% Normal study    History of cardiac cath 04/01/2021    [facility]/Dr. Little/Right Radial     History of cardiovascular stress test 03/22/2021    Equivocal study. Small perfusion defect of mild intensity in the anterior and anteroseptal regions during stress and rest imaging, which is most consistent with artifact EF 71% [place] Treadmill score is -2intermediate risk.    History of normal Holter exam 02/22/2021    CAM 6 days 19 hrs Normal sinus. AT 2 episodes Longest/fastest 9 beats at ave 127 bpm p to 153 bpm PAC 0.08% PVC 0.07%    Hypertension     Hypothyroidism     Thyroid disease     Tilt table evaluation 03/22/2021

## 2024-08-04 LAB
ANION GAP SERPL CALCULATED.3IONS-SCNC: 10 MMOL/L (ref 9–17)
BASOPHILS # BLD: 0.04 K/UL (ref 0–0.2)
BASOPHILS NFR BLD: 1 % (ref 0–2)
BUN SERPL-MCNC: 26 MG/DL (ref 6–20)
BUN/CREAT SERPL: 14 (ref 9–20)
CALCIUM SERPL-MCNC: 9.2 MG/DL (ref 8.6–10.4)
CHLORIDE SERPL-SCNC: 106 MMOL/L (ref 98–107)
CO2 SERPL-SCNC: 27 MMOL/L (ref 20–31)
CREAT SERPL-MCNC: 1.9 MG/DL (ref 0.5–0.9)
EOSINOPHIL # BLD: 0.09 K/UL (ref 0–0.44)
EOSINOPHILS RELATIVE PERCENT: 2 % (ref 1–4)
ERYTHROCYTE [DISTWIDTH] IN BLOOD BY AUTOMATED COUNT: 12.3 % (ref 11.8–14.4)
GFR, ESTIMATED: 31 ML/MIN/1.73M2
GLUCOSE SERPL-MCNC: 91 MG/DL (ref 70–99)
HCT VFR BLD AUTO: 39.8 % (ref 36.3–47.1)
HGB BLD-MCNC: 13.5 G/DL (ref 11.9–15.1)
IMM GRANULOCYTES # BLD AUTO: 0.01 K/UL (ref 0–0.3)
IMM GRANULOCYTES NFR BLD: 0 %
LYMPHOCYTES NFR BLD: 1.49 K/UL (ref 1.1–3.7)
LYMPHOCYTES RELATIVE PERCENT: 40 % (ref 24–43)
MAGNESIUM SERPL-MCNC: 1.7 MG/DL (ref 1.6–2.6)
MCH RBC QN AUTO: 33.3 PG (ref 25.2–33.5)
MCHC RBC AUTO-ENTMCNC: 33.9 G/DL (ref 28.4–34.8)
MCV RBC AUTO: 98.3 FL (ref 82.6–102.9)
MONOCYTES NFR BLD: 0.3 K/UL (ref 0.1–1.2)
MONOCYTES NFR BLD: 8 % (ref 3–12)
NEUTROPHILS NFR BLD: 49 % (ref 36–65)
NEUTS SEG NFR BLD: 1.8 K/UL (ref 1.5–8.1)
NRBC BLD-RTO: 0 PER 100 WBC
PLATELET # BLD AUTO: 221 K/UL (ref 138–453)
PMV BLD AUTO: 10.5 FL (ref 8.1–13.5)
POTASSIUM SERPL-SCNC: 3.8 MMOL/L (ref 3.7–5.3)
RBC # BLD AUTO: 4.05 M/UL (ref 3.95–5.11)
SODIUM SERPL-SCNC: 143 MMOL/L (ref 135–144)
WBC OTHER # BLD: 3.7 K/UL (ref 3.5–11.3)

## 2024-08-04 PROCEDURE — 1200000000 HC SEMI PRIVATE

## 2024-08-04 PROCEDURE — 6370000000 HC RX 637 (ALT 250 FOR IP): Performed by: INTERNAL MEDICINE

## 2024-08-04 PROCEDURE — 6360000002 HC RX W HCPCS: Performed by: INTERNAL MEDICINE

## 2024-08-04 PROCEDURE — 99232 SBSQ HOSP IP/OBS MODERATE 35: CPT | Performed by: INTERNAL MEDICINE

## 2024-08-04 PROCEDURE — 6360000002 HC RX W HCPCS

## 2024-08-04 PROCEDURE — 85025 COMPLETE CBC W/AUTO DIFF WBC: CPT

## 2024-08-04 PROCEDURE — 36415 COLL VENOUS BLD VENIPUNCTURE: CPT

## 2024-08-04 PROCEDURE — 80048 BASIC METABOLIC PNL TOTAL CA: CPT

## 2024-08-04 PROCEDURE — 83735 ASSAY OF MAGNESIUM: CPT

## 2024-08-04 PROCEDURE — 2580000003 HC RX 258

## 2024-08-04 PROCEDURE — 6370000000 HC RX 637 (ALT 250 FOR IP): Performed by: NURSE PRACTITIONER

## 2024-08-04 RX ORDER — SUMATRIPTAN 50 MG/1
100 TABLET, FILM COATED ORAL ONCE
Status: COMPLETED | OUTPATIENT
Start: 2024-08-04 | End: 2024-08-04

## 2024-08-04 RX ORDER — MAGNESIUM SULFATE 1 G/100ML
1000 INJECTION INTRAVENOUS ONCE
Status: COMPLETED | OUTPATIENT
Start: 2024-08-04 | End: 2024-08-04

## 2024-08-04 RX ADMIN — SODIUM CHLORIDE: 9 INJECTION, SOLUTION INTRAVENOUS at 05:07

## 2024-08-04 RX ADMIN — PANTOPRAZOLE SODIUM 40 MG: 40 TABLET, DELAYED RELEASE ORAL at 05:05

## 2024-08-04 RX ADMIN — MAGNESIUM SULFATE HEPTAHYDRATE 1000 MG: 1 INJECTION, SOLUTION INTRAVENOUS at 17:51

## 2024-08-04 RX ADMIN — LEVOTHYROXINE SODIUM 100 MCG: 100 TABLET ORAL at 10:19

## 2024-08-04 RX ADMIN — ONDANSETRON 4 MG: 2 INJECTION INTRAMUSCULAR; INTRAVENOUS at 10:19

## 2024-08-04 RX ADMIN — METOPROLOL SUCCINATE 50 MG: 50 TABLET, EXTENDED RELEASE ORAL at 10:20

## 2024-08-04 RX ADMIN — SUMATRIPTAN SUCCINATE 100 MG: 50 TABLET ORAL at 05:04

## 2024-08-04 RX ADMIN — DILTIAZEM HYDROCHLORIDE 300 MG: 180 CAPSULE, COATED, EXTENDED RELEASE ORAL at 10:19

## 2024-08-04 ASSESSMENT — PAIN DESCRIPTION - LOCATION: LOCATION: HEAD

## 2024-08-04 ASSESSMENT — PAIN SCALES - GENERAL
PAINLEVEL_OUTOF10: 0
PAINLEVEL_OUTOF10: 0
PAINLEVEL_OUTOF10: 6

## 2024-08-04 ASSESSMENT — PAIN DESCRIPTION - DESCRIPTORS: DESCRIPTORS: POUNDING

## 2024-08-04 ASSESSMENT — PAIN DESCRIPTION - ORIENTATION: ORIENTATION: MID

## 2024-08-04 NOTE — PROGRESS NOTES
Occupational Therapy  DATE: 2024    NAME: Andressa Boone  MRN: [id number]   : 1970    Patient not seen this date for Occupational Therapy due to:      [] Cancel by RN or physician due to:    [] Hemodialysis    [] Critical Lab Value Level     [] Blood transfusion in progress    [] Acute or unstable cardiovascular status   _MAP < 55 or more than >115  _HR < 40 or > 130    [] Acute or unstable pulmonary status   -FiO2 > 60%   _RR < 5 or >40    _O2 sats < 85%    [] Strict Bedrest    [] Off Unit for surgery or procedure    [] Off Unit for testing       [] Pending imaging to R/O fracture    [] Refusal by Patient      [] Intubated    [] Other      [x] OT being discontinued at this time. Patient independent. No further needs.  (Pt ambulating in room with no needs, pt using bathroom and reporting getting dressed without A. OT to discontinue orders this date.)     [] OT being discontinued at this time as the patient has been transferred to hospice care. No further needs.      Tegan Rawls, ANGELA, OTR/L

## 2024-08-04 NOTE — PLAN OF CARE
Problem: Discharge Planning  Goal: Discharge to home or other facility with appropriate resources  8/4/2024 0437 by Lucy Madden RN  Outcome: Progressing  Flowsheets (Taken 8/3/2024 2020)  Discharge to home or other facility with appropriate resources: Identify barriers to discharge with patient and caregiver     Problem: Pain  Goal: Verbalizes/displays adequate comfort level or baseline comfort level  8/4/2024 0437 by Lucy Madden RN  Outcome: Progressing     Problem: Metabolic/Fluid and Electrolytes - Adult  Goal: Electrolytes maintained within normal limits  8/4/2024 0437 by Lucy Madden RN  Outcome: Progressing  Flowsheets (Taken 8/3/2024 2020)  Electrolytes maintained within normal limits: Monitor labs and assess patient for signs and symptoms of electrolyte imbalances  Goal: Hemodynamic stability and optimal renal function maintained  Outcome: Progressing  Flowsheets (Taken 8/3/2024 2020)  Hemodynamic stability and optimal renal function maintained: Monitor labs and assess for signs and symptoms of volume excess or deficit

## 2024-08-04 NOTE — PROGRESS NOTES
PATIENT NAME: Andressa Boone   YOB: 1970  ADMISSION DATE: 8/3/2024 12:05 AM   TODAY'S DATE: 2024      Chief complaint   Patient continues to feel nauseated.      Objective:   Vitals:  /78   Pulse 63   Temp 98.4 °F (36.9 °C) (Oral)   Resp 16   Ht 1.651 m (5' 5\")   Wt 89.9 kg (198 lb 3.1 oz)   LMP 07/15/2020 (Approximate)   SpO2 97%   BMI 32.98 kg/m²    Temp (24hrs), Av °F (36.7 °C), Min:97.5 °F (36.4 °C), Max:98.4 °F (36.9 °C)      EXAM:  General: Alert, Appropriate. Not in acute distress.      LUNGS: Resp unlabored; Clear to auscultation     CV:  Normal heart Sounds, No murmurs.     ABD: Soft, non tender, Good bowel sounds    EXT x 4: without edema   Skin:No skin rash/ lesions.      Data Review:  CBC:   Recent Labs     24  1325 24  0532 24  0543   WBC 5.6 4.9 3.7   HGB 14.4 12.7 13.5    222 221     BMP:    Recent Labs     24  0532 24  1533 24  0543    141 143   K 3.5* 3.9 3.8   CL 97* 102 106   CO2 24 25 27   BUN 46* 37* 26*   CREATININE 4.6* 3.1* 1.9*   GLUCOSE 74 88 91     Hepatic:   Recent Labs     24  1325 24  0532   AST 26 20   ALT 24 17   BILITOT 1.1 1.0   ALKPHOS 50 39       ACTIVE  MEDICATIONS   sodium chloride flush  5-40 mL IntraVENous 2 times per day    metoprolol succinate  50 mg Oral Daily    [Held by provider] apixaban  5 mg Oral BID    dilTIAZem  300 mg Oral Daily    levothyroxine  100 mcg Oral Daily    pantoprazole  40 mg Oral QAM AC       Active Problems:   Patient Active Problem List:     Essential hypertension     Hypothyroidism     Idiopathic gout     Anxiety state     Moderate single current episode of major depressive disorder (HCC)     Iron deficiency anemia     Lab test positive for detection of COVID-19 virus     Shortness of breath     Myalgia     Persistent vomiting     Uncontrollable vomiting     Intractable nausea and vomiting     Dizziness     Orthostatic hypotension     Diarrhea

## 2024-08-04 NOTE — PROGRESS NOTES
[facility]  Office: [phone]  Vikram Borrero, DO, Enoch Rivers, DO, Roberto Bernal DO, Leonel De Los Santos, DO, Marla Hernández MD, Leesa Pablo MD, Heath Ratliff MD, Josee Jones MD,  Cheng Pool MD, Tomasa Villalobos MD, Afsaneh Dan MD,  Gela White DO, Ana Rosa Orozco MD, Nasim Machuca MD, Giles Borrero DO, Joann Anthony MD,  Fei Urena DO, Marisa Vela MD, Harriet Macias MD, Kristina Angel MD, Whit Kaye MD,  Florencio Brito MD, Linden Perkins MD, Ubaldo Diop MD, Linda Rocha MD, Claudio Nino MD, Roldan Rahman MD, Ho Cartagena DO, Clinton Luevano DO, Di Martinez MD,  Cj Alcaraz MD, Shirley Waterhouse, CNP,  Milvia Holguin CNP, Pepe Locke, CNP,  Bibiana Mccallum, DNP, Amelie Randhawa, CNP, Tabitha Aaron, CNP, Janet Collins, CNP, Marlin Rios, CNP, Kiana Gordon, PA-C, Emily King, PA-C, Nadja Chung, CNP, Missy Terrell, CNP, Kevon Singh, CNP, Flor Tatum, CNP, Samantha Rodriguez, CNP, Ellie Villavicencio, CNS, Delisa Bonds, CNP, Raven Villa CNP, Tracy Schwab, CNP         [facility]   IN-PATIENT SERVICE   [facility]    Progress Note    8/4/2024    8:39 AM    Name:   Andressa Boone  MRN:     [id number]     Acct:      [id number]   Room:   2010/2010-02  IP Day:  1  Admit Date:  8/3/2024 12:05 AM    PCP:   Criss Montelongo APRN - CNP  Code Status:  Full Code    Subjective:     C/C: Loss of appetite with nausea and vomiting    Interval History Status: improved.     Patient was tolerating clear liquid diet.  Denies any complaints of chest pain, shortness of breath, nausea or vomiting, fevers or chills.  Appetite slightly better    Brief History:     This is a 54-year-old female with a history of celiac disease that presents with loss of appetite with nausea vomiting has been ongoing for a few weeks.  She presented to [facility] emergency room where she was found to have GERI he was transferred here for further

## 2024-08-04 NOTE — PLAN OF CARE
NS IV therapy took creatinine from 7.1 to 1.9. HR around 50 for most of day, pt says it started a couple weeks ago, drops to 30s and 40s in sleep. Mag was 1.7, Dr. Bernal ordered 1G of mag which was given.   EGD scheduled for Tuesday. Independent A&Ox4.    Problem: Discharge Planning  Goal: Discharge to home or other facility with appropriate resources  8/4/2024 1812 by Rudi Orr RN  Outcome: Progressing  Flowsheets (Taken 8/4/2024 1000)  Discharge to home or other facility with appropriate resources: Identify barriers to discharge with patient and caregiver     Problem: Pain  Goal: Verbalizes/displays adequate comfort level or baseline comfort level  8/4/2024 1812 by Rudi Orr RN  Outcome: Progressing     Problem: Metabolic/Fluid and Electrolytes - Adult  Goal: Electrolytes maintained within normal limits  8/4/2024 1812 by Rudi Orr RN  Outcome: Progressing  Flowsheets (Taken 8/4/2024 1000)  Electrolytes maintained within normal limits: Monitor labs and assess patient for signs and symptoms of electrolyte imbalances     Problem: Metabolic/Fluid and Electrolytes - Adult  Goal: Hemodynamic stability and optimal renal function maintained  8/4/2024 1812 by Rudi Orr RN  Outcome: Progressing  Flowsheets (Taken 8/4/2024 1000)  Hemodynamic stability and optimal renal function maintained: Monitor labs and assess for signs and symptoms of volume excess or deficit

## 2024-08-05 LAB
ANION GAP SERPL CALCULATED.3IONS-SCNC: 7 MMOL/L (ref 9–17)
BASOPHILS # BLD: 0.05 K/UL (ref 0–0.2)
BASOPHILS NFR BLD: 1 % (ref 0–2)
BUN SERPL-MCNC: 15 MG/DL (ref 6–20)
BUN/CREAT SERPL: 13 (ref 9–20)
CALCIUM SERPL-MCNC: 9 MG/DL (ref 8.6–10.4)
CHLORIDE SERPL-SCNC: 108 MMOL/L (ref 98–107)
CO2 SERPL-SCNC: 29 MMOL/L (ref 20–31)
CREAT SERPL-MCNC: 1.2 MG/DL (ref 0.5–0.9)
EOSINOPHIL # BLD: 0.09 K/UL (ref 0–0.44)
EOSINOPHILS RELATIVE PERCENT: 2 % (ref 1–4)
ERYTHROCYTE [DISTWIDTH] IN BLOOD BY AUTOMATED COUNT: 12.5 % (ref 11.8–14.4)
GFR, ESTIMATED: 54 ML/MIN/1.73M2
GLIADIN IGA SER IA-ACNC: 4.8 U/ML
GLIADIN IGG SER IA-ACNC: 12 U/ML
GLUCOSE SERPL-MCNC: 95 MG/DL (ref 70–99)
HCT VFR BLD AUTO: 37.9 % (ref 36.3–47.1)
HGB BLD-MCNC: 12.5 G/DL (ref 11.9–15.1)
IGA SERPL-MCNC: 180 MG/DL (ref 70–400)
IMM GRANULOCYTES # BLD AUTO: 0.01 K/UL (ref 0–0.3)
IMM GRANULOCYTES NFR BLD: 0 %
LYMPHOCYTES NFR BLD: 1.71 K/UL (ref 1.1–3.7)
LYMPHOCYTES RELATIVE PERCENT: 39 % (ref 24–43)
MCH RBC QN AUTO: 33 PG (ref 25.2–33.5)
MCHC RBC AUTO-ENTMCNC: 33 G/DL (ref 28.4–34.8)
MCV RBC AUTO: 100 FL (ref 82.6–102.9)
MONOCYTES NFR BLD: 0.33 K/UL (ref 0.1–1.2)
MONOCYTES NFR BLD: 8 % (ref 3–12)
NEUTROPHILS NFR BLD: 50 % (ref 36–65)
NEUTS SEG NFR BLD: 2.19 K/UL (ref 1.5–8.1)
NRBC BLD-RTO: 0 PER 100 WBC
PLATELET # BLD AUTO: 213 K/UL (ref 138–453)
PMV BLD AUTO: 10.6 FL (ref 8.1–13.5)
POTASSIUM SERPL-SCNC: 3.9 MMOL/L (ref 3.7–5.3)
RBC # BLD AUTO: 3.79 M/UL (ref 3.95–5.11)
SODIUM SERPL-SCNC: 144 MMOL/L (ref 135–144)
TTG IGA SER IA-ACNC: 1.7 U/ML
WBC OTHER # BLD: 4.4 K/UL (ref 3.5–11.3)

## 2024-08-05 PROCEDURE — 36415 COLL VENOUS BLD VENIPUNCTURE: CPT

## 2024-08-05 PROCEDURE — 85025 COMPLETE CBC W/AUTO DIFF WBC: CPT

## 2024-08-05 PROCEDURE — 6370000000 HC RX 637 (ALT 250 FOR IP)

## 2024-08-05 PROCEDURE — 2580000003 HC RX 258

## 2024-08-05 PROCEDURE — 99232 SBSQ HOSP IP/OBS MODERATE 35: CPT | Performed by: INTERNAL MEDICINE

## 2024-08-05 PROCEDURE — 6370000000 HC RX 637 (ALT 250 FOR IP): Performed by: INTERNAL MEDICINE

## 2024-08-05 PROCEDURE — 1200000000 HC SEMI PRIVATE

## 2024-08-05 PROCEDURE — 2580000003 HC RX 258: Performed by: INTERNAL MEDICINE

## 2024-08-05 PROCEDURE — 80048 BASIC METABOLIC PNL TOTAL CA: CPT

## 2024-08-05 RX ORDER — BISOPROLOL FUMARATE AND HYDROCHLOROTHIAZIDE 10; 6.25 MG/1; MG/1
1 TABLET ORAL DAILY
Status: ON HOLD | COMMUNITY
End: 2024-08-20 | Stop reason: HOSPADM

## 2024-08-05 RX ORDER — NAPROXEN SODIUM 220 MG
440 TABLET ORAL DAILY PRN
COMMUNITY

## 2024-08-05 RX ADMIN — ONDANSETRON 4 MG: 4 TABLET, ORALLY DISINTEGRATING ORAL at 08:17

## 2024-08-05 RX ADMIN — ACETAMINOPHEN 650 MG: 325 TABLET ORAL at 15:01

## 2024-08-05 RX ADMIN — DILTIAZEM HYDROCHLORIDE 300 MG: 180 CAPSULE, COATED, EXTENDED RELEASE ORAL at 08:14

## 2024-08-05 RX ADMIN — PANTOPRAZOLE SODIUM 40 MG: 40 TABLET, DELAYED RELEASE ORAL at 08:14

## 2024-08-05 RX ADMIN — SODIUM CHLORIDE, PRESERVATIVE FREE 10 ML: 5 INJECTION INTRAVENOUS at 21:16

## 2024-08-05 RX ADMIN — LEVOTHYROXINE SODIUM 100 MCG: 100 TABLET ORAL at 08:14

## 2024-08-05 RX ADMIN — ALPRAZOLAM 0.25 MG: 0.25 TABLET ORAL at 00:33

## 2024-08-05 RX ADMIN — SODIUM CHLORIDE: 9 INJECTION, SOLUTION INTRAVENOUS at 05:30

## 2024-08-05 RX ADMIN — ALPRAZOLAM 0.25 MG: 0.25 TABLET ORAL at 21:15

## 2024-08-05 ASSESSMENT — PAIN SCALES - GENERAL: PAINLEVEL_OUTOF10: 3

## 2024-08-05 ASSESSMENT — PAIN DESCRIPTION - LOCATION: LOCATION: HEAD

## 2024-08-05 ASSESSMENT — PAIN DESCRIPTION - FREQUENCY: FREQUENCY: INTERMITTENT

## 2024-08-05 ASSESSMENT — PAIN DESCRIPTION - PAIN TYPE: TYPE: ACUTE PAIN

## 2024-08-05 ASSESSMENT — PAIN DESCRIPTION - DESCRIPTORS: DESCRIPTORS: THROBBING

## 2024-08-05 ASSESSMENT — PAIN - FUNCTIONAL ASSESSMENT: PAIN_FUNCTIONAL_ASSESSMENT: ACTIVITIES ARE NOT PREVENTED

## 2024-08-05 ASSESSMENT — PAIN DESCRIPTION - ONSET: ONSET: ON-GOING

## 2024-08-05 ASSESSMENT — PAIN DESCRIPTION - ORIENTATION: ORIENTATION: POSTERIOR

## 2024-08-05 NOTE — PROGRESS NOTES
[facility]  Office: [phone]  Vikram Borrero, DO, Enoch Rivers, DO, Roberto Bernal DO, Leonel De Los Santos, DO, Marla Hernández MD, Leesa Pablo MD, Heath Ratliff MD, Josee Jones MD,  Cheng Pool MD, Tomasa Villalobos MD, Afsaneh Dan MD,  Gela White DO, Ana Rosa Orozco MD, Nasim Machuca MD, Giles Borrero DO, Joann Anthony MD,  Fei Urena DO, Marisa Vela MD, Harriet Macias MD, Kristina Angel MD, Whit Kaye MD,  Florencio Brito MD, Linden Perkins MD, Ubaldo Diop MD, Linda Rocha MD, Claudio Nino MD, Roldan Rahman MD, Ho Cartagena DO, Clinton Luevano DO, Di Martinez MD,  Cj Alcaraz MD, Shirley Waterhouse, CNP,  Milvia Holguin CNP, Pepe Locke, CNP,  Bibiana Mccallum, DNP, Amelie Randhawa, CNP, Tabitha Aaron, CNP, Janet Collins, CNP, Marlin Rios, CNP, Kiana Gordon, PA-C, Emily King, PA-C, Nadja Chung, CNP, Missy Terrell, CNP, Kevon Singh, CNP, Flor Tatum, CNP, Samantha Rodriguez, CNP, Ellie Villavicencio, CNS, Delisa Bonds, CNP, Raven Villa CNP, Tracy Schwab, CNP         [facility]   IN-PATIENT SERVICE   [facility]    Progress Note    8/5/2024    8:14 AM    Name:   Andressa Boone  MRN:     [id number]     Acct:      [id number]   Room:   2010/2010-02  IP Day:  2  Admit Date:  8/3/2024 12:05 AM    PCP:   Criss Montelongo APRN - CNP  Code Status:  Full Code    Subjective:     C/C: Loss of appetite with nausea and vomiting    Interval History Status: improved.     Patient continues to improve, tolerating diet.  Denies any nausea, vomiting, chest pain, shortness of breath, fevers or chills or acute complaints.  EGD held today as she has not been off Eliquis.    Brief History:     This is a 54-year-old female with a history of celiac disease that presents with loss of appetite with nausea vomiting has been ongoing for a few weeks.  She presented to [facility] emergency room where she was found to have GERI he was transferred  Max:98.3 °F (36.8 °C)    No results for input(s): \"POCGLU\" in the last 72 hours.    I/O (24Hr):    Intake/Output Summary (Last 24 hours) at 8/5/2024 0814  Last data filed at 8/5/2024 0400  Gross per 24 hour   Intake 2601.51 ml   Output 650 ml   Net 1951.51 ml       Labs:  Hematology:  Recent Labs     08/03/24  0532 08/04/24  0543 08/05/24  0514   WBC 4.9 3.7 4.4   RBC 3.79* 4.05 3.79*   HGB 12.7 13.5 12.5   HCT 37.3 39.8 37.9   MCV 98.4 98.3 100.0   MCH 33.5 33.3 33.0   MCHC 34.0 33.9 33.0   RDW 12.4 12.3 12.5    221 213   MPV 10.3 10.5 10.6     Chemistry:  Recent Labs     08/03/24  0532 08/03/24  1533 08/04/24  0543 08/05/24  0514    141 143 144   K 3.5* 3.9 3.8 3.9   CL 97* 102 106 108*   CO2 24 25 27 29   GLUCOSE 74 88 91 95   BUN 46* 37* 26* 15   CREATININE 4.6* 3.1* 1.9* 1.2*   MG 1.4*  --  1.7  --    ANIONGAP 15 14 10 7*   LABGLOM 11* 17* 31* 54*   CALCIUM 8.9 9.0 9.2 9.0     Recent Labs     08/02/24  1325 08/03/24  0532   AST 26 20   ALT 24 17   ALKPHOS 50 39   BILITOT 1.1 1.0   LIPASE 34  --      ABG:No results found for: \"POCPH\", \"PHART\", \"PH\", \"POCPCO2\", \"CPC3MSK\", \"PCO2\", \"POCPO2\", \"PO2ART\", \"PO2\", \"POCHCO3\", \"CXW2URA\", \"HCO3\", \"NBEA\", \"PBEA\", \"BEART\", \"BE\", \"THGBART\", \"THB\", \"YFF4AQR\", \"LGNG0TLQ\", \"G1SXFBNF\", \"O2SAT\", \"FIO2\"  Lab Results   Component Value Date/Time    SPECIAL NOT REPORTED 03/26/2021 04:22 PM     Lab Results   Component Value Date/Time    CULTURE NORMAL SKIN LAURA (A) 03/26/2021 04:22 PM       Radiology:  CT ABDOMEN PELVIS WO CONTRAST Additional Contrast? None    Result Date: 8/2/2024  1. No acute intra-abdominal or pelvic process. 2. Normal appendix. 3. No obstructive uropathy.       Physical Examination:        General appearance:  alert, cooperative and no distress  Mental Status:  oriented to person, place and time and normal affect  Lungs:  clear to auscultation bilaterally, normal effort  Heart:  regular rate and rhythm, no murmur  Abdomen:  soft, nontender,

## 2024-08-05 NOTE — PROGRESS NOTES
PATIENT NAME: Andressa Boone   YOB: 1970  ADMISSION DATE: 8/3/2024 12:05 AM   TODAY'S DATE: 2024      Chief complaint   Patient seen and examined.   PO intake improving. No vomiting. Eliquis held.     Objective:   Vitals:  /75   Pulse 50   Temp 97.9 °F (36.6 °C) (Oral)   Resp 17   Ht 1.651 m (5' 5\")   Wt 90.3 kg (199 lb)   LMP 07/15/2020 (Approximate)   SpO2 98%   BMI 33.12 kg/m²    Temp (24hrs), Av °F (36.7 °C), Min:97.7 °F (36.5 °C), Max:98.3 °F (36.8 °C)      EXAM:  General: Alert, Appropriate. Not in acute distress.      LUNGS: Resp unlabored; Clear to auscultation     CV:  Normal heart Sounds, No murmurs.     ABD: Soft, non tender, Good bowel sounds    EXT x 4: without edema   Skin:No skin rash/ lesions.      Data Review:  CBC:   Recent Labs     24  0532 24  0543 24  0514   WBC 4.9 3.7 4.4   HGB 12.7 13.5 12.5    221 213     BMP:    Recent Labs     24  1533 24  0543 24  0514    143 144   K 3.9 3.8 3.9    106 108*   CO2 25 27 29   BUN 37* 26* 15   CREATININE 3.1* 1.9* 1.2*   GLUCOSE 88 91 95     Hepatic:   Recent Labs     24  0532   AST 20   ALT 17   BILITOT 1.0   ALKPHOS 39       ACTIVE  MEDICATIONS   sodium chloride flush  5-40 mL IntraVENous 2 times per day    [Held by provider] metoprolol succinate  50 mg Oral Daily    [Held by provider] apixaban  5 mg Oral BID    dilTIAZem  300 mg Oral Daily    levothyroxine  100 mcg Oral Daily    pantoprazole  40 mg Oral QAM AC       Active Problems:   Patient Active Problem List:     Essential hypertension     Hypothyroidism     Idiopathic gout     Anxiety state     Moderate single current episode of major depressive disorder (HCC)     Iron deficiency anemia     Lab test positive for detection of COVID-19 virus     Shortness of breath     Myalgia     Persistent vomiting     Uncontrollable vomiting     Intractable nausea and vomiting     Dizziness     Orthostatic

## 2024-08-05 NOTE — PROGRESS NOTES
Physical Therapy  DATE: 2024    NAME: Andressa Boone  MRN: [id number]   : 1970    Patient not seen this date for Physical Therapy due to:      [] Cancel by RN or physician due to:    [] Hemodialysis    [] Critical Lab Value Level     [] Blood transfusion in progress    [] Acute or unstable cardiovascular status   _MAP < 55 or more than >115  _HR < 40 or > 130    [] Acute or unstable pulmonary status   -FiO2 > 60%   _RR < 5 or >40    _O2 sats < 85%    [] Strict Bedrest    [] Off Unit for surgery or procedure    [] Off Unit for testing       [] Pending imaging to R/O fracture    [] Refusal by Patient      [] Other      [x] PT being discontinued at this time. Patient independent. No further needs.  Please re-order PT if functional mobility status changes.       [] PT being discontinued at this time as the patient has been transferred to hospice care. No further needs.      Carol Victor, PT

## 2024-08-05 NOTE — PLAN OF CARE
Problem: Discharge Planning  Goal: Discharge to home or other facility with appropriate resources  8/4/2024 1812 by Rudi Orr RN  Outcome: Progressing  Flowsheets (Taken 8/4/2024 1000)  Discharge to home or other facility with appropriate resources: Identify barriers to discharge with patient and caregiver     Problem: Pain  Goal: Verbalizes/displays adequate comfort level or baseline comfort level  8/5/2024 0056 by Audrey Bourne RN  Outcome: Progressing  Flowsheets (Taken 8/5/2024 0056)  Verbalizes/displays adequate comfort level or baseline comfort level:   Encourage patient to monitor pain and request assistance   Assess pain using appropriate pain scale   Implement non-pharmacological measures as appropriate and evaluate response   Consider cultural and social influences on pain and pain management   Administer analgesics based on type and severity of pain and evaluate response  8/4/2024 1812 by Rudi Orr RN  Outcome: Progressing     Problem: Metabolic/Fluid and Electrolytes - Adult  Goal: Electrolytes maintained within normal limits  8/5/2024 0056 by Audrey Bourne RN  Outcome: Progressing  Flowsheets (Taken 8/5/2024 0056)  Electrolytes maintained within normal limits:   Monitor labs and assess patient for signs and symptoms of electrolyte imbalances   Administer electrolyte replacement as ordered   Monitor response to electrolyte replacements, including repeat lab results as appropriate  8/4/2024 1812 by Rudi Orr RN  Outcome: Progressing  Flowsheets (Taken 8/4/2024 1000)  Electrolytes maintained within normal limits: Monitor labs and assess patient for signs and symptoms of electrolyte imbalances  Goal: Hemodynamic stability and optimal renal function maintained  8/5/2024 0056 by Audrey Bourne RN  Outcome: Progressing  Flowsheets (Taken 8/5/2024 0056)  Hemodynamic stability and optimal renal function maintained:   Monitor labs and assess for signs and symptoms of  volume excess or deficit   Monitor intake, output and patient weight   Monitor response to interventions for patient's volume status, including labs, urine output, blood pressure (other measures as available)   Encourage oral intake as appropriate  8/4/2024 1812 by Rudi Orr RN  Outcome: Progressing  Flowsheets (Taken 8/4/2024 1000)  Hemodynamic stability and optimal renal function maintained: Monitor labs and assess for signs and symptoms of volume excess or deficit

## 2024-08-05 NOTE — PROGRESS NOTES
Transitions of Care Pharmacy Service   Medication Review    The patient's list of current home medications has been reviewed.     Source(s) of information: spoke to patient and sure scripts     Based on information provided by the above source(s), I have updated the patient's home med list as described below.       I changed or updated the following medications on the patient's home medication list:  Discontinued ALPRAZolam (XANAX) 0.25 MG tablet  nortriptyline (PAMELOR) 25 MG capsule  colestipol (COLESTID) 1 g tablet  bisoprolol (ZEBETA) 10 MG tablet     Added bisoprolol-hydroCHLOROthiazide (ZIAC) 10-6.25 MG per tablet  naproxen sodium (ANAPROX) 220 MG tablet     Adjusted   None      Other Notes None            Please feel free to call me with any questions about this encounter. Thank you.    This note will be reviewed and co-signed by the Transitions of Care Pharmacist. The pharmacist will review inpatient orders and contact the physician about any discrepancies.    Praful Solis, pharmacy technician  Transitions of Care Pharmacy Service  Phone:  [phone]  Fax: [phone]      Electronically signed by Praful Solis on 8/5/2024 at 3:11 PM       Prior to Admission medications    Medication Sig   bisoprolol-hydroCHLOROthiazide (ZIAC) 10-6.25 MG per tablet Take 1 tablet by mouth daily   naproxen sodium (ANAPROX) 220 MG tablet Take 2 tablets by mouth every morning   furosemide (LASIX) 20 MG tablet TAKE 1 TABLET BY MOUTH DAILY   metFORMIN (GLUCOPHAGE) 1000 MG tablet TAKE 1 TABLET BY MOUTH 2 TIMES DAILY (WITH MEALS)   rizatriptan (MAXALT) 10 MG tablet Take 1 tablet by mouth once as needed for Migraine May repeat in 2 hours if needed     apixaban (ELIQUIS) 5 MG TABS tablet Take 1 tablet by mouth 2 times daily   traZODone (DESYREL) 50 MG tablet TAKE 3 TABLETS BY MOUTH AT BEDTIME AS NEEDED FOR INSOMNIA     levothyroxine (SYNTHROID) 100 MCG tablet Take 1 tablet by mouth daily   dilTIAZem (CARDIZEM CD) 300 MG  extended release capsule TAKE 1 CAPSULE BY MOUTH DAILY   omeprazole (PRILOSEC) 20 MG delayed release capsule Take 1 capsule by mouth daily as needed (heartburn)   progesterone (PROMETRIUM) 100 MG CAPS capsule TAKE 1 CAPSULE BY MOUTH AT BEDTIME

## 2024-08-06 ENCOUNTER — ANESTHESIA EVENT (OUTPATIENT)
Dept: OPERATING ROOM | Age: 54
DRG: 684 | End: 2024-08-06
Payer: COMMERCIAL

## 2024-08-06 ENCOUNTER — ANESTHESIA (OUTPATIENT)
Dept: OPERATING ROOM | Age: 54
DRG: 684 | End: 2024-08-06
Payer: COMMERCIAL

## 2024-08-06 VITALS
HEIGHT: 65 IN | WEIGHT: 197.31 LBS | SYSTOLIC BLOOD PRESSURE: 135 MMHG | DIASTOLIC BLOOD PRESSURE: 64 MMHG | RESPIRATION RATE: 19 BRPM | BODY MASS INDEX: 32.87 KG/M2 | OXYGEN SATURATION: 96 % | TEMPERATURE: 98.2 F | HEART RATE: 56 BPM

## 2024-08-06 PROBLEM — K29.30 CHRONIC SUPERFICIAL GASTRITIS WITHOUT BLEEDING: Status: ACTIVE | Noted: 2024-08-06

## 2024-08-06 LAB
ANION GAP SERPL CALCULATED.3IONS-SCNC: 8 MMOL/L (ref 9–17)
BUN SERPL-MCNC: 12 MG/DL (ref 6–20)
BUN/CREAT SERPL: 12 (ref 9–20)
CALCIUM SERPL-MCNC: 9.3 MG/DL (ref 8.6–10.4)
CHLORIDE SERPL-SCNC: 109 MMOL/L (ref 98–107)
CO2 SERPL-SCNC: 27 MMOL/L (ref 20–31)
CREAT SERPL-MCNC: 1 MG/DL (ref 0.5–0.9)
GFR, ESTIMATED: 67 ML/MIN/1.73M2
GLUCOSE SERPL-MCNC: 99 MG/DL (ref 70–99)
MAGNESIUM SERPL-MCNC: 1.3 MG/DL (ref 1.6–2.6)
POTASSIUM SERPL-SCNC: 4.1 MMOL/L (ref 3.7–5.3)
SODIUM SERPL-SCNC: 144 MMOL/L (ref 135–144)

## 2024-08-06 PROCEDURE — 7100000001 HC PACU RECOVERY - ADDTL 15 MIN: Performed by: INTERNAL MEDICINE

## 2024-08-06 PROCEDURE — 3700000000 HC ANESTHESIA ATTENDED CARE: Performed by: INTERNAL MEDICINE

## 2024-08-06 PROCEDURE — 3700000001 HC ADD 15 MINUTES (ANESTHESIA): Performed by: INTERNAL MEDICINE

## 2024-08-06 PROCEDURE — 36415 COLL VENOUS BLD VENIPUNCTURE: CPT

## 2024-08-06 PROCEDURE — 3609012400 HC EGD TRANSORAL BIOPSY SINGLE/MULTIPLE: Performed by: INTERNAL MEDICINE

## 2024-08-06 PROCEDURE — 88305 TISSUE EXAM BY PATHOLOGIST: CPT

## 2024-08-06 PROCEDURE — 2500000003 HC RX 250 WO HCPCS: Performed by: ANESTHESIOLOGY

## 2024-08-06 PROCEDURE — 6370000000 HC RX 637 (ALT 250 FOR IP): Performed by: INTERNAL MEDICINE

## 2024-08-06 PROCEDURE — 83735 ASSAY OF MAGNESIUM: CPT

## 2024-08-06 PROCEDURE — 88342 IMHCHEM/IMCYTCHM 1ST ANTB: CPT

## 2024-08-06 PROCEDURE — 0DB98ZX EXCISION OF DUODENUM, VIA NATURAL OR ARTIFICIAL OPENING ENDOSCOPIC, DIAGNOSTIC: ICD-10-PCS | Performed by: INTERNAL MEDICINE

## 2024-08-06 PROCEDURE — 2580000003 HC RX 258: Performed by: ANESTHESIOLOGY

## 2024-08-06 PROCEDURE — 7100000000 HC PACU RECOVERY - FIRST 15 MIN: Performed by: INTERNAL MEDICINE

## 2024-08-06 PROCEDURE — 99232 SBSQ HOSP IP/OBS MODERATE 35: CPT | Performed by: INTERNAL MEDICINE

## 2024-08-06 PROCEDURE — 80048 BASIC METABOLIC PNL TOTAL CA: CPT

## 2024-08-06 PROCEDURE — 6360000002 HC RX W HCPCS: Performed by: ANESTHESIOLOGY

## 2024-08-06 PROCEDURE — 43239 EGD BIOPSY SINGLE/MULTIPLE: CPT | Performed by: INTERNAL MEDICINE

## 2024-08-06 PROCEDURE — 2709999900 HC NON-CHARGEABLE SUPPLY: Performed by: INTERNAL MEDICINE

## 2024-08-06 PROCEDURE — 6360000002 HC RX W HCPCS: Performed by: INTERNAL MEDICINE

## 2024-08-06 PROCEDURE — 2580000003 HC RX 258

## 2024-08-06 RX ORDER — MAGNESIUM OXIDE 400 MG/1
400 TABLET ORAL DAILY
Qty: 30 TABLET | Refills: 0 | Status: SHIPPED | OUTPATIENT
Start: 2024-08-06

## 2024-08-06 RX ORDER — LIDOCAINE HYDROCHLORIDE 20 MG/ML
INJECTION, SOLUTION EPIDURAL; INFILTRATION; INTRACAUDAL; PERINEURAL PRN
Status: DISCONTINUED | OUTPATIENT
Start: 2024-08-06 | End: 2024-08-06 | Stop reason: SDUPTHER

## 2024-08-06 RX ORDER — SODIUM CHLORIDE, SODIUM LACTATE, POTASSIUM CHLORIDE, CALCIUM CHLORIDE 600; 310; 30; 20 MG/100ML; MG/100ML; MG/100ML; MG/100ML
INJECTION, SOLUTION INTRAVENOUS CONTINUOUS PRN
Status: DISCONTINUED | OUTPATIENT
Start: 2024-08-06 | End: 2024-08-06 | Stop reason: SDUPTHER

## 2024-08-06 RX ORDER — PROPOFOL 10 MG/ML
INJECTION, EMULSION INTRAVENOUS PRN
Status: DISCONTINUED | OUTPATIENT
Start: 2024-08-06 | End: 2024-08-06 | Stop reason: SDUPTHER

## 2024-08-06 RX ORDER — MAGNESIUM SULFATE IN WATER 40 MG/ML
2000 INJECTION, SOLUTION INTRAVENOUS
Status: DISPENSED | OUTPATIENT
Start: 2024-08-06 | End: 2024-08-06

## 2024-08-06 RX ADMIN — PROPOFOL 60 MG: 10 INJECTION, EMULSION INTRAVENOUS at 17:10

## 2024-08-06 RX ADMIN — PANTOPRAZOLE SODIUM 40 MG: 40 TABLET, DELAYED RELEASE ORAL at 11:50

## 2024-08-06 RX ADMIN — MAGNESIUM SULFATE HEPTAHYDRATE 2000 MG: 40 INJECTION, SOLUTION INTRAVENOUS at 15:37

## 2024-08-06 RX ADMIN — DILTIAZEM HYDROCHLORIDE 300 MG: 180 CAPSULE, COATED, EXTENDED RELEASE ORAL at 11:50

## 2024-08-06 RX ADMIN — PROPOFOL 60 MG: 10 INJECTION, EMULSION INTRAVENOUS at 17:14

## 2024-08-06 RX ADMIN — LEVOTHYROXINE SODIUM 100 MCG: 100 TABLET ORAL at 11:51

## 2024-08-06 RX ADMIN — SODIUM CHLORIDE, PRESERVATIVE FREE 10 ML: 5 INJECTION INTRAVENOUS at 11:52

## 2024-08-06 RX ADMIN — SODIUM CHLORIDE, POTASSIUM CHLORIDE, SODIUM LACTATE AND CALCIUM CHLORIDE: 600; 310; 30; 20 INJECTION, SOLUTION INTRAVENOUS at 17:09

## 2024-08-06 RX ADMIN — LIDOCAINE HYDROCHLORIDE 60 MG: 20 INJECTION, SOLUTION EPIDURAL; INFILTRATION; INTRACAUDAL; PERINEURAL at 17:09

## 2024-08-06 RX ADMIN — PROPOFOL 60 MG: 10 INJECTION, EMULSION INTRAVENOUS at 17:12

## 2024-08-06 ASSESSMENT — PAIN - FUNCTIONAL ASSESSMENT: PAIN_FUNCTIONAL_ASSESSMENT: FACE, LEGS, ACTIVITY, CRY, AND CONSOLABILITY (FLACC)

## 2024-08-06 NOTE — ANESTHESIA POSTPROCEDURE EVALUATION
Department of Anesthesiology  Postprocedure Note    Patient: Andressa Boone  MRN: [id number]  YOB: 1970  Date of evaluation: 8/6/2024    Procedure Summary       Date: 08/06/24 Room / Location: [address]    Anesthesia Start: 1703 Anesthesia Stop: 1726    Procedure: ESOPHAGOGASTRODUODENOSCOPY BIOPSY (Esophagus) Diagnosis:       Celiac disease      (Celiac disease [K90.0])    Surgeons: Wilian Vernon MD Responsible Provider: Radha Ryan MD    Anesthesia Type: MAC ASA Status: 3            Anesthesia Type: No value filed.    Eleonora Phase I:      Eleonora Phase II:      Anesthesia Post Evaluation    Patient location during evaluation: PACU  Patient participation: complete - patient participated  Level of consciousness: awake and alert  Airway patency: patent  Nausea & Vomiting: no nausea and no vomiting  Cardiovascular status: hemodynamically stable  Respiratory status: acceptable  Hydration status: euvolemic  Pain management: adequate    No notable events documented.

## 2024-08-06 NOTE — PROGRESS NOTES
Patient discharged with all belongings and discharge paperwork. Patient denies any questions regarding discharge instructions. Patient ambulated to front, gait steady, no distress noted.

## 2024-08-06 NOTE — PLAN OF CARE
Problem: Discharge Planning  Goal: Discharge to home or other facility with appropriate resources  Outcome: Progressing  Flowsheets (Taken 8/5/2024 0820 by Shadi Anand, RN)  Discharge to home or other facility with appropriate resources:   Identify barriers to discharge with patient and caregiver   Arrange for needed discharge resources and transportation as appropriate   Identify discharge learning needs (meds, wound care, etc)   Refer to discharge planning if patient needs post-hospital services based on physician order or complex needs related to functional status, cognitive ability or social support system     Problem: Pain  Goal: Verbalizes/displays adequate comfort level or baseline comfort level  Outcome: Progressing  Flowsheets (Taken 8/5/2024 1501 by Shadi Anand, RN)  Verbalizes/displays adequate comfort level or baseline comfort level:   Encourage patient to monitor pain and request assistance   Assess pain using appropriate pain scale   Administer analgesics based on type and severity of pain and evaluate response   Implement non-pharmacological measures as appropriate and evaluate response     Problem: Metabolic/Fluid and Electrolytes - Adult  Goal: Electrolytes maintained within normal limits  Outcome: Progressing  Flowsheets (Taken 8/5/2024 0820 by Shadi Anand, RN)  Electrolytes maintained within normal limits:   Monitor labs and assess patient for signs and symptoms of electrolyte imbalances   Administer electrolyte replacement as ordered   Monitor response to electrolyte replacements, including repeat lab results as appropriate     Problem: Metabolic/Fluid and Electrolytes - Adult  Goal: Hemodynamic stability and optimal renal function maintained  Outcome: Progressing  Flowsheets (Taken 8/5/2024 0820 by Shadi Anand, RN)  Hemodynamic stability and optimal renal function maintained:   Monitor labs and assess for signs and symptoms of volume excess or deficit   Monitor intake,  needed   Encourage mobilization and activity   Nutrition consult to assist patient with appropriate food choices     Problem: Gastrointestinal - Adult  Goal: Maintains adequate nutritional intake  Outcome: Progressing  Flowsheets (Taken 8/5/2024 0820 by Shadi Anand RN)  Maintains adequate nutritional intake:   Monitor percentage of each meal consumed   Identify factors contributing to decreased intake, treat as appropriate   Monitor intake and output, weight and lab values

## 2024-08-06 NOTE — DISCHARGE SUMMARY
[facility]  Office: [phone]  Vikram Borrero DO, Enoch Rivers DO, Roberto Bernal DO, Leonel De Los Santos DO, Marla Hernández MD, Leesa Pablo MD, Heath Ratliff MD, Josee Jones MD,  Cheng Pool MD, Tomasa Villalobos MD, Afsaneh Dan MD,  Gela White DO, Ana Rosa Orozco MD, Nasim Machuca MD, Giles Borrero DO, Joann Anthony MD,  Fei Urena DO, Marisa Vela MD, Harriet Macias MD, Kristina Angel MD, Whit Kaye MD,  Florencio Brito MD, Linden Perkins MD, Ubaldo Diop MD, Linda Rocha MD, Claudio Nino MD, Roldan Rahman MD, Ho Cartagena DO, Clinton Luevano DO, Di Martinez MD,  Cj Alcaraz MD, Shirley Waterhouse, CNP,  Milvia Holguin CNP, Pepe Locke, CNP,  Bibiana Mccallum, DNP, Amelie Randhawa, CNP, Tabitha Aaron, CNP, Janet Collins CNP, Marlin Rios, CNP, Kiana Gordon, PA-C, Emily King PA-C, Nadja Chung, CNP, Missy Terrell, CNP, Kevon Singh, CNP, Flor Tatum, CNP, Samantha Rodriguez, CNP, Ellie Villavicencio, CNS, Delisa Bonds, CNP, Raven Villa CNP, Tracy Schwab, CNP         [facility]   IN-PATIENT SERVICE   [facility]    Discharge Summary     Patient ID: Andressa Boone  :  1970   MRN: [id number]     ACCOUNT:  [id number]   Patient's PCP: Criss Montelongo APRN - CNP  Admit Date: 8/3/2024   Discharge Date: 2024     Length of Stay: 3  Code Status:  Full Code  Admitting Physician: Roberto Bernal DO  Discharge Physician: Roberto Bernal DO     Active Discharge Diagnoses:     Hospital Problem Lists:  Principal Problem:    GERI (acute kidney injury) (HCC)  Active Problems:    Celiac disease    Essential hypertension    Hypothyroidism    Moderate mixed hyperlipidemia not requiring statin therapy    Hypomagnesemia    Hypokalemia  Resolved Problems:    * No resolved hospital problems. *      Admission Condition:  fair     Discharged Condition: stable    Hospital Stay:     Hospital Course:  Andressa Boone is a 54

## 2024-08-06 NOTE — PLAN OF CARE
Problem: Discharge Planning  Goal: Discharge to home or other facility with appropriate resources  Outcome: Adequate for Discharge  Flowsheets  Taken 8/6/2024 1828 by Nargis Moraes, RN  Discharge to home or other facility with appropriate resources:   Identify barriers to discharge with patient and caregiver   Identify discharge learning needs (meds, wound care, etc)   Arrange for needed discharge resources and transportation as appropriate   Refer to discharge planning if patient needs post-hospital services based on physician order or complex needs related to functional status, cognitive ability or social support system  Taken 8/6/2024 1030 by Shadi Anand RN  Discharge to home or other facility with appropriate resources:   Identify barriers to discharge with patient and caregiver   Arrange for needed discharge resources and transportation as appropriate   Identify discharge learning needs (meds, wound care, etc)   Refer to discharge planning if patient needs post-hospital services based on physician order or complex needs related to functional status, cognitive ability or social support system     Problem: Pain  Goal: Verbalizes/displays adequate comfort level or baseline comfort level  8/6/2024 1828 by Nargis Moraes RN  Outcome: Adequate for Discharge  Flowsheets (Taken 8/6/2024 1828)  Verbalizes/displays adequate comfort level or baseline comfort level:   Encourage patient to monitor pain and request assistance   Assess pain using appropriate pain scale   Administer analgesics based on type and severity of pain and evaluate response   Implement non-pharmacological measures as appropriate and evaluate response   Notify Licensed Independent Practitioner if interventions unsuccessful or patient reports new pain  8/6/2024 0558 by Rhina Khalil, RN  Outcome: Progressing  Flowsheets (Taken 8/6/2024 0558)  Verbalizes/displays adequate comfort level or baseline comfort level:   Encourage patient to monitor  deficit   Monitor intake, output and patient weight   Monitor response to interventions for patient's volume status, including labs, urine output, blood pressure (other measures as available)     Problem: Cardiovascular - Adult  Goal: Maintains optimal cardiac output and hemodynamic stability  Outcome: Adequate for Discharge  Flowsheets  Taken 8/6/2024 1828 by Nargis Moraes RN  Maintains optimal cardiac output and hemodynamic stability:   Monitor urine output and notify Licensed Independent Practitioner for values outside of normal range   Monitor blood pressure and heart rate   Assess for signs of decreased cardiac output   Administer fluid and/or volume expanders as ordered   Administer vasoactive medications as ordered  Taken 8/6/2024 1030 by Shadi Anand RN  Maintains optimal cardiac output and hemodynamic stability:   Monitor blood pressure and heart rate   Monitor urine output and notify Licensed Independent Practitioner for values outside of normal range   Assess for signs of decreased cardiac output  Goal: Absence of cardiac dysrhythmias or at baseline  Outcome: Adequate for Discharge  Flowsheets (Taken 8/6/2024 1828)  Absence of cardiac dysrhythmias or at baseline:   Monitor cardiac rate and rhythm   Assess for signs of decreased cardiac output   Administer antiarrhythmia medication and electrolyte replacement as ordered     Problem: Gastrointestinal - Adult  Goal: Minimal or absence of nausea and vomiting  Outcome: Adequate for Discharge  Flowsheets (Taken 8/6/2024 1030 by Shadi Anand RN)  Minimal or absence of nausea and vomiting:   Administer IV fluids as ordered to ensure adequate hydration   Administer ordered antiemetic medications as needed   Provide nonpharmacologic comfort measures as appropriate  Goal: Maintains or returns to baseline bowel function  Outcome: Adequate for Discharge  Flowsheets (Taken 8/6/2024 1030 by Shadi Anand RN)  Maintains or returns to baseline bowel

## 2024-08-06 NOTE — PROGRESS NOTES
[facility]  Office: [phone]  Vikram Borrero, DO, Enoch Rivers, DO, Roberto Bernal DO, Leonel De Los Santos, DO, Marla Hernández MD, Leesa Pablo MD, Heath Ratliff MD, Josee Jones MD,  Cheng Pool MD, Tomasa Villalobos MD, Afsaneh Dan MD,  Gela White DO, Ana Rosa Orozco MD, Nasim Machuca MD, Giles Borrero DO, Joann Anthony MD,  Fei Urena DO, Marisa Vela MD, Harriet Macias MD, Kristina Angel MD, Whit Kaye MD,  Florencio Brito MD, Linden Perkins MD, Ubaldo Diop MD, Linda Rocha MD, Claudio Nino MD, Roldan Rahman MD, Ho Cartagena DO, Clinton Luevano DO, Di Martinez MD,  Cj Alcaraz MD, Shirley Waterhouse, CNP,  Milvia Holguin CNP, Pepe Locke, CNP,  Bibiana Mccallum, DNP, Amelie Randhawa, CNP, Tabitha Aaron, CNP, Janet Collins, CNP, Marlin Rios, CNP, Kiana Gordon, PA-C, Emily King, PA-C, Nadja Chung, CNP, Missy Terrell, CNP, Kevon Singh, CNP, Flor Tatum, CNP, Samantha Rodriguez, CNP, Ellie Villavicencio, CNS, Delisa Bonds, CNP, Raven Villa CNP, Tracy Schwab, CNP         [facility]   IN-PATIENT SERVICE   [facility]    Progress Note    8/6/2024    2:36 PM    Name:   Andressa Boone  MRN:     [id number]     Acct:      [id number]   Room:   2010/2010-02  IP Day:  3  Admit Date:  8/3/2024 12:05 AM    PCP:   Criss Montelongo APRN - CNP  Code Status:  Full Code    Subjective:     C/C: Loss of appetite with nausea and vomiting    Interval History Status: improved.     Patient continues to improve, renal function nearing baseline.  Denies any chest pain, shortness of breath, nausea or vomiting, fevers or chills.  Scheduled for EGD today    Brief History:     This is a 54-year-old female with a history of celiac disease that presents with loss of appetite with nausea vomiting has been ongoing for a few weeks.  She presented to [facility] emergency room where she was found to have GERI he was transferred here for further  evaluation.    Review of Systems:     Constitutional:  negative for chills, fevers, sweats  Respiratory:  negative for cough, dyspnea on exertion, shortness of breath, wheezing  Cardiovascular:  negative for chest pain, chest pressure/discomfort, lower extremity edema, palpitations  Gastrointestinal:  negative for abdominal pain, constipation, diarrhea, nausea, vomiting  Neurological:  negative for dizziness, headache    Medications:     Allergies:    Allergies   Allergen Reactions    Rocephin [Ceftriaxone] Anaphylaxis    Bee Venom Swelling       Current Meds:   Scheduled Meds:    magnesium sulfate  4,000 mg IntraVENous Once    sodium chloride flush  5-40 mL IntraVENous 2 times per day    [Held by provider] metoprolol succinate  50 mg Oral Daily    [Held by provider] apixaban  5 mg Oral BID    dilTIAZem  300 mg Oral Daily    levothyroxine  100 mcg Oral Daily    pantoprazole  40 mg Oral QAM AC     Continuous Infusions:    sodium chloride       PRN Meds: sodium chloride flush, sodium chloride, ondansetron **OR** ondansetron, acetaminophen **OR** acetaminophen, ALPRAZolam    Data:     Past Medical History:   has a past medical history of Anemia, unspecified, Anxiety, Atrial fibrillation (HCC), Celiac disease, Edema, H/O cardiovascular stress test, H/O echocardiogram, History of cardiac cath, History of cardiovascular stress test, History of normal Holter exam, Hypertension, Hypothyroidism, Thyroid disease, and Tilt table evaluation.    Social History:   reports that she has never smoked. She has never used smokeless tobacco. She reports current alcohol use. She reports that she does not use drugs.     Family History:   Family History   Adopted: Yes       Vitals:  /79   Pulse 50   Temp 98.2 °F (36.8 °C) (Oral)   Resp 18   Ht 1.651 m (5' 5\")   Wt 89.5 kg (197 lb 5 oz)   LMP 07/15/2020 (Approximate)   SpO2 99%   BMI 32.83 kg/m²   Temp (24hrs), Av.1 °F (36.7 °C), Min:97.9 °F (36.6 °C), Max:98.2 °F (36.8  nondistended, normal bowel sounds, no masses, hepatomegaly, splenomegaly  Extremities:  no edema, redness, tenderness in the calves  Skin:  no gross lesions, rashes, induration    Assessment:        Hospital Problems             Last Modified POA    * (Principal) GERI (acute kidney injury) (HCC) 8/3/2024 Yes    Celiac disease 8/3/2024 Yes    Essential hypertension 8/3/2024 Yes    Hypothyroidism 8/3/2024 Yes    Moderate mixed hyperlipidemia not requiring statin therapy 8/3/2024 Yes    Hypomagnesemia 8/3/2024 No    Hypokalemia 8/3/2024 Yes       Plan:        Supplement magnesium  EGD today  Antihypertensives as ordered  Avoid nephrotoxic agents  Continue home maintenance medications  Discharge home after EGD    Roberto Bernal DO  8/6/2024  2:36 PM

## 2024-08-06 NOTE — ANESTHESIA PRE PROCEDURE
Department of Anesthesiology  Preprocedure Note       Name:  Andressa Boone   Age:  54 y.o.  :  1970                                          MRN:  [id number]         Date:  2024      Surgeon: Surgeon(s):  Wilian Vernon MD    Procedure: Procedure(s):  ESOPHAGOGASTRODUODENOSCOPY    Medications prior to admission:   Prior to Admission medications    Medication Sig Start Date End Date Taking? Authorizing Provider   bisoprolol-hydroCHLOROthiazide (ZIAC) 10-6.25 MG per tablet Take 1 tablet by mouth daily   Yes Mitzy Ann MD   naproxen sodium (ANAPROX) 220 MG tablet Take 2 tablets by mouth every morning   Yes Mitzy Ann MD   furosemide (LASIX) 20 MG tablet TAKE 1 TABLET BY MOUTH DAILY 24   Kenny Coronado MD   metFORMIN (GLUCOPHAGE) 1000 MG tablet TAKE 1 TABLET BY MOUTH 2 TIMES DAILY (WITH MEALS) 3/15/24   Criss Montelongo APRN - CNP   rizatriptan (MAXALT) 10 MG tablet Take 1 tablet by mouth once as needed for Migraine May repeat in 2 hours if needed 24  Kleber Barron MD   apixaban (ELIQUIS) 5 MG TABS tablet Take 1 tablet by mouth 2 times daily 12/15/23   Koffi Ratliff MD   traZODone (DESYREL) 50 MG tablet TAKE 3 TABLETS BY MOUTH AT BEDTIME AS NEEDED FOR INSOMNIA 11/15/23   Criss Montelongo APRN - CNP   levothyroxine (SYNTHROID) 100 MCG tablet Take 1 tablet by mouth daily 23   Criss Montelongo APRN - CNP   dilTIAZem (CARDIZEM CD) 300 MG extended release capsule TAKE 1 CAPSULE BY MOUTH DAILY 23   Audrey Disla PA-C   omeprazole (PRILOSEC) 20 MG delayed release capsule Take 1 capsule by mouth daily as needed (heartburn) 23   Mitzy Ann MD   progesterone (PROMETRIUM) 100 MG CAPS capsule TAKE 1 CAPSULE BY MOUTH AT BEDTIME 3/15/23   ProviderMitzy MD       Current medications:    Current Facility-Administered Medications   Medication Dose Route Frequency Provider Last Rate Last Admin   • magnesium sulfate 2000 mg

## 2024-08-06 NOTE — PROGRESS NOTES
SPIRITUAL CARE DEPARTMENT [facility]  PROGRESS NOTE    Room # 2010/2010-02   Name: Andressa Boone            Christianity: Worship     Reason for visit:  Rounds    I visited the patient.    Admit Date & Time: 8/3/2024 12:05 AM    Assessment:  Andressa Boone is a 54 y.o. female in the hospital because Acute Kidney Injury. Upon entering the room Pt. Was alert and conversant.  Pt. Mentioned three dogs at home.      Intervention:  I introduced myself and my title as  I offered space for Pt.  to express feelings, needs, and concerns and provided a ministry presence. Pt. And Writer engaged in Prayer together.    Outcome:  Pt. Thanked Writer for Visit.    Plan:  Chaplains will remain available to offer spiritual and emotional support as needed.    Electronically signed by Chaplain Jeronimo, on 8/5/2024 at 8:33 PM.  Spiritual Care Department  [facility]       08/05/24 2031   Encounter Summary   Encounter Overview/Reason Initial Encounter;Spiritual/Emotional Needs   Service Provided For Patient   Referral/Consult From [facility] [place]   Support System Unknown   Last Encounter  08/05/24   Complexity of Encounter Low   Begin Time 2020   End Time  2025   Total Time Calculated 5 min   Spiritual/Emotional needs   Type Spiritual Support   Assessment/Intervention/Outcome   Assessment Calm;Coping;Hopeful;Peaceful   Intervention Active listening;Prayer (assurance of)/Zanoni;Sustaining Presence/Ministry of presence   Outcome Engaged in conversation   Plan and Referrals   Plan/Referrals Continue Support (comment)

## 2024-08-06 NOTE — OP NOTE
Operative Note      Patient: Andressa Boone  YOB: 1970  MRN: [id number]    Date of Procedure: 8/6/2024    Pre-Op Diagnosis Codes:     * Celiac disease [K90.0]    Post-Op Diagnosis:  Gastritis.       Procedure(s):  ESOPHAGOGASTRODUODENOSCOPY BIOPSY    Surgeon(s):  Wilian Vernon MD    Assistant:   * No surgical staff found *    Anesthesia: Monitor Anesthesia Care    Estimated Blood Loss (mL): Minimal    Complications: None    Specimens:   ID Type Source Tests Collected by Time Destination   A : DUODENAL BIOPSY Tissue Duodenum SURGICAL PATHOLOGY Wilian Vernon MD 8/6/2024 1712    B : GASTRIC BIOPSY Tissue Gastric SURGICAL PATHOLOGY Wilian Vernon MD 8/6/2024 1713        Implants:  * No implants in log *      Drains: * No LDAs found *    Findings:  Infection Present At Time Of Surgery (PATOS) (choose all levels that have infection present):  No infection present      Description of Procedure:  Informed consent was obtained from the patient after explanation of the procedure including indications, description of the procedure,  benefits and possible risks and complications of the procedure, and alternatives. Questions were answered.  The patient's history was reviewed and a directed physical examination was performed prior to the procedure.    Patient was monitored throughout the procedure with pulse oximetry and periodic assessment of vital signs. Patient was sedated as noted above. With the patient in the left lateral decubitus position, the Olympus videoendoscope was placed in the patient's mouth and under direct visualization passed into the esophagus.  Visualization of the esophagus, stomach, and duodenum was performed during both introduction and withdrawal of the endoscope and retroflexed view of the proximal stomach was obtained. The scope was passed to the 2nd portion of the duodenum.  The patient tolerated the procedure well and was taken to the recovery area in good

## 2024-08-06 NOTE — PLAN OF CARE
Problem: Pain  Goal: Verbalizes/displays adequate comfort level or baseline comfort level  8/6/2024 0558 by Rhina Khalil RN  Outcome: Progressing  Flowsheets (Taken 8/6/2024 0558)  Verbalizes/displays adequate comfort level or baseline comfort level:   Encourage patient to monitor pain and request assistance   Assess pain using appropriate pain scale   Administer analgesics based on type and severity of pain and evaluate response   Implement non-pharmacological measures as appropriate and evaluate response   Consider cultural and social influences on pain and pain management   Notify Licensed Independent Practitioner if interventions unsuccessful or patient reports new pain

## 2024-08-07 ENCOUNTER — TELEPHONE (OUTPATIENT)
Dept: PRIMARY CARE CLINIC | Age: 54
End: 2024-08-07

## 2024-08-07 DIAGNOSIS — N17.9 AKI (ACUTE KIDNEY INJURY) (HCC): Primary | ICD-10-CM

## 2024-08-07 NOTE — TELEPHONE ENCOUNTER
Care Transitions Initial Follow Up Call    Outreach made within 2 business days of discharge: Yes    Patient: Andressa Boone Patient : 1970   MRN: [id number]  Reason for Admission: GERI  Discharge Date: 24       Spoke with: Andressa    Discharge department/facility: [facility] Interactive Patient Contact:  Was patient able to fill all prescriptions: No:   Was patient instructed to bring all medications to the follow-up visit: No:   Is patient taking all medications as directed in the discharge summary? Yes  Does patient understand their discharge instructions: Yes  Does patient have questions or concerns that need addressed prior to 7-14 day follow up office visit: no    Additional needs identified to be addressed with provider               Scheduled appointment with PCP within 7-14 days    Follow Up  Future Appointments   Date Time Provider Department Center   2024  3:00 PM [facility] ECHO 1 [facility] Rad   2024  3:00 PM Kleber Barron MD TIFF NEURO Neurology -   10/1/2024  8:00 AM SCHEDULE, [facility] TIFF CARD T[facility]   2025  3:40 PM [facility]       Sharita Rivas

## 2024-08-07 NOTE — TELEPHONE ENCOUNTER
Pt calls in requesting labs prior to visit Monday hosp f/u GERI   Per CHAITANYA Montelongo agrees to magnesium and bmp

## 2024-08-09 ENCOUNTER — HOSPITAL ENCOUNTER (OUTPATIENT)
Age: 54
Discharge: HOME | End: 2024-08-09
Payer: COMMERCIAL

## 2024-08-09 ENCOUNTER — TELEPHONE (OUTPATIENT)
Dept: GASTROENTEROLOGY | Age: 54
End: 2024-08-09

## 2024-08-09 DIAGNOSIS — N17.9 AKI (ACUTE KIDNEY INJURY) (HCC): ICD-10-CM

## 2024-08-09 LAB
ANION GAP SERPL CALCULATED.3IONS-SCNC: 9 MMOL/L (ref 9–17)
BUN SERPL-MCNC: 15 MG/DL (ref 6–20)
BUN/CREAT SERPL: 12 (ref 9–20)
CALCIUM SERPL-MCNC: 10 MG/DL (ref 8.6–10.4)
CHLORIDE SERPL-SCNC: 102 MMOL/L (ref 98–107)
CO2 SERPL-SCNC: 32 MMOL/L (ref 20–31)
CREAT SERPL-MCNC: 1.3 MG/DL (ref 0.5–0.9)
GFR, ESTIMATED: 49 ML/MIN/1.73M2
GLUCOSE SERPL-MCNC: 112 MG/DL (ref 70–99)
MAGNESIUM SERPL-MCNC: 1.4 MG/DL (ref 1.6–2.6)
POTASSIUM SERPL-SCNC: 3.9 MMOL/L (ref 3.7–5.3)
SODIUM SERPL-SCNC: 143 MMOL/L (ref 135–144)
SURGICAL PATHOLOGY REPORT: NORMAL

## 2024-08-09 PROCEDURE — 83735 ASSAY OF MAGNESIUM: CPT

## 2024-08-09 PROCEDURE — 36415 COLL VENOUS BLD VENIPUNCTURE: CPT

## 2024-08-09 PROCEDURE — 80048 BASIC METABOLIC PNL TOTAL CA: CPT

## 2024-08-09 RX ORDER — FUROSEMIDE 20 MG
20 TABLET ORAL DAILY
Qty: 90 TABLET | Refills: 0 | OUTPATIENT
Start: 2024-08-09

## 2024-08-09 NOTE — TELEPHONE ENCOUNTER
Called patient and discussed the biopsy results.  Discussed the results from the duodenum showing findings consistent with gluten sensitivity.  I also discussed the finding of H. pylori gastritis.  Patient is recovering from GERI.  I feel more comfortable that her H. pylori gastritis is treated once her kidney function come back to normal.  I will forward this conversation to her PCP and will leave the timing to treat her H pylori on her discretion once GERI resolves.   Plan was discussed with the patient and all questions answered

## 2024-08-12 ENCOUNTER — HOSPITAL ENCOUNTER (OUTPATIENT)
Age: 54
Discharge: HOME | End: 2024-08-12
Payer: COMMERCIAL

## 2024-08-12 ENCOUNTER — OFFICE VISIT (OUTPATIENT)
Dept: PRIMARY CARE CLINIC | Age: 54
End: 2024-08-12

## 2024-08-12 ENCOUNTER — HOSPITAL ENCOUNTER (OUTPATIENT)
Age: 54
Discharge: HOME | End: 2024-08-14
Payer: COMMERCIAL

## 2024-08-12 VITALS
DIASTOLIC BLOOD PRESSURE: 70 MMHG | WEIGHT: 195 LBS | TEMPERATURE: 97.1 F | OXYGEN SATURATION: 99 % | SYSTOLIC BLOOD PRESSURE: 94 MMHG | BODY MASS INDEX: 32.45 KG/M2 | HEART RATE: 65 BPM

## 2024-08-12 DIAGNOSIS — R00.1 BRADYCARDIA: ICD-10-CM

## 2024-08-12 DIAGNOSIS — E83.42 HYPOMAGNESEMIA: ICD-10-CM

## 2024-08-12 DIAGNOSIS — E03.9 HYPOTHYROIDISM, UNSPECIFIED TYPE: ICD-10-CM

## 2024-08-12 DIAGNOSIS — R00.2 PALPITATIONS: ICD-10-CM

## 2024-08-12 DIAGNOSIS — Z13.220 LIPID SCREENING: ICD-10-CM

## 2024-08-12 DIAGNOSIS — N17.9 AKI (ACUTE KIDNEY INJURY) (HCC): Primary | ICD-10-CM

## 2024-08-12 DIAGNOSIS — R53.83 OTHER FATIGUE: ICD-10-CM

## 2024-08-12 DIAGNOSIS — I10 ESSENTIAL HYPERTENSION: ICD-10-CM

## 2024-08-12 DIAGNOSIS — R73.03 PREDIABETES: ICD-10-CM

## 2024-08-12 DIAGNOSIS — R19.7 DIARRHEA, UNSPECIFIED TYPE: ICD-10-CM

## 2024-08-12 DIAGNOSIS — E87.6 HYPOKALEMIA: ICD-10-CM

## 2024-08-12 DIAGNOSIS — N17.9 AKI (ACUTE KIDNEY INJURY) (HCC): ICD-10-CM

## 2024-08-12 LAB
ANION GAP SERPL CALCULATED.3IONS-SCNC: 13 MMOL/L (ref 9–17)
BUN SERPL-MCNC: 21 MG/DL (ref 6–20)
BUN/CREAT SERPL: 18 (ref 9–20)
CALCIUM SERPL-MCNC: 10.1 MG/DL (ref 8.6–10.4)
CHLORIDE SERPL-SCNC: 100 MMOL/L (ref 98–107)
CO2 SERPL-SCNC: 33 MMOL/L (ref 20–31)
CREAT SERPL-MCNC: 1.2 MG/DL (ref 0.5–0.9)
ERYTHROCYTE [DISTWIDTH] IN BLOOD BY AUTOMATED COUNT: 12.5 % (ref 11.8–14.4)
GFR, ESTIMATED: 54 ML/MIN/1.73M2
GLUCOSE SERPL-MCNC: 97 MG/DL (ref 70–99)
HCT VFR BLD AUTO: 43.6 % (ref 36.3–47.1)
HGB BLD-MCNC: 14.7 G/DL (ref 11.9–15.1)
MAGNESIUM SERPL-MCNC: 1.4 MG/DL (ref 1.6–2.6)
MCH RBC QN AUTO: 32.7 PG (ref 25.2–33.5)
MCHC RBC AUTO-ENTMCNC: 33.7 G/DL (ref 28.4–34.8)
MCV RBC AUTO: 97.1 FL (ref 82.6–102.9)
NRBC BLD-RTO: 0 PER 100 WBC
PLATELET # BLD AUTO: 293 K/UL (ref 138–453)
PMV BLD AUTO: 10.8 FL (ref 8.1–13.5)
POTASSIUM SERPL-SCNC: 4.9 MMOL/L (ref 3.7–5.3)
RBC # BLD AUTO: 4.49 M/UL (ref 3.95–5.11)
SODIUM SERPL-SCNC: 146 MMOL/L (ref 135–144)
TSH SERPL DL<=0.05 MIU/L-ACNC: 0.11 UIU/ML (ref 0.3–5)
WBC OTHER # BLD: 5 K/UL (ref 3.5–11.3)

## 2024-08-12 PROCEDURE — 80048 BASIC METABOLIC PNL TOTAL CA: CPT

## 2024-08-12 PROCEDURE — 80061 LIPID PANEL: CPT

## 2024-08-12 PROCEDURE — 93243 EXT ECG>48HR<7D SCAN A/R: CPT

## 2024-08-12 PROCEDURE — 85027 COMPLETE CBC AUTOMATED: CPT

## 2024-08-12 PROCEDURE — 82607 VITAMIN B-12: CPT

## 2024-08-12 PROCEDURE — 84443 ASSAY THYROID STIM HORMONE: CPT

## 2024-08-12 PROCEDURE — 82306 VITAMIN D 25 HYDROXY: CPT

## 2024-08-12 PROCEDURE — 36415 COLL VENOUS BLD VENIPUNCTURE: CPT

## 2024-08-12 PROCEDURE — 93005 ELECTROCARDIOGRAM TRACING: CPT

## 2024-08-12 PROCEDURE — 84439 ASSAY OF FREE THYROXINE: CPT

## 2024-08-12 PROCEDURE — 83036 HEMOGLOBIN GLYCOSYLATED A1C: CPT

## 2024-08-12 PROCEDURE — 83735 ASSAY OF MAGNESIUM: CPT

## 2024-08-12 RX ORDER — FUROSEMIDE 20 MG/1
20 TABLET ORAL DAILY
Qty: 90 TABLET | Refills: 0 | Status: CANCELLED | OUTPATIENT
Start: 2024-08-12

## 2024-08-12 SDOH — ECONOMIC STABILITY: FOOD INSECURITY: WITHIN THE PAST 12 MONTHS, YOU WORRIED THAT YOUR FOOD WOULD RUN OUT BEFORE YOU GOT MONEY TO BUY MORE.: NEVER TRUE

## 2024-08-12 SDOH — ECONOMIC STABILITY: FOOD INSECURITY: WITHIN THE PAST 12 MONTHS, THE FOOD YOU BOUGHT JUST DIDN'T LAST AND YOU DIDN'T HAVE MONEY TO GET MORE.: NEVER TRUE

## 2024-08-12 SDOH — ECONOMIC STABILITY: INCOME INSECURITY: HOW HARD IS IT FOR YOU TO PAY FOR THE VERY BASICS LIKE FOOD, HOUSING, MEDICAL CARE, AND HEATING?: NOT HARD AT ALL

## 2024-08-12 ASSESSMENT — PATIENT HEALTH QUESTIONNAIRE - PHQ9
9. THOUGHTS THAT YOU WOULD BE BETTER OFF DEAD, OR OF HURTING YOURSELF: NOT AT ALL
SUM OF ALL RESPONSES TO PHQ QUESTIONS 1-9: 6
2. FEELING DOWN, DEPRESSED OR HOPELESS: NOT AT ALL
SUM OF ALL RESPONSES TO PHQ QUESTIONS 1-9: 6
8. MOVING OR SPEAKING SO SLOWLY THAT OTHER PEOPLE COULD HAVE NOTICED. OR THE OPPOSITE, BEING SO FIGETY OR RESTLESS THAT YOU HAVE BEEN MOVING AROUND A LOT MORE THAN USUAL: NOT AT ALL
1. LITTLE INTEREST OR PLEASURE IN DOING THINGS: NOT AT ALL
10. IF YOU CHECKED OFF ANY PROBLEMS, HOW DIFFICULT HAVE THESE PROBLEMS MADE IT FOR YOU TO DO YOUR WORK, TAKE CARE OF THINGS AT HOME, OR GET ALONG WITH OTHER PEOPLE: NOT DIFFICULT AT ALL
SUM OF ALL RESPONSES TO PHQ9 QUESTIONS 1 & 2: 0
5. POOR APPETITE OR OVEREATING: NEARLY EVERY DAY
7. TROUBLE CONCENTRATING ON THINGS, SUCH AS READING THE NEWSPAPER OR WATCHING TELEVISION: NOT AT ALL
4. FEELING TIRED OR HAVING LITTLE ENERGY: NEARLY EVERY DAY
6. FEELING BAD ABOUT YOURSELF - OR THAT YOU ARE A FAILURE OR HAVE LET YOURSELF OR YOUR FAMILY DOWN: NOT AT ALL
3. TROUBLE FALLING OR STAYING ASLEEP: NOT AT ALL

## 2024-08-12 NOTE — PROGRESS NOTES
Name: Andressa Boone  : 1970         Chief Complaint:     Chief Complaint   Patient presents with    Follow-Up from Hospital     GERI-  Stated not feeling any better  -very tired  -lower back pain  On magnesium now since the hospital       History of Present Illness:      Andressa Boone is a 54 y.o.  female who presents with Follow-Up from Hospital (GERI-/Stated not feeling any better/-very tired/-lower back pain/On magnesium now since the hospital)      HPI    The patient presents for hospital follow-up.  She was admitted to [facility] 8/3/2024 - 2024 with chief diagnosis of GERI.  Hospital course per EMR as noted below:    \"Hospital Course:  Andressa Boone is a 54 y.o. female who was admitted for the management of  GERI (acute kidney injury) (HCC) , presented to ER with nausea, vomiting loss of appetite. This is a 54-year-old female with history of celiac disease that presents with a several week history of nausea, vomiting and loss of appetite with poor oral intake.  She was initially evaluated at [facility] where she was found to have acute kidney injury.  She was transferred here for further investigation with her history of celiac disease for GI consultation and management.  She was administered IV fluids with correction of her acute kidney injury.  Per GI recommendations she was monitored and underwent EGD with biopsies of the duodenum and the gastric antrum for findings of gastritis.  Postprocedure her diet was advanced and 2024 creatinine 1.3, GFR 49, she will be discharged home in stable condition.\"    Biopsy did come back 2024 showing the following: A.  DUODENUM, BIOPSY: -DUODENAL MUCOSA WITH INTRAEPITHELIAL LYMPHOCYTOSIS, COMPATIBLE WITH GLUTEN SENSITIVITY IN THE APPROPRIATE CLINICAL SETTING -SEE COMMENT. B.  STOMACH, BIOPSY: -HELICOBACTER GASTRITIS.  2024 magnesium 1.4, creatinine 1.3, EGFR 49.  Reviewed cardiology note 2024 stating the

## 2024-08-13 LAB
25(OH)D3 SERPL-MCNC: 63.9 NG/ML (ref 30–100)
CHOLEST SERPL-MCNC: 206 MG/DL (ref 0–199)
CHOLESTEROL/HDL RATIO: 5
EKG ATRIAL RATE: 62 BPM
EKG P AXIS: 24 DEGREES
EKG P-R INTERVAL: 140 MS
EKG Q-T INTERVAL: 458 MS
EKG QRS DURATION: 98 MS
EKG QTC CALCULATION (BAZETT): 464 MS
EKG R AXIS: 39 DEGREES
EKG T AXIS: 28 DEGREES
EKG VENTRICULAR RATE: 62 BPM
EST. AVERAGE GLUCOSE BLD GHB EST-MCNC: 108 MG/DL
HBA1C MFR BLD: 5.4 % (ref 4–6)
HDLC SERPL-MCNC: 41 MG/DL
LDLC SERPL CALC-MCNC: 117 MG/DL (ref 0–100)
T4 FREE SERPL-MCNC: 3.2 NG/DL (ref 0.92–1.68)
TRIGL SERPL-MCNC: 239 MG/DL
VIT B12 SERPL-MCNC: 327 PG/ML (ref 232–1245)
VLDLC SERPL CALC-MCNC: 48 MG/DL

## 2024-08-13 PROCEDURE — 93010 ELECTROCARDIOGRAM REPORT: CPT | Performed by: FAMILY MEDICINE

## 2024-08-13 NOTE — PROGRESS NOTES
Physician Progress Note      PATIENT:               AURELIANO GARCIA  [facility] #:                  [id number]  :                       1970  ADMIT DATE:       8/3/2024 12:05 AM  DISCH DATE:        2024 7:40 PM  RESPONDING  PROVIDER #:        Roberto Bernal DO          QUERY TEXT:    Patient admitted with GERI, noted to have atrial fibrillation and is maintained   on Eliquis. If possible, please document in progress notes and discharge   summary if you are evaluating and/or treating any of the following:?    ? The medical record reflects the following:  Risk Factors: Hypertension    Clinical Indicators: Internal Medicine PN  Past Medical History:  has a   past medical history of Anemia, unspecified, Anxiety, Atrial fibrillation,   (HCC), Celiac disease, Edema, H/O cardiovascular stress test  Care Coordination  Eliquis is a home med.    Treatment: Eliquis, Cardizem and metoprolol succinate    Thank You  Nilay Burgess [facility] CDS  Options provided:  -- Secondary hypercoagulable state in a patient with atrial fibrillation  -- Other - I will add my own diagnosis  -- Disagree - Not applicable / Not valid  -- Disagree - Clinically unable to determine / Unknown  -- Refer to Clinical Documentation Reviewer    PROVIDER RESPONSE TEXT:    This patient has secondary hypercoagulable state in a patient with atrial   fibrillation.    Query created by: Nilay Burgess on 2024 2:49 AM      QUERY TEXT:    Patient admitted with GERI, noted to have atrial fibrillation. If possible,   please document in progress notes and discharge summary further specificity   regarding the type of atrial fibrillation:    The medical record reflects the following:  Risk Factors: Hypertension    Clinical Indicators: Internal Medicine PN  Past Medical History:  has a   past medical history of Anemia, unspecified, Anxiety, Atrial fibrillation,   (HCC), Celiac disease, Edema, H/O cardiovascular stress test  Care Coordination

## 2024-08-14 ENCOUNTER — TELEPHONE (OUTPATIENT)
Dept: PRIMARY CARE CLINIC | Age: 54
End: 2024-08-14

## 2024-08-14 RX ORDER — LEVOTHYROXINE SODIUM 0.07 MG/1
75 TABLET ORAL DAILY
Qty: 90 TABLET | Refills: 3 | Status: ON HOLD | OUTPATIENT
Start: 2024-08-14

## 2024-08-14 NOTE — TELEPHONE ENCOUNTER
Please notify patient I did receive a call back from [facility] gastroenterology stating that Shayne Fitzgerald (provider) is going to start the patient on quad therapy for her H. pylori and retest in 8 weeks.    [facility] - please fax patient's recent OV notes (and hospital discharge note) to [facility] GI

## 2024-08-14 NOTE — TELEPHONE ENCOUNTER
Called and left  at [facility] GI for Shayne Cristina regarding patient's positive H. Pylori results from recent hospitalization EGD. Reviewed most recent labs showing improved renal fx. Referred to nephrology. Recommended GI take over treatment for H. Pylori. Awaiting call back from GI to confirm

## 2024-08-16 ENCOUNTER — OFFICE VISIT (OUTPATIENT)
Dept: NEPHROLOGY | Age: 54
End: 2024-08-16
Payer: COMMERCIAL

## 2024-08-16 VITALS
WEIGHT: 197.8 LBS | HEART RATE: 77 BPM | BODY MASS INDEX: 32.96 KG/M2 | SYSTOLIC BLOOD PRESSURE: 102 MMHG | DIASTOLIC BLOOD PRESSURE: 45 MMHG | HEIGHT: 65 IN | RESPIRATION RATE: 16 BRPM | TEMPERATURE: 97.8 F

## 2024-08-16 DIAGNOSIS — E87.0 HYPERNATREMIA: ICD-10-CM

## 2024-08-16 DIAGNOSIS — N17.9 AKI (ACUTE KIDNEY INJURY) (HCC): Primary | ICD-10-CM

## 2024-08-16 DIAGNOSIS — N18.31 CHRONIC KIDNEY DISEASE, STAGE 3A (HCC): ICD-10-CM

## 2024-08-16 DIAGNOSIS — E83.42 HYPOMAGNESEMIA: ICD-10-CM

## 2024-08-16 PROCEDURE — 99204 OFFICE O/P NEW MOD 45 MIN: CPT | Performed by: INTERNAL MEDICINE

## 2024-08-16 PROCEDURE — 3074F SYST BP LT 130 MM HG: CPT | Performed by: INTERNAL MEDICINE

## 2024-08-16 PROCEDURE — 3078F DIAST BP <80 MM HG: CPT | Performed by: INTERNAL MEDICINE

## 2024-08-16 NOTE — PATIENT INSTRUCTIONS
SURVEY:    Thank you for allowing us to care for you today.    You may be receiving a survey from [facility] regarding your visit today- electronically or via mail.      Please help us by completing the survey as this will provide the needed feedback to ensure we are providing the very best care for you and your family.    If you cannot score us a very good on any question, please call the office to discuss how we could have made your experience a very good one.    Thank you.       STAFF:    Patricia Flores, Donna Garcia, Rebeca Howard      CLINICAL STAFF:    Morena Taveras LPN, Chiquita Coley LPN, Norma Smyth LPN, Carol Diana CMA

## 2024-08-16 NOTE — PROGRESS NOTES
[facility] PHYSICIANS [facility], [facility] KIDNEY AND HYPERTENSION  [address] [address]  Dept: [phone]  Outpatient Consult Note  8/16/2024 10:34 AM        Pt Name:    Andressa Boone  YOB: 1970  Primary Care Physician:  Criss Montelongo, APRN - CNP     Chief Complaint:   Chief Complaint   Patient presents with    GERI     New patient GERI, hypomagnesemia        Background Information/HPI   The patient is a 54 y.o.  femalepatient who was sent in for evaluation of elevated creatinine.  Patient has hx celiac disease, HTN and atrial fibrillation and DM and takes metformin. Sees Dr. Little for atrial fibrillation. She is on ziac and cardizem. No Hx PPM or AICD. She has loop records. She reports she had this placed in 2021 when she had COVID and recurrent syncope. No hx smoking. No hx CVA or cancer. Denies any hx kidney stones.  No urinary complaints such as dysuria or hematuria. She takes naproxen 2 in the morning prn for generalized aches. She says she takes it 3-4 times per week.  She is adopted. She reports being diagnosed with lymphedema (by DR Johnson). She reports she was referred to lymphedema clinic. She has lymphedema pumps and wears stockings. 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 She was recently admitted with GERI. Creat went upto 7 and was given IVF and improved with fluids. She was admitted to a [facility].   She says she was having a lot of nausea, vomiting and diarrhea when she was admitted. Symptoms have improved since discharge    Allergies:  Rocephin [ceftriaxone] and Bee venom        Past Medical History:  Past Medical History:   Diagnosis Date    Anemia, unspecified     Anxiety     Atrial fibrillation (HCC)     Celiac disease     Edema     H/O cardiovascular stress test 03/08/2021    Cordova treadmill score is 0    H/O echocardiogram 01/26/2021    EF >60% Normal study    History of cardiac cath 04/01/2021

## 2024-08-18 ENCOUNTER — HOSPITAL ENCOUNTER (INPATIENT)
Age: 54
LOS: 2 days | Discharge: HOME | DRG: 641 | End: 2024-08-20
Attending: EMERGENCY MEDICINE | Admitting: STUDENT IN AN ORGANIZED HEALTH CARE EDUCATION/TRAINING PROGRAM
Payer: COMMERCIAL

## 2024-08-18 ENCOUNTER — APPOINTMENT (OUTPATIENT)
Dept: GENERAL RADIOLOGY | Age: 54
DRG: 641 | End: 2024-08-18
Payer: COMMERCIAL

## 2024-08-18 DIAGNOSIS — E87.8 ELECTROLYTE ABNORMALITY: ICD-10-CM

## 2024-08-18 DIAGNOSIS — I48.91 ATRIAL FIBRILLATION WITH RVR (HCC): Primary | ICD-10-CM

## 2024-08-18 LAB
ANION GAP SERPL CALCULATED.3IONS-SCNC: 13 MMOL/L (ref 9–17)
BASOPHILS # BLD: 0.06 K/UL (ref 0–0.2)
BASOPHILS NFR BLD: 1 % (ref 0–2)
BUN SERPL-MCNC: 20 MG/DL (ref 6–20)
BUN/CREAT SERPL: 20 (ref 9–20)
CALCIUM SERPL-MCNC: 9.7 MG/DL (ref 8.6–10.4)
CHLORIDE SERPL-SCNC: 96 MMOL/L (ref 98–107)
CO2 SERPL-SCNC: 32 MMOL/L (ref 20–31)
CREAT SERPL-MCNC: 1 MG/DL (ref 0.5–0.9)
EKG Q-T INTERVAL: 296 MS
EKG QRS DURATION: 88 MS
EKG QTC CALCULATION (BAZETT): 478 MS
EKG R AXIS: 32 DEGREES
EKG T AXIS: -126 DEGREES
EKG VENTRICULAR RATE: 157 BPM
EOSINOPHIL # BLD: 0.07 K/UL (ref 0–0.44)
EOSINOPHILS RELATIVE PERCENT: 1 % (ref 1–4)
ERYTHROCYTE [DISTWIDTH] IN BLOOD BY AUTOMATED COUNT: 12.3 % (ref 11.8–14.4)
GFR, ESTIMATED: 67 ML/MIN/1.73M2
GLUCOSE SERPL-MCNC: 95 MG/DL (ref 70–99)
HCT VFR BLD AUTO: 39 % (ref 36.3–47.1)
HGB BLD-MCNC: 13.8 G/DL (ref 11.9–15.1)
IMM GRANULOCYTES # BLD AUTO: <0.03 K/UL (ref 0–0.3)
IMM GRANULOCYTES NFR BLD: 0 %
LYMPHOCYTES NFR BLD: 2.07 K/UL (ref 1.1–3.7)
LYMPHOCYTES RELATIVE PERCENT: 36 % (ref 24–43)
MAGNESIUM SERPL-MCNC: 1.3 MG/DL (ref 1.6–2.6)
MCH RBC QN AUTO: 33.1 PG (ref 25.2–33.5)
MCHC RBC AUTO-ENTMCNC: 35.4 G/DL (ref 28.4–34.8)
MCV RBC AUTO: 93.5 FL (ref 82.6–102.9)
MONOCYTES NFR BLD: 0.4 K/UL (ref 0.1–1.2)
MONOCYTES NFR BLD: 7 % (ref 3–12)
NEUTROPHILS NFR BLD: 55 % (ref 36–65)
NEUTS SEG NFR BLD: 3.08 K/UL (ref 1.5–8.1)
NRBC BLD-RTO: 0 PER 100 WBC
PLATELET # BLD AUTO: 269 K/UL (ref 138–453)
PMV BLD AUTO: 10.2 FL (ref 8.1–13.5)
POTASSIUM SERPL-SCNC: 2.8 MMOL/L (ref 3.7–5.3)
RBC # BLD AUTO: 4.17 M/UL (ref 3.95–5.11)
SODIUM SERPL-SCNC: 141 MMOL/L (ref 135–144)
TROPONIN I SERPL HS-MCNC: 6 NG/L (ref 0–14)
WBC OTHER # BLD: 5.7 K/UL (ref 3.5–11.3)

## 2024-08-18 PROCEDURE — 94761 N-INVAS EAR/PLS OXIMETRY MLT: CPT

## 2024-08-18 PROCEDURE — 6370000000 HC RX 637 (ALT 250 FOR IP): Performed by: INTERNAL MEDICINE

## 2024-08-18 PROCEDURE — 1200000000 HC SEMI PRIVATE

## 2024-08-18 PROCEDURE — 6360000002 HC RX W HCPCS: Performed by: EMERGENCY MEDICINE

## 2024-08-18 PROCEDURE — 6360000002 HC RX W HCPCS: Performed by: INTERNAL MEDICINE

## 2024-08-18 PROCEDURE — 99285 EMERGENCY DEPT VISIT HI MDM: CPT

## 2024-08-18 PROCEDURE — 80048 BASIC METABOLIC PNL TOTAL CA: CPT

## 2024-08-18 PROCEDURE — 2580000003 HC RX 258: Performed by: INTERNAL MEDICINE

## 2024-08-18 PROCEDURE — 93010 ELECTROCARDIOGRAM REPORT: CPT | Performed by: FAMILY MEDICINE

## 2024-08-18 PROCEDURE — 83735 ASSAY OF MAGNESIUM: CPT

## 2024-08-18 PROCEDURE — 93005 ELECTROCARDIOGRAM TRACING: CPT | Performed by: EMERGENCY MEDICINE

## 2024-08-18 PROCEDURE — 71045 X-RAY EXAM CHEST 1 VIEW: CPT

## 2024-08-18 PROCEDURE — 85025 COMPLETE CBC W/AUTO DIFF WBC: CPT

## 2024-08-18 PROCEDURE — 84484 ASSAY OF TROPONIN QUANT: CPT

## 2024-08-18 PROCEDURE — 6370000000 HC RX 637 (ALT 250 FOR IP): Performed by: EMERGENCY MEDICINE

## 2024-08-18 PROCEDURE — 96365 THER/PROPH/DIAG IV INF INIT: CPT

## 2024-08-18 RX ORDER — DILTIAZEM HYDROCHLORIDE 5 MG/ML
20 INJECTION INTRAVENOUS ONCE
Status: DISCONTINUED | OUTPATIENT
Start: 2024-08-18 | End: 2024-08-20 | Stop reason: HOSPADM

## 2024-08-18 RX ORDER — NAPROXEN SODIUM 220 MG
440 TABLET ORAL EVERY MORNING
Status: DISCONTINUED | OUTPATIENT
Start: 2024-08-19 | End: 2024-08-19

## 2024-08-18 RX ORDER — POTASSIUM CHLORIDE 1500 MG/1
40 TABLET, EXTENDED RELEASE ORAL 3 TIMES DAILY
Status: DISCONTINUED | OUTPATIENT
Start: 2024-08-18 | End: 2024-08-20

## 2024-08-18 RX ORDER — POTASSIUM CHLORIDE 7.45 MG/ML
10 INJECTION INTRAVENOUS ONCE
Status: COMPLETED | OUTPATIENT
Start: 2024-08-18 | End: 2024-08-18

## 2024-08-18 RX ORDER — POTASSIUM CHLORIDE 7.45 MG/ML
10 INJECTION INTRAVENOUS PRN
Status: DISCONTINUED | OUTPATIENT
Start: 2024-08-18 | End: 2024-08-20 | Stop reason: HOSPADM

## 2024-08-18 RX ORDER — LANOLIN ALCOHOL/MO/W.PET/CERES
400 CREAM (GRAM) TOPICAL DAILY
Status: DISCONTINUED | OUTPATIENT
Start: 2024-08-19 | End: 2024-08-20 | Stop reason: HOSPADM

## 2024-08-18 RX ORDER — SODIUM CHLORIDE 0.9 % (FLUSH) 0.9 %
5-40 SYRINGE (ML) INJECTION EVERY 12 HOURS SCHEDULED
Status: DISCONTINUED | OUTPATIENT
Start: 2024-08-18 | End: 2024-08-20 | Stop reason: HOSPADM

## 2024-08-18 RX ORDER — ONDANSETRON 4 MG/1
4 TABLET, ORALLY DISINTEGRATING ORAL EVERY 8 HOURS PRN
Status: DISCONTINUED | OUTPATIENT
Start: 2024-08-18 | End: 2024-08-20 | Stop reason: HOSPADM

## 2024-08-18 RX ORDER — MAGNESIUM SULFATE IN WATER 40 MG/ML
2000 INJECTION, SOLUTION INTRAVENOUS PRN
Status: DISCONTINUED | OUTPATIENT
Start: 2024-08-18 | End: 2024-08-20 | Stop reason: HOSPADM

## 2024-08-18 RX ORDER — PROGESTERONE 100 MG/1
100 CAPSULE ORAL NIGHTLY
Status: DISCONTINUED | OUTPATIENT
Start: 2024-08-18 | End: 2024-08-20 | Stop reason: HOSPADM

## 2024-08-18 RX ORDER — POLYETHYLENE GLYCOL 3350 17 G/17G
17 POWDER, FOR SOLUTION ORAL DAILY PRN
Status: DISCONTINUED | OUTPATIENT
Start: 2024-08-18 | End: 2024-08-20 | Stop reason: HOSPADM

## 2024-08-18 RX ORDER — SODIUM CHLORIDE 9 MG/ML
INJECTION, SOLUTION INTRAVENOUS PRN
Status: DISCONTINUED | OUTPATIENT
Start: 2024-08-18 | End: 2024-08-20 | Stop reason: HOSPADM

## 2024-08-18 RX ORDER — ACETAMINOPHEN 325 MG/1
650 TABLET ORAL EVERY 6 HOURS PRN
Status: DISCONTINUED | OUTPATIENT
Start: 2024-08-18 | End: 2024-08-20 | Stop reason: HOSPADM

## 2024-08-18 RX ORDER — SODIUM CHLORIDE AND POTASSIUM CHLORIDE 300; 900 MG/100ML; MG/100ML
INJECTION, SOLUTION INTRAVENOUS CONTINUOUS
Status: DISCONTINUED | OUTPATIENT
Start: 2024-08-18 | End: 2024-08-19

## 2024-08-18 RX ORDER — POTASSIUM CHLORIDE 1500 MG/1
40 TABLET, EXTENDED RELEASE ORAL PRN
Status: DISCONTINUED | OUTPATIENT
Start: 2024-08-18 | End: 2024-08-20 | Stop reason: HOSPADM

## 2024-08-18 RX ORDER — MAGNESIUM SULFATE IN WATER 40 MG/ML
2000 INJECTION, SOLUTION INTRAVENOUS ONCE
Status: COMPLETED | OUTPATIENT
Start: 2024-08-18 | End: 2024-08-18

## 2024-08-18 RX ORDER — LEVOTHYROXINE SODIUM 75 UG/1
75 TABLET ORAL DAILY
Status: DISCONTINUED | OUTPATIENT
Start: 2024-08-19 | End: 2024-08-20 | Stop reason: HOSPADM

## 2024-08-18 RX ORDER — ACETAMINOPHEN 650 MG/1
650 SUPPOSITORY RECTAL EVERY 6 HOURS PRN
Status: DISCONTINUED | OUTPATIENT
Start: 2024-08-18 | End: 2024-08-20 | Stop reason: HOSPADM

## 2024-08-18 RX ORDER — TRAZODONE HYDROCHLORIDE 50 MG/1
150 TABLET, FILM COATED ORAL NIGHTLY PRN
Status: DISCONTINUED | OUTPATIENT
Start: 2024-08-18 | End: 2024-08-20 | Stop reason: HOSPADM

## 2024-08-18 RX ORDER — SODIUM CHLORIDE 0.9 % (FLUSH) 0.9 %
10 SYRINGE (ML) INJECTION PRN
Status: DISCONTINUED | OUTPATIENT
Start: 2024-08-18 | End: 2024-08-20 | Stop reason: HOSPADM

## 2024-08-18 RX ORDER — METOPROLOL SUCCINATE 100 MG/1
100 TABLET, EXTENDED RELEASE ORAL DAILY
Status: DISCONTINUED | OUTPATIENT
Start: 2024-08-19 | End: 2024-08-20 | Stop reason: HOSPADM

## 2024-08-18 RX ORDER — ONDANSETRON 2 MG/ML
4 INJECTION INTRAMUSCULAR; INTRAVENOUS EVERY 6 HOURS PRN
Status: DISCONTINUED | OUTPATIENT
Start: 2024-08-18 | End: 2024-08-20 | Stop reason: HOSPADM

## 2024-08-18 RX ORDER — POTASSIUM CHLORIDE 1500 MG/1
40 TABLET, EXTENDED RELEASE ORAL ONCE
Status: COMPLETED | OUTPATIENT
Start: 2024-08-18 | End: 2024-08-18

## 2024-08-18 RX ORDER — PANTOPRAZOLE SODIUM 40 MG/1
40 TABLET, DELAYED RELEASE ORAL DAILY PRN
Status: DISCONTINUED | OUTPATIENT
Start: 2024-08-18 | End: 2024-08-20 | Stop reason: HOSPADM

## 2024-08-18 RX ADMIN — MAGNESIUM SULFATE HEPTAHYDRATE 2000 MG: 40 INJECTION, SOLUTION INTRAVENOUS at 19:20

## 2024-08-18 RX ADMIN — POTASSIUM CHLORIDE 40 MEQ: 1500 TABLET, EXTENDED RELEASE ORAL at 19:14

## 2024-08-18 RX ADMIN — POTASSIUM CHLORIDE 40 MEQ: 1500 TABLET, EXTENDED RELEASE ORAL at 21:37

## 2024-08-18 RX ADMIN — POTASSIUM CHLORIDE AND SODIUM CHLORIDE: 900; 300 INJECTION, SOLUTION INTRAVENOUS at 21:53

## 2024-08-18 RX ADMIN — POTASSIUM CHLORIDE 10 MEQ: 7.46 INJECTION, SOLUTION INTRAVENOUS at 20:36

## 2024-08-18 RX ADMIN — APIXABAN 5 MG: 5 TABLET, FILM COATED ORAL at 21:38

## 2024-08-18 RX ADMIN — SODIUM CHLORIDE, PRESERVATIVE FREE 10 ML: 5 INJECTION INTRAVENOUS at 21:38

## 2024-08-18 ASSESSMENT — LIFESTYLE VARIABLES
HOW OFTEN DO YOU HAVE A DRINK CONTAINING ALCOHOL: NEVER
HOW MANY STANDARD DRINKS CONTAINING ALCOHOL DO YOU HAVE ON A TYPICAL DAY: PATIENT DOES NOT DRINK
HOW OFTEN DO YOU HAVE A DRINK CONTAINING ALCOHOL: NEVER
HOW MANY STANDARD DRINKS CONTAINING ALCOHOL DO YOU HAVE ON A TYPICAL DAY: PATIENT DOES NOT DRINK

## 2024-08-18 ASSESSMENT — PAIN - FUNCTIONAL ASSESSMENT: PAIN_FUNCTIONAL_ASSESSMENT: 0-10

## 2024-08-18 ASSESSMENT — PAIN DESCRIPTION - LOCATION: LOCATION: CHEST

## 2024-08-18 ASSESSMENT — PAIN SCALES - GENERAL: PAINLEVEL_OUTOF10: 4

## 2024-08-18 NOTE — ED PROVIDER NOTES
[facility]  EMERGENCY DEPARTMENT ENCOUNTER      Pt Name: Andressa Boone  MRN: [id number]  Birthdate 1970  Date of evaluation: 8/18/2024  Provider: Rhoda Lewis MD    CHIEF COMPLAINT       Chief Complaint   Patient presents with    Chest Pain     Mid sternal chest pain onset 1 hour PTA, describes as \"discomfort\" with shortness of breath, fatigue, palpitations and bilateral hand tingling. Pt states history of afib, currently with holter monitor in place. Pt states was recently discharged last week from [facility] last week for GERI         HISTORY OF PRESENT ILLNESS   (Location/Symptom, Timing/Onset, Context/Setting, Quality, Duration, Modifying Factors, Severity)  Note limiting factors.   Andressa Boone is a 54 y.o. female who presents to the emergency department      54 -year-old female present emergency department stating she is experiencing midsternal chest discomfort.  States she is currently wearing a cardiac monitor due to irregular rhythms on a recent hospitalization for kidney issues.  Patient is resting comfortably at this time.  She was initially noted to be in atrial fibrillation with a heart rate up to the 160s.  Prior to having Cardizem administered patient spontaneously converted to sinus rhythm.  She is currently resting comfortably and is denying any acute concerns        Nursing Notes were reviewed.    REVIEW OF SYSTEMS    (2-9 systems for level 4, 10 or more for level 5)     Review of Systems   All other systems reviewed and are negative.      Except as noted above the remainder of the review of systems was reviewed and negative.       PAST MEDICAL HISTORY     Past Medical History:   Diagnosis Date    Anemia, unspecified     Anxiety     Atrial fibrillation (HCC)     Celiac disease     Edema     H/O cardiovascular stress test 03/08/2021    Cordova treadmill score is 0    H/O echocardiogram 01/26/2021    EF >60% Normal study    History of cardiac cath 04/01/2021    [facility] Nia/  Bruhl/Right Radial     History of cardiovascular stress test 03/22/2021    Equivocal study. Small perfusion defect of mild intensity in the anterior and anteroseptal regions during stress and rest imaging, which is most consistent with artifact EF 71% Duke Treadmill score is -2intermediate risk.    History of normal Holter exam 02/22/2021    CAM 6 days 19 hrs Normal sinus. AT 2 episodes Longest/fastest 9 beats at ave 127 bpm p to 153 bpm PAC 0.08% PVC 0.07%    Hypertension     Hypothyroidism     Thyroid disease     Tilt table evaluation 03/22/2021    Negative study Although sensitivity of study certainly is not 100% relatively normal results signifcantly decrease the likelihood of neurocardiogenic source for pt symptoms. Clinically suspision remains high possible repeat study maybe indicated         SURGICAL HISTORY       Past Surgical History:   Procedure Laterality Date    ANTERIOR CRUCIATE LIGAMENT REPAIR Left     HYSTERECTOMY, VAGINAL      07/2020 robotic, lap supracervical hyst and BS, perineopexy, A&P repair, cysto, Lynx retropubic slin    INFECTED SKIN DEBRIDEMENT Left     Left third finger    INSERTABLE CARDIAC MONITOR  05/20/2021    OTHER SURGICAL HISTORY  01/2024    Uterus issues-biopy taken    UPPER GASTROINTESTINAL ENDOSCOPY N/A 8/6/2024    ESOPHAGOGASTRODUODENOSCOPY BIOPSY performed by Wilian Vernon MD at [facility] OR         CURRENT MEDICATIONS       Discharge Medication List as of 8/20/2024 12:44 PM        CONTINUE these medications which have NOT CHANGED    Details   metFORMIN (GLUCOPHAGE) 1000 MG tablet Take 1 tablet by mouth daily (with breakfast), Disp-180 tablet, R-3Please renew this medication by 03/19/2024 to prevent a break in patient therapy.Adjust Sig      levothyroxine (SYNTHROID) 75 MCG tablet Take 1 tablet by mouth daily, Disp-90 tablet, R-3Normal      magnesium oxide (MAG-OX) 400 MG tablet Take 1 tablet by mouth daily, Disp-30 tablet, R-0Normal      naproxen sodium (ANAPROX) 220 MG  Charge filed ***    FINAL IMPRESSION    No diagnosis found.      DISPOSITION/PLAN   DISPOSITION Decision To Admit 08/18/2024 07:09:55 PM      PATIENT REFERRED TO:  No follow-up provider specified.    DISCHARGE MEDICATIONS:  New Prescriptions    No medications on file     Controlled Substances Monitoring:     RX Monitoring Periodic Controlled Substance Monitoring   11/26/2021   8:41 AM No signs of potential drug abuse or diversion identified.       (Please note that portions of this note were completed with a voice recognition program.  Efforts were made to edit the dictations but occasionally words are mis-transcribed.)    Rhoda Lewis MD (electronically signed)  Attending Emergency Physician

## 2024-08-18 NOTE — ED TRIAGE NOTES
Pt had holter monitor placed on 8/12 for 7 days.Pt states also has loop recorder in place. Pt currently on Cardizem and eliquis.

## 2024-08-19 DIAGNOSIS — R07.9 CHEST PAIN AT REST: Primary | ICD-10-CM

## 2024-08-19 PROBLEM — E87.6 DRUG-INDUCED HYPOKALEMIA: Status: ACTIVE | Noted: 2024-08-19

## 2024-08-19 PROBLEM — I48.0 PAROXYSMAL ATRIAL FIBRILLATION (HCC): Status: ACTIVE | Noted: 2024-08-19

## 2024-08-19 PROBLEM — E44.0 MODERATE MALNUTRITION (HCC): Status: ACTIVE | Noted: 2024-08-19

## 2024-08-19 PROBLEM — D68.69 SECONDARY HYPERCOAGULABLE STATE (HCC): Status: ACTIVE | Noted: 2024-08-19

## 2024-08-19 PROBLEM — T50.905A MEDICATION SIDE EFFECT, INITIAL ENCOUNTER: Status: ACTIVE | Noted: 2024-08-19

## 2024-08-19 PROBLEM — I48.91 ATRIAL FIBRILLATION WITH RVR (HCC): Status: ACTIVE | Noted: 2024-08-19

## 2024-08-19 PROBLEM — T50.905A DRUG-INDUCED HYPOKALEMIA: Status: ACTIVE | Noted: 2024-08-19

## 2024-08-19 LAB
ALBUMIN SERPL-MCNC: 3.3 G/DL (ref 3.5–5.2)
ALBUMIN/GLOB SERPL: 1.5 {RATIO} (ref 1–2.5)
ALP SERPL-CCNC: 41 U/L (ref 35–104)
ALT SERPL-CCNC: 26 U/L (ref 5–33)
ANION GAP SERPL CALCULATED.3IONS-SCNC: 5 MMOL/L (ref 9–17)
AST SERPL-CCNC: 24 U/L
BASOPHILS # BLD: 0.06 K/UL (ref 0–0.2)
BASOPHILS NFR BLD: 1 % (ref 0–2)
BILIRUB SERPL-MCNC: 0.6 MG/DL (ref 0.3–1.2)
BUN SERPL-MCNC: 15 MG/DL (ref 6–20)
BUN/CREAT SERPL: 17 (ref 9–20)
CALCIUM SERPL-MCNC: 8.9 MG/DL (ref 8.6–10.4)
CHLORIDE SERPL-SCNC: 105 MMOL/L (ref 98–107)
CO2 SERPL-SCNC: 32 MMOL/L (ref 20–31)
CREAT SERPL-MCNC: 0.9 MG/DL (ref 0.5–0.9)
EKG ATRIAL RATE: 83 BPM
EKG P AXIS: 48 DEGREES
EKG P-R INTERVAL: 154 MS
EKG Q-T INTERVAL: 388 MS
EKG QRS DURATION: 100 MS
EKG QTC CALCULATION (BAZETT): 455 MS
EKG R AXIS: 33 DEGREES
EKG T AXIS: 26 DEGREES
EKG VENTRICULAR RATE: 83 BPM
EOSINOPHIL # BLD: 0.08 K/UL (ref 0–0.44)
EOSINOPHILS RELATIVE PERCENT: 2 % (ref 1–4)
ERYTHROCYTE [DISTWIDTH] IN BLOOD BY AUTOMATED COUNT: 12.5 % (ref 11.8–14.4)
GFR, ESTIMATED: 76 ML/MIN/1.73M2
GLUCOSE SERPL-MCNC: 98 MG/DL (ref 70–99)
HCT VFR BLD AUTO: 34 % (ref 36.3–47.1)
HGB BLD-MCNC: 11.8 G/DL (ref 11.9–15.1)
IMM GRANULOCYTES # BLD AUTO: <0.03 K/UL (ref 0–0.3)
IMM GRANULOCYTES NFR BLD: 0 %
LYMPHOCYTES NFR BLD: 1.79 K/UL (ref 1.1–3.7)
LYMPHOCYTES RELATIVE PERCENT: 38 % (ref 24–43)
MCH RBC QN AUTO: 33.1 PG (ref 25.2–33.5)
MCHC RBC AUTO-ENTMCNC: 34.7 G/DL (ref 28.4–34.8)
MCV RBC AUTO: 95.5 FL (ref 82.6–102.9)
MONOCYTES NFR BLD: 0.35 K/UL (ref 0.1–1.2)
MONOCYTES NFR BLD: 7 % (ref 3–12)
NEUTROPHILS NFR BLD: 52 % (ref 36–65)
NEUTS SEG NFR BLD: 2.45 K/UL (ref 1.5–8.1)
NRBC BLD-RTO: 0 PER 100 WBC
PLATELET # BLD AUTO: 223 K/UL (ref 138–453)
PMV BLD AUTO: 10.2 FL (ref 8.1–13.5)
POTASSIUM SERPL-SCNC: 4 MMOL/L (ref 3.7–5.3)
POTASSIUM SERPL-SCNC: 4.8 MMOL/L (ref 3.7–5.3)
PROT SERPL-MCNC: 5.5 G/DL (ref 6.4–8.3)
RBC # BLD AUTO: 3.56 M/UL (ref 3.95–5.11)
SODIUM SERPL-SCNC: 142 MMOL/L (ref 135–144)
WBC OTHER # BLD: 4.7 K/UL (ref 3.5–11.3)

## 2024-08-19 PROCEDURE — 99222 1ST HOSP IP/OBS MODERATE 55: CPT | Performed by: FAMILY MEDICINE

## 2024-08-19 PROCEDURE — 36415 COLL VENOUS BLD VENIPUNCTURE: CPT

## 2024-08-19 PROCEDURE — 1200000000 HC SEMI PRIVATE

## 2024-08-19 PROCEDURE — 2580000003 HC RX 258: Performed by: INTERNAL MEDICINE

## 2024-08-19 PROCEDURE — 80053 COMPREHEN METABOLIC PANEL: CPT

## 2024-08-19 PROCEDURE — 94761 N-INVAS EAR/PLS OXIMETRY MLT: CPT

## 2024-08-19 PROCEDURE — 6360000002 HC RX W HCPCS: Performed by: INTERNAL MEDICINE

## 2024-08-19 PROCEDURE — 93010 ELECTROCARDIOGRAM REPORT: CPT | Performed by: INTERNAL MEDICINE

## 2024-08-19 PROCEDURE — 85025 COMPLETE CBC W/AUTO DIFF WBC: CPT

## 2024-08-19 PROCEDURE — 6370000000 HC RX 637 (ALT 250 FOR IP): Performed by: INTERNAL MEDICINE

## 2024-08-19 PROCEDURE — 6370000000 HC RX 637 (ALT 250 FOR IP)

## 2024-08-19 PROCEDURE — 84132 ASSAY OF SERUM POTASSIUM: CPT

## 2024-08-19 RX ORDER — LOPERAMIDE HCL 2 MG
2 CAPSULE ORAL 4 TIMES DAILY PRN
Status: DISCONTINUED | OUTPATIENT
Start: 2024-08-19 | End: 2024-08-20 | Stop reason: HOSPADM

## 2024-08-19 RX ORDER — NAPROXEN 250 MG/1
500 TABLET ORAL DAILY PRN
Status: DISCONTINUED | OUTPATIENT
Start: 2024-08-19 | End: 2024-08-20 | Stop reason: HOSPADM

## 2024-08-19 RX ORDER — NAPROXEN SODIUM 220 MG
440 TABLET ORAL DAILY PRN
Status: DISCONTINUED | OUTPATIENT
Start: 2024-08-19 | End: 2024-08-19

## 2024-08-19 RX ADMIN — Medication 400 MG: at 08:01

## 2024-08-19 RX ADMIN — APIXABAN 5 MG: 5 TABLET, FILM COATED ORAL at 08:01

## 2024-08-19 RX ADMIN — APIXABAN 5 MG: 5 TABLET, FILM COATED ORAL at 21:17

## 2024-08-19 RX ADMIN — POTASSIUM CHLORIDE AND SODIUM CHLORIDE: 900; 300 INJECTION, SOLUTION INTRAVENOUS at 10:12

## 2024-08-19 RX ADMIN — LEVOTHYROXINE SODIUM 75 MCG: 75 TABLET ORAL at 08:00

## 2024-08-19 RX ADMIN — POTASSIUM CHLORIDE 40 MEQ: 1500 TABLET, EXTENDED RELEASE ORAL at 08:01

## 2024-08-19 RX ADMIN — DILTIAZEM HYDROCHLORIDE 300 MG: 180 CAPSULE, COATED, EXTENDED RELEASE ORAL at 08:01

## 2024-08-19 RX ADMIN — METOPROLOL SUCCINATE 100 MG: 100 TABLET, EXTENDED RELEASE ORAL at 08:01

## 2024-08-19 RX ADMIN — LOPERAMIDE HYDROCHLORIDE 2 MG: 2 CAPSULE ORAL at 01:00

## 2024-08-19 RX ADMIN — METFORMIN HYDROCHLORIDE 1000 MG: 500 TABLET ORAL at 08:01

## 2024-08-19 RX ADMIN — POTASSIUM CHLORIDE 40 MEQ: 1500 TABLET, EXTENDED RELEASE ORAL at 21:17

## 2024-08-19 RX ADMIN — SODIUM CHLORIDE, PRESERVATIVE FREE 10 ML: 5 INJECTION INTRAVENOUS at 21:17

## 2024-08-19 RX ADMIN — POTASSIUM CHLORIDE 40 MEQ: 1500 TABLET, EXTENDED RELEASE ORAL at 13:44

## 2024-08-19 ASSESSMENT — PAIN - FUNCTIONAL ASSESSMENT: PAIN_FUNCTIONAL_ASSESSMENT: NONE - DENIES PAIN

## 2024-08-19 NOTE — CONSULTS
I, Yarely Wing am scribing for and in the presence of Reji Little MD, MS, F.A.C.C..    [facility]: Andressa Boone  [facility] 1970[facility] Phys[facility]: Criss Montelongo  Today's Date: 2024    REASON FOR VISIT: Chest Pain (Mid sternal chest pain onset 1 hour PTA, describes as \"discomfort\" with shortness of breath, fatigue, palpitations and bilateral hand tingling. Pt states history of afib, currently with holter monitor in place. Pt states was recently discharged last week from [facility] last week for GERI)    HPI: Ms. Boone is a 54 y.o. female who as been admitted to the hospital in the past with lightheaded and dizziness. She denied any previous heart related history. She is adopted so she was unaware of any family history. She does drink 1-2 cups off coffee daily. Her echocardiogram on 2021 was fairly unremarkable and showed an ejection fraction of >60%.  Her CAM monitor was done on 2021 she wore 6 days, 19 hours showed one epos ode of atrial tachycardia with symptoms. She had treadmill stress test done on 3/8/2021 her  Duke treadmill score is 0 which is intermediate risk. Ms. Boone recently had Negative Tilt Table test on 3/22/21 and an Equivocal stress test. Ms. Boone heart catheterization on 21 was largely normal. LINQ report from 2021: 1 symptom recorded however no abnormal heart rhythms noted. Battery life is good. Cardiac event monitor worn on 2021 showed frequent premature ventricular contractions 3.2% of the beats, 5 symptoms were reported and associated with the PVCs. Loop recorder showed several episodes, she had one episode of atrial fibrillation in  and 2023 lasting 1 hour and between 2-3 hours. Sleep stuyd 2023 showed no sleep apnea.     Ms. Boone was admitted yesterday due to an electrolyte abnormality, primarily hypokalemia. She had come to the ER because she was having chest discomfort, shortness of breath and palpitations. She also  allowing me to participate in this patients care. Please do not hesitate to contact me if I could be of any further assistance.      Sincerely,  Reji Little MD, MS, [facility] Cardiology Specialist    [address] [place] [id number]  Phone: [phone], Fax: [phone]     I believe that the risk of significant morbidity and mortality related to the patient's current medical conditions are: Intermediate.      The documentation recorded by the scribe, accurately and completely reflects the services I personally performed and the decisions made by me. Reji Little MD, MS, F.A.C.C. August 19, 2024

## 2024-08-19 NOTE — PROGRESS NOTES
[address] , [place], [place], [address]    Progress Note    Date:   8/19/2024  Patient name:  Andressa Boone  Date of admission:  8/18/2024  6:17 PM  MRN:   [id number]  YOB: 1970    SUBJECTIVE/Last 24 hours update:     Patient seen and examined at the bed side , no new acute events overnight and no new complains noted. VSS, afebrile. She feels about the same. Notes from nursing staff and Consults had been reviewed, and the overnight progress had been checked with the nursing staff as well.    REVIEW OF SYSTEMS:      CONSTITUTIONAL:  no fevers, no headcahes  EYES: negative for blury vision  HEENT: No headaches, No nasal congestion, no difficulty swallowing  RESPIRATORY:negative for dyspnea, no wheezing, no Cough  CARDIOVASCULAR: negative for chest pain, no palpitations  GASTROINTESTINAL: no nausea, no vomiting, no change in bowel habits, no abdominal pain   GENITOURINARY: negative for dysuria, no hematuria   MUSCULOSKELETAL: no joint pains, no muscle aches, no swelling of joints or extremities  NEUROLOGICAL: No  Weakness or numbness      PAST MEDICAL HISTORY:      has a past medical history of Anemia, unspecified, Anxiety, Atrial fibrillation (HCC), Celiac disease, Edema, H/O cardiovascular stress test, H/O echocardiogram, History of cardiac cath, History of cardiovascular stress test, History of normal Holter exam, Hypertension, Hypothyroidism, Thyroid disease, and Tilt table evaluation.    PAST SURGICAL HISTORY:      has a past surgical history that includes Anterior cruciate ligament repair (Left); Infected skin debridement (Left); Hysterectomy, vaginal; Insertable Cardiac Monitor (05/20/2021); other surgical history (01/2024); and Upper gastrointestinal endoscopy (N/A, 8/6/2024).       SOCIAL HISTORY:      reports that she has never smoked. She has never used smokeless tobacco. She reports current alcohol use. She reports that she does not use drugs.    TOBACCO:    (H) 28.4 - 34.8 g/dL    RDW 12.3 11.8 - 14.4 %    Platelets 269 138 - 453 k/uL    MPV 10.2 8.1 - 13.5 fL    NRBC Automated 0.0 0.0 per 100 WBC    Neutrophils % 55 36 - 65 %    Lymphocytes % 36 24 - 43 %    Monocytes % 7 3 - 12 %    Eosinophils % 1 1 - 4 %    Basophils % 1 0 - 2 %    Immature Granulocytes % 0 0 %    Neutrophils Absolute 3.08 1.50 - 8.10 k/uL    Lymphocytes Absolute 2.07 1.10 - 3.70 k/uL    Monocytes Absolute 0.40 0.10 - 1.20 k/uL    Eosinophils Absolute 0.07 0.00 - 0.44 k/uL    Basophils Absolute 0.06 0.00 - 0.20 k/uL    Immature Granulocytes Absolute <0.03 0.00 - 0.30 k/uL   Troponin    Collection Time: 08/18/24  6:15 PM   Result Value Ref Range    Troponin, High Sensitivity 6 0 - 14 ng/L   Magnesium    Collection Time: 08/18/24  6:15 PM   Result Value Ref Range    Magnesium 1.3 (LL) 1.6 - 2.6 mg/dL   EKG 12 Lead    Collection Time: 08/18/24  6:39 PM   Result Value Ref Range    Ventricular Rate 83 BPM    Atrial Rate 83 BPM    P-R Interval 154 ms    QRS Duration 100 ms    Q-T Interval 388 ms    QTc Calculation (Bazett) 455 ms    P Axis 48 degrees    R Axis 33 degrees    T Axis 26 degrees   CBC auto differential    Collection Time: 08/19/24  6:05 AM   Result Value Ref Range    WBC 4.7 3.5 - 11.3 k/uL    RBC 3.56 (L) 3.95 - 5.11 m/uL    Hemoglobin 11.8 (L) 11.9 - 15.1 g/dL    Hematocrit 34.0 (L) 36.3 - 47.1 %    MCV 95.5 82.6 - 102.9 fL    MCH 33.1 25.2 - 33.5 pg    MCHC 34.7 28.4 - 34.8 g/dL    RDW 12.5 11.8 - 14.4 %    Platelets 223 138 - 453 k/uL    MPV 10.2 8.1 - 13.5 fL    NRBC Automated 0.0 0.0 per 100 WBC    Neutrophils % 52 36 - 65 %    Lymphocytes % 38 24 - 43 %    Monocytes % 7 3 - 12 %    Eosinophils % 2 1 - 4 %    Basophils % 1 0 - 2 %    Immature Granulocytes % 0 0 %    Neutrophils Absolute 2.45 1.50 - 8.10 k/uL    Lymphocytes Absolute 1.79 1.10 - 3.70 k/uL    Monocytes Absolute 0.35 0.10 - 1.20 k/uL    Eosinophils Absolute 0.08 0.00 - 0.44 k/uL    Basophils Absolute 0.06 0.00 - 0.20 k/uL

## 2024-08-19 NOTE — PROGRESS NOTES
Patient arrived to the floor via bed at 2045. Transfer from bed to bed was with stand and pivot. Patient has no complaints of lightheadedness at this time. Patient oriented to the room and uses call light appropriately. Vital signs are within normal limits and patient denies pain at this time.     0055: Patient has complaints of loose stools. Requested Imodium.

## 2024-08-19 NOTE — H&P
History and Physical    Patient:  Andressa Boone  MRN: [id number]    Chief Complaint: Chest pain    History Obtained From:  patient, electronic medical record    PCP: Criss Montelongo, APRN - CNP    History of Present Illness:   The patient is a 54 y.o. female who presents with chest discomfort, palpitations, shortness of breath dizziness, and tingling of bilateral hands.  Patient has a history of atrial fibrillation and states that she has been going into it more frequently recently.  EKG on arrival today showed atrial fibrillation with RVR with a rate of 157.  Patient converted to normal sinus rhythm on her own without medication.  Patient is currently on Eliquis.  She is also on a Holter monitor.  She states she is been having issues with her heart rate dropped in the 30s.  Patient was discharged from [facility] last week following admission for an GERI.  Patient had initially presented for vomiting and was found to have a creatinine of 7.1 at that time.  Over the course of her hospitalization she was given IV fluids and her creatinine normalized to 1.0.  She also underwent an EGD and was found to have H. pylori.  She is awaiting correction of her renal function prior to starting medications for this.  Other past medical history includes celiac disease, thyroid disease, hypertension, and anemia.  Potassium 2.8, chloride 96, CO2 32, creatinine 1.0, magnesium 1.3, troponin 6.  Patient was given IV magnesium and oral and IV potassium in the emergency department.        Past Medical History:        Diagnosis Date    Anemia, unspecified     Anxiety     Atrial fibrillation (HCC)     Celiac disease     Edema     H/O cardiovascular stress test 03/08/2021    Cordova treadmill score is 0    H/O echocardiogram 01/26/2021    EF >60% Normal study    History of cardiac cath 04/01/2021    [facility] Nia/Dr. Little/Right Radial     History of cardiovascular stress test 03/22/2021    Equivocal study. Small perfusion  defect of mild intensity in the anterior and anteroseptal regions during stress and rest imaging, which is most consistent with artifact EF 71% Duke Treadmill score is -2intermediate risk.    History of normal Holter exam 02/22/2021    CAM 6 days 19 hrs Normal sinus. AT 2 episodes Longest/fastest 9 beats at ave 127 bpm p to 153 bpm PAC 0.08% PVC 0.07%    Hypertension     Hypothyroidism     Thyroid disease     Tilt table evaluation 03/22/2021    Negative study Although sensitivity of study certainly is not 100% relatively normal results signifcantly decrease the likelihood of neurocardiogenic source for pt symptoms. Clinically suspision remains high possible repeat study maybe indicated       Past Surgical History:        Procedure Laterality Date    ANTERIOR CRUCIATE LIGAMENT REPAIR Left     HYSTERECTOMY, VAGINAL      07/2020 robotic, lap supracervical hyst and BS, perineopexy, A&P repair, cysto, Lynx retropubic slin    INFECTED SKIN DEBRIDEMENT Left     Left third finger    INSERTABLE CARDIAC MONITOR  05/20/2021    OTHER SURGICAL HISTORY  01/2024    Uterus issues-biopy taken    UPPER GASTROINTESTINAL ENDOSCOPY N/A 8/6/2024    ESOPHAGOGASTRODUODENOSCOPY BIOPSY performed by Wilian Vernon MD at [facility] OR       Medications Prior to Admission:    Prior to Admission medications    Medication Sig Start Date End Date Taking? Authorizing Provider   metFORMIN (GLUCOPHAGE) 1000 MG tablet Take 1 tablet by mouth daily (with breakfast) 8/14/24  Yes Criss Montelongo APRN - CNP   levothyroxine (SYNTHROID) 75 MCG tablet Take 1 tablet by mouth daily 8/14/24  Yes Criss Montelongo APRN - CNP   magnesium oxide (MAG-OX) 400 MG tablet Take 1 tablet by mouth daily 8/6/24  Yes Roberto Bernal DO   bisoprolol-hydroCHLOROthiazide (ZIAC) 10-6.25 MG per tablet Take 1 tablet by mouth daily   Yes Mitzy Ann MD   naproxen sodium (ANAPROX) 220 MG tablet Take 2 tablets by mouth every morning   Yes Mitzy Ann MD  consult  Imaging: no further imaging studies ordered today  Medications:   Continue magnesium  IV fluids with potassium  Oral potassium  Medication Monitoring / High Risk Medications: none      Paroxysmal atrial fibrillation  Condition is a chronic stable condition  Treatment plan:   Cardiology consult  Telemetry monitoring  Imaging: no further imaging studies ordered today  Medications:   Continue Eliquis  Continue metoprolol  Continue diltiazem    Nutrition status:   mild malnutrition  Dietician consult initiated    Hospital Prophylaxis:   DVT: Eliquis   Stress Ulcer: Not indicated at this time    MDM Data:   Test interpretation:  My independent EKG interpretation: atrial fibrillation, rate 157  My independent X-ray interpretation:  CXR shows no acute process  Management and/or test interpretation discussed with ER MD at time of admission  Consults and Nursing notes were personally reviewed, all current labs and imaging were personally reviewed, tests ordered: CBC, BMP, and history obtained by independent historian       Disposition:  Shared decision making: All test results, treatment options and disposition options were discussed with the patient today  Social determinants of health that may impact management: none  Code status: Full Code   Disposition: Discharge plan is home        Critical Care Time:  Total critical care time caring for this patient with life threatening, unstable organ failure, including direct patient contact, management of life support systems, review of data including imaging and labs, discussions with other team members and physicians at least 0 minutes so far today, excluding separately billable procedures.      [facility] Medication Reconciliation documentation:    [x] I have utilized all available immediate resources to obtain, update, or review the patient's current medications (including all prescriptions, over-the-counter products, herbals, cannabinoid products and

## 2024-08-19 NOTE — PROGRESS NOTES
Comprehensive Nutrition Assessment    Type and Reason for Visit:  Initial, Positive Nutrition Screen (MST 2)    Nutrition Recommendations/Plan:   Diet modified to include gluten free due to celiac disease.   D/c ensure enlive due to dislike.   Start ensure clear berry TID with meals.      Malnutrition Assessment:  Malnutrition Status:  Moderate malnutrition (08/19/24 1029)    Context:  Acute Illness     Findings of the 6 clinical characteristics of malnutrition:  Energy Intake:  50% or less of estimated energy requirements for 5 or more days (couple of weeks)  Weight Loss:   (11.4%/25.5# x 6 months)     Body Fat Loss:  No significant body fat loss     Muscle Mass Loss:  No significant muscle mass loss    Fluid Accumulation:  Mild Extremities (Trace BLE)   Strength:  Not Performed    Nutrition Assessment:    Moderate malnutrition r/t inadequate protein/energy intakes/altered GI function aeb weight loss 25.5#/11.4% x 6 months, PO ~ 50% x 2 weeks, new Dx H. Pylori. Pt admitted with electrolyte abdormality. Pt dislikes ensure and magic cups, willing to try berry ensure clear. Pt with Hx celiac disease, diet modified to gluten free.    Nutrition Related Findings:    trace edema, active bowel sounds Wound Type: None       Current Nutrition Intake & Therapies:    Average Meal Intake: Unable to assess  Average Supplements Intake: Refusing to take (supplement modified)  ADULT DIET; Regular; Gluten Free  ADULT ORAL NUTRITION SUPPLEMENT; Breakfast, Lunch, Dinner; Clear Liquid Oral Supplement    Anthropometric Measures:  Height: 165.1 cm (5' 5\")  Ideal Body Weight (IBW): 125 lbs (57 kg)    Admission Body Weight: 89.7 kg (197 lb 13 oz)  Current Body Weight: 90.9 kg (200 lb 6.4 oz), 160.3 % IBW. Weight Source: Bed Scale  Current BMI (kg/m2): 33.3  Usual Body Weight: 101.2 kg (223 lb)  % Weight Change (Calculated): -10.1  Weight Adjustment For: No Adjustment                 BMI Categories: Obese Class 1 (BMI

## 2024-08-20 ENCOUNTER — TELEPHONE (OUTPATIENT)
Dept: PRIMARY CARE CLINIC | Age: 54
End: 2024-08-20

## 2024-08-20 VITALS
HEART RATE: 60 BPM | BODY MASS INDEX: 33.67 KG/M2 | WEIGHT: 202.1 LBS | SYSTOLIC BLOOD PRESSURE: 134 MMHG | RESPIRATION RATE: 16 BRPM | TEMPERATURE: 97 F | OXYGEN SATURATION: 99 % | DIASTOLIC BLOOD PRESSURE: 71 MMHG | HEIGHT: 65 IN

## 2024-08-20 LAB
ALBUMIN SERPL-MCNC: 3.6 G/DL (ref 3.5–5.2)
ALBUMIN/GLOB SERPL: 1.4 {RATIO} (ref 1–2.5)
ALP SERPL-CCNC: 50 U/L (ref 35–104)
ALT SERPL-CCNC: 44 U/L (ref 5–33)
ANION GAP SERPL CALCULATED.3IONS-SCNC: 7 MMOL/L (ref 9–17)
AST SERPL-CCNC: 48 U/L
BASOPHILS # BLD: 0.06 K/UL (ref 0–0.2)
BASOPHILS NFR BLD: 1 % (ref 0–2)
BILIRUB SERPL-MCNC: 0.4 MG/DL (ref 0.3–1.2)
BUN SERPL-MCNC: 12 MG/DL (ref 6–20)
BUN/CREAT SERPL: 15 (ref 9–20)
CALCIUM SERPL-MCNC: 9.7 MG/DL (ref 8.6–10.4)
CHLORIDE SERPL-SCNC: 104 MMOL/L (ref 98–107)
CO2 SERPL-SCNC: 26 MMOL/L (ref 20–31)
CREAT SERPL-MCNC: 0.8 MG/DL (ref 0.5–0.9)
EOSINOPHIL # BLD: 0.07 K/UL (ref 0–0.44)
EOSINOPHILS RELATIVE PERCENT: 1 % (ref 1–4)
ERYTHROCYTE [DISTWIDTH] IN BLOOD BY AUTOMATED COUNT: 12.8 % (ref 11.8–14.4)
GFR, ESTIMATED: 88 ML/MIN/1.73M2
GLUCOSE SERPL-MCNC: 99 MG/DL (ref 70–99)
HCT VFR BLD AUTO: 37.7 % (ref 36.3–47.1)
HGB BLD-MCNC: 12.5 G/DL (ref 11.9–15.1)
IMM GRANULOCYTES # BLD AUTO: <0.03 K/UL (ref 0–0.3)
IMM GRANULOCYTES NFR BLD: 0 %
LYMPHOCYTES NFR BLD: 1.38 K/UL (ref 1.1–3.7)
LYMPHOCYTES RELATIVE PERCENT: 27 % (ref 24–43)
MCH RBC QN AUTO: 32.5 PG (ref 25.2–33.5)
MCHC RBC AUTO-ENTMCNC: 33.2 G/DL (ref 28.4–34.8)
MCV RBC AUTO: 97.9 FL (ref 82.6–102.9)
MONOCYTES NFR BLD: 0.3 K/UL (ref 0.1–1.2)
MONOCYTES NFR BLD: 6 % (ref 3–12)
NEUTROPHILS NFR BLD: 65 % (ref 36–65)
NEUTS SEG NFR BLD: 3.37 K/UL (ref 1.5–8.1)
NRBC BLD-RTO: 0 PER 100 WBC
PLATELET # BLD AUTO: 246 K/UL (ref 138–453)
PMV BLD AUTO: 10.4 FL (ref 8.1–13.5)
POTASSIUM SERPL-SCNC: 5.3 MMOL/L (ref 3.7–5.3)
PROT SERPL-MCNC: 6.1 G/DL (ref 6.4–8.3)
RBC # BLD AUTO: 3.85 M/UL (ref 3.95–5.11)
SODIUM SERPL-SCNC: 137 MMOL/L (ref 135–144)
WBC OTHER # BLD: 5.2 K/UL (ref 3.5–11.3)

## 2024-08-20 PROCEDURE — 94761 N-INVAS EAR/PLS OXIMETRY MLT: CPT

## 2024-08-20 PROCEDURE — 6370000000 HC RX 637 (ALT 250 FOR IP)

## 2024-08-20 PROCEDURE — 6370000000 HC RX 637 (ALT 250 FOR IP): Performed by: INTERNAL MEDICINE

## 2024-08-20 PROCEDURE — 2580000003 HC RX 258: Performed by: INTERNAL MEDICINE

## 2024-08-20 PROCEDURE — 36415 COLL VENOUS BLD VENIPUNCTURE: CPT

## 2024-08-20 PROCEDURE — 85025 COMPLETE CBC W/AUTO DIFF WBC: CPT

## 2024-08-20 PROCEDURE — 99232 SBSQ HOSP IP/OBS MODERATE 35: CPT | Performed by: FAMILY MEDICINE

## 2024-08-20 PROCEDURE — 80053 COMPREHEN METABOLIC PANEL: CPT

## 2024-08-20 RX ORDER — METOPROLOL SUCCINATE 100 MG/1
100 TABLET, EXTENDED RELEASE ORAL DAILY
Qty: 30 TABLET | Refills: 3 | Status: SHIPPED | OUTPATIENT
Start: 2024-08-21 | End: 2024-08-23 | Stop reason: DRUGHIGH

## 2024-08-20 RX ORDER — HYDROCODONE BITARTRATE AND ACETAMINOPHEN 5; 325 MG/1; MG/1
1 TABLET ORAL ONCE
Status: COMPLETED | OUTPATIENT
Start: 2024-08-20 | End: 2024-08-20

## 2024-08-20 RX ADMIN — HYDROCODONE BITARTRATE AND ACETAMINOPHEN 1 TABLET: 5; 325 TABLET ORAL at 01:03

## 2024-08-20 RX ADMIN — POTASSIUM CHLORIDE 40 MEQ: 1500 TABLET, EXTENDED RELEASE ORAL at 08:19

## 2024-08-20 RX ADMIN — Medication 400 MG: at 08:18

## 2024-08-20 RX ADMIN — APIXABAN 5 MG: 5 TABLET, FILM COATED ORAL at 08:18

## 2024-08-20 RX ADMIN — SODIUM CHLORIDE, PRESERVATIVE FREE 10 ML: 5 INJECTION INTRAVENOUS at 08:20

## 2024-08-20 RX ADMIN — METOPROLOL SUCCINATE 100 MG: 100 TABLET, EXTENDED RELEASE ORAL at 08:20

## 2024-08-20 RX ADMIN — ALUMINUM HYDROXIDE, MAGNESIUM HYDROXIDE, AND SIMETHICONE: 1200; 120; 1200 SUSPENSION ORAL at 01:06

## 2024-08-20 RX ADMIN — METFORMIN HYDROCHLORIDE 1000 MG: 500 TABLET ORAL at 08:19

## 2024-08-20 RX ADMIN — DILTIAZEM HYDROCHLORIDE 300 MG: 180 CAPSULE, COATED, EXTENDED RELEASE ORAL at 08:19

## 2024-08-20 RX ADMIN — LEVOTHYROXINE SODIUM 75 MCG: 75 TABLET ORAL at 08:18

## 2024-08-20 ASSESSMENT — PAIN DESCRIPTION - LOCATION: LOCATION: BACK

## 2024-08-20 ASSESSMENT — PAIN - FUNCTIONAL ASSESSMENT: PAIN_FUNCTIONAL_ASSESSMENT: ACTIVITIES ARE NOT PREVENTED

## 2024-08-20 ASSESSMENT — PAIN DESCRIPTION - DESCRIPTORS: DESCRIPTORS: ACHING;DISCOMFORT

## 2024-08-20 ASSESSMENT — PAIN DESCRIPTION - ONSET: ONSET: GRADUAL

## 2024-08-20 ASSESSMENT — PAIN DESCRIPTION - PAIN TYPE: TYPE: ACUTE PAIN

## 2024-08-20 ASSESSMENT — PAIN DESCRIPTION - FREQUENCY: FREQUENCY: INTERMITTENT

## 2024-08-20 ASSESSMENT — PAIN SCALES - GENERAL: PAINLEVEL_OUTOF10: 7

## 2024-08-20 ASSESSMENT — PAIN DESCRIPTION - ORIENTATION: ORIENTATION: LOWER;MID

## 2024-08-20 NOTE — PROGRESS NOTES
Physician Progress Note      PATIENT:               AURELIANO GARCIA  [facility] #:                  [id number]  :                       1970  ADMIT DATE:       8/3/2024 12:05 AM  DISCH DATE:        2024 7:40 PM  RESPONDING  PROVIDER #:        Roberto Bernal DO          QUERY TEXT:    Patient admitted with GERI.  Noted documentation of Atrial Fibrillation and   secondary hypercoagulable state.  If possible, please document in progress notes and discharge summary if you   are evaluating and /or treating any of the following:    The medical record reflects the following:  Risk Factors: Hypertension  Clinical Indicators: Hospital PN  PROVIDER RESPONSE TEXT:   This patient   has secondary hypercoagulable state in a patient with atrial  PROVIDER RESPONSE TEXT:   A fib rolled out  Treatment: Eliquis, Cardizem and metoprolol succinate  Thank You  Nilay Burgess [facility] CDS  Options provided:  -- Atrial Fibrillation and secondary hypercoagulable state confirmed  -- Atrial Fibrillation and secondary hypercoagulable state ruled out  -- Other - I will add my own diagnosis  -- Disagree - Not applicable / Not valid  -- Disagree - Clinically unable to determine / Unknown  -- Refer to Clinical Documentation Reviewer    PROVIDER RESPONSE TEXT:    After study, Atrial Fibrillation and secondary hypercoagulable state   confirmed.    Query created by: Nilay Burgess on 2024 5:01 AM      Electronically signed by:  Roberto Bernal DO 2024 1:38 PM

## 2024-08-20 NOTE — FLOWSHEET NOTE
Reviewed discharge instructions with patient. Voices understanding. Ambulated to from entrance for discharge home with a family member

## 2024-08-20 NOTE — PROGRESS NOTES
I, Yarely Wing am scribing for and in the presence of Reji Little MD, MS, F.A.C.C..    Patient: Andressa Bo[facility]  [facility] 1970[facility] Physician: Criss Montelongo  Today's Date: 2024    REASON FOR VISIT: Chest Pain (Mid sternal chest pain onset 1 hour PTA, describes as \"discomfort\" with shortness of breath, fatigue, palpitations and bilateral hand tingling. Pt states history of afib, currently with holter monitor in place. Pt states was recently discharged last week from [facility] last week for GERI)    HPI: Ms. Boone is a 54 y.o. female who as been admitted to the hospital in the past with lightheaded and dizziness. She denied any previous heart related history. She is adopted so she was unaware of any family history. She does drink 1-2 cups off coffee daily. Her echocardiogram on 2021 was fairly unremarkable and showed an ejection fraction of >60%.  Her CAM monitor was done on 2021 she wore 6 days, 19 hours showed one epos ode of atrial tachycardia with symptoms. She had treadmill stress test done on 3/8/2021 her  [facility] treadmill score is 0 which is intermediate risk. Ms. Boone recently had Negative Tilt Table test on 3/22/21 and an Equivocal stress test. Ms. Boone heart catheterization on 21 was largely normal. LINQ report from 2021: 1 symptom recorded however no abnormal heart rhythms noted. Battery life is good. Cardiac event monitor worn on 2021 showed frequent premature ventricular contractions 3.2% of the beats, 5 symptoms were reported and associated with the PVCs. Loop recorder showed several episodes, she had one episode of atrial fibrillation in  and 2023 lasting 1 hour and between 2-3 hours. Sleep stuyd 2023 showed no sleep apnea.     Ms. Boone was admitted due to an electrolyte abnormality, primarily hypokalemia. She had come to the ER because she was having chest discomfort, shortness of breath and palpitations. She also had hand     Iron deficiency anemia 02/15/2019    Moderate single current episode of major depressive disorder (HCC) 03/21/2018    Idiopathic gout 04/20/2016    Anxiety state 04/20/2016    Essential hypertension 05/06/2015    Hypothyroidism 05/06/2015      PLAN:    Hypokalemia: yesterday it was 4.8 but now it is back up to 5.3 today   Beta Blocker: STOP bisoprolol/HCTZ (Ziac) and START bisoprolol 10 mg daily. I also discussed the potential side effects of this medication including lightheadedness and dizziness and instructed them to stop the medication of this occurs and call our office if this occurs.  Beta Blocker: Continue bisoprolol 10 mg daily.                                    Repeat Potassium next week  Stress test & echocardiogram ordered as outpatient.     Paroxysmal Atrial Fibrillation: Noted on Loop recorder in the past. Currently experiencing some heart palpitations however she is doing better then her last visit. We did review her latest down load together from just four weeks ago which showed no events and her heart rate is fairly well controlled.   She was worried about how often she was going into afib, we reviewed her most recent download from July 20th. I want her to do a manual download when she gets home from being discharged.   Beta Blocker: Continue bisoprolol 10 mg daily.                                    Calcium Channel Blocker: Continue diltiazem CD (Cardizem CD) 300 mg once daily.   [id number] Score for Atrial Fibrillation Stroke Risk   Risk   Factors  Component Value   C CHF No 0   H HTN Yes 1   A2 Age >= 75 No,  (54 y.o.) 0   D DM No 0   S2 Prior Stroke/TIA No 0   V Vascular Disease No 0   A Age 65-74 No,  (54 y.o.) 0   Sc Sex female 1    [id number]  Score  2   Score last updated 1/13/23 12:39 PM EST  Click here for a link to the UpToDate guideline \"Atrial Fibrillation: Anticoagulation therapy to prevent embolization  Disclaimer: Risk Score calculation is dependent on accuracy of patient  the risk of significant morbidity and mortality related to the patient's current medical conditions are: Intermediate.      The documentation recorded by the scribe, accurately and completely reflects the services I personally performed and the decisions made by me. Reji Little MD, MS, F.A.C.C. August 20, 2024

## 2024-08-20 NOTE — DISCHARGE INSTR - COC
Continuity of Care Form    Patient Name: Andressa Boone   :  1970  MRN:  [id number]    Admit date:  2024  Discharge date:  ***    Code Status Order: Full Code   Advance Directives:   Advance Care Flowsheet Documentation             Admitting Physician:  Kenny Coronado MD  PCP: Criss Montelongo, APRN - CNP    Discharging Nurse: ***  Discharging Hospital Unit/Room#: 0318/0318-01  Discharging Unit Phone Number: ***    Emergency Contact:   Extended Emergency Contact Information  Primary Emergency Contact: Martha Desiree  Home Phone: [phone]  Work Phone: [phone]  Mobile Phone: [phone]  Relation: Other  Preferred language: English   needed? No  Secondary Emergency Contact: Isiah Boone  Home Phone: [phone]  Work Phone: [phone]  Mobile Phone: [phone]  Relation: Child    Past Surgical History:  Past Surgical History:   Procedure Laterality Date    ANTERIOR CRUCIATE LIGAMENT REPAIR Left     HYSTERECTOMY, VAGINAL      2020 robotic, lap supracervical hyst and BS, perineopexy, A&P repair, cysto, Lynx retropubic slin    INFECTED SKIN DEBRIDEMENT Left     Left third finger    INSERTABLE CARDIAC MONITOR  2021    OTHER SURGICAL HISTORY  2024    Uterus issues-biopy taken    UPPER GASTROINTESTINAL ENDOSCOPY N/A 2024    ESOPHAGOGASTRODUODENOSCOPY BIOPSY performed by Wilian Vernon MD at [facility] OR       Immunization History:   Immunization History   Administered Date(s) Administered    BCG (Peter BCG) 2013    COVID-19, MODERNA BLUE border, Primary or Immunocompromised, (age 12y+), IM, 100 mcg/0.5mL 08/10/2021, 2021    Influenza Virus Vaccine 2014, 10/20/2020    Influenza, FLUBLOK, (age 18 y+), Quadv PF, 0.5mL 2023    TDaP, ADACEL (age 10y-64y), BOOSTRIX (age 10y+), IM, 0.5mL 2023       Active Problems:  Patient Active Problem List   Diagnosis Code    Essential hypertension I10    Hypothyroidism E03.9    Idiopathic gout M10.00     pain score (0-10 scale): Pain Level: 7  Last Weight:   Wt Readings from Last 1 Encounters:   08/20/24 91.7 kg (202 lb 1.6 oz)     Mental Status:  {IP PT MENTAL STATUS:20030}    IV Access:  { LIZZIE IV ACCESS:784299068}    Nursing Mobility/ADLs:  Walking   {CHP DME ADLs:253975044}  Transfer  {CHP DME ADLs:472795157}  Bathing  {CHP DME ADLs:954095389}  Dressing  {CHP DME ADLs:626367838}  Toileting  {CHP DME ADLs:015708826}  Feeding  {CHP DME ADLs:706869283}  Med Admin  {CHP DME ADLs:384071092}  Med Delivery   { LIZZIE MED Delivery:344134870}    Wound Care Documentation and Therapy:  Incision 08/02/16 Finger (Comment which one) Dorsal (Active)   Number of days: 2939       Wound 01/16/23 Foot Left (Active)   Number of days: 582        Elimination:  Continence:   Bowel: {YES / NO:19727}  Bladder: {YES / NO:19727}  Urinary Catheter: {Urinary Catheter:559473847}   Colostomy/Ileostomy/Ileal Conduit: {YES / NO:19727}       Date of Last BM: ***    Intake/Output Summary (Last 24 hours) at 8/20/2024 1123  Last data filed at 8/20/2024 1041  Gross per 24 hour   Intake 1633.8 ml   Output 4350 ml   Net -2716.2 ml     I/O last 3 completed shifts:  In: 2257.9 [P.O.:800; I.V.:1457.9]  Out: 4150 [Urine:4150]    Safety Concerns:     { LIZZIE Safety Concerns:356321709}    Impairments/Disabilities:      { LIZZIE Impairments/Disabilities:950052299}    Nutrition Therapy:  Current Nutrition Therapy:   { LIZZIE Diet List:873536784}    Routes of Feeding: {CHP DME Other Feedings:924240759}  Liquids: {Slp liquid thickness:06670}  Daily Fluid Restriction: {CHP DME Yes amt example:115732937}  Last Modified Barium Swallow with Video (Video Swallowing Test): {Done Not Done Date:304088012}    Treatments at the Time of Hospital Discharge:   Respiratory Treatments: ***  Oxygen Therapy:  {Therapy; copd oxygen:00703}  Ventilator:    { CC Vent List:976505460}    Rehab Therapies: {THERAPEUTIC INTERVENTION:8786832440}  Weight Bearing Status/Restrictions: {

## 2024-08-20 NOTE — PLAN OF CARE
Problem: Discharge Planning  Goal: Discharge to home or other facility with appropriate resources  8/20/2024 1230 by Shilpi Castro RN  Outcome: Adequate for Discharge  8/20/2024 0932 by Shilpi Castro RN  Outcome: Progressing  8/20/2024 0305 by Tunde Garcia RN  Outcome: Progressing  Flowsheets (Taken 8/20/2024 0305)  Discharge to home or other facility with appropriate resources:   Identify discharge learning needs (meds, wound care, etc)   Identify barriers to discharge with patient and caregiver   Refer to discharge planning if patient needs post-hospital services based on physician order or complex needs related to functional status, cognitive ability or social support system   Arrange for needed discharge resources and transportation as appropriate   Arrange for interpreters to assist at discharge as needed     Problem: Safety - Adult  Goal: Free from fall injury  8/20/2024 1230 by Shilpi Castro RN  Outcome: Adequate for Discharge  8/20/2024 0932 by Shilpi Castro RN  Outcome: Progressing  8/20/2024 0305 by Tunde Garcia RN  Outcome: Progressing  Flowsheets (Taken 8/20/2024 0305)  Free From Fall Injury:   Based on caregiver fall risk screen, instruct family/caregiver to ask for assistance with transferring infant if caregiver noted to have fall risk factors   Instruct family/caregiver on patient safety     Problem: Cardiovascular - Adult  Goal: Maintains optimal cardiac output and hemodynamic stability  8/20/2024 1230 by Shilpi Castro RN  Outcome: Adequate for Discharge  8/20/2024 0932 by Shilpi Castro RN  Outcome: Progressing  8/20/2024 0305 by Tunde Garcia RN  Outcome: Progressing  Flowsheets (Taken 8/20/2024 0305)  Maintains optimal cardiac output and hemodynamic stability:   Monitor blood pressure and heart rate   Monitor urine output and notify Licensed Independent Practitioner for values outside of normal range   Administer fluid and/or volume expanders as ordered   Assess for signs of decreased  cardiac output   Administer vasoactive medications as ordered   For PPHN infants, administer sedation as ordered and minimize all controllable stressors.  Goal: Absence of cardiac dysrhythmias or at baseline  8/20/2024 1230 by Shilpi Castro RN  Outcome: Adequate for Discharge  8/20/2024 0932 by Shilpi Castro RN  Outcome: Progressing  8/20/2024 0305 by Tunde Garcia RN  Outcome: Progressing  Flowsheets (Taken 8/20/2024 0305)  Absence of cardiac dysrhythmias or at baseline:   Administer antiarrhythmia medication and electrolyte replacement as ordered   Monitor cardiac rate and rhythm   Assess for signs of decreased cardiac output     Problem: Nutrition Deficit:  Goal: Optimize nutritional status  8/20/2024 1230 by Shilpi Castro RN  Outcome: Adequate for Discharge  8/20/2024 0932 by Shilpi Castro RN  Outcome: Progressing  8/20/2024 0305 by Tunde Garcia RN  Outcome: Progressing  Flowsheets (Taken 8/20/2024 0305)  Nutrient intake appropriate for improving, restoring, or maintaining nutritional needs:   Recommend appropriate diets, oral nutritional supplements, and vitamin/mineral supplements   Assess nutritional status and recommend course of action   Monitor oral intake, labs, and treatment plans   Order, calculate, and assess calorie counts as needed   Provide specific nutrition education to patient or family as appropriate   Recommend, monitor, and adjust tube feedings and TPN/PPN based on assessed needs     Problem: Pain  Goal: Verbalizes/displays adequate comfort level or baseline comfort level  8/20/2024 1230 by Shilpi Castro RN  Outcome: Adequate for Discharge  8/20/2024 0932 by Shilpi Castro RN  Outcome: Progressing  8/20/2024 0305 by Tunde Garcia RN  Outcome: Progressing  Flowsheets (Taken 8/20/2024 0305)  Verbalizes/displays adequate comfort level or baseline comfort level:   Consider cultural and social influences on pain and pain management   Administer analgesics based on type and severity of pain  and evaluate response   Encourage patient to monitor pain and request assistance   Implement non-pharmacological measures as appropriate and evaluate response   Assess pain using appropriate pain scale   Notify Licensed Independent Practitioner if interventions unsuccessful or patient reports new pain     Problem: Discharge Planning  Goal: Discharge to home or other facility with appropriate resources  8/20/2024 1230 by Shilpi Castro RN  Outcome: Adequate for Discharge  8/20/2024 0932 by Shilpi Castro RN  Outcome: Progressing  8/20/2024 0305 by Tunde Garcia RN  Outcome: Progressing  Flowsheets (Taken 8/20/2024 0305)  Discharge to home or other facility with appropriate resources:   Identify discharge learning needs (meds, wound care, etc)   Identify barriers to discharge with patient and caregiver   Refer to discharge planning if patient needs post-hospital services based on physician order or complex needs related to functional status, cognitive ability or social support system   Arrange for needed discharge resources and transportation as appropriate   Arrange for interpreters to assist at discharge as needed     Problem: Safety - Adult  Goal: Free from fall injury  8/20/2024 1230 by Shilpi Castro RN  Outcome: Adequate for Discharge  8/20/2024 0932 by Shilpi Castro RN  Outcome: Progressing  8/20/2024 0305 by Tunde Garcia RN  Outcome: Progressing  Flowsheets (Taken 8/20/2024 0305)  Free From Fall Injury:   Based on caregiver fall risk screen, instruct family/caregiver to ask for assistance with transferring infant if caregiver noted to have fall risk factors   Instruct family/caregiver on patient safety     Problem: Cardiovascular - Adult  Goal: Maintains optimal cardiac output and hemodynamic stability  8/20/2024 1230 by Shilpi Castro RN  Outcome: Adequate for Discharge  8/20/2024 0932 by Shilpi Castro RN  Outcome: Progressing  8/20/2024 0305 by Tunde Garcia RN  Outcome: Progressing  Flowsheets (Taken

## 2024-08-20 NOTE — TELEPHONE ENCOUNTER
Hello, RTW as tolerated, thank you.  Electronically signed by Kenny Coronado MD on 8/20/2024 at 1:20 PM

## 2024-08-20 NOTE — FLOWSHEET NOTE
Awake, resting in bed watching TV. Vitals checked and assessment completed. Alert and oriented x 4. Denies pain this morning. States she had back and abdominal pain last night. Better now. Dr Coronado in to see patient. No needs at present time. Call light within reach. Continue to monitor

## 2024-08-20 NOTE — PROGRESS NOTES
Patient called out at this time stating pain to mid and lower back 7/10 and heartburn. Reassessment complete as charted- see flow sheet for details. BRANDON Garber notified at this time. New orders received. See MAR for details.

## 2024-08-20 NOTE — DISCHARGE INSTR - DIET
Good nutrition is important when healing from an illness, injury, or surgery.  Follow any nutrition recommendations given to you during your hospital stay.   If you were given an oral nutrition supplement while in the hospital, continue to take this supplement at home.  You can take it with meals, in-between meals, and/or before bedtime. These supplements can be purchased at most local grocery stores, pharmacies, and chain [employer].   If you have any questions about your diet or nutrition, call the hospital and ask for the dietitian.          General Gluten Free diet/ low salt

## 2024-08-20 NOTE — DISCHARGE SUMMARY
Discharge Summary    Andressa Boone  :  1970  MRN:  [id number]    Admit date:  2024      Discharge date: 2024     Admitting Physician:  Kenny Coronado MD    Discharge Diagnoses:    Principal Problem:    Electrolyte abnormality  Active Problems:    Medication side effect, initial encounter    Atrial fibrillation with RVR (HCC)    Drug-induced hypokalemia    Moderate malnutrition (HCC)    Essential hypertension    Paroxysmal atrial fibrillation (HCC)    Secondary hypercoagulable state (HCC)  Resolved Problems:    * No resolved hospital problems. *      Hospital Course:   Andressa Boone is a 54 y.o. female admitted with electrolyte abnormality.  She presented with chest discomfort, palpitation shortness of breath dizziness and tingling in bilateral hands.  She does have history of atrial fibrillation.  EKG initially showed atrial fibrillation with RVR with a heart rate of 157.  She converted to normal sinus rhythm on her own without medication.  She is on Eliquis chronically.  Patient did have Holter monitor on.  She also reported that she was having issues with her heart rate dropping into the 30s.  Patient was discharged from [facility] last week for GERI.  Initially she presented with vomiting and found to have a creatinine of 7.1 at that time over the course of her hospitalization she was given IV fluids and creatinine was normalized at 1.0.  She underwent EGD and was found to have H. pylori and is awaiting correction of renal function prior to starting medications.  She does have history of celiac disease, hypertension and anemia.  During patient's initial workup patient was hypokalemic with potassium of 2.8, magnesium 1.3 and creatinine of 1.0 troponin was 6.  She was given electrolyte replacement and labs continued to be monitored.  Patient's Ziac was stopped and she was placed on Toprol- mg daily and electrolytes have remained stable.  Plan will be to discharge today I will continue the  following activities:  Evaluation and Management of patient  Discussion with patient and/or surrogate about current care plan  Coordination with Case Management and/or   Coordination of care with Consultants (if applicable)   Coordination of care with Receiving Facility Physician (if applicable)  Completion of DME forms (if applicable)  Preparation of Discharge Summary  Preparation of Medication Reconciliation  Preparation of Discharge Prescriptions    Signed:  BRADLEY Finnegan - CNP, BRADLEY, NP-C  8/20/2024, 11:34 AM

## 2024-08-20 NOTE — PLAN OF CARE
Problem: Discharge Planning  Goal: Discharge to home or other facility with appropriate resources  8/20/2024 0932 by Shilpi Castro RN  Outcome: Progressing  8/20/2024 0305 by Tunde Garcia RN  Outcome: Progressing  Flowsheets (Taken 8/20/2024 0305)  Discharge to home or other facility with appropriate resources:   Identify discharge learning needs (meds, wound care, etc)   Identify barriers to discharge with patient and caregiver   Refer to discharge planning if patient needs post-hospital services based on physician order or complex needs related to functional status, cognitive ability or social support system   Arrange for needed discharge resources and transportation as appropriate   Arrange for interpreters to assist at discharge as needed     Problem: Safety - Adult  Goal: Free from fall injury  8/20/2024 0932 by Shilpi Castro RN  Outcome: Progressing  8/20/2024 0305 by Tunde Garcia RN  Outcome: Progressing  Flowsheets (Taken 8/20/2024 0305)  Free From Fall Injury:   Based on caregiver fall risk screen, instruct family/caregiver to ask for assistance with transferring infant if caregiver noted to have fall risk factors   Instruct family/caregiver on patient safety     Problem: Cardiovascular - Adult  Goal: Maintains optimal cardiac output and hemodynamic stability  8/20/2024 0932 by Shilpi Castro RN  Outcome: Progressing  8/20/2024 0305 by Tunde Garcia RN  Outcome: Progressing  Flowsheets (Taken 8/20/2024 0305)  Maintains optimal cardiac output and hemodynamic stability:   Monitor blood pressure and heart rate   Monitor urine output and notify Licensed Independent Practitioner for values outside of normal range   Administer fluid and/or volume expanders as ordered   Assess for signs of decreased cardiac output   Administer vasoactive medications as ordered   For PPHN infants, administer sedation as ordered and minimize all controllable stressors.  Goal: Absence of cardiac dysrhythmias or at

## 2024-08-20 NOTE — PROGRESS NOTES
Progress Note    SUBJECTIVE:    Patient seen for f/u of Electrolyte abnormality.  She resting in bed no distress. Reviewed labs and plan.  Afebrile.  No CP.       ROS:   Constitutional: negative  for fevers, and negative for chills.  Respiratory: negative for shortness of breath, negative for cough, and negative for wheezing  Cardiovascular: negative for chest pain, and negative for palpitations  Gastrointestinal: negative for abdominal pain, negative for nausea,negative for vomiting, negative for diarrhea, and negative for constipation     All other systems were reviewed with the patient and are negative unless otherwise stated in HPI      OBJECTIVE:      Vitals:   Vitals:    08/20/24 0042   BP: (!) 142/79   Pulse: 55   Resp: 22   Temp: 97.1 °F (36.2 °C)   SpO2: 98%     Weight - Scale: 91.7 kg (202 lb 1.6 oz)   Height: 165.1 cm (5' 5\")     Weight  Wt Readings from Last 3 Encounters:   08/20/24 91.7 kg (202 lb 1.6 oz)   08/16/24 89.7 kg (197 lb 12.8 oz)   08/12/24 88.5 kg (195 lb)     Body mass index is 33.63 kg/m².    24HR INTAKE/OUTPUT:      Intake/Output Summary (Last 24 hours) at 8/20/2024 0650  Last data filed at 8/20/2024 0540  Gross per 24 hour   Intake 833.8 ml   Output 3850 ml   Net -3016.2 ml     -----------------------------------------------------------------  Exam:    GEN:    Awake, alert and oriented x3.   EYES:  EOMI, pupils equal   NECK: Supple. No lymphadenopathy.  No carotid bruit  CVS:    regular rate and rhythm, no audible murmur  PULM:  CTA, no wheezes, rales or rhonchi, no acute respiratory distress  ABD:    Bowels sounds normal.  Abdomen is soft.  No distention.  no tenderness to palpation.   EXT:   no edema bilaterally .  No calf tenderness.   NEURO: Moves all extremities.  Motor and sensory are grossly intact  SKIN:  No rashes.  No skin lesions.    -----------------------------------------------------------------    Diagnostic Data:      Complete Blood Count:   Recent Labs     08/18/24  1818  08/19/24  0605 08/20/24  0535   WBC 5.7 4.7 5.2   RBC 4.17 3.56* 3.85*   HGB 13.8 11.8* 12.5   HCT 39.0 34.0* 37.7   MCV 93.5 95.5 97.9   MCH 33.1 33.1 32.5   MCHC 35.4* 34.7 33.2   RDW 12.3 12.5 12.8    223 246   MPV 10.2 10.2 10.4        Last 3 Blood Glucose:   Recent Labs     08/18/24 1815 08/19/24  0605 08/20/24  0535   GLUCOSE 95 98 99        Comprehensive Metabolic Profile:   Recent Labs     08/18/24 1815 08/19/24 0605 08/19/24  1658 08/20/24  0535    142  --  137   K 2.8* 4.0 4.8 5.3   CL 96* 105  --  104   CO2 32* 32*  --  26   BUN 20 15  --  12   CREATININE 1.0* 0.9  --  0.8   GLUCOSE 95 98  --  99   CALCIUM 9.7 8.9  --  9.7   BILITOT  --  0.6  --  0.4   ALKPHOS  --  41  --  50   AST  --  24  --  48*   ALT  --  26  --  44*        Urinalysis:   Lab Results   Component Value Date/Time    NITRU NEGATIVE 08/03/2024 01:09 AM    COLORU Yellow 08/03/2024 01:09 AM    PHUR 5.5 08/03/2024 01:09 AM    PHUR 6.0 01/25/2021 07:49 AM    WBCUA 0 TO 2 08/03/2024 01:09 AM    RBCUA 0 TO 2 08/03/2024 01:09 AM    MUCUS NOT REPORTED 01/25/2021 07:49 AM    TRICHOMONAS NOT REPORTED 01/25/2021 07:49 AM    YEAST NOT REPORTED 01/25/2021 07:49 AM    BACTERIA 1+ 08/02/2024 02:40 PM    LEUKOCYTESUR NEGATIVE 08/03/2024 01:09 AM    UROBILINOGEN Normal 08/03/2024 01:09 AM    BILIRUBINUR NEGATIVE 08/03/2024 01:09 AM    GLUCOSEU NEGATIVE 08/03/2024 01:09 AM    KETUA NEGATIVE 08/03/2024 01:09 AM    AMORPHOUS 1+ 08/02/2024 02:40 PM       HgBA1c:    Lab Results   Component Value Date/Time    LABA1C 5.4 08/12/2024 02:34 PM       Lactic Acid:   Lab Results   Component Value Date/Time    LACTA 1.3 08/02/2024 01:25 PM     Radiology/Imaging:  XR CHEST PORTABLE   Final Result   No acute process.               ASSESSMENT / PLAN:    MEDICAL DECISION MAKING:    Primary Problem(s): Electrolyte abnormality  Differential diagnoses: GERI, CKD  Condition is an undiagnosed new problem with uncertain prognosis  Condition is stable  Treatment

## 2024-08-20 NOTE — PLAN OF CARE
Problem: Discharge Planning  Goal: Discharge to home or other facility with appropriate resources  Outcome: Progressing  Flowsheets (Taken 8/20/2024 0305)  Discharge to home or other facility with appropriate resources:   Identify discharge learning needs (meds, wound care, etc)   Identify barriers to discharge with patient and caregiver   Refer to discharge planning if patient needs post-hospital services based on physician order or complex needs related to functional status, cognitive ability or social support system   Arrange for needed discharge resources and transportation as appropriate   Arrange for interpreters to assist at discharge as needed     Problem: Safety - Adult  Goal: Free from fall injury  Outcome: Progressing  Flowsheets (Taken 8/20/2024 0305)  Free From Fall Injury:   Based on caregiver fall risk screen, instruct family/caregiver to ask for assistance with transferring infant if caregiver noted to have fall risk factors   Instruct family/caregiver on patient safety     Problem: Cardiovascular - Adult  Goal: Maintains optimal cardiac output and hemodynamic stability  Outcome: Progressing  Flowsheets (Taken 8/20/2024 0305)  Maintains optimal cardiac output and hemodynamic stability:   Monitor blood pressure and heart rate   Monitor urine output and notify Licensed Independent Practitioner for values outside of normal range   Administer fluid and/or volume expanders as ordered   Assess for signs of decreased cardiac output   Administer vasoactive medications as ordered   For PPHN infants, administer sedation as ordered and minimize all controllable stressors.  Goal: Absence of cardiac dysrhythmias or at baseline  Outcome: Progressing  Flowsheets (Taken 8/20/2024 0305)  Absence of cardiac dysrhythmias or at baseline:   Administer antiarrhythmia medication and electrolyte replacement as ordered   Monitor cardiac rate and rhythm   Assess for signs of decreased cardiac output     Problem: Nutrition

## 2024-08-20 NOTE — TELEPHONE ENCOUNTER
Pt states she believes she's being discharged after lunch, states she saw you this morning but forgot to ask when she's able to go back to work

## 2024-08-21 ENCOUNTER — TELEPHONE (OUTPATIENT)
Dept: PRIMARY CARE CLINIC | Age: 54
End: 2024-08-21

## 2024-08-21 NOTE — TELEPHONE ENCOUNTER
Care Transitions Initial Follow Up Call    Outreach made within 2 business days of discharge: Yes    Patient: Andressa Boone Patient : 1970   MRN: [id number]  Reason for Admission: Afib with RVR  Discharge Date: 24      Left message to call the office       Sharita Rivas

## 2024-08-22 ENCOUNTER — TELEPHONE (OUTPATIENT)
Dept: PRIMARY CARE CLINIC | Age: 54
End: 2024-08-22

## 2024-08-22 NOTE — TELEPHONE ENCOUNTER
Care Transitions Initial Follow Up Call    Outreach made within 2 business days of discharge: Yes    Patient: Andressa Boone Patient : 1970   MRN: [id number]  Reason for Admission: electrolyte abnormality   Discharge Date: 24       Spoke with: Andressa    Discharge department/facility: [facility]    [facility] Interactive Patient Contact:  Was patient able to fill all prescriptions: Yes  Was patient instructed to bring all medications to the follow-up visit: No:   Is patient taking all medications as directed in the discharge summary? Yes  Does patient understand their discharge instructions: Yes  Does patient have questions or concerns that need addressed prior to 7-14 day follow up office visit: no    Additional needs identified to be addressed with provider               Scheduled appointment with PCP within 7-14 days    Follow Up  Future Appointments   Date Time Provider Department Center   2024  4:00 PM MTH ULTRASOUND ROOM [facility] ULTRA MTH Rad   2024  3:00 PM Criss Montelongo, APRN - CNP [facility]OS[facility] BS ECC DEP   2024  6:30 AM MTH NUCLEAR INJECTION ROOM [facility] NUC MED MTH Rad   2024  8:00 AM MTH NUCLEAR ROOM [facility] NUC MED MTH Rad   2024 10:00 AM MTH STRESS LAB 1 [facility] MISSY MTH Rad   2024 11:00 AM MTH NUCLEAR ROOM [facility] NUC MED MTH Rad   2024  3:00 PM MTH ECHO 1 [facility] M[facility]SY MTH Rad   2024  3:00 PM Ata Weston MD TIFF KID&HYP [facility]   10/1/2024  8:00 AM SCHEDULE, [facility] TIFFIN CAR [facility] TIFF CARD [facility]   2025  3:40 PM Reji Little MD TIFF CARD [facility]       Sharita Rivas

## 2024-08-23 RX ORDER — METOPROLOL SUCCINATE 100 MG/1
100 TABLET, EXTENDED RELEASE ORAL SEE ADMIN INSTRUCTIONS
COMMUNITY

## 2024-08-23 NOTE — TELEPHONE ENCOUNTER
VO from  to have patient increase Toprol XL to 150 mg daily and make apt in 2-3 weeks.  Pt aware and voiced understanding.

## 2024-08-27 ENCOUNTER — HOSPITAL ENCOUNTER (OUTPATIENT)
Age: 54
Discharge: HOME | End: 2024-08-27
Payer: COMMERCIAL

## 2024-08-27 ENCOUNTER — HOSPITAL ENCOUNTER (OUTPATIENT)
Dept: ULTRASOUND IMAGING | Age: 54
Discharge: HOME | End: 2024-08-29
Payer: COMMERCIAL

## 2024-08-27 DIAGNOSIS — E83.42 HYPOMAGNESEMIA: ICD-10-CM

## 2024-08-27 DIAGNOSIS — E87.8 ELECTROLYTE ABNORMALITY: ICD-10-CM

## 2024-08-27 DIAGNOSIS — N18.31 CHRONIC KIDNEY DISEASE, STAGE 3A (HCC): ICD-10-CM

## 2024-08-27 DIAGNOSIS — E87.0 HYPERNATREMIA: ICD-10-CM

## 2024-08-27 DIAGNOSIS — N17.9 AKI (ACUTE KIDNEY INJURY) (HCC): ICD-10-CM

## 2024-08-27 PROCEDURE — 76770 US EXAM ABDO BACK WALL COMP: CPT

## 2024-08-28 ENCOUNTER — OFFICE VISIT (OUTPATIENT)
Dept: PRIMARY CARE CLINIC | Age: 54
End: 2024-08-28

## 2024-08-28 ENCOUNTER — HOSPITAL ENCOUNTER (OUTPATIENT)
Age: 54
Discharge: HOME | End: 2024-08-28
Payer: COMMERCIAL

## 2024-08-28 VITALS
TEMPERATURE: 98.4 F | WEIGHT: 199 LBS | SYSTOLIC BLOOD PRESSURE: 94 MMHG | DIASTOLIC BLOOD PRESSURE: 68 MMHG | OXYGEN SATURATION: 99 % | HEART RATE: 77 BPM | BODY MASS INDEX: 33.12 KG/M2

## 2024-08-28 DIAGNOSIS — E87.0 HYPERNATREMIA: ICD-10-CM

## 2024-08-28 DIAGNOSIS — R53.83 OTHER FATIGUE: ICD-10-CM

## 2024-08-28 DIAGNOSIS — N18.31 CHRONIC KIDNEY DISEASE, STAGE 3A (HCC): ICD-10-CM

## 2024-08-28 DIAGNOSIS — N17.9 AKI (ACUTE KIDNEY INJURY) (HCC): ICD-10-CM

## 2024-08-28 DIAGNOSIS — R00.2 PALPITATIONS: Primary | ICD-10-CM

## 2024-08-28 DIAGNOSIS — E83.42 HYPOMAGNESEMIA: ICD-10-CM

## 2024-08-28 DIAGNOSIS — I47.10 PSVT (PAROXYSMAL SUPRAVENTRICULAR TACHYCARDIA) (HCC): ICD-10-CM

## 2024-08-28 DIAGNOSIS — A04.8 H. PYLORI INFECTION: ICD-10-CM

## 2024-08-28 LAB
ANION GAP SERPL CALCULATED.3IONS-SCNC: 12 MMOL/L (ref 9–16)
BILIRUB UR QL STRIP: NEGATIVE
BUN SERPL-MCNC: 21 MG/DL (ref 6–20)
BUN/CREAT SERPL: 19 (ref 9–20)
CALCIUM SERPL-MCNC: 9.8 MG/DL (ref 8.6–10.4)
CHLORIDE SERPL-SCNC: 98 MMOL/L (ref 98–107)
CLARITY UR: CLEAR
CO2 SERPL-SCNC: 33 MMOL/L (ref 20–31)
COLOR UR: YELLOW
CREAT SERPL-MCNC: 1.1 MG/DL (ref 0.5–0.9)
CREAT UR-MCNC: 142 MG/DL (ref 28–217)
GFR, ESTIMATED: 59 ML/MIN/1.73M2
GLUCOSE SERPL-MCNC: 91 MG/DL (ref 74–99)
GLUCOSE UR STRIP-MCNC: NEGATIVE MG/DL
HGB UR QL STRIP.AUTO: NEGATIVE
KETONES UR STRIP-MCNC: NEGATIVE MG/DL
LEUKOCYTE ESTERASE UR QL STRIP: NEGATIVE
MAGNESIUM SERPL-MCNC: 1.4 MG/DL (ref 1.6–2.6)
NITRITE UR QL STRIP: NEGATIVE
PH UR STRIP: 7 [PH] (ref 5–9)
POTASSIUM SERPL-SCNC: 4.2 MMOL/L (ref 3.7–5.3)
PROT UR STRIP-MCNC: NEGATIVE MG/DL
SODIUM SERPL-SCNC: 143 MMOL/L (ref 136–145)
SP GR UR STRIP: 1.02 (ref 1.01–1.02)
TOTAL PROTEIN, URINE: 9 MG/DL
URINE TOTAL PROTEIN CREATININE RATIO: 0.06 (ref 0–0.2)
UROBILINOGEN UR STRIP-ACNC: NORMAL EU/DL (ref 0–1)

## 2024-08-28 PROCEDURE — 83735 ASSAY OF MAGNESIUM: CPT

## 2024-08-28 PROCEDURE — 84156 ASSAY OF PROTEIN URINE: CPT

## 2024-08-28 PROCEDURE — 80048 BASIC METABOLIC PNL TOTAL CA: CPT

## 2024-08-28 PROCEDURE — 81003 URINALYSIS AUTO W/O SCOPE: CPT

## 2024-08-28 PROCEDURE — 82570 ASSAY OF URINE CREATININE: CPT

## 2024-08-28 RX ORDER — TETRACYCLINE HYDROCHLORIDE 500 MG/1
500 CAPSULE ORAL 2 TIMES DAILY
COMMUNITY
Start: 2024-08-16

## 2024-08-28 RX ORDER — ALPRAZOLAM 0.25 MG
0.25 TABLET ORAL NIGHTLY PRN
COMMUNITY

## 2024-08-28 NOTE — PROGRESS NOTES
Name: Andressa Boone  : 1970         Chief Complaint:     Chief Complaint   Patient presents with    Follow-Up from Hospital     Electrolyte abnormality-stated she was in afib.  Pt. Stated she is feeling better  Denies any chest pain  Still has palpitations and SOB  Very fatigued       History of Present Illness:      Andressa Boone is a 54 y.o.  female who presents with Follow-Up from Hospital (Electrolyte abnormality-stated she was in afib./Pt. Stated she is feeling better/Denies any chest pain/Still has palpitations and SOB/Very fatigued)      HPI    The patient presents for hospital follow-up.  She was admitted to [facility] 2024 - 2024 with chief diagnosis of electrolyte abnormality.  Hospital course per EMR as noted below:    \"Hospital Course:   Andressa Boone is a 54 y.o. female admitted with electrolyte abnormality.  She presented with chest discomfort, palpitation shortness of breath dizziness and tingling in bilateral hands.  She does have history of atrial fibrillation.  EKG initially showed atrial fibrillation with RVR with a heart rate of 157.  She converted to normal sinus rhythm on her own without medication.  She is on Eliquis chronically.  Patient did have Holter monitor on.  She also reported that she was having issues with her heart rate dropping into the 30s.  Patient was discharged from [facility] last week for GERI.  Initially she presented with vomiting and found to have a creatinine of 7.1 at that time over the course of her hospitalization she was given IV fluids and creatinine was normalized at 1.0.  She underwent EGD and was found to have H. pylori and is awaiting correction of renal function prior to starting medications.  She does have history of celiac disease, hypertension and anemia.  During patient's initial workup patient was hypokalemic with potassium of 2.8, magnesium 1.3 and creatinine of 1.0 troponin was 6.  She was given electrolyte

## 2024-08-29 ENCOUNTER — TELEPHONE (OUTPATIENT)
Dept: NEPHROLOGY | Age: 54
End: 2024-08-29

## 2024-08-29 DIAGNOSIS — E83.42 HYPOMAGNESEMIA: Primary | ICD-10-CM

## 2024-08-29 RX ORDER — MAGNESIUM OXIDE 400 MG/1
400 TABLET ORAL 2 TIMES DAILY
Qty: 180 TABLET | Refills: 1 | Status: SHIPPED | OUTPATIENT
Start: 2024-08-29

## 2024-08-29 NOTE — TELEPHONE ENCOUNTER
----- Message from Dr. Ata Weston MD sent at 8/28/2024  8:01 PM EDT -----  Her magnesium is still low  Increase magnesium oxide 400 mg twice daily  Check magnesium level in 1 to 2 weeks

## 2024-08-29 NOTE — RESULT ENCOUNTER NOTE
Her magnesium is still low  Increase magnesium oxide 400 mg twice daily  Check magnesium level in 1 to 2 weeks

## 2024-08-29 NOTE — TELEPHONE ENCOUNTER
I spoke to Andressa I let her know her magnesium is still low  Increase magnesium oxide 400 mg twice daily.  Check magnesium level in 1 to 2 weeks.  She will need a new prescription with dose increase sent to [facility] and she will go to [facility] to get mag level  done in 2 weeks.

## 2024-09-05 ENCOUNTER — HOSPITAL ENCOUNTER (EMERGENCY)
Age: 54
Discharge: HOME | End: 2024-09-05
Payer: COMMERCIAL

## 2024-09-05 VITALS
HEART RATE: 56 BPM | TEMPERATURE: 97.9 F | OXYGEN SATURATION: 98 % | DIASTOLIC BLOOD PRESSURE: 82 MMHG | SYSTOLIC BLOOD PRESSURE: 115 MMHG | RESPIRATION RATE: 15 BRPM

## 2024-09-05 DIAGNOSIS — T63.481A INSECT STINGS, ACCIDENTAL OR UNINTENTIONAL, INITIAL ENCOUNTER: Primary | ICD-10-CM

## 2024-09-05 PROCEDURE — 96372 THER/PROPH/DIAG INJ SC/IM: CPT

## 2024-09-05 PROCEDURE — 99284 EMERGENCY DEPT VISIT MOD MDM: CPT

## 2024-09-05 PROCEDURE — 6360000002 HC RX W HCPCS: Performed by: PHYSICIAN ASSISTANT

## 2024-09-05 RX ORDER — METHYLPREDNISOLONE ACETATE 40 MG/ML
60 INJECTION, SUSPENSION INTRA-ARTICULAR; INTRALESIONAL; INTRAMUSCULAR; SOFT TISSUE ONCE
Status: COMPLETED | OUTPATIENT
Start: 2024-09-05 | End: 2024-09-05

## 2024-09-05 RX ORDER — DOXYCYCLINE 100 MG/1
100 CAPSULE ORAL 2 TIMES DAILY
Qty: 20 CAPSULE | Refills: 0 | Status: SHIPPED | OUTPATIENT
Start: 2024-09-05 | End: 2024-09-15

## 2024-09-05 RX ADMIN — METHYLPREDNISOLONE ACETATE 60 MG: 40 INJECTION, SUSPENSION INTRA-ARTICULAR; INTRALESIONAL; INTRAMUSCULAR; INTRASYNOVIAL; SOFT TISSUE at 15:55

## 2024-09-05 ASSESSMENT — ENCOUNTER SYMPTOMS
COUGH: 0
SHORTNESS OF BREATH: 0

## 2024-09-05 ASSESSMENT — PAIN DESCRIPTION - LOCATION: LOCATION: HAND

## 2024-09-05 ASSESSMENT — PAIN - FUNCTIONAL ASSESSMENT: PAIN_FUNCTIONAL_ASSESSMENT: 0-10

## 2024-09-05 ASSESSMENT — PAIN DESCRIPTION - ORIENTATION: ORIENTATION: RIGHT

## 2024-09-05 ASSESSMENT — PAIN SCALES - GENERAL: PAINLEVEL_OUTOF10: 5

## 2024-09-05 NOTE — ED PROVIDER NOTES
[facility]  EMERGENCY DEPARTMENT ENCOUNTER      Pt Name: Andressa Boone  MRN: [id number]  Birthdate 1970  Date of evaluation: 9/5/2024  Provider: Shilpi Sim PA-C    CHIEF COMPLAINT       Chief Complaint   Patient presents with    Insect Bite     Patient was stung by an unknown insect on Monday on the right hand. Increased swelling and pain.         HISTORY OF PRESENT ILLNESS      Andressa Boone is a 54 y.o. female who presents to the emergency department due to insect sting.  Patient was stung by an insect 4 days ago.  Since then she has increasing warmth, redness, and swelling.  It involves the dorsum of her right hand and her entire middle finger.  Finger is swollen and limits bending.  Pain is minimal.  There are no other complaints or concerns.        REVIEW OF SYSTEMS       Review of Systems   Constitutional:  Negative for fever.   Respiratory:  Negative for cough and shortness of breath.    Cardiovascular:  Negative for chest pain.         PAST MEDICAL HISTORY     Past Medical History:   Diagnosis Date    Anemia, unspecified     Anxiety     Atrial fibrillation (HCC)     Celiac disease     Edema     H/O cardiovascular stress test 03/08/2021    [place] treadmill score is 0    H/O echocardiogram 01/26/2021    EF >60% Normal study    History of cardiac cath 04/01/2021    [facility]/Dr. Little/Right Radial     History of cardiovascular stress test 03/22/2021    Equivocal study. Small perfusion defect of mild intensity in the anterior and anteroseptal regions during stress and rest imaging, which is most consistent with artifact EF 71% [place] Treadmill score is -2intermediate risk.    History of normal Holter exam 02/22/2021    CAM 6 days 19 hrs Normal sinus. AT 2 episodes Longest/fastest 9 beats at ave 127 bpm p to 153 bpm PAC 0.08% PVC 0.07%    Hypertension     Hypothyroidism     Thyroid disease     Tilt table evaluation 03/22/2021    Negative study Although sensitivity of study

## 2024-09-05 NOTE — DISCHARGE INSTRUCTIONS
You have been given a steroid shot.  Begin taking the antibiotics.  Continue to take Benadryl and apply over-the-counter Benadryl cream and hydrocortisone cream.

## 2024-09-06 ENCOUNTER — TELEPHONE (OUTPATIENT)
Dept: PRIMARY CARE CLINIC | Age: 54
End: 2024-09-06

## 2024-09-06 RX ORDER — EPINEPHRINE 0.3 MG/.3ML
0.3 INJECTION SUBCUTANEOUS ONCE
Qty: 2 EACH | Refills: 1 | Status: SHIPPED | OUTPATIENT
Start: 2024-09-06 | End: 2024-09-06

## 2024-09-06 NOTE — TELEPHONE ENCOUNTER
[facility] ED Follow up Call    Reason for ED visit:  insect bite     9/6/2024     Corey Crisostomo , this is Sharita from Criss Lee's office, just calling to see how you are doing after your recent ED visit.    Did you receive discharge instructions?  Yes  Do you understand the discharge instructions? Yes  Did the ED give you any new prescriptions? No:   Were you able to fill your prescriptions? No:       Do you have one of our red, yellow and green  Zone sheets that help you to determine when you should go to the ED?  Not Applicable      Do you need or want to make a follow up appt with your PCP?  No    Do you have any further needs in the home, e.g. equipment?  Not Applicable        FU appts/Provider:    Future Appointments   Date Time Provider Department Center   9/16/2024  3:00 PM Reji Little MD TIFF CARD [facility]   9/17/2024  3:00 PM tAa Weston MD TIFF KID&HYP [facility]   9/20/2024  9:00 AM MTH ECHO 2 [facility] MISSY MTH Rad   9/20/2024 10:00 AM MTH STRESS LAB 1 [facility] MISSY MTH Rad   9/20/2024 11:00 AM MTH NUCLEAR ROOM [facility] NUC MED MTH Rad   9/23/2024 12:30 PM MTH NUCLEAR INJECTION ROOM [facility] NUC MED MTH Rad   9/23/2024  1:15 PM MTH NUCLEAR ROOM [facility] NUC MED MTH Rad   10/1/2024  8:00 AM SCHEDULE, M[facility] TIFFIN CAR [facility]TRONIC TIFF CARD [facility]   12/3/2024  3:00 PM Criss Montelongo, APRN - CNP TIFF HOSP PC [facility] ECC DEP   5/6/2025  3:40 PM Reji Little MD TIFF CARD [facility]

## 2024-09-10 ENCOUNTER — HOSPITAL ENCOUNTER (OUTPATIENT)
Age: 54
Discharge: HOME | End: 2024-09-10
Payer: COMMERCIAL

## 2024-09-10 DIAGNOSIS — E83.42 HYPOMAGNESEMIA: ICD-10-CM

## 2024-09-10 LAB — MAGNESIUM SERPL-MCNC: 1.9 MG/DL (ref 1.6–2.6)

## 2024-09-10 PROCEDURE — 36415 COLL VENOUS BLD VENIPUNCTURE: CPT

## 2024-09-10 PROCEDURE — 83735 ASSAY OF MAGNESIUM: CPT

## 2024-09-11 ENCOUNTER — HOSPITAL ENCOUNTER (OUTPATIENT)
Age: 54
Discharge: HOME | End: 2024-09-11
Payer: COMMERCIAL

## 2024-09-11 DIAGNOSIS — E03.9 HYPOTHYROIDISM, UNSPECIFIED TYPE: ICD-10-CM

## 2024-09-11 LAB — TSH SERPL DL<=0.05 MIU/L-ACNC: 0.77 UIU/ML (ref 0.27–4.2)

## 2024-09-11 PROCEDURE — 36415 COLL VENOUS BLD VENIPUNCTURE: CPT

## 2024-09-11 PROCEDURE — 84443 ASSAY THYROID STIM HORMONE: CPT

## 2024-09-17 ENCOUNTER — OFFICE VISIT (OUTPATIENT)
Dept: NEPHROLOGY | Age: 54
End: 2024-09-17
Payer: COMMERCIAL

## 2024-09-17 VITALS
SYSTOLIC BLOOD PRESSURE: 121 MMHG | DIASTOLIC BLOOD PRESSURE: 67 MMHG | HEIGHT: 65 IN | HEART RATE: 58 BPM | WEIGHT: 207 LBS | TEMPERATURE: 97 F | BODY MASS INDEX: 34.49 KG/M2 | RESPIRATION RATE: 18 BRPM

## 2024-09-17 DIAGNOSIS — E83.42 HYPOMAGNESEMIA: ICD-10-CM

## 2024-09-17 DIAGNOSIS — I10 PRIMARY HYPERTENSION: ICD-10-CM

## 2024-09-17 DIAGNOSIS — N18.31 CHRONIC KIDNEY DISEASE, STAGE 3A (HCC): ICD-10-CM

## 2024-09-17 DIAGNOSIS — N17.9 AKI (ACUTE KIDNEY INJURY) (HCC): Primary | ICD-10-CM

## 2024-09-17 PROCEDURE — 99213 OFFICE O/P EST LOW 20 MIN: CPT | Performed by: INTERNAL MEDICINE

## 2024-09-17 PROCEDURE — 3074F SYST BP LT 130 MM HG: CPT | Performed by: INTERNAL MEDICINE

## 2024-09-17 PROCEDURE — 3078F DIAST BP <80 MM HG: CPT | Performed by: INTERNAL MEDICINE

## 2024-09-17 RX ORDER — DULOXETIN HYDROCHLORIDE 30 MG/1
30 CAPSULE, DELAYED RELEASE ORAL DAILY
COMMUNITY
Start: 2024-09-09

## 2024-09-20 ENCOUNTER — HOSPITAL ENCOUNTER (OUTPATIENT)
Dept: NUCLEAR MEDICINE | Age: 54
Discharge: HOME | End: 2024-09-22
Attending: FAMILY MEDICINE
Payer: COMMERCIAL

## 2024-09-20 ENCOUNTER — HOSPITAL ENCOUNTER (OUTPATIENT)
Age: 54
Discharge: HOME | End: 2024-09-22
Attending: FAMILY MEDICINE
Payer: COMMERCIAL

## 2024-09-20 VITALS
HEIGHT: 65 IN | DIASTOLIC BLOOD PRESSURE: 67 MMHG | WEIGHT: 207.01 LBS | BODY MASS INDEX: 34.49 KG/M2 | SYSTOLIC BLOOD PRESSURE: 121 MMHG

## 2024-09-20 DIAGNOSIS — Z45.09 ENCOUNTER FOR LOOP RECORDER CHECK: ICD-10-CM

## 2024-09-20 DIAGNOSIS — E66.9 CLASS 2 OBESITY WITH BODY MASS INDEX (BMI) OF 35.0 TO 35.9 IN ADULT, UNSPECIFIED OBESITY TYPE, UNSPECIFIED WHETHER SERIOUS COMORBIDITY PRESENT: ICD-10-CM

## 2024-09-20 DIAGNOSIS — R07.9 CHEST PAIN AT REST: ICD-10-CM

## 2024-09-20 DIAGNOSIS — I10 ESSENTIAL HYPERTENSION: ICD-10-CM

## 2024-09-20 DIAGNOSIS — Z79.01 CHRONIC ANTICOAGULATION: ICD-10-CM

## 2024-09-20 DIAGNOSIS — I48.0 PAF (PAROXYSMAL ATRIAL FIBRILLATION) (HCC): ICD-10-CM

## 2024-09-20 DIAGNOSIS — R00.2 HEART PALPITATIONS: ICD-10-CM

## 2024-09-20 DIAGNOSIS — R42 LIGHTHEADED: ICD-10-CM

## 2024-09-20 LAB
ECHO AO SINUS VALSALVA DIAM: 2.7 CM
ECHO AO SINUS VALSALVA INDEX: 1.34 CM/M2
ECHO AO ST JNCT DIAM: 2.3 CM
ECHO AV CUSP MM: 2.1 CM
ECHO AV MEAN GRADIENT: 6 MMHG
ECHO AV MEAN VELOCITY: 1.1 M/S
ECHO AV PEAK GRADIENT: 11 MMHG
ECHO AV PEAK VELOCITY: 1.6 M/S
ECHO AV VELOCITY RATIO: 0.75
ECHO AV VTI: 34.6 CM
ECHO BSA: 2.08 M2
ECHO BSA: 2.08 M2
ECHO EST RA PRESSURE: 3 MMHG
ECHO LA AREA 2C: 18.3 CM2
ECHO LA AREA 4C: 21.5 CM2
ECHO LA MAJOR AXIS: 5.3 CM
ECHO LA MINOR AXIS: 6 CM
ECHO LA VOL BP: 58 ML (ref 22–52)
ECHO LA VOL MOD A2C: 45 ML (ref 22–52)
ECHO LA VOL MOD A4C: 66 ML (ref 22–52)
ECHO LA VOL/BSA BIPLANE: 29 ML/M2 (ref 16–34)
ECHO LA VOLUME INDEX MOD A2C: 22 ML/M2 (ref 16–34)
ECHO LA VOLUME INDEX MOD A4C: 33 ML/M2 (ref 16–34)
ECHO LV E' LATERAL VELOCITY: 16.8 CM/S
ECHO LV EDV A2C: 55 ML
ECHO LV EDV A4C: 58 ML
ECHO LV EDV INDEX A4C: 29 ML/M2
ECHO LV EDV NDEX A2C: 27 ML/M2
ECHO LV EJECTION FRACTION A2C: 77 %
ECHO LV EJECTION FRACTION A4C: 59 %
ECHO LV EJECTION FRACTION BIPLANE: 70 % (ref 55–100)
ECHO LV ESV A2C: 13 ML
ECHO LV ESV A4C: 24 ML
ECHO LV ESV INDEX A2C: 6 ML/M2
ECHO LV ESV INDEX A4C: 12 ML/M2
ECHO LV FRACTIONAL SHORTENING: 35 % (ref 28–44)
ECHO LV INTERNAL DIMENSION DIASTOLE INDEX: 2.14 CM/M2
ECHO LV INTERNAL DIMENSION DIASTOLIC: 4.3 CM (ref 3.9–5.3)
ECHO LV INTERNAL DIMENSION SYSTOLIC INDEX: 1.39 CM/M2
ECHO LV INTERNAL DIMENSION SYSTOLIC: 2.8 CM
ECHO LV IVSD: 1 CM (ref 0.6–0.9)
ECHO LV MASS 2D: 142.5 G (ref 67–162)
ECHO LV MASS INDEX 2D: 70.9 G/M2 (ref 43–95)
ECHO LV POSTERIOR WALL DIASTOLIC: 1 CM (ref 0.6–0.9)
ECHO LV RELATIVE WALL THICKNESS RATIO: 0.47
ECHO LVOT AV VTI INDEX: 0.73
ECHO LVOT MEAN GRADIENT: 3 MMHG
ECHO LVOT PEAK GRADIENT: 6 MMHG
ECHO LVOT PEAK VELOCITY: 1.2 M/S
ECHO LVOT VTI: 25.2 CM
ECHO MV A VELOCITY: 0.67 M/S
ECHO MV E DECELERATION TIME (DT): 222 MS
ECHO MV E VELOCITY: 0.68 M/S
ECHO MV E/A RATIO: 1.01
ECHO MV E/E' LATERAL: 4.05
ECHO PV MAX VELOCITY: 1 M/S
ECHO PV PEAK GRADIENT: 4 MMHG
ECHO RIGHT VENTRICULAR SYSTOLIC PRESSURE (RVSP): 26 MMHG
ECHO TV REGURGITANT MAX VELOCITY: 2.38 M/S
ECHO TV REGURGITANT PEAK GRADIENT: 23 MMHG
STRESS BASELINE DIAS BP: 80 MMHG
STRESS BASELINE HR: 61 BPM
STRESS BASELINE SYS BP: 132 MMHG
STRESS ESTIMATED WORKLOAD: 10.2 METS
STRESS EXERCISE DUR MIN: 10 MIN
STRESS EXERCISE DUR SEC: 8 SEC
STRESS PEAK DIAS BP: 84 MMHG
STRESS PEAK SYS BP: 196 MMHG
STRESS PERCENT HR ACHIEVED: 90 %
STRESS POST PEAK HR: 150 BPM
STRESS RATE PRESSURE PRODUCT: NORMAL BPM*MMHG
STRESS TARGET HR: 167 BPM

## 2024-09-20 PROCEDURE — 93018 CV STRESS TEST I&R ONLY: CPT | Performed by: FAMILY MEDICINE

## 2024-09-20 PROCEDURE — 93306 TTE W/DOPPLER COMPLETE: CPT

## 2024-09-20 PROCEDURE — 93306 TTE W/DOPPLER COMPLETE: CPT | Performed by: FAMILY MEDICINE

## 2024-09-20 PROCEDURE — 93017 CV STRESS TEST TRACING ONLY: CPT

## 2024-09-20 PROCEDURE — 93016 CV STRESS TEST SUPVJ ONLY: CPT | Performed by: FAMILY MEDICINE

## 2024-09-20 PROCEDURE — 3430000000 HC RX DIAGNOSTIC RADIOPHARMACEUTICAL: Performed by: FAMILY MEDICINE

## 2024-09-20 PROCEDURE — 78452 HT MUSCLE IMAGE SPECT MULT: CPT | Performed by: FAMILY MEDICINE

## 2024-09-20 PROCEDURE — A9500 TC99M SESTAMIBI: HCPCS | Performed by: FAMILY MEDICINE

## 2024-09-20 RX ORDER — TETRAKIS(2-METHOXYISOBUTYLISOCYANIDE)COPPER(I) TETRAFLUOROBORATE 1 MG/ML
30 INJECTION, POWDER, LYOPHILIZED, FOR SOLUTION INTRAVENOUS
Status: COMPLETED | OUTPATIENT
Start: 2024-09-20 | End: 2024-09-20

## 2024-09-20 RX ADMIN — Medication 30 MILLICURIE: at 10:51

## 2024-09-23 ENCOUNTER — HOSPITAL ENCOUNTER (OUTPATIENT)
Dept: NUCLEAR MEDICINE | Age: 54
Discharge: HOME | End: 2024-09-25
Attending: FAMILY MEDICINE
Payer: COMMERCIAL

## 2024-09-23 ENCOUNTER — TELEPHONE (OUTPATIENT)
Dept: CARDIOLOGY | Age: 54
End: 2024-09-23

## 2024-09-23 LAB
ECHO BSA: 2.08 M2
NUC STRESS EJECTION FRACTION: 79 %
STRESS BASELINE DIAS BP: 80 MMHG
STRESS BASELINE HR: 61 BPM
STRESS BASELINE SYS BP: 132 MMHG
STRESS ESTIMATED WORKLOAD: 10.2 METS
STRESS EXERCISE DUR MIN: 10 MIN
STRESS EXERCISE DUR SEC: 8 SEC
STRESS PEAK DIAS BP: 84 MMHG
STRESS PEAK SYS BP: 196 MMHG
STRESS PERCENT HR ACHIEVED: 90 %
STRESS POST PEAK HR: 150 BPM
STRESS RATE PRESSURE PRODUCT: NORMAL BPM*MMHG
STRESS ST DEPRESSION: 1.1 MM
STRESS TARGET HR: 167 BPM

## 2024-09-23 PROCEDURE — 78452 HT MUSCLE IMAGE SPECT MULT: CPT

## 2024-09-23 PROCEDURE — A9500 TC99M SESTAMIBI: HCPCS | Performed by: FAMILY MEDICINE

## 2024-09-23 PROCEDURE — 93017 CV STRESS TEST TRACING ONLY: CPT

## 2024-09-23 PROCEDURE — 3430000000 HC RX DIAGNOSTIC RADIOPHARMACEUTICAL: Performed by: FAMILY MEDICINE

## 2024-09-23 RX ORDER — TETRAKIS(2-METHOXYISOBUTYLISOCYANIDE)COPPER(I) TETRAFLUOROBORATE 1 MG/ML
30 INJECTION, POWDER, LYOPHILIZED, FOR SOLUTION INTRAVENOUS
Status: COMPLETED | OUTPATIENT
Start: 2024-09-23 | End: 2024-09-23

## 2024-09-23 RX ADMIN — Medication 30 MILLICURIE: at 12:19

## 2024-09-24 ENCOUNTER — TELEPHONE (OUTPATIENT)
Dept: CARDIOLOGY | Age: 54
End: 2024-09-24

## 2024-09-27 ENCOUNTER — NURSE ONLY (OUTPATIENT)
Dept: CARDIOLOGY | Age: 54
End: 2024-09-27

## 2024-09-27 DIAGNOSIS — Z45.09 ENCOUNTER FOR LOOP RECORDER CHECK: ICD-10-CM

## 2024-09-27 DIAGNOSIS — I48.0 PAF (PAROXYSMAL ATRIAL FIBRILLATION) (HCC): Primary | ICD-10-CM

## 2024-10-01 ENCOUNTER — OFFICE VISIT (OUTPATIENT)
Dept: CARDIOLOGY | Age: 54
End: 2024-10-01
Payer: COMMERCIAL

## 2024-10-01 ENCOUNTER — HOSPITAL ENCOUNTER (OUTPATIENT)
Age: 54
Discharge: HOME | End: 2024-10-01
Payer: COMMERCIAL

## 2024-10-01 VITALS
RESPIRATION RATE: 16 BRPM | HEIGHT: 65 IN | BODY MASS INDEX: 35.45 KG/M2 | DIASTOLIC BLOOD PRESSURE: 77 MMHG | HEART RATE: 59 BPM | SYSTOLIC BLOOD PRESSURE: 130 MMHG | WEIGHT: 212.8 LBS | OXYGEN SATURATION: 98 %

## 2024-10-01 DIAGNOSIS — Z79.01 CHRONIC ANTICOAGULATION: ICD-10-CM

## 2024-10-01 DIAGNOSIS — R42 LIGHTHEADED: ICD-10-CM

## 2024-10-01 DIAGNOSIS — R07.89 CHEST DISCOMFORT: ICD-10-CM

## 2024-10-01 DIAGNOSIS — E66.812 CLASS 2 OBESITY WITH BODY MASS INDEX (BMI) OF 35.0 TO 35.9 IN ADULT, UNSPECIFIED OBESITY TYPE, UNSPECIFIED WHETHER SERIOUS COMORBIDITY PRESENT: ICD-10-CM

## 2024-10-01 DIAGNOSIS — R94.39 ABNORMAL STRESS TEST: ICD-10-CM

## 2024-10-01 DIAGNOSIS — I10 ESSENTIAL HYPERTENSION: ICD-10-CM

## 2024-10-01 DIAGNOSIS — R42 DIZZINESS: ICD-10-CM

## 2024-10-01 DIAGNOSIS — R06.02 SOB (SHORTNESS OF BREATH): ICD-10-CM

## 2024-10-01 DIAGNOSIS — Z95.818 IMPLANTABLE LOOP RECORDER PRESENT: ICD-10-CM

## 2024-10-01 DIAGNOSIS — R94.39 ABNORMAL CARDIOVASCULAR STRESS TEST: Primary | ICD-10-CM

## 2024-10-01 DIAGNOSIS — I48.0 PAF (PAROXYSMAL ATRIAL FIBRILLATION) (HCC): ICD-10-CM

## 2024-10-01 DIAGNOSIS — R94.39 ABNORMAL CARDIOVASCULAR STRESS TEST: ICD-10-CM

## 2024-10-01 LAB
ANION GAP SERPL CALCULATED.3IONS-SCNC: 9 MMOL/L (ref 9–16)
BUN SERPL-MCNC: 18 MG/DL (ref 6–20)
BUN/CREAT SERPL: 18 (ref 9–20)
CALCIUM SERPL-MCNC: 9.6 MG/DL (ref 8.6–10.4)
CHLORIDE SERPL-SCNC: 102 MMOL/L (ref 98–107)
CO2 SERPL-SCNC: 28 MMOL/L (ref 20–31)
CREAT SERPL-MCNC: 1 MG/DL (ref 0.5–0.9)
ERYTHROCYTE [DISTWIDTH] IN BLOOD BY AUTOMATED COUNT: 14.2 % (ref 11.8–14.4)
GFR, ESTIMATED: 64 ML/MIN/1.73M2
GLUCOSE SERPL-MCNC: 89 MG/DL (ref 74–99)
HCT VFR BLD AUTO: 36.4 % (ref 36.3–47.1)
HGB BLD-MCNC: 12.2 G/DL (ref 11.9–15.1)
MCH RBC QN AUTO: 33.8 PG (ref 25.2–33.5)
MCHC RBC AUTO-ENTMCNC: 33.5 G/DL (ref 28.4–34.8)
MCV RBC AUTO: 100.8 FL (ref 82.6–102.9)
NRBC BLD-RTO: 0 PER 100 WBC
PLATELET # BLD AUTO: 298 K/UL (ref 138–453)
PMV BLD AUTO: 9.9 FL (ref 8.1–13.5)
POTASSIUM SERPL-SCNC: 5 MMOL/L (ref 3.7–5.3)
RBC # BLD AUTO: 3.61 M/UL (ref 3.95–5.11)
SODIUM SERPL-SCNC: 139 MMOL/L (ref 136–145)
WBC OTHER # BLD: 5.6 K/UL (ref 3.5–11.3)

## 2024-10-01 PROCEDURE — 80048 BASIC METABOLIC PNL TOTAL CA: CPT

## 2024-10-01 PROCEDURE — 85027 COMPLETE CBC AUTOMATED: CPT

## 2024-10-01 PROCEDURE — 99214 OFFICE O/P EST MOD 30 MIN: CPT | Performed by: FAMILY MEDICINE

## 2024-10-01 PROCEDURE — 3078F DIAST BP <80 MM HG: CPT | Performed by: FAMILY MEDICINE

## 2024-10-01 PROCEDURE — 36415 COLL VENOUS BLD VENIPUNCTURE: CPT

## 2024-10-01 PROCEDURE — 3075F SYST BP GE 130 - 139MM HG: CPT | Performed by: FAMILY MEDICINE

## 2024-10-01 NOTE — PROGRESS NOTES
Start Date End Date Taking? Authorizing Provider   DULoxetine (CYMBALTA) 30 MG extended release capsule Take 1 capsule by mouth daily 9/9/24  Yes ProviderMitzy MD   EPINEPHrine (EPIPEN 2-AROLDO) 0.3 MG/0.3ML SOAJ injection Inject 0.3 mLs into the muscle once for 1 dose Use as directed for allergic reaction 9/6/24 10/1/24 Yes Criss Montelongo APRN - CNP   magnesium oxide (MAG-OX) 400 MG tablet Take 1 tablet by mouth 2 times daily 8/29/24  Yes Ata Weston MD   ALPRAZolam (XANAX) 0.25 MG tablet Take 1 tablet by mouth nightly as needed for Sleep.   Yes ProviderMitzy MD   metoprolol succinate (TOPROL XL) 100 MG extended release tablet Take 1 tablet by mouth See Admin Instructions Take 150 mg daily   Yes Provider, MD Mitzy   metFORMIN (GLUCOPHAGE) 1000 MG tablet Take 1 tablet by mouth daily (with breakfast) 8/14/24  Yes Criss Montelongo APRN - CNP   levothyroxine (SYNTHROID) 75 MCG tablet Take 1 tablet by mouth daily 8/14/24  Yes Criss Montelongo APRN - CNP   naproxen sodium (ANAPROX) 220 MG tablet Take 2 tablets by mouth daily as needed for Pain   Yes Provider, MD Mitzy   rizatriptan (MAXALT) 10 MG tablet Take 1 tablet by mouth once as needed for Migraine May repeat in 2 hours if needed 2/26/24 10/1/24 Yes Kleber Barron MD   apixaban (ELIQUIS) 5 MG TABS tablet Take 1 tablet by mouth 2 times daily 12/15/23  Yes Koffi Ratliff MD   traZODone (DESYREL) 50 MG tablet TAKE 3 TABLETS BY MOUTH AT BEDTIME AS NEEDED FOR INSOMNIA 11/15/23  Yes Criss Montelongo APRN - CNP   dilTIAZem (CARDIZEM CD) 300 MG extended release capsule TAKE 1 CAPSULE BY MOUTH DAILY 11/13/23  Yes Audrey Disla PA-C   omeprazole (PRILOSEC) 20 MG delayed release capsule Take 1 capsule by mouth daily as needed (heartburn) 8/29/23  Yes ProviderMitzy MD   progesterone (PROMETRIUM) 100 MG CAPS capsule TAKE 1 CAPSULE BY MOUTH AT BEDTIME 3/15/23  Yes Provider, Mitzy, MD     FAMILY HISTORY: Reviewed but

## 2024-10-02 ENCOUNTER — TELEPHONE (OUTPATIENT)
Dept: CARDIOLOGY | Age: 54
End: 2024-10-02

## 2024-10-02 NOTE — TELEPHONE ENCOUNTER
----- Message from Dr. Reji Little MD sent at 10/1/2024  9:59 PM EDT -----  Let Ms. Boone know their test result was ok. Will discuss at next visit. Thanks.

## 2024-10-10 ENCOUNTER — HOSPITAL ENCOUNTER (OUTPATIENT)
Age: 54
Setting detail: OUTPATIENT SURGERY
Discharge: HOME | End: 2024-10-10
Attending: FAMILY MEDICINE | Admitting: FAMILY MEDICINE
Payer: COMMERCIAL

## 2024-10-10 VITALS
HEART RATE: 57 BPM | TEMPERATURE: 97.3 F | SYSTOLIC BLOOD PRESSURE: 143 MMHG | RESPIRATION RATE: 12 BRPM | OXYGEN SATURATION: 100 % | DIASTOLIC BLOOD PRESSURE: 70 MMHG

## 2024-10-10 DIAGNOSIS — R94.39 ABNORMAL STRESS TEST: ICD-10-CM

## 2024-10-10 PROCEDURE — 2580000003 HC RX 258: Performed by: FAMILY MEDICINE

## 2024-10-10 PROCEDURE — 2709999900 HC NON-CHARGEABLE SUPPLY: Performed by: FAMILY MEDICINE

## 2024-10-10 PROCEDURE — 6370000000 HC RX 637 (ALT 250 FOR IP): Performed by: FAMILY MEDICINE

## 2024-10-10 PROCEDURE — 6360000002 HC RX W HCPCS: Performed by: FAMILY MEDICINE

## 2024-10-10 PROCEDURE — C1894 INTRO/SHEATH, NON-LASER: HCPCS | Performed by: FAMILY MEDICINE

## 2024-10-10 PROCEDURE — 93458 L HRT ARTERY/VENTRICLE ANGIO: CPT | Performed by: FAMILY MEDICINE

## 2024-10-10 PROCEDURE — 2500000003 HC RX 250 WO HCPCS: Performed by: FAMILY MEDICINE

## 2024-10-10 PROCEDURE — 99152 MOD SED SAME PHYS/QHP 5/>YRS: CPT | Performed by: FAMILY MEDICINE

## 2024-10-10 PROCEDURE — 7100000011 HC PHASE II RECOVERY - ADDTL 15 MIN: Performed by: FAMILY MEDICINE

## 2024-10-10 PROCEDURE — C1769 GUIDE WIRE: HCPCS | Performed by: FAMILY MEDICINE

## 2024-10-10 PROCEDURE — 6360000004 HC RX CONTRAST MEDICATION: Performed by: FAMILY MEDICINE

## 2024-10-10 PROCEDURE — 7100000010 HC PHASE II RECOVERY - FIRST 15 MIN: Performed by: FAMILY MEDICINE

## 2024-10-10 RX ORDER — MIDAZOLAM HYDROCHLORIDE 1 MG/ML
INJECTION INTRAMUSCULAR; INTRAVENOUS PRN
Status: DISCONTINUED | OUTPATIENT
Start: 2024-10-10 | End: 2024-10-10 | Stop reason: HOSPADM

## 2024-10-10 RX ORDER — SODIUM CHLORIDE 0.9 % (FLUSH) 0.9 %
5-40 SYRINGE (ML) INJECTION PRN
Status: DISCONTINUED | OUTPATIENT
Start: 2024-10-10 | End: 2024-10-10 | Stop reason: HOSPADM

## 2024-10-10 RX ORDER — NITROGLYCERIN 20 MG/100ML
INJECTION INTRAVENOUS PRN
Status: DISCONTINUED | OUTPATIENT
Start: 2024-10-10 | End: 2024-10-10 | Stop reason: HOSPADM

## 2024-10-10 RX ORDER — ACETAMINOPHEN 325 MG/1
650 TABLET ORAL EVERY 4 HOURS PRN
Status: DISCONTINUED | OUTPATIENT
Start: 2024-10-10 | End: 2024-10-10 | Stop reason: HOSPADM

## 2024-10-10 RX ORDER — SODIUM CHLORIDE 9 MG/ML
INJECTION, SOLUTION INTRAVENOUS PRN
Status: DISCONTINUED | OUTPATIENT
Start: 2024-10-10 | End: 2024-10-10 | Stop reason: HOSPADM

## 2024-10-10 RX ORDER — SODIUM CHLORIDE 0.9 % (FLUSH) 0.9 %
5-40 SYRINGE (ML) INJECTION EVERY 12 HOURS SCHEDULED
Status: DISCONTINUED | OUTPATIENT
Start: 2024-10-10 | End: 2024-10-10 | Stop reason: HOSPADM

## 2024-10-10 RX ORDER — IOPAMIDOL 755 MG/ML
INJECTION, SOLUTION INTRAVASCULAR PRN
Status: DISCONTINUED | OUTPATIENT
Start: 2024-10-10 | End: 2024-10-10 | Stop reason: HOSPADM

## 2024-10-10 RX ORDER — SODIUM CHLORIDE 9 MG/ML
INJECTION, SOLUTION INTRAVENOUS CONTINUOUS
Status: DISCONTINUED | OUTPATIENT
Start: 2024-10-10 | End: 2024-10-10 | Stop reason: HOSPADM

## 2024-10-10 RX ORDER — DIPHENHYDRAMINE HCL 25 MG
50 CAPSULE ORAL ONCE
Status: DISCONTINUED | OUTPATIENT
Start: 2024-10-10 | End: 2024-10-10 | Stop reason: HOSPADM

## 2024-10-10 RX ORDER — NITROGLYCERIN 0.4 MG/1
0.4 TABLET SUBLINGUAL EVERY 5 MIN PRN
Status: DISCONTINUED | OUTPATIENT
Start: 2024-10-10 | End: 2024-10-10 | Stop reason: HOSPADM

## 2024-10-10 RX ORDER — LIDOCAINE HYDROCHLORIDE 10 MG/ML
INJECTION, SOLUTION INFILTRATION; PERINEURAL PRN
Status: DISCONTINUED | OUTPATIENT
Start: 2024-10-10 | End: 2024-10-10 | Stop reason: HOSPADM

## 2024-10-10 RX ADMIN — SODIUM CHLORIDE: 9 INJECTION, SOLUTION INTRAVENOUS at 12:26

## 2024-10-10 RX ADMIN — ACETAMINOPHEN 650 MG: 325 TABLET ORAL at 15:00

## 2024-10-10 ASSESSMENT — PAIN DESCRIPTION - DESCRIPTORS: DESCRIPTORS: STABBING

## 2024-10-10 ASSESSMENT — PAIN SCALES - GENERAL: PAINLEVEL_OUTOF10: 7

## 2024-10-10 ASSESSMENT — PAIN DESCRIPTION - ORIENTATION: ORIENTATION: RIGHT

## 2024-10-10 ASSESSMENT — PAIN DESCRIPTION - LOCATION: LOCATION: HEAD

## 2024-10-10 NOTE — PROGRESS NOTES
Patient brought back to pre/post procedure area. Patient denies any pain. Elastoplast and gauze in place to right wrist. Pulse is +1 distal to puncture site.

## 2024-10-10 NOTE — DISCHARGE INSTRUCTIONS
Discharge Instructions for Cardiac Catheterization    A cardiac catheterization is a diagnostic test used to evaluate the health of the heart and its blood vessels. The test is done with a thin catheter carefully threaded into your heart from a leg or arm artery. Most likely, you will be allowed to go home the same day as the procedure.   Steps to Take at Home:   Pain- apply ice to site 15-20 minutes every hour for the first 2 days.   Showering is okay 24 hours after procedure.    No soaking in a pool, hot tub, bath tub, or standing water for one week.   Bleeding (outward or under the skin-hematoma)- apply firm pressure for 10-15 minutes or until the bleeding stops, then call your doctor. If unable to get bleeding stopped, call 911.    Kidney damage- Call if you urinate less than normal, have swelling or feel puffy, and/or gain 2 or more pounds over night in the first week.   If procedure was in ARM:  You were instructed to keep wrist straight and still for two hours after the procedure. The arm and hand may now be used for normal daily activities except, avoid using the heal of hand while getting up and down from furniture for the first few days.  Keep affected arm elevated, hand higher than elbow, while pressure dressing in place to decrease swelling.  Gauze and Elastoplast   Remove in 4 hours as follows:     TIME___7:30PM____________________  Remove 1 piece of tape at a time, waiting 15 -20 minutes between layers to monitor for bleeding.   If dressing sticks, place wrist under cool running water to help loosen gauze from site then pat site dry.   If hand feels numb, tingly, and/or cold- loosen first 1-2 layers of tape if dressing still in place.  If no relief noticed, remove pressure dressing as per above instructions. Seek medical help if no relief or if dressing already off.     Diet   Drink plenty of fluids after the test to flush the x-ray dye from your system.   Return to your normal diet.   No alcoholic

## 2024-10-14 RX ORDER — TRAZODONE HYDROCHLORIDE 50 MG/1
TABLET, FILM COATED ORAL
Qty: 270 TABLET | Refills: 1 | Status: SHIPPED | OUTPATIENT
Start: 2024-10-14

## 2024-12-03 ENCOUNTER — HOSPITAL ENCOUNTER (OUTPATIENT)
Age: 54
Discharge: HOME | End: 2024-12-03
Payer: COMMERCIAL

## 2024-12-03 ENCOUNTER — OFFICE VISIT (OUTPATIENT)
Dept: PRIMARY CARE CLINIC | Age: 54
End: 2024-12-03

## 2024-12-03 VITALS
WEIGHT: 223 LBS | OXYGEN SATURATION: 98 % | TEMPERATURE: 96.6 F | HEART RATE: 62 BPM | DIASTOLIC BLOOD PRESSURE: 80 MMHG | SYSTOLIC BLOOD PRESSURE: 120 MMHG | BODY MASS INDEX: 37.11 KG/M2

## 2024-12-03 DIAGNOSIS — E03.9 HYPOTHYROIDISM, UNSPECIFIED TYPE: ICD-10-CM

## 2024-12-03 DIAGNOSIS — E83.42 HYPOMAGNESEMIA: ICD-10-CM

## 2024-12-03 DIAGNOSIS — I10 ESSENTIAL HYPERTENSION: ICD-10-CM

## 2024-12-03 DIAGNOSIS — E78.2 MODERATE MIXED HYPERLIPIDEMIA NOT REQUIRING STATIN THERAPY: ICD-10-CM

## 2024-12-03 DIAGNOSIS — F32.1 MODERATE SINGLE CURRENT EPISODE OF MAJOR DEPRESSIVE DISORDER (HCC): ICD-10-CM

## 2024-12-03 DIAGNOSIS — Z00.00 WELLNESS EXAMINATION: ICD-10-CM

## 2024-12-03 DIAGNOSIS — I10 ESSENTIAL HYPERTENSION: Primary | ICD-10-CM

## 2024-12-03 PROBLEM — N17.9 AKI (ACUTE KIDNEY INJURY) (HCC): Status: RESOLVED | Noted: 2024-08-03 | Resolved: 2024-12-03

## 2024-12-03 PROBLEM — R11.15 PERSISTENT VOMITING: Status: RESOLVED | Noted: 2021-01-25 | Resolved: 2024-12-03

## 2024-12-03 PROBLEM — R11.2 INTRACTABLE NAUSEA AND VOMITING: Status: RESOLVED | Noted: 2021-01-25 | Resolved: 2024-12-03

## 2024-12-03 LAB
25(OH)D3 SERPL-MCNC: 37.9 NG/ML (ref 30–100)
ALT SERPL-CCNC: 17 U/L (ref 10–35)
ANION GAP SERPL CALCULATED.3IONS-SCNC: 8 MMOL/L (ref 9–16)
AST SERPL-CCNC: 25 U/L (ref 10–35)
BUN SERPL-MCNC: 23 MG/DL (ref 6–20)
BUN/CREAT SERPL: 23 (ref 9–20)
CALCIUM SERPL-MCNC: 9.7 MG/DL (ref 8.6–10.4)
CHLORIDE SERPL-SCNC: 105 MMOL/L (ref 98–107)
CHOLEST SERPL-MCNC: 222 MG/DL (ref 0–199)
CHOLESTEROL/HDL RATIO: 4.3
CO2 SERPL-SCNC: 29 MMOL/L (ref 20–31)
CREAT SERPL-MCNC: 1 MG/DL (ref 0.5–0.9)
ERYTHROCYTE [DISTWIDTH] IN BLOOD BY AUTOMATED COUNT: 13.4 % (ref 11.8–14.4)
EST. AVERAGE GLUCOSE BLD GHB EST-MCNC: 105 MG/DL
GFR, ESTIMATED: 64 ML/MIN/1.73M2
GLUCOSE SERPL-MCNC: 93 MG/DL (ref 74–99)
HBA1C MFR BLD: 5.3 % (ref 4–6)
HCT VFR BLD AUTO: 37.7 % (ref 36.3–47.1)
HDLC SERPL-MCNC: 52 MG/DL
HGB BLD-MCNC: 12.6 G/DL (ref 11.9–15.1)
LDLC SERPL CALC-MCNC: 129 MG/DL (ref 0–100)
MAGNESIUM SERPL-MCNC: 1.9 MG/DL (ref 1.6–2.6)
MCH RBC QN AUTO: 34.1 PG (ref 25.2–33.5)
MCHC RBC AUTO-ENTMCNC: 33.4 G/DL (ref 28.4–34.8)
MCV RBC AUTO: 102.2 FL (ref 82.6–102.9)
NRBC BLD-RTO: 0 PER 100 WBC
PLATELET # BLD AUTO: 296 K/UL (ref 138–453)
PMV BLD AUTO: 9.4 FL (ref 8.1–13.5)
POTASSIUM SERPL-SCNC: 5.1 MMOL/L (ref 3.7–5.3)
RBC # BLD AUTO: 3.69 M/UL (ref 3.95–5.11)
SODIUM SERPL-SCNC: 142 MMOL/L (ref 136–145)
TRIGL SERPL-MCNC: 204 MG/DL
TSH SERPL DL<=0.05 MIU/L-ACNC: 1.55 UIU/ML (ref 0.27–4.2)
VLDLC SERPL CALC-MCNC: 41 MG/DL (ref 1–30)
WBC OTHER # BLD: 5.7 K/UL (ref 3.5–11.3)

## 2024-12-03 PROCEDURE — G0480 DRUG TEST DEF 1-7 CLASSES: HCPCS

## 2024-12-03 PROCEDURE — 83735 ASSAY OF MAGNESIUM: CPT

## 2024-12-03 PROCEDURE — 84443 ASSAY THYROID STIM HORMONE: CPT

## 2024-12-03 PROCEDURE — 84450 TRANSFERASE (AST) (SGOT): CPT

## 2024-12-03 PROCEDURE — 83516 IMMUNOASSAY NONANTIBODY: CPT

## 2024-12-03 PROCEDURE — 36415 COLL VENOUS BLD VENIPUNCTURE: CPT

## 2024-12-03 PROCEDURE — 85027 COMPLETE CBC AUTOMATED: CPT

## 2024-12-03 PROCEDURE — 83036 HEMOGLOBIN GLYCOSYLATED A1C: CPT

## 2024-12-03 PROCEDURE — 82306 VITAMIN D 25 HYDROXY: CPT

## 2024-12-03 PROCEDURE — 84460 ALANINE AMINO (ALT) (SGPT): CPT

## 2024-12-03 PROCEDURE — 80048 BASIC METABOLIC PNL TOTAL CA: CPT

## 2024-12-03 PROCEDURE — 80061 LIPID PANEL: CPT

## 2024-12-03 NOTE — PROGRESS NOTES
Name: Andressa Boone  : 1970         Chief Complaint:     Chief Complaint   Patient presents with    Hypertension     admits monitoring blood pressure at home. At-home blood pressure is averaging 105-120/60-70. admits a well balanced diet. admits following a low-sodium diet. For physical activity, notes walking. Denies chest pain, shortness of breath, headache, vision changes, palpitations, lightheadedness, or dizziness.       Anxiety     Pt states her anxiety comes and goes. Denies thoughts of harming herself or others.    Hypothyroidism     Current medication includes levothyroxine.       History of Present Illness:      Andressa Boone is a 54 y.o.  female who presents with Hypertension (admits monitoring blood pressure at home. At-home blood pressure is averaging 105-120/60-70. admits a well balanced diet. admits following a low-sodium diet. For physical activity, notes walking. Denies chest pain, shortness of breath, headache, vision changes, palpitations, lightheadedness, or dizziness. /), Anxiety (Pt states her anxiety comes and goes. Denies thoughts of harming herself or others.), and Hypothyroidism (Current medication includes levothyroxine.)      HPI    Hypertension:  Current treatment includes metoprolol succinate 100 mg daily and diltiazem 300 mg daily.  She is monitoring her blood pressure at home and this is averaging 105-120/60-70.  She admits well-balanced diet and low-salt diet.  For physical activity she is walking.  Denies chest pain, shortness of breath, headache, vision changes, palpitations, lightheadedness, or dizziness.    Anxiety, Depression:  Following with Dr. Strong (psych, [place]).  Current treatment includes Cymbalta 30 mg daily and Xanax as needed. She states her mood is stable.     Hypothyroidism:  Current treatment includes levothyroxine 75 mcg daily.  2024 TSH 0.77.    Cardiology:  Following with [facility] for hx of PAF, abnormal stress test, chest pain,

## 2024-12-04 LAB
TISSUE TRANSGLUTAMINASE ANTIBODY IGG: <0.6 U/ML
TTG IGA SER IA-ACNC: 1.3 U/ML

## 2024-12-07 LAB
COTININE: <5 NG/ML
NICOTINE: <5 NG/ML

## 2024-12-11 ENCOUNTER — HOSPITAL ENCOUNTER (OUTPATIENT)
Dept: CT IMAGING | Age: 54
Discharge: HOME | End: 2024-12-13
Payer: COMMERCIAL

## 2024-12-11 DIAGNOSIS — K90.0 CELIAC DISEASE: ICD-10-CM

## 2024-12-11 DIAGNOSIS — R10.30 LOWER ABDOMINAL PAIN: ICD-10-CM

## 2024-12-11 DIAGNOSIS — R19.5 LOOSE STOOLS: ICD-10-CM

## 2024-12-11 DIAGNOSIS — K21.9 GASTROESOPHAGEAL REFLUX DISEASE WITHOUT ESOPHAGITIS: ICD-10-CM

## 2024-12-11 PROCEDURE — 74177 CT ABD & PELVIS W/CONTRAST: CPT

## 2024-12-11 PROCEDURE — 6360000004 HC RX CONTRAST MEDICATION: Performed by: NURSE PRACTITIONER

## 2024-12-11 RX ORDER — IOPAMIDOL 755 MG/ML
75 INJECTION, SOLUTION INTRAVASCULAR
Status: COMPLETED | OUTPATIENT
Start: 2024-12-11 | End: 2024-12-11

## 2024-12-11 RX ORDER — IOPAMIDOL 755 MG/ML
18 INJECTION, SOLUTION INTRAVASCULAR
Status: COMPLETED | OUTPATIENT
Start: 2024-12-11 | End: 2024-12-11

## 2024-12-11 RX ADMIN — IOPAMIDOL 75 ML: 755 INJECTION, SOLUTION INTRAVENOUS at 16:13

## 2024-12-11 RX ADMIN — IOPAMIDOL 18 ML: 755 INJECTION, SOLUTION INTRAVENOUS at 16:13

## 2024-12-16 ENCOUNTER — OFFICE VISIT (OUTPATIENT)
Dept: PRIMARY CARE CLINIC | Age: 54
End: 2024-12-16
Payer: COMMERCIAL

## 2024-12-16 VITALS
OXYGEN SATURATION: 98 % | SYSTOLIC BLOOD PRESSURE: 130 MMHG | BODY MASS INDEX: 37.44 KG/M2 | DIASTOLIC BLOOD PRESSURE: 84 MMHG | HEART RATE: 60 BPM | WEIGHT: 225 LBS | TEMPERATURE: 95.9 F

## 2024-12-16 DIAGNOSIS — Z12.31 ENCOUNTER FOR SCREENING MAMMOGRAM FOR MALIGNANT NEOPLASM OF BREAST: ICD-10-CM

## 2024-12-16 DIAGNOSIS — H00.015 HORDEOLUM EXTERNUM OF LEFT LOWER EYELID: ICD-10-CM

## 2024-12-16 DIAGNOSIS — Z00.00 ANNUAL WELLNESS VISIT: Primary | ICD-10-CM

## 2024-12-16 PROCEDURE — 99396 PREV VISIT EST AGE 40-64: CPT | Performed by: NURSE PRACTITIONER

## 2024-12-16 PROCEDURE — 3079F DIAST BP 80-89 MM HG: CPT | Performed by: NURSE PRACTITIONER

## 2024-12-16 PROCEDURE — 3075F SYST BP GE 130 - 139MM HG: CPT | Performed by: NURSE PRACTITIONER

## 2024-12-16 PROCEDURE — 99213 OFFICE O/P EST LOW 20 MIN: CPT | Performed by: NURSE PRACTITIONER

## 2024-12-16 RX ORDER — DOXYCYCLINE HYCLATE 100 MG
100 TABLET ORAL 2 TIMES DAILY
Qty: 14 TABLET | Refills: 0 | Status: SHIPPED | OUTPATIENT
Start: 2024-12-16 | End: 2024-12-23

## 2024-12-16 ASSESSMENT — PATIENT HEALTH QUESTIONNAIRE - PHQ9
SUM OF ALL RESPONSES TO PHQ QUESTIONS 1-9: 0
SUM OF ALL RESPONSES TO PHQ QUESTIONS 1-9: 0
5. POOR APPETITE OR OVEREATING: NOT AT ALL
10. IF YOU CHECKED OFF ANY PROBLEMS, HOW DIFFICULT HAVE THESE PROBLEMS MADE IT FOR YOU TO DO YOUR WORK, TAKE CARE OF THINGS AT HOME, OR GET ALONG WITH OTHER PEOPLE: NOT DIFFICULT AT ALL
2. FEELING DOWN, DEPRESSED OR HOPELESS: NOT AT ALL
SUM OF ALL RESPONSES TO PHQ QUESTIONS 1-9: 0
4. FEELING TIRED OR HAVING LITTLE ENERGY: NOT AT ALL
9. THOUGHTS THAT YOU WOULD BE BETTER OFF DEAD, OR OF HURTING YOURSELF: NOT AT ALL
SUM OF ALL RESPONSES TO PHQ QUESTIONS 1-9: 0
3. TROUBLE FALLING OR STAYING ASLEEP: NOT AT ALL
SUM OF ALL RESPONSES TO PHQ9 QUESTIONS 1 & 2: 0
7. TROUBLE CONCENTRATING ON THINGS, SUCH AS READING THE NEWSPAPER OR WATCHING TELEVISION: NOT AT ALL
6. FEELING BAD ABOUT YOURSELF - OR THAT YOU ARE A FAILURE OR HAVE LET YOURSELF OR YOUR FAMILY DOWN: NOT AT ALL
1. LITTLE INTEREST OR PLEASURE IN DOING THINGS: NOT AT ALL
8. MOVING OR SPEAKING SO SLOWLY THAT OTHER PEOPLE COULD HAVE NOTICED. OR THE OPPOSITE, BEING SO FIGETY OR RESTLESS THAT YOU HAVE BEEN MOVING AROUND A LOT MORE THAN USUAL: NOT AT ALL

## 2024-12-16 NOTE — PROGRESS NOTES
PM    MCHC 33.4 12/03/2024 04:09 PM    RDW 13.4 12/03/2024 04:09 PM     12/03/2024 04:09 PM    MPV 9.4 12/03/2024 04:09 PM     Lab Results   Component Value Date/Time    TSH 1.55 12/03/2024 04:10 PM     Lab Results   Component Value Date/Time    CHOL 222 12/03/2024 04:10 PM     12/03/2024 04:10 PM     04/22/2020 07:35 AM    HDL 52 12/03/2024 04:10 PM    LABA1C 5.3 12/03/2024 04:09 PM       Assessment/Plan:      Diagnosis Orders   1. Encounter for screening mammogram for malignant neoplasm of breast        2. Annual wellness visit [Z00.00]          Wellness:  - Counseled on well-balanced diet and routine physical activity  - Encouraged routine eye and dental exams  - Patient planning to obtain flu vaccine through her employer  - Encouraged shingles and COVID-vaccine  - Request colonoscopy report from [facility]  - Following with gynecology annually  - Mammogram scheduled 12/2024  - Reviewed labs with the patient 12/2024 including elevated lipid panel.  Counseled on low-cholesterol diet and routine exercise    Stye:   -- No improvement/worsening x2 weeks despite warm compress   -- With location of lesion, I do not believe topical opth drops/ointment will be beneficial. Doxycyline 100mg BID x7 days   -- Continue warm compresses   -- If worsening/persistent sx notify office. Discussed will need to see eye doctor at that time     -- Reviewed emergent signs and symptoms and when to seek care at the emergency department and/or call 911     Completed Refills   Requested Prescriptions      No prescriptions requested or ordered in this encounter       No orders of the defined types were placed in this encounter.       No results found for this visit on 12/16/24.    No follow-ups on file.    Electronically signed by BRADLEY Funes CNP on 12/16/24 at 11:24 AM.

## 2024-12-17 PROCEDURE — 93298 REM INTERROG DEV EVAL SCRMS: CPT | Performed by: FAMILY MEDICINE

## 2024-12-18 ENCOUNTER — NURSE ONLY (OUTPATIENT)
Dept: CARDIOLOGY | Age: 54
End: 2024-12-18
Payer: COMMERCIAL

## 2024-12-18 DIAGNOSIS — I48.0 PAF (PAROXYSMAL ATRIAL FIBRILLATION) (HCC): ICD-10-CM

## 2024-12-18 DIAGNOSIS — Z45.09 ENCOUNTER FOR LOOP RECORDER CHECK: Primary | ICD-10-CM

## 2024-12-30 ENCOUNTER — HOSPITAL ENCOUNTER (OUTPATIENT)
Age: 54
Setting detail: SPECIMEN
Discharge: HOME | End: 2024-12-30
Payer: COMMERCIAL

## 2024-12-30 LAB
DATE, STOOL #1: NORMAL
HEMOCCULT SP1 STL QL: NEGATIVE
TIME, STOOL #1: 823

## 2024-12-30 PROCEDURE — 87338 HPYLORI STOOL AG IA: CPT

## 2024-12-30 PROCEDURE — 83993 ASSAY FOR CALPROTECTIN FECAL: CPT

## 2024-12-30 PROCEDURE — 82272 OCCULT BLD FECES 1-3 TESTS: CPT

## 2024-12-30 RX ORDER — METOPROLOL SUCCINATE 100 MG/1
TABLET, EXTENDED RELEASE ORAL
Qty: 30 TABLET | Refills: 0 | Status: SHIPPED | OUTPATIENT
Start: 2024-12-30 | End: 2024-12-30 | Stop reason: SDUPTHER

## 2024-12-30 RX ORDER — METOPROLOL SUCCINATE 100 MG/1
TABLET, EXTENDED RELEASE ORAL
Qty: 90 TABLET | Refills: 3 | Status: SHIPPED | OUTPATIENT
Start: 2024-12-30

## 2024-12-30 NOTE — TELEPHONE ENCOUNTER
Please sign for a short script to [facility] and then I'll send the [facility], thank you.

## 2024-12-31 LAB
MICROORGANISM/AGENT SPEC: NEGATIVE
SPECIMEN DESCRIPTION: NORMAL

## 2025-01-02 LAB — CALPROTECTIN, FECAL: 112 UG/G

## 2025-01-09 RX ORDER — TRAZODONE HYDROCHLORIDE 50 MG/1
TABLET, FILM COATED ORAL
Qty: 270 TABLET | Refills: 1 | Status: SHIPPED | OUTPATIENT
Start: 2025-01-09

## 2025-01-14 RX ORDER — METOPROLOL SUCCINATE 100 MG/1
TABLET, EXTENDED RELEASE ORAL
Qty: 90 TABLET | Refills: 3 | Status: SHIPPED | OUTPATIENT
Start: 2025-01-14

## 2025-02-10 ENCOUNTER — HOSPITAL ENCOUNTER (OUTPATIENT)
Dept: GENERAL RADIOLOGY | Age: 55
Discharge: HOME | End: 2025-02-12
Payer: COMMERCIAL

## 2025-02-10 ENCOUNTER — HOSPITAL ENCOUNTER (OUTPATIENT)
Age: 55
Discharge: HOME | End: 2025-02-12
Payer: COMMERCIAL

## 2025-02-10 ENCOUNTER — OFFICE VISIT (OUTPATIENT)
Dept: PRIMARY CARE CLINIC | Age: 55
End: 2025-02-10
Payer: COMMERCIAL

## 2025-02-10 VITALS
DIASTOLIC BLOOD PRESSURE: 84 MMHG | BODY MASS INDEX: 38.71 KG/M2 | HEART RATE: 66 BPM | WEIGHT: 232.6 LBS | OXYGEN SATURATION: 98 % | SYSTOLIC BLOOD PRESSURE: 126 MMHG

## 2025-02-10 DIAGNOSIS — I48.0 PAF (PAROXYSMAL ATRIAL FIBRILLATION) (HCC): ICD-10-CM
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
## 2025-02-10 DIAGNOSIS — R52 PAIN: ICD-10-CM
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
## 2025-02-10 PROCEDURE — 72070 X-RAY EXAM THORAC SPINE 2VWS: CPT

## 2025-02-10 PROCEDURE — 73080 X-RAY EXAM OF ELBOW: CPT

## 2025-02-10 PROCEDURE — 72040 X-RAY EXAM NECK SPINE 2-3 VW: CPT

## 2025-02-10 RX ORDER — BACLOFEN 10 MG/1
10 TABLET ORAL 3 TIMES DAILY
Qty: 30 TABLET | Refills: 0 | Status: SHIPPED | OUTPATIENT
Start: 2025-02-10 | End: 2025-02-20

## 2025-02-10 ASSESSMENT — PATIENT HEALTH QUESTIONNAIRE - PHQ9
7. TROUBLE CONCENTRATING ON THINGS, SUCH AS READING THE NEWSPAPER OR WATCHING TELEVISION: NOT AT ALL
2. FEELING DOWN, DEPRESSED OR HOPELESS: NOT AT ALL
5. POOR APPETITE OR OVEREATING: NOT AT ALL
SUM OF ALL RESPONSES TO PHQ QUESTIONS 1-9: 3
SUM OF ALL RESPONSES TO PHQ9 QUESTIONS 1 & 2: 0
8. MOVING OR SPEAKING SO SLOWLY THAT OTHER PEOPLE COULD HAVE NOTICED. OR THE OPPOSITE, BEING SO FIGETY OR RESTLESS THAT YOU HAVE BEEN MOVING AROUND A LOT MORE THAN USUAL: NOT AT ALL
10. IF YOU CHECKED OFF ANY PROBLEMS, HOW DIFFICULT HAVE THESE PROBLEMS MADE IT FOR YOU TO DO YOUR WORK, TAKE CARE OF THINGS AT HOME, OR GET ALONG WITH OTHER PEOPLE: NOT DIFFICULT AT ALL
1. LITTLE INTEREST OR PLEASURE IN DOING THINGS: NOT AT ALL
9. THOUGHTS THAT YOU WOULD BE BETTER OFF DEAD, OR OF HURTING YOURSELF: NOT AT ALL
4. FEELING TIRED OR HAVING LITTLE ENERGY: NEARLY EVERY DAY
SUM OF ALL RESPONSES TO PHQ QUESTIONS 1-9: 3
3. TROUBLE FALLING OR STAYING ASLEEP: NOT AT ALL
6. FEELING BAD ABOUT YOURSELF - OR THAT YOU ARE A FAILURE OR HAVE LET YOURSELF OR YOUR FAMILY DOWN: NOT AT ALL

## 2025-02-10 NOTE — PROGRESS NOTES
[facility]      Patient:  Andressa Boone 54 y.o. female     Date of Service: 02/10/25        Chief Complaint:   Chief Complaint   Patient presents with    Fall     Patient states she fell on Saturday on the ice when she stepped up on her porch.  She states she hurts all over and wants to know if she can get a muscle relaxer.          History of Present Illness     History of Present Illness  The patient presents for evaluation of a fall.    She experienced a forward fall from a standing position onto concrete on Saturday night, resulting in knee impact and subsequent body jarring. She reports no head or neck trauma but describes pain extending from her neck to her shoulders. The right elbow is identified as the primary source of discomfort. Despite the incident, she was able to continue her work the following day. However, she reports an escalation in pain since the initial fall. She has no history of neck issues. She is currently on Eliquis for atrial fibrillation and reports no unusual bruising or bleeding. She has a past medical history of chronic kidney disease and has previously used muscle relaxants. She has been managing her pain with standard dose of naproxen, which provides minimal relief.        Allergies:    Gluten, Rocephin [ceftriaxone], and Bee venom    Medication List:    Current Outpatient Medications   Medication Sig Dispense Refill    metoprolol succinate (TOPROL XL) 100 MG extended release tablet Take one and a half tablets daily for a total of 150 mg daily 90 tablet 3    traZODone (DESYREL) 50 MG tablet TAKE 3 TABLETS BY MOUTH AT BEDTIME AS NEEDED FOR INSOMNIA (Patient taking differently: Take 1 tablet by mouth 3 times daily Patient states she's only been taking a 50mg not 3 tablets) 270 tablet 1    apixaban (ELIQUIS) 5 MG TABS tablet Take 1 tablet by mouth 2 times daily 180 tablet 3    DULoxetine (CYMBALTA) 30 MG extended release capsule Take 1 capsule by mouth daily

## 2025-02-20 ENCOUNTER — TELEPHONE (OUTPATIENT)
Dept: PRIMARY CARE CLINIC | Age: 55
End: 2025-02-20

## 2025-02-20 NOTE — TELEPHONE ENCOUNTER
Patient calls in stating that she was seen 02/10 and given baclofen 10 mg to take for spasms/back pain after a fall on the ice but these are not offering any relief. She has been taking 1000 mg tylenol 1-2x daily and 800 mg 1-2x daily of ibuprofen, alternating these, also. Patient would like to know if there is something else that she can try to help with this pain. Pain is mid back at bra line and lower back. Please advise.

## 2025-02-20 NOTE — TELEPHONE ENCOUNTER
Please advise her to continue with the ibuprofen Tylenol and incorporate topical medications as well such as Voltaren gel, lidocaine jelly lidocaine patches. Unfortunately we cannot use stronger anti-inflammatories given that she is on Eliquis and due to the increased risk of bleeding.  The x-rays did show arthritis as well which is likely further causing her pain.  Will plan to check back shortly to reevaluate and discuss next

## 2025-02-21 ENCOUNTER — TELEPHONE (OUTPATIENT)
Dept: NEPHROLOGY | Age: 55
End: 2025-02-21

## 2025-02-21 DIAGNOSIS — N18.31 CHRONIC KIDNEY DISEASE, STAGE 3A (HCC): Primary | ICD-10-CM

## 2025-02-21 RX ORDER — FUROSEMIDE 20 MG/1
20 TABLET ORAL DAILY
Qty: 90 TABLET | Refills: 1 | Status: SHIPPED | OUTPATIENT
Start: 2025-02-21

## 2025-02-21 RX ORDER — FUROSEMIDE 20 MG/1
20 TABLET ORAL DAILY
Qty: 30 TABLET | Refills: 0 | Status: SHIPPED | OUTPATIENT
Start: 2025-02-21

## 2025-02-21 NOTE — TELEPHONE ENCOUNTER
Patient called asking if she can go back on Lasix? She stated she is having 3+ pitting edema. Her next appointment is 3/18/25. Please advise.

## 2025-02-24 ENCOUNTER — OFFICE VISIT (OUTPATIENT)
Dept: PRIMARY CARE CLINIC | Age: 55
End: 2025-02-24
Payer: COMMERCIAL

## 2025-02-24 VITALS
SYSTOLIC BLOOD PRESSURE: 114 MMHG | DIASTOLIC BLOOD PRESSURE: 74 MMHG | HEART RATE: 72 BPM | WEIGHT: 230.8 LBS | BODY MASS INDEX: 38.41 KG/M2 | OXYGEN SATURATION: 98 %

## 2025-02-24 DIAGNOSIS — M21.41 PES PLANUS OF BOTH FEET: ICD-10-CM

## 2025-02-24 DIAGNOSIS — G89.29 ACUTE ON CHRONIC BACK PAIN: ICD-10-CM

## 2025-02-24 DIAGNOSIS — M21.42 PES PLANUS OF BOTH FEET: ICD-10-CM

## 2025-02-24 DIAGNOSIS — E66.812 CLASS 2 OBESITY WITH BODY MASS INDEX (BMI) OF 38.0 TO 38.9 IN ADULT, UNSPECIFIED OBESITY TYPE, UNSPECIFIED WHETHER SERIOUS COMORBIDITY PRESENT: ICD-10-CM
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
## 2025-02-24 DIAGNOSIS — M54.9 ACUTE ON CHRONIC BACK PAIN: ICD-10-CM

## 2025-02-24 PROBLEM — N18.31 CHRONIC KIDNEY DISEASE, STAGE 3A (HCC): Status: ACTIVE | Noted: 2025-02-24

## 2025-02-24 PROCEDURE — 99214 OFFICE O/P EST MOD 30 MIN: CPT | Performed by: FAMILY MEDICINE

## 2025-02-24 PROCEDURE — 3078F DIAST BP <80 MM HG: CPT | Performed by: FAMILY MEDICINE

## 2025-02-24 PROCEDURE — 3074F SYST BP LT 130 MM HG: CPT | Performed by: FAMILY MEDICINE

## 2025-02-24 PROCEDURE — G2211 COMPLEX E/M VISIT ADD ON: HCPCS | Performed by: FAMILY MEDICINE

## 2025-02-24 RX ORDER — PHENTERMINE HYDROCHLORIDE 15 MG/1
15 CAPSULE ORAL 2 TIMES DAILY
Qty: 60 CAPSULE | Refills: 0 | Status: SHIPPED | OUTPATIENT
Start: 2025-02-24 | End: 2025-03-26

## 2025-02-24 NOTE — PROGRESS NOTES
[facility]      Patient:  Andressa Boone 55 y.o. female     Date of Service: 02/24/25        Chief Complaint:   Chief Complaint   Patient presents with    Back Pain     FU: Patient states she's still pretty sore and still taking the Baclofen.   Most days she is an 8/10. Patient is stretching daily.         History of Present Illness     History of Present Illness  Fu recent fall     She reports persistent soreness in her lower, mid, and upper back, which she attributes to a fall that occurred approximately 2 weeks ago. She experiences intermittent spasms in these areas. She has been managing the pain with alternating doses of Tylenol and ibuprofen. She is currently on a regimen of baclofen 10 mg, taken twice daily, but expresses a desire to discontinue this medication once her symptoms improve.    She also reports bilateral foot pain, instability in her ankles, and limited range of motion, with the right side being more affected than the left. These symptoms have been present for an extended period and are recurrent in nature. She has not experienced any episodes of ankle rolling. She has previously been provided with inserts for her shoes and has received physical therapy at the hospital. However, she has not consulted with a podiatrist. She expresses a strong desire to resume running, a hobby she had to abandon due to COVID-19 related complications.    She is currently on metformin and has previously tried Adipex, which she found beneficial. She is interested in exploring the possibility of resuming Adipex. She reports no history of hyperthyroidism, seizures, glaucoma, coronary artery disease, or hypotension. She is not currently on any other stimulants.        Allergies:    Gluten, Rocephin [ceftriaxone], and Bee venom    Medication List:    Current Outpatient Medications   Medication Sig Dispense Refill    furosemide (LASIX) 20 MG tablet Take 1 tablet by mouth daily 30 tablet 0

## 2025-03-18 PROCEDURE — 93298 REM INTERROG DEV EVAL SCRMS: CPT | Performed by: FAMILY MEDICINE

## 2025-03-19 ENCOUNTER — CLINICAL SUPPORT (OUTPATIENT)
Dept: CARDIOLOGY | Age: 55
End: 2025-03-19
Payer: COMMERCIAL

## 2025-03-19 DIAGNOSIS — R55 SYNCOPE, UNSPECIFIED SYNCOPE TYPE: ICD-10-CM

## 2025-03-19 DIAGNOSIS — Z45.09 ENCOUNTER FOR LOOP RECORDER CHECK: Primary | ICD-10-CM

## 2025-03-24 ENCOUNTER — OFFICE VISIT (OUTPATIENT)
Dept: PRIMARY CARE CLINIC | Age: 55
End: 2025-03-24
Payer: COMMERCIAL

## 2025-03-24 VITALS
SYSTOLIC BLOOD PRESSURE: 94 MMHG | WEIGHT: 215.6 LBS | HEART RATE: 67 BPM | OXYGEN SATURATION: 98 % | BODY MASS INDEX: 35.88 KG/M2 | DIASTOLIC BLOOD PRESSURE: 72 MMHG

## 2025-03-24 DIAGNOSIS — G89.29 ACUTE ON CHRONIC BACK PAIN: ICD-10-CM

## 2025-03-24 DIAGNOSIS — N18.31 CHRONIC KIDNEY DISEASE, STAGE 3A (HCC): ICD-10-CM

## 2025-03-24 DIAGNOSIS — E66.812 CLASS 2 OBESITY WITH BODY MASS INDEX (BMI) OF 38.0 TO 38.9 IN ADULT, UNSPECIFIED OBESITY TYPE, UNSPECIFIED WHETHER SERIOUS COMORBIDITY PRESENT: ICD-10-CM

## 2025-03-24 DIAGNOSIS — I48.91 ATRIAL FIBRILLATION, UNSPECIFIED TYPE (HCC): ICD-10-CM

## 2025-03-24 DIAGNOSIS — M54.9 ACUTE ON CHRONIC BACK PAIN: ICD-10-CM

## 2025-03-24 DIAGNOSIS — F32.1 MODERATE SINGLE CURRENT EPISODE OF MAJOR DEPRESSIVE DISORDER (HCC): Primary | ICD-10-CM

## 2025-03-24 PROCEDURE — 3074F SYST BP LT 130 MM HG: CPT | Performed by: FAMILY MEDICINE

## 2025-03-24 PROCEDURE — 99214 OFFICE O/P EST MOD 30 MIN: CPT | Performed by: FAMILY MEDICINE

## 2025-03-24 PROCEDURE — 3078F DIAST BP <80 MM HG: CPT | Performed by: FAMILY MEDICINE

## 2025-03-24 PROCEDURE — G2211 COMPLEX E/M VISIT ADD ON: HCPCS | Performed by: FAMILY MEDICINE

## 2025-03-24 ASSESSMENT — PATIENT HEALTH QUESTIONNAIRE - PHQ9
5. POOR APPETITE OR OVEREATING: NOT AT ALL
SUM OF ALL RESPONSES TO PHQ QUESTIONS 1-9: 0
3. TROUBLE FALLING OR STAYING ASLEEP: NOT AT ALL
6. FEELING BAD ABOUT YOURSELF - OR THAT YOU ARE A FAILURE OR HAVE LET YOURSELF OR YOUR FAMILY DOWN: NOT AT ALL
4. FEELING TIRED OR HAVING LITTLE ENERGY: NOT AT ALL
2. FEELING DOWN, DEPRESSED OR HOPELESS: NOT AT ALL
7. TROUBLE CONCENTRATING ON THINGS, SUCH AS READING THE NEWSPAPER OR WATCHING TELEVISION: NOT AT ALL
1. LITTLE INTEREST OR PLEASURE IN DOING THINGS: NOT AT ALL
8. MOVING OR SPEAKING SO SLOWLY THAT OTHER PEOPLE COULD HAVE NOTICED. OR THE OPPOSITE, BEING SO FIGETY OR RESTLESS THAT YOU HAVE BEEN MOVING AROUND A LOT MORE THAN USUAL: NOT AT ALL
9. THOUGHTS THAT YOU WOULD BE BETTER OFF DEAD, OR OF HURTING YOURSELF: NOT AT ALL
SUM OF ALL RESPONSES TO PHQ QUESTIONS 1-9: 0
10. IF YOU CHECKED OFF ANY PROBLEMS, HOW DIFFICULT HAVE THESE PROBLEMS MADE IT FOR YOU TO DO YOUR WORK, TAKE CARE OF THINGS AT HOME, OR GET ALONG WITH OTHER PEOPLE: NOT DIFFICULT AT ALL

## 2025-03-24 NOTE — PROGRESS NOTES
[facility]      Patient:  Andressa Boone 55 y.o. female     Date of Service: 03/24/25        Chief Complaint:   Chief Complaint   Patient presents with    Weight Management     Only can take in the morning- Adipex  2/24- 230lb-3/24-215lb    Back Pain     Feeling better         History of Present Illness     History of Present Illness  Follow-up on chronic back pain, symptoms much improved at this time.    Also follow-up on weight loss, is down about 15 pounds over the past few weeks.  Tolerating Adipex well daily.  No reported side effects such as chest pain, headaches, chest discomfort, palpitations, dry mouth        Allergies:    Gluten, Rocephin [ceftriaxone], and Bee venom    Medication List:    Current Outpatient Medications   Medication Sig Dispense Refill    phentermine 15 MG capsule Take 1 capsule by mouth in the morning and at bedtime for 30 days. Max Daily Amount: 30 mg 60 capsule 0    furosemide (LASIX) 20 MG tablet Take 1 tablet by mouth daily 30 tablet 0    metFORMIN (GLUCOPHAGE) 1000 MG tablet TAKE 1 TABLET BY MOUTH 2 TIMES DAILY (WITH MEALS) 180 tablet 3    metoprolol succinate (TOPROL XL) 100 MG extended release tablet Take one and a half tablets daily for a total of 150 mg daily 90 tablet 3    apixaban (ELIQUIS) 5 MG TABS tablet Take 1 tablet by mouth 2 times daily 180 tablet 3    DULoxetine (CYMBALTA) 30 MG extended release capsule Take 1 capsule by mouth daily      ALPRAZolam (XANAX) 0.25 MG tablet Take 1 tablet by mouth nightly as needed for Sleep.      levothyroxine (SYNTHROID) 75 MCG tablet Take 1 tablet by mouth daily 90 tablet 3    naproxen sodium (ANAPROX) 220 MG tablet Take 2 tablets by mouth daily as needed for Pain      dilTIAZem (CARDIZEM CD) 300 MG extended release capsule TAKE 1 CAPSULE BY MOUTH DAILY 90 capsule 3    omeprazole (PRILOSEC) 20 MG delayed release capsule Take 1 capsule by mouth daily as needed (heartburn)      progesterone (PROMETRIUM) 100 MG

## 2025-03-31 DIAGNOSIS — E66.812 CLASS 2 OBESITY WITH BODY MASS INDEX (BMI) OF 38.0 TO 38.9 IN ADULT, UNSPECIFIED OBESITY TYPE, UNSPECIFIED WHETHER SERIOUS COMORBIDITY PRESENT: ICD-10-CM

## 2025-03-31 RX ORDER — PHENTERMINE HYDROCHLORIDE 15 MG/1
15 CAPSULE ORAL 2 TIMES DAILY
Qty: 60 CAPSULE | Refills: 0 | Status: SHIPPED | OUTPATIENT
Start: 2025-03-31 | End: 2025-04-30

## 2025-04-07 RX ORDER — FUROSEMIDE 20 MG/1
20 TABLET ORAL DAILY
Qty: 30 TABLET | Refills: 1 | Status: SHIPPED | OUTPATIENT
Start: 2025-04-07

## 2025-04-16 ENCOUNTER — OFFICE VISIT (OUTPATIENT)
Dept: PRIMARY CARE CLINIC | Age: 55
End: 2025-04-16
Payer: COMMERCIAL

## 2025-04-16 VITALS
OXYGEN SATURATION: 96 % | BODY MASS INDEX: 36.28 KG/M2 | HEART RATE: 55 BPM | DIASTOLIC BLOOD PRESSURE: 70 MMHG | TEMPERATURE: 96.8 F | SYSTOLIC BLOOD PRESSURE: 120 MMHG | WEIGHT: 218 LBS

## 2025-04-16 DIAGNOSIS — E03.9 HYPOTHYROIDISM, UNSPECIFIED TYPE: ICD-10-CM

## 2025-04-16 DIAGNOSIS — E78.2 MODERATE MIXED HYPERLIPIDEMIA NOT REQUIRING STATIN THERAPY: ICD-10-CM

## 2025-04-16 DIAGNOSIS — I10 ESSENTIAL HYPERTENSION: Primary | ICD-10-CM

## 2025-04-16 DIAGNOSIS — F41.1 ANXIETY STATE: ICD-10-CM

## 2025-04-16 DIAGNOSIS — F32.1 MODERATE SINGLE CURRENT EPISODE OF MAJOR DEPRESSIVE DISORDER (HCC): ICD-10-CM

## 2025-04-16 PROBLEM — E87.6 HYPOKALEMIA: Status: RESOLVED | Noted: 2024-08-03 | Resolved: 2025-04-16

## 2025-04-16 PROBLEM — R42 DIZZINESS: Status: RESOLVED | Noted: 2021-01-26 | Resolved: 2025-04-16

## 2025-04-16 PROBLEM — K29.30 CHRONIC SUPERFICIAL GASTRITIS WITHOUT BLEEDING: Status: RESOLVED | Noted: 2024-08-06 | Resolved: 2025-04-16

## 2025-04-16 PROBLEM — T50.905A DRUG-INDUCED HYPOKALEMIA: Status: RESOLVED | Noted: 2024-08-19 | Resolved: 2025-04-16

## 2025-04-16 PROBLEM — E87.8 ELECTROLYTE ABNORMALITY: Status: RESOLVED | Noted: 2024-08-18 | Resolved: 2025-04-16

## 2025-04-16 PROBLEM — R55 RECURRENT SYNCOPE: Status: RESOLVED | Noted: 2021-05-20 | Resolved: 2025-04-16

## 2025-04-16 PROBLEM — M79.10 MYALGIA: Status: RESOLVED | Noted: 2020-12-20 | Resolved: 2025-04-16

## 2025-04-16 PROBLEM — E87.6 DRUG-INDUCED HYPOKALEMIA: Status: RESOLVED | Noted: 2024-08-19 | Resolved: 2025-04-16

## 2025-04-16 PROCEDURE — 3078F DIAST BP <80 MM HG: CPT | Performed by: NURSE PRACTITIONER

## 2025-04-16 PROCEDURE — 99214 OFFICE O/P EST MOD 30 MIN: CPT | Performed by: NURSE PRACTITIONER

## 2025-04-16 PROCEDURE — G2211 COMPLEX E/M VISIT ADD ON: HCPCS | Performed by: NURSE PRACTITIONER

## 2025-04-16 PROCEDURE — 3074F SYST BP LT 130 MM HG: CPT | Performed by: NURSE PRACTITIONER

## 2025-04-16 NOTE — PROGRESS NOTES
Name: Andressa Boone  : 1970         Chief Complaint:     Chief Complaint   Patient presents with    Hypertension     admits monitoring blood pressure at home. At-home blood pressure is averaging 127/73. admits a well balanced diet. admits following a low-sodium diet. For physical activity, notes walking. Denies chest pain, shortness of breath, headache, vision changes, palpitations, lightheadedness, or dizziness.      Hypothyroidism    Anxiety     Today, she states her anxiety is good. She has not had to take her xanax in awhile       History of Present Illness:      Andressa Boone is a 55 y.o.  female who presents with Hypertension (admits monitoring blood pressure at home. At-home blood pressure is averaging 127/73. admits a well balanced diet. admits following a low-sodium diet. For physical activity, notes walking. Denies chest pain, shortness of breath, headache, vision changes, palpitations, lightheadedness, or dizziness.  ), Hypothyroidism, and Anxiety (Today, she states her anxiety is good. She has not had to take her xanax in awhile)      HPI    Hypertension:  Current treatment includes lasix 20mg QD, metoprolol 150mg QD, and diltiazem 300mg QD.  She is monitoring blood pressure at home and this is averaging 127/73.  Admits a well-balanced diet and low-salt diet.  For physical activity she is walking.  Denies chest pain, shortness of breath, palpitations, lightheadedness, or dizziness.    Anxiety, depression:  Today she states her anxiety is controlled.  Following with psychiatry in [place].  Taking Cymbalta 30 mg daily and Xanax as needed.    Hypothyroidism:  Current treatment includes levothyroxine 75 mcg daily.  Reviewed TFTs 2024, WNL.      Past Medical History:     Past Medical History:   Diagnosis Date    GERI (acute kidney injury) 2024    Anemia, unspecified     Anxiety     Atrial fibrillation (HCC)     Celiac disease     Drug-induced hypokalemia 2024    Edema     H/O

## 2025-04-24 ENCOUNTER — OFFICE VISIT (OUTPATIENT)
Dept: PRIMARY CARE CLINIC | Age: 55
End: 2025-04-24
Payer: COMMERCIAL

## 2025-04-24 VITALS
SYSTOLIC BLOOD PRESSURE: 98 MMHG | HEART RATE: 58 BPM | WEIGHT: 215 LBS | OXYGEN SATURATION: 97 % | DIASTOLIC BLOOD PRESSURE: 74 MMHG | BODY MASS INDEX: 35.78 KG/M2

## 2025-04-24 DIAGNOSIS — E66.812 CLASS 2 OBESITY WITH BODY MASS INDEX (BMI) OF 38.0 TO 38.9 IN ADULT, UNSPECIFIED OBESITY TYPE, UNSPECIFIED WHETHER SERIOUS COMORBIDITY PRESENT: ICD-10-CM

## 2025-04-24 DIAGNOSIS — I10 ESSENTIAL HYPERTENSION: Primary | ICD-10-CM

## 2025-04-24 DIAGNOSIS — N18.31 CHRONIC KIDNEY DISEASE, STAGE 3A (HCC): ICD-10-CM

## 2025-04-24 PROCEDURE — G2211 COMPLEX E/M VISIT ADD ON: HCPCS | Performed by: FAMILY MEDICINE

## 2025-04-24 PROCEDURE — 99214 OFFICE O/P EST MOD 30 MIN: CPT | Performed by: FAMILY MEDICINE

## 2025-04-24 PROCEDURE — 3074F SYST BP LT 130 MM HG: CPT | Performed by: FAMILY MEDICINE

## 2025-04-24 PROCEDURE — 3078F DIAST BP <80 MM HG: CPT | Performed by: FAMILY MEDICINE

## 2025-04-24 RX ORDER — MELOXICAM 15 MG/1
15 TABLET ORAL DAILY
COMMUNITY
Start: 2025-04-17

## 2025-04-24 RX ORDER — PHENTERMINE HYDROCHLORIDE 15 MG/1
15 CAPSULE ORAL 2 TIMES DAILY
Qty: 60 CAPSULE | Refills: 1 | Status: SHIPPED | OUTPATIENT
Start: 2025-04-24 | End: 2025-06-23

## 2025-04-24 SDOH — ECONOMIC STABILITY: FOOD INSECURITY: WITHIN THE PAST 12 MONTHS, YOU WORRIED THAT YOUR FOOD WOULD RUN OUT BEFORE YOU GOT MONEY TO BUY MORE.: NEVER TRUE

## 2025-04-24 SDOH — ECONOMIC STABILITY: FOOD INSECURITY: WITHIN THE PAST 12 MONTHS, THE FOOD YOU BOUGHT JUST DIDN'T LAST AND YOU DIDN'T HAVE MONEY TO GET MORE.: NEVER TRUE

## 2025-04-24 ASSESSMENT — PATIENT HEALTH QUESTIONNAIRE - PHQ9
3. TROUBLE FALLING OR STAYING ASLEEP: NOT AT ALL
5. POOR APPETITE OR OVEREATING: NOT AT ALL
10. IF YOU CHECKED OFF ANY PROBLEMS, HOW DIFFICULT HAVE THESE PROBLEMS MADE IT FOR YOU TO DO YOUR WORK, TAKE CARE OF THINGS AT HOME, OR GET ALONG WITH OTHER PEOPLE: NOT DIFFICULT AT ALL
SUM OF ALL RESPONSES TO PHQ QUESTIONS 1-9: 0
4. FEELING TIRED OR HAVING LITTLE ENERGY: NOT AT ALL
2. FEELING DOWN, DEPRESSED OR HOPELESS: NOT AT ALL
1. LITTLE INTEREST OR PLEASURE IN DOING THINGS: NOT AT ALL
8. MOVING OR SPEAKING SO SLOWLY THAT OTHER PEOPLE COULD HAVE NOTICED. OR THE OPPOSITE, BEING SO FIGETY OR RESTLESS THAT YOU HAVE BEEN MOVING AROUND A LOT MORE THAN USUAL: NOT AT ALL
SUM OF ALL RESPONSES TO PHQ QUESTIONS 1-9: 0
9. THOUGHTS THAT YOU WOULD BE BETTER OFF DEAD, OR OF HURTING YOURSELF: NOT AT ALL
SUM OF ALL RESPONSES TO PHQ QUESTIONS 1-9: 0
7. TROUBLE CONCENTRATING ON THINGS, SUCH AS READING THE NEWSPAPER OR WATCHING TELEVISION: NOT AT ALL
6. FEELING BAD ABOUT YOURSELF - OR THAT YOU ARE A FAILURE OR HAVE LET YOURSELF OR YOUR FAMILY DOWN: NOT AT ALL
SUM OF ALL RESPONSES TO PHQ QUESTIONS 1-9: 0

## 2025-04-24 NOTE — PROGRESS NOTES
TAKE 3 TABLETS BY MOUTH AT BEDTIME AS NEEDED FOR INSOMNIA (Patient taking differently: Take 1 tablet by mouth 3 times daily Patient states she's only been taking a 50mg not 3 tablets) 270 tablet 1     No current facility-administered medications for this visit.          Review of Systems   See hpi  Physical Exam   Physical Exam  Constitutional:       Appearance: Well-developed.   HENT:      Head: Normocephalic.      Right Ear: External ear normal.      Left Ear: External ear normal.   NAD  RRR  CTAB  Vitals:    04/24/25 1505   BP: 98/74   Pulse: 58   SpO2: 97%         Assessment and Plan     Assessment & Plan  Obesity   We discussed weight loss modalities including nonpharmacological and pharmacological.  She is currently doing well on adipex and metformin combination and tolerating the medication well without side effects or intolerances.  She has been tracking any and all intake of food and fluid and a log.  She is physically active several times per week as well.  There is been strong symptomatic improvement over the past few weeks since initiation of Adipex.  She is agreeable to continue at this time.  Discussed Adipex parameters and requirement for 5% weight loss in a period of 12 weeks.    Given the strong overall improvement with Adipex use, continue at this time    Continue regular physical activity, muscle building and resistance training.    Hypertension is stable at this time, continue present management.    History of CKD stable, minimize NSAIDs and maintain adequate hydration.  Follow-up with nephrology.

## 2025-04-29 ENCOUNTER — RESULTS FOLLOW-UP (OUTPATIENT)
Dept: PRIMARY CARE CLINIC | Age: 55
End: 2025-04-29

## 2025-04-29 ENCOUNTER — HOSPITAL ENCOUNTER (OUTPATIENT)
Dept: GENERAL RADIOLOGY | Age: 55
Discharge: HOME | End: 2025-05-01
Payer: COMMERCIAL

## 2025-04-29 ENCOUNTER — OFFICE VISIT (OUTPATIENT)
Dept: PRIMARY CARE CLINIC | Age: 55
End: 2025-04-29
Payer: COMMERCIAL

## 2025-04-29 VITALS
BODY MASS INDEX: 35.45 KG/M2 | DIASTOLIC BLOOD PRESSURE: 80 MMHG | WEIGHT: 213 LBS | SYSTOLIC BLOOD PRESSURE: 110 MMHG | OXYGEN SATURATION: 99 % | TEMPERATURE: 95.7 F | HEART RATE: 61 BPM

## 2025-04-29 DIAGNOSIS — R07.81 RIB PAIN ON LEFT SIDE: ICD-10-CM
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
## 2025-04-29 DIAGNOSIS — R07.81 RIB PAIN ON LEFT SIDE: Primary | ICD-10-CM

## 2025-04-29 PROCEDURE — G2211 COMPLEX E/M VISIT ADD ON: HCPCS | Performed by: NURSE PRACTITIONER

## 2025-04-29 PROCEDURE — 99214 OFFICE O/P EST MOD 30 MIN: CPT | Performed by: NURSE PRACTITIONER

## 2025-04-29 PROCEDURE — 3074F SYST BP LT 130 MM HG: CPT | Performed by: NURSE PRACTITIONER

## 2025-04-29 PROCEDURE — 71101 X-RAY EXAM UNILAT RIBS/CHEST: CPT

## 2025-04-29 PROCEDURE — 3079F DIAST BP 80-89 MM HG: CPT | Performed by: NURSE PRACTITIONER

## 2025-04-29 RX ORDER — TRAMADOL HYDROCHLORIDE 50 MG/1
50 TABLET ORAL 2 TIMES DAILY PRN
Qty: 10 TABLET | Refills: 0 | Status: CANCELLED | OUTPATIENT
Start: 2025-04-29 | End: 2025-05-04

## 2025-04-29 RX ORDER — HYDROCODONE BITARTRATE AND ACETAMINOPHEN 5; 325 MG/1; MG/1
1 TABLET ORAL 2 TIMES DAILY PRN
Qty: 10 TABLET | Refills: 0 | Status: SHIPPED | OUTPATIENT
Start: 2025-04-29 | End: 2025-05-04

## 2025-04-29 NOTE — PROGRESS NOTES
Name: Andressa Boone  : 1970         Chief Complaint:     Chief Complaint   Patient presents with    Fall     -pt fell 2 days ago off of a deck  -she knocked the wind out of her  -left side abdomen pain       History of Present Illness:      Andressa Boone is a 55 y.o.  female who presents with Fall (-pt fell 2 days ago off of a deck/-she knocked the wind out of her/-left side abdomen pain)      HPI    Patient presents with concerns of left lateral chest wall pain and inferior left breast pain after suffering a fall.  She states she fell off of a deck 2 days ago (estimated 2 feet high) when her chair went out from under her. She lost her balance and fell off the deck onto her left side onto the stones. She did not hit her head or lose consciousness.  Since this time she has had persistent left side pain. Denies skin changes in this area. Denies SOB, but pain worsens with inspiration. She has taken tylenol and celebrex with no relief. Is suffering from 10/10 pain, worse with moving or reaching.     Past Medical History:     Past Medical History:   Diagnosis Date    GERI (acute kidney injury) 2024    Anemia, unspecified     Anxiety     Atrial fibrillation (HCC)     Celiac disease     Drug-induced hypokalemia 2024    Edema     H/O cardiovascular stress test 2021    [place]ova treadmill score is 0    H/O echocardiogram 2021    EF >60% Normal study    History of cardiac cath 2021    [facility]/Dr. Little/Right Radial     History of cardiovascular stress test 2021    Equivocal study. Small perfusion defect of mild intensity in the anterior and anteroseptal regions during stress and rest imaging, which is most consistent with artifact EF 71% Duke Treadmill score is -2intermediate risk.    History of normal Holter exam 2021    CAM 6 days 19 hrs Normal sinus. AT 2 episodes Longest/fastest 9 beats at ave 127 bpm p to 153 bpm PAC 0.08% PVC 0.07%    Hypertension

## 2025-04-30 ENCOUNTER — HOSPITAL ENCOUNTER (OUTPATIENT)
Dept: CT IMAGING | Age: 55
Discharge: HOME | End: 2025-05-02
Payer: COMMERCIAL

## 2025-04-30 DIAGNOSIS — R07.81 RIB PAIN ON LEFT SIDE: ICD-10-CM

## 2025-04-30 PROCEDURE — 71250 CT THORAX DX C-: CPT

## 2025-05-01 ENCOUNTER — RESULTS FOLLOW-UP (OUTPATIENT)
Dept: PRIMARY CARE CLINIC | Age: 55
End: 2025-05-01

## 2025-05-16 ENCOUNTER — HOSPITAL ENCOUNTER (OUTPATIENT)
Age: 55
Discharge: HOME | End: 2025-05-16
Payer: COMMERCIAL

## 2025-05-16 ENCOUNTER — OFFICE VISIT (OUTPATIENT)
Dept: NEPHROLOGY | Age: 55
End: 2025-05-16
Payer: COMMERCIAL

## 2025-05-16 VITALS
HEART RATE: 67 BPM | BODY MASS INDEX: 34.6 KG/M2 | HEIGHT: 65 IN | DIASTOLIC BLOOD PRESSURE: 70 MMHG | TEMPERATURE: 96.4 F | RESPIRATION RATE: 18 BRPM | SYSTOLIC BLOOD PRESSURE: 133 MMHG | WEIGHT: 207.7 LBS

## 2025-05-16 DIAGNOSIS — I12.9 HYPERTENSIVE RENAL DISEASE, STAGE 1 THROUGH STAGE 4 OR UNSPECIFIED CHRONIC KIDNEY DISEASE: ICD-10-CM

## 2025-05-16 DIAGNOSIS — N18.2 CKD (CHRONIC KIDNEY DISEASE), STAGE II: Primary | ICD-10-CM

## 2025-05-16 DIAGNOSIS — N18.2 CKD (CHRONIC KIDNEY DISEASE), STAGE II: ICD-10-CM

## 2025-05-16 LAB
ANION GAP SERPL CALCULATED.3IONS-SCNC: 10 MMOL/L (ref 9–16)
BUN SERPL-MCNC: 20 MG/DL (ref 6–20)
CALCIUM SERPL-MCNC: 9.7 MG/DL (ref 8.6–10.4)
CHLORIDE SERPL-SCNC: 103 MMOL/L (ref 98–107)
CO2 SERPL-SCNC: 29 MMOL/L (ref 20–31)
CREAT SERPL-MCNC: 1 MG/DL (ref 0.6–0.9)
CREAT UR-MCNC: 106 MG/DL (ref 28–217)
GFR, ESTIMATED: 67 ML/MIN/1.73M2
GLUCOSE SERPL-MCNC: 88 MG/DL (ref 74–99)
POTASSIUM SERPL-SCNC: 4.4 MMOL/L (ref 3.7–5.3)
SODIUM SERPL-SCNC: 142 MMOL/L (ref 136–145)
TOTAL PROTEIN, URINE: 7 MG/DL
URINE TOTAL PROTEIN CREATININE RATIO: 0.07 (ref 0–0.2)

## 2025-05-16 PROCEDURE — 99213 OFFICE O/P EST LOW 20 MIN: CPT | Performed by: INTERNAL MEDICINE

## 2025-05-16 PROCEDURE — 3078F DIAST BP <80 MM HG: CPT | Performed by: INTERNAL MEDICINE

## 2025-05-16 PROCEDURE — 80048 BASIC METABOLIC PNL TOTAL CA: CPT

## 2025-05-16 PROCEDURE — 82570 ASSAY OF URINE CREATININE: CPT

## 2025-05-16 PROCEDURE — 3075F SYST BP GE 130 - 139MM HG: CPT | Performed by: INTERNAL MEDICINE

## 2025-05-16 PROCEDURE — 84156 ASSAY OF PROTEIN URINE: CPT

## 2025-05-16 PROCEDURE — 36415 COLL VENOUS BLD VENIPUNCTURE: CPT

## 2025-05-16 NOTE — PROGRESS NOTES
[facility] PHYSICIANS [facility], [facility] KIDNEY AND HYPERTENSION  [address] [place] [address]  Dept: [phone]  Office Progress Note  5/16/2025 11:02 AM      Pt Name:    Andressa Boone  YOB: 1970  Primary Care Physician:  Criss Montelongo, APRN - CNP     Chief Complaint:   Chief Complaint   Patient presents with    Follow-up     Patient here for GERI follow up. States swelling in leg and ankles.         Background Information/Interval History:   The patient is a 55 y.o.  femalepatient with  hx celiac disease, HTN and atrial fibrillation and DM and takes metformin. Sees Dr. Little for atrial fibrillation. She has loop recorder. She reports she had this placed in 2021 when she had COVID and recurrent syncope. She was taking naproxen 2 in the morning prn for generalized aches. She says she took it 3-4 times per week but no longer taking.   She is adopted. She reports being diagnosed with lymphedema (by DR Johnson). She reports she was referred to lymphedema clinic. She has lymphedema pumps and wears stockings. 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 She was recently admitted with GERI. Creat went upto 7 and was given IVF and improved with fluids. She was admitted to a [facility]. I am seeing for 6 months follow-up. No new complaints. No urinary complaints. Says sugars are in  range. No urinary complaints. Bp is stable in 133/70. She is now on mobic per podiatrist.    She did not get labs done for this visit.     Past History:  Past Medical History:   Diagnosis Date    GERI (acute kidney injury) 08/03/2024    Anemia, unspecified     Anxiety     Atrial fibrillation (HCC)     Celiac disease     Drug-induced hypokalemia 08/19/2024    Edema     H/O cardiovascular stress test 03/08/2021    [place] treadmill score is 0    H/O echocardiogram 01/26/2021    EF >60% Normal study    History of cardiac cath 04/01/2021    [facility]/

## 2025-05-29 ENCOUNTER — OFFICE VISIT (OUTPATIENT)
Dept: PRIMARY CARE CLINIC | Age: 55
End: 2025-05-29
Payer: COMMERCIAL

## 2025-05-29 VITALS
BODY MASS INDEX: 35.45 KG/M2 | HEART RATE: 65 BPM | DIASTOLIC BLOOD PRESSURE: 76 MMHG | WEIGHT: 213 LBS | OXYGEN SATURATION: 99 % | SYSTOLIC BLOOD PRESSURE: 102 MMHG

## 2025-05-29 DIAGNOSIS — Z76.89 ENCOUNTER FOR WEIGHT MANAGEMENT: ICD-10-CM

## 2025-05-29 DIAGNOSIS — N18.31 CHRONIC KIDNEY DISEASE, STAGE 3A (HCC): ICD-10-CM

## 2025-05-29 DIAGNOSIS — E66.812 CLASS 2 OBESITY WITH BODY MASS INDEX (BMI) OF 38.0 TO 38.9 IN ADULT, UNSPECIFIED OBESITY TYPE, UNSPECIFIED WHETHER SERIOUS COMORBIDITY PRESENT: Primary | ICD-10-CM

## 2025-05-29 DIAGNOSIS — I10 ESSENTIAL HYPERTENSION: ICD-10-CM

## 2025-05-29 PROCEDURE — G2211 COMPLEX E/M VISIT ADD ON: HCPCS | Performed by: FAMILY MEDICINE

## 2025-05-29 PROCEDURE — 3074F SYST BP LT 130 MM HG: CPT | Performed by: FAMILY MEDICINE

## 2025-05-29 PROCEDURE — 99214 OFFICE O/P EST MOD 30 MIN: CPT | Performed by: FAMILY MEDICINE

## 2025-05-29 PROCEDURE — 3078F DIAST BP <80 MM HG: CPT | Performed by: FAMILY MEDICINE

## 2025-05-29 RX ORDER — FUROSEMIDE 20 MG/1
20 TABLET ORAL DAILY
Qty: 30 TABLET | Refills: 4 | Status: SHIPPED | OUTPATIENT
Start: 2025-05-29

## 2025-05-29 SDOH — ECONOMIC STABILITY: FOOD INSECURITY: WITHIN THE PAST 12 MONTHS, THE FOOD YOU BOUGHT JUST DIDN'T LAST AND YOU DIDN'T HAVE MONEY TO GET MORE.: NEVER TRUE

## 2025-05-29 SDOH — ECONOMIC STABILITY: FOOD INSECURITY: WITHIN THE PAST 12 MONTHS, YOU WORRIED THAT YOUR FOOD WOULD RUN OUT BEFORE YOU GOT MONEY TO BUY MORE.: NEVER TRUE

## 2025-05-29 ASSESSMENT — PATIENT HEALTH QUESTIONNAIRE - PHQ9
SUM OF ALL RESPONSES TO PHQ QUESTIONS 1-9: 0
9. THOUGHTS THAT YOU WOULD BE BETTER OFF DEAD, OR OF HURTING YOURSELF: NOT AT ALL
1. LITTLE INTEREST OR PLEASURE IN DOING THINGS: NOT AT ALL
7. TROUBLE CONCENTRATING ON THINGS, SUCH AS READING THE NEWSPAPER OR WATCHING TELEVISION: NOT AT ALL
5. POOR APPETITE OR OVEREATING: NOT AT ALL
SUM OF ALL RESPONSES TO PHQ QUESTIONS 1-9: 0
10. IF YOU CHECKED OFF ANY PROBLEMS, HOW DIFFICULT HAVE THESE PROBLEMS MADE IT FOR YOU TO DO YOUR WORK, TAKE CARE OF THINGS AT HOME, OR GET ALONG WITH OTHER PEOPLE: NOT DIFFICULT AT ALL
SUM OF ALL RESPONSES TO PHQ QUESTIONS 1-9: 0
3. TROUBLE FALLING OR STAYING ASLEEP: NOT AT ALL
8. MOVING OR SPEAKING SO SLOWLY THAT OTHER PEOPLE COULD HAVE NOTICED. OR THE OPPOSITE, BEING SO FIGETY OR RESTLESS THAT YOU HAVE BEEN MOVING AROUND A LOT MORE THAN USUAL: NOT AT ALL
6. FEELING BAD ABOUT YOURSELF - OR THAT YOU ARE A FAILURE OR HAVE LET YOURSELF OR YOUR FAMILY DOWN: NOT AT ALL
SUM OF ALL RESPONSES TO PHQ QUESTIONS 1-9: 0
4. FEELING TIRED OR HAVING LITTLE ENERGY: NOT AT ALL
2. FEELING DOWN, DEPRESSED OR HOPELESS: NOT AT ALL

## 2025-05-29 NOTE — PROGRESS NOTES
[facility]      Patient:  Andressa Boone 55 y.o. female     Date of Service: 05/29/25        Chief Complaint:   Chief Complaint   Patient presents with    Weight Management     Taking Adipex-last weight-207lb-may 16th   No new concerns         History of Present Illness     History of Present Illness  Follow-up on weight loss, is down >15 pounds since initiation of Adipex.  Tolerating Adipex well daily.  No reported side effects such as chest pain, headaches, chest discomfort, palpitations, dry mouth        Allergies:    Gluten, Rocephin [ceftriaxone], and Bee venom    Medication List:    Current Outpatient Medications   Medication Sig Dispense Refill    phentermine 15 MG capsule Take 1 capsule by mouth in the morning and at bedtime for 60 days. Max Daily Amount: 30 mg 60 capsule 1    furosemide (LASIX) 20 MG tablet Take 1 tablet by mouth daily 30 tablet 1    metFORMIN (GLUCOPHAGE) 1000 MG tablet TAKE 1 TABLET BY MOUTH 2 TIMES DAILY (WITH MEALS) 180 tablet 3    metoprolol succinate (TOPROL XL) 100 MG extended release tablet Take one and a half tablets daily for a total of 150 mg daily 90 tablet 3    apixaban (ELIQUIS) 5 MG TABS tablet Take 1 tablet by mouth 2 times daily 180 tablet 3    DULoxetine (CYMBALTA) 30 MG extended release capsule Take 1 capsule by mouth daily      ALPRAZolam (XANAX) 0.25 MG tablet Take 1 tablet by mouth nightly as needed for Sleep.      levothyroxine (SYNTHROID) 75 MCG tablet Take 1 tablet by mouth daily 90 tablet 3    dilTIAZem (CARDIZEM CD) 300 MG extended release capsule TAKE 1 CAPSULE BY MOUTH DAILY 90 capsule 3    omeprazole (PRILOSEC) 20 MG delayed release capsule Take 1 capsule by mouth daily as needed (heartburn)      progesterone (PROMETRIUM) 100 MG CAPS capsule TAKE 1 CAPSULE BY MOUTH AT BEDTIME      traZODone (DESYREL) 50 MG tablet TAKE 3 TABLETS BY MOUTH AT BEDTIME AS NEEDED FOR INSOMNIA (Patient taking differently: Take 1 tablet by mouth nightly as

## 2025-07-10 ENCOUNTER — OFFICE VISIT (OUTPATIENT)
Dept: PRIMARY CARE CLINIC | Age: 55
End: 2025-07-10
Payer: COMMERCIAL

## 2025-07-10 VITALS
SYSTOLIC BLOOD PRESSURE: 104 MMHG | OXYGEN SATURATION: 99 % | BODY MASS INDEX: 35.11 KG/M2 | HEART RATE: 72 BPM | DIASTOLIC BLOOD PRESSURE: 84 MMHG | WEIGHT: 211 LBS

## 2025-07-10 DIAGNOSIS — N18.31 CHRONIC KIDNEY DISEASE, STAGE 3A (HCC): ICD-10-CM

## 2025-07-10 DIAGNOSIS — I10 ESSENTIAL HYPERTENSION: ICD-10-CM

## 2025-07-10 DIAGNOSIS — E66.812 CLASS 2 OBESITY WITH BODY MASS INDEX (BMI) OF 38.0 TO 38.9 IN ADULT, UNSPECIFIED OBESITY TYPE, UNSPECIFIED WHETHER SERIOUS COMORBIDITY PRESENT: Primary | ICD-10-CM

## 2025-07-10 PROCEDURE — 3079F DIAST BP 80-89 MM HG: CPT | Performed by: FAMILY MEDICINE

## 2025-07-10 PROCEDURE — 3074F SYST BP LT 130 MM HG: CPT | Performed by: FAMILY MEDICINE

## 2025-07-10 PROCEDURE — 99214 OFFICE O/P EST MOD 30 MIN: CPT | Performed by: FAMILY MEDICINE

## 2025-07-10 PROCEDURE — G2211 COMPLEX E/M VISIT ADD ON: HCPCS | Performed by: FAMILY MEDICINE

## 2025-07-10 RX ORDER — PHENTERMINE HYDROCHLORIDE 15 MG/1
30 CAPSULE ORAL EVERY MORNING
COMMUNITY

## 2025-07-10 SDOH — ECONOMIC STABILITY: FOOD INSECURITY: WITHIN THE PAST 12 MONTHS, THE FOOD YOU BOUGHT JUST DIDN'T LAST AND YOU DIDN'T HAVE MONEY TO GET MORE.: NEVER TRUE

## 2025-07-10 SDOH — ECONOMIC STABILITY: FOOD INSECURITY: WITHIN THE PAST 12 MONTHS, YOU WORRIED THAT YOUR FOOD WOULD RUN OUT BEFORE YOU GOT MONEY TO BUY MORE.: NEVER TRUE

## 2025-07-10 ASSESSMENT — PATIENT HEALTH QUESTIONNAIRE - PHQ9
SUM OF ALL RESPONSES TO PHQ QUESTIONS 1-9: 0
2. FEELING DOWN, DEPRESSED OR HOPELESS: NOT AT ALL
1. LITTLE INTEREST OR PLEASURE IN DOING THINGS: NOT AT ALL
SUM OF ALL RESPONSES TO PHQ QUESTIONS 1-9: 0

## 2025-07-10 NOTE — PROGRESS NOTES
AT BEDTIME AS NEEDED FOR INSOMNIA) 270 tablet 1     No current facility-administered medications for this visit.          Review of Systems   See hpi  Physical Exam   Physical Exam  Constitutional:       Appearance: Well-developed.   HENT:      Head: Normocephalic.      Right Ear: External ear normal.      Left Ear: External ear normal.   NAD  RRR  CTAB  Vitals:    07/10/25 0918   BP: 104/84   Pulse: 72   SpO2: 99%         Assessment and Plan     Assessment & Plan  Obesity   We discussed weight loss modalities including nonpharmacological and pharmacological.  She is currently doing well on adipex and metformin combination and tolerating the medication well without side effects or intolerances.  She has been tracking any and all intake of food and fluid and a log.  She is physically active several times per week as well.  There is been strong symptomatic improvement over the past few months since initiation of Adipex.  She is agreeable to continue at this time.  She is 211 pounds this evaluation.  Discussed Adipex parameters and requirement for 5% weight loss in a period of 12 weeks.    Her weight did plateau in the past 1 to 2 weeks however she feels her tracking was skewed and her diet changes are result of significant interpersonal stressors.    Given the strong overall improvement with Adipex use, continue at this time    Continue regular physical activity, muscle building and resistance training.    Hypertension is stable at this time, continue present management.    History of CKD stable, minimize NSAIDs and maintain adequate hydration.  Follow-up with nephrology.

## 2025-07-14 DIAGNOSIS — Z76.89 ENCOUNTER FOR WEIGHT MANAGEMENT: ICD-10-CM

## 2025-07-14 DIAGNOSIS — E66.812 CLASS 2 OBESITY WITH BODY MASS INDEX (BMI) OF 38.0 TO 38.9 IN ADULT, UNSPECIFIED OBESITY TYPE, UNSPECIFIED WHETHER SERIOUS COMORBIDITY PRESENT: Primary | ICD-10-CM

## 2025-07-15 RX ORDER — PHENTERMINE HYDROCHLORIDE 15 MG/1
30 CAPSULE ORAL EVERY MORNING
Qty: 60 CAPSULE | Refills: 0 | Status: SHIPPED | OUTPATIENT
Start: 2025-07-15 | End: 2025-08-14

## 2025-07-28 ENCOUNTER — OFFICE VISIT (OUTPATIENT)
Dept: CARDIOLOGY | Age: 55
End: 2025-07-28
Payer: COMMERCIAL

## 2025-07-28 VITALS
BODY MASS INDEX: 35.49 KG/M2 | HEART RATE: 66 BPM | HEIGHT: 65 IN | SYSTOLIC BLOOD PRESSURE: 121 MMHG | RESPIRATION RATE: 18 BRPM | OXYGEN SATURATION: 99 % | WEIGHT: 213 LBS | DIASTOLIC BLOOD PRESSURE: 73 MMHG

## 2025-07-28 DIAGNOSIS — Z95.818 IMPLANTABLE LOOP RECORDER PRESENT: ICD-10-CM

## 2025-07-28 DIAGNOSIS — E66.812 CLASS 2 OBESITY WITH BODY MASS INDEX (BMI) OF 35.0 TO 35.9 IN ADULT, UNSPECIFIED OBESITY TYPE, UNSPECIFIED WHETHER SERIOUS COMORBIDITY PRESENT: ICD-10-CM

## 2025-07-28 DIAGNOSIS — I48.0 PAF (PAROXYSMAL ATRIAL FIBRILLATION) (HCC): ICD-10-CM

## 2025-07-28 DIAGNOSIS — I25.10 MILD CAD: Primary | ICD-10-CM

## 2025-07-28 DIAGNOSIS — I10 ESSENTIAL HYPERTENSION: ICD-10-CM

## 2025-07-28 DIAGNOSIS — Z79.01 CHRONIC ANTICOAGULATION: ICD-10-CM

## 2025-07-28 PROCEDURE — 3074F SYST BP LT 130 MM HG: CPT | Performed by: FAMILY MEDICINE

## 2025-07-28 PROCEDURE — 99214 OFFICE O/P EST MOD 30 MIN: CPT | Performed by: FAMILY MEDICINE

## 2025-07-28 PROCEDURE — 93000 ELECTROCARDIOGRAM COMPLETE: CPT | Performed by: FAMILY MEDICINE

## 2025-07-28 PROCEDURE — 3078F DIAST BP <80 MM HG: CPT | Performed by: FAMILY MEDICINE

## 2025-07-28 RX ORDER — METOPROLOL SUCCINATE 100 MG/1
100 TABLET, EXTENDED RELEASE ORAL DAILY
Qty: 90 TABLET | Refills: 3 | Status: SHIPPED | OUTPATIENT
Start: 2025-07-28

## 2025-07-28 RX ORDER — FLECAINIDE ACETATE 100 MG/1
100 TABLET ORAL 2 TIMES DAILY
Qty: 180 TABLET | Refills: 3 | Status: SHIPPED | OUTPATIENT
Start: 2025-07-28

## 2025-07-28 RX ORDER — FLECAINIDE ACETATE 100 MG/1
100 TABLET ORAL 2 TIMES DAILY
Qty: 60 TABLET | Refills: 0 | Status: SHIPPED | OUTPATIENT
Start: 2025-07-28 | End: 2025-07-28 | Stop reason: SDUPTHER

## 2025-07-28 RX ORDER — METOPROLOL SUCCINATE 100 MG/1
100 TABLET, EXTENDED RELEASE ORAL DAILY
Qty: 90 TABLET | Refills: 3 | Status: CANCELLED | OUTPATIENT
Start: 2025-07-28

## 2025-07-28 RX ORDER — LEVOTHYROXINE SODIUM 75 UG/1
75 TABLET ORAL DAILY
Qty: 90 TABLET | Refills: 3 | Status: SHIPPED | OUTPATIENT
Start: 2025-07-28

## 2025-08-19 DIAGNOSIS — Z01.818 PRE-OP EXAM: Primary | ICD-10-CM

## 2025-08-21 ENCOUNTER — HOSPITAL ENCOUNTER (OUTPATIENT)
Dept: LAB | Age: 55
Discharge: HOME | End: 2025-08-21
Payer: COMMERCIAL

## 2025-08-21 ENCOUNTER — HOSPITAL ENCOUNTER (OUTPATIENT)
Dept: GENERAL RADIOLOGY | Age: 55
Discharge: HOME | End: 2025-08-23
Payer: COMMERCIAL

## 2025-08-21 DIAGNOSIS — Z01.818 PRE-OP EXAM: ICD-10-CM

## 2025-08-21 DIAGNOSIS — Z01.818 PRE-OP EXAM: Primary | ICD-10-CM

## 2025-08-21 LAB
ANION GAP SERPL CALCULATED.3IONS-SCNC: 10 MMOL/L (ref 9–16)
BUN SERPL-MCNC: 16 MG/DL (ref 6–20)
BUN/CREAT SERPL: 16 (ref 9–20)
CALCIUM SERPL-MCNC: 9.4 MG/DL (ref 8.6–10.4)
CHLORIDE SERPL-SCNC: 103 MMOL/L (ref 98–107)
CO2 SERPL-SCNC: 29 MMOL/L (ref 20–31)
CREAT SERPL-MCNC: 1 MG/DL (ref 0.5–0.9)
ERYTHROCYTE [DISTWIDTH] IN BLOOD BY AUTOMATED COUNT: 12.9 % (ref 11.8–14.4)
GFR, ESTIMATED: 68 ML/MIN/1.73M2
GLUCOSE SERPL-MCNC: 96 MG/DL (ref 74–99)
HCT VFR BLD AUTO: 39.2 % (ref 36.3–47.1)
HGB BLD-MCNC: 12.5 G/DL (ref 11.9–15.1)
MCH RBC QN AUTO: 31.3 PG (ref 25.2–33.5)
MCHC RBC AUTO-ENTMCNC: 31.9 G/DL (ref 28.4–34.8)
MCV RBC AUTO: 98.2 FL (ref 82.6–102.9)
NRBC BLD-RTO: 0 PER 100 WBC
PLATELET # BLD AUTO: 301 K/UL (ref 138–453)
PMV BLD AUTO: 9.5 FL (ref 8.1–13.5)
POTASSIUM SERPL-SCNC: 4.2 MMOL/L (ref 3.7–5.3)
RBC # BLD AUTO: 3.99 M/UL (ref 3.95–5.11)
SODIUM SERPL-SCNC: 142 MMOL/L (ref 136–145)
WBC OTHER # BLD: 4.7 K/UL (ref 3.5–11.3)

## 2025-08-21 PROCEDURE — 71046 X-RAY EXAM CHEST 2 VIEWS: CPT

## 2025-08-21 PROCEDURE — 85027 COMPLETE CBC AUTOMATED: CPT

## 2025-08-21 PROCEDURE — 36415 COLL VENOUS BLD VENIPUNCTURE: CPT

## 2025-08-21 PROCEDURE — 80048 BASIC METABOLIC PNL TOTAL CA: CPT

## 2025-08-26 ENCOUNTER — OFFICE VISIT (OUTPATIENT)
Dept: PRIMARY CARE CLINIC | Age: 55
End: 2025-08-26
Payer: COMMERCIAL

## 2025-08-26 VITALS
WEIGHT: 210 LBS | SYSTOLIC BLOOD PRESSURE: 102 MMHG | TEMPERATURE: 98 F | HEART RATE: 57 BPM | DIASTOLIC BLOOD PRESSURE: 70 MMHG | BODY MASS INDEX: 34.95 KG/M2 | OXYGEN SATURATION: 98 %

## 2025-08-26 DIAGNOSIS — Z01.818 PRE-OP EXAM: Primary | ICD-10-CM

## 2025-08-26 PROCEDURE — G2211 COMPLEX E/M VISIT ADD ON: HCPCS | Performed by: NURSE PRACTITIONER

## 2025-08-26 PROCEDURE — 3074F SYST BP LT 130 MM HG: CPT | Performed by: NURSE PRACTITIONER

## 2025-08-26 PROCEDURE — 99214 OFFICE O/P EST MOD 30 MIN: CPT | Performed by: NURSE PRACTITIONER

## 2025-08-26 PROCEDURE — 3078F DIAST BP <80 MM HG: CPT | Performed by: NURSE PRACTITIONER

## 2025-08-26 RX ORDER — EPINEPHRINE 0.3 MG/.3ML
INJECTION SUBCUTANEOUS
COMMUNITY
Start: 2024-09-07

## 2025-08-26 RX ORDER — PHENTERMINE HYDROCHLORIDE 15 MG/1
CAPSULE ORAL
COMMUNITY
Start: 2025-07-15

## (undated) DEVICE — GLOVE ORANGE PI 7 1/2   MSG9075

## (undated) DEVICE — GLIDESHEATH NITINOL HYDROPHILIC COATED INTRODUCER SHEATH: Brand: GLIDESHEATH

## (undated) DEVICE — STAZ ENDO KIT: Brand: MEDLINE INDUSTRIES, INC.

## (undated) DEVICE — DRAPE, RADIAL, STERILE: Brand: MEDLINE

## (undated) DEVICE — CATHETER ANGIO PIG 0.045 IN 5 FRX110 CM VENTRICULAR EXPO

## (undated) DEVICE — FORCEPS BX L240CM JAW DIA2.2MM RAD JAW 4 HOT DISP

## (undated) DEVICE — RADIFOCUS OPTITORQUE ANGIOGRAPHIC CATHETER: Brand: OPTITORQUE

## (undated) DEVICE — BENTSON WIRE GUIDE 20CM DISTAL FLEXIBILITY WITH SOFTENED TIP: Brand: BENTSON

## (undated) DEVICE — MERCY TIFFIN CATH LAB PACK: Brand: MEDLINE INDUSTRIES, INC.

## (undated) DEVICE — BITEBLOCK ENDOSCP 60FR MAXI WHT POLYETH STURDY W/ VELC WVN